# Patient Record
Sex: FEMALE | Race: WHITE | Employment: OTHER | URBAN - METROPOLITAN AREA
[De-identification: names, ages, dates, MRNs, and addresses within clinical notes are randomized per-mention and may not be internally consistent; named-entity substitution may affect disease eponyms.]

---

## 2017-01-03 DIAGNOSIS — I10 ESSENTIAL HYPERTENSION WITH GOAL BLOOD PRESSURE LESS THAN 140/90: Primary | ICD-10-CM

## 2017-01-03 DIAGNOSIS — J44.9 CHRONIC OBSTRUCTIVE PULMONARY DISEASE, UNSPECIFIED COPD TYPE (H): ICD-10-CM

## 2017-01-03 DIAGNOSIS — R91.8 GROUND GLASS OPACITY PRESENT ON IMAGING OF LUNG: ICD-10-CM

## 2017-01-03 RX ORDER — HYDROCHLOROTHIAZIDE 25 MG/1
25 TABLET ORAL DAILY
Qty: 30 TABLET | Refills: 0 | Status: SHIPPED | OUTPATIENT
Start: 2017-01-03 | End: 2017-02-26

## 2017-01-03 NOTE — TELEPHONE ENCOUNTER
HCTZ 25 mg tab      Last Written Prescription Date: 8/29/16  Last Fill Quantity: 30, # refills: 3  Last Office Visit with [facility] or [facility] prescribing provider: 12/29/16       POTASSIUM   Date Value Ref Range Status   02/17/2016 3.6 3.4 - 5.3 mmol/L Final     CREATININE   Date Value Ref Range Status   02/17/2016 0.85 0.52 - 1.04 mg/dL Final     BP Readings from Last 3 Encounters:   12/29/16 96/62   11/16/16 128/80   10/26/16 116/73     Dulera 100-5 mcg oral inhaler        Last Written Prescription Date: 12/2/16  Last Fill Quantity: 1, # refills: 0    Last Office Visit with [facility] or [facility] prescribing provider:  12/29/16   Future Office Visit:       Date of Last Asthma Action Plan Letter:   There are no preventive care reminders to display for this patient.   Asthma Control Test: No flowsheet data found.    Date of Last Spirometry Test:   No results found for this or any previous visit.

## 2017-01-04 ENCOUNTER — TELEPHONE (OUTPATIENT)
Dept: FAMILY MEDICINE | Facility: CLINIC | Age: 72
End: 2017-01-04

## 2017-01-04 NOTE — TELEPHONE ENCOUNTER
Reason for Call: Request for an order or referral:    Order or referral being requested: Theresa is asking if Shae can refer her to . She says she saw Dr Persaud in [place] on 12/29 and he referred her. She called a [facility] clinic in [place] and they can't get her in until May and then they referred her to try to get apt at the [facility] but they can't get her in until April. She wants to know if Shae can refer her someplace where she can be seen sooner. Theresa says she was referred for on ongoing cough she has had for 4 months    Date needed: as soon as possible    Has the patient been seen by the PCP for this problem? YES    Additional comments: none    Phone number Patient can be reached at:  Home number on file [phone] (home)    Best Time:  anytime        Call taken on 1/4/2017 at 9:57 AM by Aliza Jolley

## 2017-01-24 ENCOUNTER — TELEPHONE (OUTPATIENT)
Dept: FAMILY MEDICINE | Facility: CLINIC | Age: 72
End: 2017-01-24

## 2017-01-25 ENCOUNTER — MEDICAL CORRESPONDENCE (OUTPATIENT)
Dept: HEALTH INFORMATION MANAGEMENT | Facility: CLINIC | Age: 72
End: 2017-01-25

## 2017-01-25 DIAGNOSIS — Z79.4 TYPE 2 DIABETES MELLITUS WITHOUT COMPLICATION, WITH LONG-TERM CURRENT USE OF INSULIN (H): Primary | Chronic | ICD-10-CM

## 2017-01-25 DIAGNOSIS — E11.9 TYPE 2 DIABETES MELLITUS WITHOUT COMPLICATION, WITH LONG-TERM CURRENT USE OF INSULIN (H): Primary | Chronic | ICD-10-CM

## 2017-01-25 RX ORDER — METFORMIN HCL 500 MG
500 TABLET, EXTENDED RELEASE 24 HR ORAL 2 TIMES DAILY WITH MEALS
Qty: 180 TABLET | Refills: 0 | Status: SHIPPED | OUTPATIENT
Start: 2017-01-25 | End: 2017-04-21

## 2017-01-25 NOTE — TELEPHONE ENCOUNTER
Metformin  mg tab         Last Written Prescription Date: 6/29/16  Last Fill Quantity: 180, # refills: 1  Last Office Visit with [facility], [facility] or [facility] prescribing provider:  12/29/16        BP Readings from Last 3 Encounters:   12/29/16 96/62   11/16/16 128/80   10/26/16 116/73     MICROL        8   6/29/2016  No results found for this basename: microalbumin  CREATININE   Date Value Ref Range Status   02/17/2016 0.85 0.52 - 1.04 mg/dL Final   ]  GFR ESTIMATE   Date Value Ref Range Status   02/17/2016 66 >60 mL/min/1.7m2 Final     Comment:     Non  GFR Calc   04/17/2015 76 >60 mL/min/1.7m2 Final     Comment:     Non  GFR Calc   02/04/2015 76 >60 mL/min/1.7m2 Final     Comment:     Non  GFR Calc     GFR ESTIMATE IF BLACK   Date Value Ref Range Status   02/17/2016 80 >60 mL/min/1.7m2 Final     Comment:      GFR Calc   04/17/2015 >90   GFR Calc   >60 mL/min/1.7m2 Final   02/04/2015 >90   GFR Calc   >60 mL/min/1.7m2 Final     CHOL      185   6/29/2016  HDL       47   6/29/2016  LDL      103   6/29/2016  TRIG      174   6/29/2016  CHOLHDLRATIO      3.2   4/30/2015  AST       27   2/17/2016  ALT       31   2/17/2016  A1C      6.5   6/29/2016  A1C      6.5   2/24/2016  A1C      7.1   9/11/2015  A1C      7.7   4/17/2015  A1C      6.9   1/5/2015  POTASSIUM   Date Value Ref Range Status   02/17/2016 3.6 3.4 - 5.3 mmol/L Final

## 2017-02-15 ENCOUNTER — PRE VISIT (OUTPATIENT)
Dept: GASTROENTEROLOGY | Facility: CLINIC | Age: 72
End: 2017-02-15

## 2017-02-15 NOTE — TELEPHONE ENCOUNTER
1.  Date/reason for appt: 4/11/17 -- LPRD  2.  Referring provider: Rony Persaud  3.  Call to patient (Yes / No - short description): no, referred  4.  Previous care at / records requested from: [facility] -- referral and records in Epic. Per appt note, no outside records.

## 2017-02-26 DIAGNOSIS — I10 ESSENTIAL HYPERTENSION WITH GOAL BLOOD PRESSURE LESS THAN 140/90: ICD-10-CM

## 2017-02-26 NOTE — TELEPHONE ENCOUNTER
HCTZ      Last Written Prescription Date: 02/26/17  Last Fill Quantity: 30, # refills: 0  Last Office Visit with [facility], [facility] or [facility] prescribing provider: 11/16/16       Potassium   Date Value Ref Range Status   02/17/2016 3.6 3.4 - 5.3 mmol/L Final     Creatinine   Date Value Ref Range Status   02/17/2016 0.85 0.52 - 1.04 mg/dL Final     BP Readings from Last 3 Encounters:   12/29/16 96/62   11/16/16 128/80   10/26/16 116/73

## 2017-02-27 RX ORDER — HYDROCHLOROTHIAZIDE 25 MG/1
25 TABLET ORAL DAILY
Qty: 30 TABLET | Refills: 0 | Status: SHIPPED | OUTPATIENT
Start: 2017-02-27 | End: 2017-04-21

## 2017-03-06 ENCOUNTER — HOSPITAL ENCOUNTER (OUTPATIENT)
Dept: CT IMAGING | Facility: CLINIC | Age: 72
Discharge: HOME | End: 2017-03-06
Attending: PHYSICIAN ASSISTANT | Admitting: PHYSICIAN ASSISTANT
Payer: MEDICARE

## 2017-03-06 DIAGNOSIS — M54.10 RADICULAR LEG PAIN: ICD-10-CM

## 2017-03-06 DIAGNOSIS — R91.8 GROUND GLASS OPACITY PRESENT ON IMAGING OF LUNG: ICD-10-CM

## 2017-03-06 DIAGNOSIS — J44.9 CHRONIC OBSTRUCTIVE PULMONARY DISEASE, UNSPECIFIED COPD TYPE (H): ICD-10-CM

## 2017-03-06 PROCEDURE — 71250 CT THORAX DX C-: CPT

## 2017-03-06 RX ORDER — TRAMADOL HYDROCHLORIDE 50 MG/1
50 TABLET ORAL EVERY 6 HOURS PRN
Qty: 30 TABLET | Refills: 3 | Status: SHIPPED | OUTPATIENT
Start: 2017-03-06 | End: 2017-04-21

## 2017-03-06 NOTE — PROGRESS NOTES
Amber Cardenas,    Your chest CT looks ok.  The previous concern resolved.  They saw a bit of inflammation which can suggest current or recent infection.  Therefore, let me know if you're having more cough.  Otherwise you do not need to repeat this CT unless new concerns come up.    Please call clinic at [phone] if you have questions.    Shae Pierre PA-C

## 2017-03-06 NOTE — TELEPHONE ENCOUNTER
Tramadol      Last Written Prescription Date:  08/29/16  Last Fill Quantity: 30,   # refills: 3  Last Office Visit with [facility] or [facility] prescribing provider: 12/29/16  Future Office visit:       Routing refill request to provider for review/approval because:  Drug not on the [facility] or [facility] refill protocol or controlled substance

## 2017-03-15 ENCOUNTER — MEDICAL CORRESPONDENCE (OUTPATIENT)
Dept: HEALTH INFORMATION MANAGEMENT | Facility: CLINIC | Age: 72
End: 2017-03-15

## 2017-03-15 ENCOUNTER — TRANSFERRED RECORDS (OUTPATIENT)
Dept: HEALTH INFORMATION MANAGEMENT | Facility: CLINIC | Age: 72
End: 2017-03-15

## 2017-03-15 DIAGNOSIS — E11.40 DIABETIC NEUROPATHY (H): ICD-10-CM

## 2017-03-15 DIAGNOSIS — R26.89 BALANCE PROBLEM: ICD-10-CM

## 2017-03-15 DIAGNOSIS — R26.89 SCISSOR GAIT: ICD-10-CM

## 2017-03-15 DIAGNOSIS — G25.81 RESTLESS LEGS SYNDROME (RLS): Primary | ICD-10-CM

## 2017-03-17 ENCOUNTER — TRANSFERRED RECORDS (OUTPATIENT)
Dept: HEALTH INFORMATION MANAGEMENT | Facility: CLINIC | Age: 72
End: 2017-03-17

## 2017-04-08 DIAGNOSIS — I10 ESSENTIAL HYPERTENSION: ICD-10-CM

## 2017-04-10 RX ORDER — METOPROLOL SUCCINATE 50 MG/1
TABLET, EXTENDED RELEASE ORAL
Qty: 90 TABLET | Refills: 0 | Status: SHIPPED | OUTPATIENT
Start: 2017-04-10 | End: 2017-04-21

## 2017-04-10 NOTE — TELEPHONE ENCOUNTER
metoprolol (TOPROL-XL) 50 MG 24 hr tablet      Last Written Prescription Date: 9/16/16  Last Fill Quantity: 90, # refills: 1  Last Office Visit with [facility], [facility] or [facility] prescribing provider: 12/29/16       Potassium   Date Value Ref Range Status   02/17/2016 3.6 3.4 - 5.3 mmol/L Final     Creatinine   Date Value Ref Range Status   02/17/2016 0.85 0.52 - 1.04 mg/dL Final     BP Readings from Last 3 Encounters:   12/29/16 96/62   11/16/16 128/80   10/26/16 116/73

## 2017-04-21 ENCOUNTER — OFFICE VISIT (OUTPATIENT)
Dept: FAMILY MEDICINE | Facility: CLINIC | Age: 72
End: 2017-04-21
Payer: COMMERCIAL

## 2017-04-21 VITALS
HEART RATE: 72 BPM | DIASTOLIC BLOOD PRESSURE: 76 MMHG | WEIGHT: 175.2 LBS | BODY MASS INDEX: 33.08 KG/M2 | SYSTOLIC BLOOD PRESSURE: 126 MMHG | HEIGHT: 61 IN | OXYGEN SATURATION: 91 %

## 2017-04-21 DIAGNOSIS — E11.42 DIABETIC POLYNEUROPATHY ASSOCIATED WITH TYPE 2 DIABETES MELLITUS (H): ICD-10-CM

## 2017-04-21 DIAGNOSIS — Z79.4 TYPE 2 DIABETES MELLITUS WITHOUT COMPLICATION, WITH LONG-TERM CURRENT USE OF INSULIN (H): Chronic | ICD-10-CM

## 2017-04-21 DIAGNOSIS — R05.3 CHRONIC COUGH: ICD-10-CM

## 2017-04-21 DIAGNOSIS — E11.9 TYPE 2 DIABETES MELLITUS WITHOUT COMPLICATION, WITH LONG-TERM CURRENT USE OF INSULIN (H): Chronic | ICD-10-CM

## 2017-04-21 DIAGNOSIS — E78.5 DYSLIPIDEMIA: ICD-10-CM

## 2017-04-21 DIAGNOSIS — G25.81 RESTLESS LEGS SYNDROME (RLS): ICD-10-CM

## 2017-04-21 DIAGNOSIS — I10 ESSENTIAL HYPERTENSION WITH GOAL BLOOD PRESSURE LESS THAN 140/90: ICD-10-CM

## 2017-04-21 DIAGNOSIS — Z00.00 WELLNESS EXAMINATION: Primary | ICD-10-CM

## 2017-04-21 DIAGNOSIS — J44.9 CHRONIC OBSTRUCTIVE PULMONARY DISEASE, UNSPECIFIED COPD TYPE (H): Chronic | ICD-10-CM

## 2017-04-21 DIAGNOSIS — E53.8 B12 DEFICIENCY: ICD-10-CM

## 2017-04-21 DIAGNOSIS — K21.9 LPRD (LARYNGOPHARYNGEAL REFLUX DISEASE): ICD-10-CM

## 2017-04-21 DIAGNOSIS — M54.10 RADICULAR LEG PAIN: ICD-10-CM

## 2017-04-21 DIAGNOSIS — F33.0 MAJOR DEPRESSIVE DISORDER, RECURRENT EPISODE, MILD (H): Chronic | ICD-10-CM

## 2017-04-21 DIAGNOSIS — R26.89 SCISSOR GAIT: ICD-10-CM

## 2017-04-21 DIAGNOSIS — R63.4 WEIGHT LOSS: ICD-10-CM

## 2017-04-21 DIAGNOSIS — R26.89 BALANCE PROBLEM: ICD-10-CM

## 2017-04-21 LAB
ALBUMIN SERPL-MCNC: 3.4 G/DL (ref 3.4–5)
ALP SERPL-CCNC: 129 U/L (ref 40–150)
ALT SERPL W P-5'-P-CCNC: 21 U/L (ref 0–50)
ANION GAP SERPL CALCULATED.3IONS-SCNC: 6 MMOL/L (ref 3–14)
AST SERPL W P-5'-P-CCNC: 20 U/L (ref 0–45)
BASOPHILS # BLD AUTO: 0.1 10E9/L (ref 0–0.2)
BASOPHILS NFR BLD AUTO: 0.8 %
BILIRUB SERPL-MCNC: 0.4 MG/DL (ref 0.2–1.3)
BUN SERPL-MCNC: 13 MG/DL (ref 7–30)
CALCIUM SERPL-MCNC: 8.9 MG/DL (ref 8.5–10.1)
CHLORIDE SERPL-SCNC: 102 MMOL/L (ref 94–109)
CHOLEST SERPL-MCNC: 151 MG/DL
CO2 SERPL-SCNC: 31 MMOL/L (ref 20–32)
CREAT SERPL-MCNC: 0.94 MG/DL (ref 0.52–1.04)
DIFFERENTIAL METHOD BLD: NORMAL
EOSINOPHIL # BLD AUTO: 0.4 10E9/L (ref 0–0.7)
EOSINOPHIL NFR BLD AUTO: 5.5 %
ERYTHROCYTE [DISTWIDTH] IN BLOOD BY AUTOMATED COUNT: 14.4 % (ref 10–15)
ERYTHROCYTE [SEDIMENTATION RATE] IN BLOOD BY WESTERGREN METHOD: 8 MM/H (ref 0–30)
FERRITIN SERPL-MCNC: 71 NG/ML (ref 8–252)
GFR SERPL CREATININE-BSD FRML MDRD: 58 ML/MIN/1.7M2
GLUCOSE SERPL-MCNC: 139 MG/DL (ref 70–99)
HBA1C MFR BLD: 6.3 % (ref 4.3–6)
HCT VFR BLD AUTO: 40.9 % (ref 35–47)
HDLC SERPL-MCNC: 59 MG/DL
HGB BLD-MCNC: 13.1 G/DL (ref 11.7–15.7)
LDLC SERPL CALC-MCNC: 69 MG/DL
LYMPHOCYTES # BLD AUTO: 1.7 10E9/L (ref 0.8–5.3)
LYMPHOCYTES NFR BLD AUTO: 25.5 %
MCH RBC QN AUTO: 29.2 PG (ref 26.5–33)
MCHC RBC AUTO-ENTMCNC: 32 G/DL (ref 31.5–36.5)
MCV RBC AUTO: 91 FL (ref 78–100)
MONOCYTES # BLD AUTO: 0.6 10E9/L (ref 0–1.3)
MONOCYTES NFR BLD AUTO: 9.7 %
NEUTROPHILS # BLD AUTO: 3.8 10E9/L (ref 1.6–8.3)
NEUTROPHILS NFR BLD AUTO: 58.5 %
NONHDLC SERPL-MCNC: 92 MG/DL
PLATELET # BLD AUTO: 234 10E9/L (ref 150–450)
POTASSIUM SERPL-SCNC: 4.2 MMOL/L (ref 3.4–5.3)
PROT SERPL-MCNC: 6.7 G/DL (ref 6.8–8.8)
RBC # BLD AUTO: 4.49 10E12/L (ref 3.8–5.2)
SODIUM SERPL-SCNC: 139 MMOL/L (ref 133–144)
TRIGL SERPL-MCNC: 113 MG/DL
TSH SERPL DL<=0.005 MIU/L-ACNC: 3.65 MU/L (ref 0.4–4)
VIT B12 SERPL-MCNC: 281 PG/ML (ref 193–986)
WBC # BLD AUTO: 6.5 10E9/L (ref 4–11)

## 2017-04-21 PROCEDURE — 82607 VITAMIN B-12: CPT | Performed by: PHYSICIAN ASSISTANT

## 2017-04-21 PROCEDURE — 82728 ASSAY OF FERRITIN: CPT | Performed by: PHYSICIAN ASSISTANT

## 2017-04-21 PROCEDURE — 36415 COLL VENOUS BLD VENIPUNCTURE: CPT | Performed by: PHYSICIAN ASSISTANT

## 2017-04-21 PROCEDURE — 99397 PER PM REEVAL EST PAT 65+ YR: CPT | Performed by: PHYSICIAN ASSISTANT

## 2017-04-21 PROCEDURE — 83036 HEMOGLOBIN GLYCOSYLATED A1C: CPT | Performed by: PHYSICIAN ASSISTANT

## 2017-04-21 PROCEDURE — 80061 LIPID PANEL: CPT | Performed by: PHYSICIAN ASSISTANT

## 2017-04-21 PROCEDURE — 85652 RBC SED RATE AUTOMATED: CPT | Performed by: PHYSICIAN ASSISTANT

## 2017-04-21 PROCEDURE — 85025 COMPLETE CBC W/AUTO DIFF WBC: CPT | Performed by: PHYSICIAN ASSISTANT

## 2017-04-21 PROCEDURE — 99213 OFFICE O/P EST LOW 20 MIN: CPT | Mod: 25 | Performed by: PHYSICIAN ASSISTANT

## 2017-04-21 PROCEDURE — 84443 ASSAY THYROID STIM HORMONE: CPT | Performed by: PHYSICIAN ASSISTANT

## 2017-04-21 PROCEDURE — 80053 COMPREHEN METABOLIC PANEL: CPT | Performed by: PHYSICIAN ASSISTANT

## 2017-04-21 RX ORDER — METOPROLOL SUCCINATE 50 MG/1
50 TABLET, EXTENDED RELEASE ORAL DAILY
Qty: 90 TABLET | Refills: 3 | Status: SHIPPED | OUTPATIENT
Start: 2017-04-21 | End: 2017-11-27

## 2017-04-21 RX ORDER — TRAMADOL HYDROCHLORIDE 50 MG/1
50 TABLET ORAL EVERY 6 HOURS PRN
Qty: 30 TABLET | Refills: 3 | Status: SHIPPED | OUTPATIENT
Start: 2017-04-21 | End: 2017-10-03

## 2017-04-21 RX ORDER — METFORMIN HCL 500 MG
500 TABLET, EXTENDED RELEASE 24 HR ORAL 2 TIMES DAILY WITH MEALS
Qty: 180 TABLET | Refills: 3 | Status: SHIPPED | OUTPATIENT
Start: 2017-04-21 | End: 2018-04-24

## 2017-04-21 RX ORDER — ATORVASTATIN CALCIUM 40 MG/1
40 TABLET, FILM COATED ORAL AT BEDTIME
Qty: 90 TABLET | Refills: 3 | Status: SHIPPED | OUTPATIENT
Start: 2017-04-21 | End: 2018-03-14

## 2017-04-21 RX ORDER — DULOXETIN HYDROCHLORIDE 60 MG/1
60 CAPSULE, DELAYED RELEASE ORAL DAILY
Qty: 30 CAPSULE | Refills: 3 | Status: SHIPPED | OUTPATIENT
Start: 2017-04-21 | End: 2017-05-12

## 2017-04-21 RX ORDER — HYDROCHLOROTHIAZIDE 25 MG/1
25 TABLET ORAL DAILY
Qty: 90 TABLET | Refills: 3 | Status: SHIPPED | OUTPATIENT
Start: 2017-04-21 | End: 2017-11-27

## 2017-04-21 RX ORDER — MECOBALAMIN 5000 MCG
15 TABLET,DISINTEGRATING ORAL 2 TIMES DAILY
Qty: 60 CAPSULE | Refills: 3 | Status: SHIPPED | OUTPATIENT
Start: 2017-04-21 | End: 2017-10-04

## 2017-04-21 ASSESSMENT — ANXIETY QUESTIONNAIRES
6. BECOMING EASILY ANNOYED OR IRRITABLE: NOT AT ALL
5. BEING SO RESTLESS THAT IT IS HARD TO SIT STILL: NOT AT ALL
7. FEELING AFRAID AS IF SOMETHING AWFUL MIGHT HAPPEN: NOT AT ALL
3. WORRYING TOO MUCH ABOUT DIFFERENT THINGS: MORE THAN HALF THE DAYS
1. FEELING NERVOUS, ANXIOUS, OR ON EDGE: MORE THAN HALF THE DAYS
IF YOU CHECKED OFF ANY PROBLEMS ON THIS QUESTIONNAIRE, HOW DIFFICULT HAVE THESE PROBLEMS MADE IT FOR YOU TO DO YOUR WORK, TAKE CARE OF THINGS AT HOME, OR GET ALONG WITH OTHER PEOPLE: SOMEWHAT DIFFICULT
GAD7 TOTAL SCORE: 4
2. NOT BEING ABLE TO STOP OR CONTROL WORRYING: NOT AT ALL

## 2017-04-21 ASSESSMENT — PATIENT HEALTH QUESTIONNAIRE - PHQ9: 5. POOR APPETITE OR OVEREATING: NOT AT ALL

## 2017-04-21 NOTE — PATIENT INSTRUCTIONS
Weight loss concerning  Labs for this  Have had CT of lungs, scope of stomach.  Do mammogram. Colonoscopy is up to date.    Cough -  Stop dulera since not helping  Try spiriva  Set up appt with lung doctor in [place]    Mood -   Worse with bad things going on in family  Increase cymbalta  Recheck with me in 3 weeks for breathing and mood    Other meds unchanged until I see labs      [place] Mammo Schedule      [facility] Mammo Schedule  [facility] ~ [phone]  One Day Weekly- Alternating Days    [place] ~ [phone]  Every other Monday or Wednesday   & one Saturday morning a month    [place] ~ [phone]  Every Other Monday Morning    [place] ~ [phone]  Every Other Monday Afternoon    [place] ~ [phone]  Every Monday morning  Every Tuesday afternoon     Wed, Thurs, Friday morning & afternoon    Mammogram walk-in hours in [place]: Monday Friday, 8 a.m. - 4 p.m.  Questions? Call [phone].      Preventive Health Recommendations    Female Ages 65 +    Yearly exam:     See your health care provider every year in order to  o Review health changes.   o Discuss preventive care.    o Review your medicines if your doctor has prescribed any.      You no longer need a yearly Pap test unless you've had an abnormal Pap test in the past 10 years. If you have vaginal symptoms, such as bleeding or discharge, be sure to talk with your provider about a Pap test.      Every 1 to 2 years, have a mammogram.  If you are over 69, talk with your health care provider about whether or not you want to continue having screening mammograms.      Every 10 years, have a colonoscopy. Or, have a yearly FIT test (stool test). These exams will check for colon cancer.       Have a cholesterol test every 5 years, or more often if your doctor advises it.       Have a diabetes test (fasting glucose) every three years. If you are at risk for diabetes, you should have this test more often.       At age 65, have a bone  density scan (DEXA) to check for osteoporosis (brittle bone disease).    Shots:    Get a flu shot each year.    Get a tetanus shot every 10 years.    Talk to your doctor about your pneumonia vaccines. There are now two you should receive - Pneumovax (PPSV 23) and Prevnar (PCV 13).    Talk to your doctor about the shingles vaccine.    Talk to your doctor about the hepatitis B vaccine.    Nutrition:     Eat at least 5 servings of fruits and vegetables each day.      Eat whole-grain bread, whole-wheat pasta and brown rice instead of white grains and rice.      Talk to your provider about Calcium and Vitamin D.     Lifestyle    Exercise at least 150 minutes a week (30 minutes a day, 5 days a week). This will help you control your weight and prevent disease.      Limit alcohol to one drink per day.      No smoking.       Wear sunscreen to prevent skin cancer.       See your dentist twice a year for an exam and cleaning.      See your eye doctor every 1 to 2 years to screen for conditions such as glaucoma, macular degeneration and cataracts.

## 2017-04-21 NOTE — MR AVS SNAPSHOT
After Visit Summary   4/21/2017    Theresa Bill    MRN: [id number]           Patient Information     Date Of Birth          1945        Visit Information        Provider Department      4/21/2017 9:00 AM Shae Pierre PA-C [facility]        Today's Diagnoses     Chronic cough    -  1    Essential hypertension with goal blood pressure less than 140/90        Type 2 diabetes mellitus without complication, with long-term current use of insulin (H)        Essential hypertension        Radicular leg pain        LPRD (laryngopharyngeal reflux disease)        Dyslipidemia        Major depressive disorder, recurrent episode, mild (H)        Chronic obstructive pulmonary disease, unspecified COPD type (H)        Weight loss        Restless legs syndrome (RLS)        Diabetic neuropathy (H)        Scissor gait        Balance problem        B12 deficiency          Care Instructions    Weight loss concerning  Labs for this  Have had CT of lungs, scope of stomach.  Do mammogram. Colonoscopy is up to date.    Cough -  Stop dulera since not helping  Try spiriva  Set up appt with lung doctor in [place]    Mood -   Worse with bad things going on in family  Increase cymbalta  Recheck with me in 3 weeks for breathing and mood    Other meds unchanged until I see labs      [place] Mammo Schedule      [facility] Mammo Schedule  [facility] ~ [phone]  One Day Weekly- Alternating Days    [place] ~ [phone]  Every other Monday or Wednesday   & one Saturday morning a month    [place] ~ [phone]  Every Other Monday Morning    [place] ~ [phone]  Every Other Monday Afternoon    [place] ~ [phone]  Every Monday morning  Every Tuesday afternoon     Wed, Thurs, Friday morning & afternoon    Mammogram walk-in hours in [place]: Monday-Friday, 8 a.m. - 4 p.m.  Questions? Call [phone].      Preventive Health Recommendations    Female Ages 65 +    Yearly exam:     See  your health care provider every year in order to  o Review health changes.   o Discuss preventive care.    o Review your medicines if your doctor has prescribed any.      You no longer need a yearly Pap test unless you've had an abnormal Pap test in the past 10 years. If you have vaginal symptoms, such as bleeding or discharge, be sure to talk with your provider about a Pap test.      Every 1 to 2 years, have a mammogram.  If you are over 69, talk with your health care provider about whether or not you want to continue having screening mammograms.      Every 10 years, have a colonoscopy. Or, have a yearly FIT test (stool test). These exams will check for colon cancer.       Have a cholesterol test every 5 years, or more often if your doctor advises it.       Have a diabetes test (fasting glucose) every three years. If you are at risk for diabetes, you should have this test more often.       At age 65, have a bone density scan (DEXA) to check for osteoporosis (brittle bone disease).    Shots:    Get a flu shot each year.    Get a tetanus shot every 10 years.    Talk to your doctor about your pneumonia vaccines. There are now two you should receive - Pneumovax (PPSV 23) and Prevnar (PCV 13).    Talk to your doctor about the shingles vaccine.    Talk to your doctor about the hepatitis B vaccine.    Nutrition:     Eat at least 5 servings of fruits and vegetables each day.      Eat whole-grain bread, whole-wheat pasta and brown rice instead of white grains and rice.      Talk to your provider about Calcium and Vitamin D.     Lifestyle    Exercise at least 150 minutes a week (30 minutes a day, 5 days a week). This will help you control your weight and prevent disease.      Limit alcohol to one drink per day.      No smoking.       Wear sunscreen to prevent skin cancer.       See your dentist twice a year for an exam and cleaning.      See your eye doctor every 1 to 2 years to screen for conditions such as glaucoma,  macular degeneration and cataracts.        Follow-ups after your visit        Additional Services     PULMONARY MEDICINE REFERRAL       Your provider has referred you to: [facility]: [facility] [phone]   [email or web address]    Please be aware that coverage of these services is subject to the terms and limitations of your health insurance plan.  Call member services at your health plan with any benefit or coverage questions.      Please bring the following with you to your appointment:    (1) Any X-Rays, CTs or MRIs which have been performed.  Contact the facility where they were done to arrange for  prior to your scheduled appointment.    (2) List of current medications   (3) This referral request   (4) Any documents/labs given to you for this referral                  Who to contact     If you have questions or need follow up information about today's clinic visit or your schedule please contact [facility] directly at [phone].  Normal or non-critical lab and imaging results will be communicated to you by MyChart, letter or phone within 4 business days after the clinic has received the results. If you do not hear from us within 7 days, please contact the clinic through [employer] or phone. If you have a critical or abnormal lab result, we will notify you by phone as soon as possible.  Submit refill requests through [employer] or call your pharmacy and they will forward the refill request to us. Please allow 3 business days for your refill to be completed.          Additional Information About Your Visit        [employer] Information     [employer] gives you secure access to your electronic health record. If you see a primary care provider, you can also send messages to your care team and make appointments. If you have questions, please call your primary care clinic.  If you do not have a primary care provider, please call [phone] and  "they will assist you.        Care EveryWhere ID     This is your Care EveryWhere ID. This could be used by other organizations to access your [place] medical records  [id number]        Your Vitals Were     Pulse Height Pulse Oximetry BMI (Body Mass Index)          72 5' 1\" (1.549 m) 91% 33.1 kg/m2         Blood Pressure from Last 3 Encounters:   04/21/17 126/76   12/29/16 96/62   11/16/16 128/80    Weight from Last 3 Encounters:   04/21/17 175 lb 3.2 oz (79.5 kg)   12/29/16 179 lb (81.2 kg)   11/16/16 181 lb 6.4 oz (82.3 kg)              We Performed the Following     CBC with platelets and differential     Comprehensive metabolic panel (BMP + Alb, Alk Phos, ALT, AST, Total. Bili, TP)     Erythrocyte sedimentation rate auto     Ferritin     Lipid Profile with reflex to direct LDL     PULMONARY MEDICINE REFERRAL     TSH with free T4 reflex     Vitamin B12          Today's Medication Changes          These changes are accurate as of: 4/21/17 10:08 AM.  If you have any questions, ask your nurse or doctor.               Start taking these medicines.        Dose/Directions    tiotropium 2.5 MCG/ACT inhalation aerosol   Commonly known as:  SPIRIVA RESPIMAT   Used for:  Chronic cough, Chronic obstructive pulmonary disease, unspecified COPD type (H)   Started by:  Shae Pierre PA-C        Dose:  2 puff   Inhale 2 puffs into the lungs daily   Quantity:  4 g   Refills:  1         These medicines have changed or have updated prescriptions.        Dose/Directions    DULoxetine 60 MG EC capsule   Commonly known as:  CYMBALTA   This may have changed:    - medication strength  - how much to take  - additional instructions   Used for:  Major depressive disorder, recurrent episode, mild (H)   Changed by:  Shae Pierre PA-C        Dose:  60 mg   Take 1 capsule (60 mg) by mouth daily   Quantity:  30 capsule   Refills:  3       metoprolol 50 MG 24 hr tablet   Commonly known as:  TOPROL-XL   This may have changed: "  See the new instructions.   Used for:  Essential hypertension   Changed by:  Shae Pierre PA-C        Dose:  50 mg   Take 1 tablet (50 mg) by mouth daily   Quantity:  90 tablet   Refills:  3            Where to get your medicines      These medications were sent to [facility] [address] [place] [address] - [address] [facility]  [address] [facility] [address]     Phone:  [phone]     atorvastatin 40 MG tablet    DULoxetine 60 MG EC capsule    hydrochlorothiazide 25 MG tablet    LANsoprazole 15 MG CR capsule    metFORMIN 500 MG 24 hr tablet    metoprolol 50 MG 24 hr tablet    tiotropium 2.5 MCG/ACT inhalation aerosol         Some of these will need a paper prescription and others can be bought over the counter.  Ask your nurse if you have questions.     Bring a paper prescription for each of these medications     traMADol 50 MG tablet                Primary Care Provider Office Phone # Fax #    Shae Pierre PA-C [phone]       [address] [address]        Thank you!     Thank you for choosing [facility]  for your care. Our goal is always to provide you with excellent care. Hearing back from our patients is one way we can continue to improve our services. Please take a few minutes to complete the written survey that you may receive in the mail after your visit with us. Thank you!             Your Updated Medication List - Protect others around you: Learn how to safely use, store and throw away your medicines at www.disposemymeds.org.          This list is accurate as of: 4/21/17 10:08 AM.  Always use your most recent med list.                   Brand Name Dispense Instructions for use    ACE/ARB NOT PRESCRIBED (INTENTIONAL)      ACE & ARB not prescribed due to Intolerance-cough       albuterol 108 (90 BASE) MCG/ACT Inhaler    PROAIR HFA/PROVENTIL HFA/VENTOLIN HFA    1 Inhaler    Inhale 2 puffs into the lungs  every 4 hours as needed for shortness of breath / dyspnea       aspirin 81 MG tablet     100    ONE DAILY       atorvastatin 40 MG tablet    LIPITOR    90 tablet    Take 1 tablet (40 mg) by mouth At Bedtime       blood glucose monitoring test strip    no brand specified    2 Box    1 strip by In Vitro route 3 times daily. Has a Freestyle       clonazePAM 0.5 MG tablet    klonoPIN    180 tablet    Take 0.5 tablets (0.25 mg) by mouth At Bedtime       DAILY MULTIVITAMIN PO      One tablet daily       DITROPAN XL 15 MG Tb24   Generic drug:  oxybutynin chloride     90 tablet    Take 1 tablet (15 mg) by mouth daily       DULoxetine 60 MG EC capsule    CYMBALTA    30 capsule    Take 1 capsule (60 mg) by mouth daily       hydrochlorothiazide 25 MG tablet    HYDRODIURIL    90 tablet    Take 1 tablet (25 mg) by mouth daily       hydrocortisone 1 % cream    CORTAID    30 g    Apply sparingly to affected area of lip corners two times daily for 1 week.       insulin detemir 100 UNIT/ML injection    LEVEMIR FLEXPEN/FLEXTOUCH    3 Month    9 units at bedtime  Or as directed by diabetic educator.       insulin pen needle 32G X 4 MM    BD MRAIIA U/F    90 each    Use one pen daily       ipratropium - albuterol 0.5 mg/2.5 mg/3 mL 0.5-2.5 (3) MG/3ML neb solution    DUONEB    1080 mL    Take 1 vial (3 mLs) by nebulization every 6 hours as needed for shortness of breath / dyspnea or wheezing       ketoconazole 2 % cream    NIZORAL    30 g    Apply topically 2 times daily To corners of lips for 2 weeks.       LANsoprazole 15 MG CR capsule    PREVACID    60 capsule    Take 1 capsule (15 mg) by mouth 2 times daily       metFORMIN 500 MG 24 hr tablet    GLUCOPHAGE-XR    180 tablet    Take 1 tablet (500 mg) by mouth 2 times daily (with meals)       metoprolol 50 MG 24 hr tablet    TOPROL-XL    90 tablet    Take 1 tablet (50 mg) by mouth daily       * nystatin cream    MYCOSTATIN    60 g    Apply twice daily for yeast rash for 3 weeks.       *  nystatin [id number] UNIT/GM Powd    MYCOSTATIN    60 g    Apply 1 g topically 3 times daily as needed       olopatadine 0.1 % ophthalmic solution    PATANOL    5 mL    Place 1 drop into both eyes 2 times daily       order for DME      Equipment being ordered: CPAP Patient Theresa Bill received a Resmed Airsense 10  Auto. Pressures were set at Auto 10 - 15 cm H2O.       order for DME     1 Device    Equipment being ordered: Knee compression sleeve       tiotropium 2.5 MCG/ACT inhalation aerosol    SPIRIVA RESPIMAT    4 g    Inhale 2 puffs into the lungs daily       traMADol 50 MG tablet    ULTRAM    30 tablet    Take 1 tablet (50 mg) by mouth every 6 hours as needed for moderate pain       * Notice:  This list has 2 medication(s) that are the same as other medications prescribed for you. Read the directions carefully, and ask your doctor or other care provider to review them with you.

## 2017-04-21 NOTE — PROGRESS NOTES
CMA - future Cr please, diagnosis dec gfr.    Amber Cardenas,    Your labs look fine overall.  I found them reassuring regarding your weight loss.  My only concern on these labs was that kidney function dipped a bit.  This could be from just a bit of dehydration.  Drink plenty of water.  We'll recheck kidney function when we do your next A1c.    A1c result still to come.    Please call clinic at [phone] if you have questions.    Shae Pierre PA-C

## 2017-04-21 NOTE — NURSING NOTE
"Chief Complaint   Patient presents with     Wellness Visit     pt. is fasting today for lab work        Initial /76 (BP Location: Right arm, Patient Position: Chair, Cuff Size: Adult Regular)  Pulse 72  Ht 5' 1\" (1.549 m)  Wt 175 lb 3.2 oz (79.5 kg)  SpO2 91%  BMI 33.1 kg/m2 Estimated body mass index is 33.1 kg/(m^2) as calculated from the following:    Height as of this encounter: 5' 1\" (1.549 m).    Weight as of this encounter: 175 lb 3.2 oz (79.5 kg).  Medication Reconciliation: complete     Theresa Canchola, CMA      "

## 2017-04-22 ASSESSMENT — PATIENT HEALTH QUESTIONNAIRE - PHQ9: SUM OF ALL RESPONSES TO PHQ QUESTIONS 1-9: 13

## 2017-04-22 ASSESSMENT — ANXIETY QUESTIONNAIRES: GAD7 TOTAL SCORE: 4

## 2017-04-24 NOTE — PROGRESS NOTES
Amber Cardenas,    Your A1c continues to look great.  I think we can decrease your levemir.  I have that you're currently taking 9 units per day.  If this is correct, then please decrease to 7 units.  If not correct, please let me know.  Monitor your sugars a bit more for next couple weeks after this dose change, and let me know if getting numbers above 180.    Please call clinic at [phone] if you have questions.    Shae Pierre PA-C

## 2017-04-26 ENCOUNTER — TELEPHONE (OUTPATIENT)
Dept: FAMILY MEDICINE | Facility: CLINIC | Age: 72
End: 2017-04-26

## 2017-04-26 NOTE — TELEPHONE ENCOUNTER
Attempted to contact pt. She is looking for the number to pulmonology [place] the cities. She can try the [facility] at [phone] or [place] at [phone]. Also inquire that her Endoscopy papers are at the  for her to . I made copies and put in her chart. Thanks.     Ana Beard MA  12:46 PM 4/26/2017

## 2017-04-28 DIAGNOSIS — N30.20 OTHER CHRONIC CYSTITIS WITHOUT HEMATURIA: Primary | ICD-10-CM

## 2017-04-29 DIAGNOSIS — N30.20 OTHER CHRONIC CYSTITIS WITHOUT HEMATURIA: ICD-10-CM

## 2017-04-29 LAB
ALBUMIN UR-MCNC: NEGATIVE MG/DL
APPEARANCE UR: CLEAR
BILIRUB UR QL STRIP: NEGATIVE
COLOR UR AUTO: YELLOW
GLUCOSE UR STRIP-MCNC: NEGATIVE MG/DL
HGB UR QL STRIP: ABNORMAL
KETONES UR STRIP-MCNC: NEGATIVE MG/DL
LEUKOCYTE ESTERASE UR QL STRIP: ABNORMAL
NITRATE UR QL: NEGATIVE
PH UR STRIP: 7.5 PH (ref 5–7)
SP GR UR STRIP: 1.01 (ref 1–1.03)
URN SPEC COLLECT METH UR: ABNORMAL
UROBILINOGEN UR STRIP-ACNC: 0.2 EU/DL (ref 0.2–1)

## 2017-04-29 PROCEDURE — 87086 URINE CULTURE/COLONY COUNT: CPT | Performed by: FAMILY MEDICINE

## 2017-04-29 PROCEDURE — 81003 URINALYSIS AUTO W/O SCOPE: CPT | Performed by: FAMILY MEDICINE

## 2017-05-02 LAB
BACTERIA SPEC CULT: ABNORMAL
MICRO REPORT STATUS: ABNORMAL
SPECIMEN SOURCE: ABNORMAL

## 2017-05-12 ENCOUNTER — OFFICE VISIT (OUTPATIENT)
Dept: FAMILY MEDICINE | Facility: CLINIC | Age: 72
End: 2017-05-12
Payer: COMMERCIAL

## 2017-05-12 VITALS
BODY MASS INDEX: 32.47 KG/M2 | RESPIRATION RATE: 16 BRPM | HEIGHT: 61 IN | DIASTOLIC BLOOD PRESSURE: 82 MMHG | HEART RATE: 59 BPM | WEIGHT: 172 LBS | OXYGEN SATURATION: 96 % | SYSTOLIC BLOOD PRESSURE: 128 MMHG

## 2017-05-12 DIAGNOSIS — K12.0 APHTHAE, ORAL: ICD-10-CM

## 2017-05-12 DIAGNOSIS — D50.9 IRON DEFICIENCY ANEMIA, UNSPECIFIED IRON DEFICIENCY ANEMIA TYPE: ICD-10-CM

## 2017-05-12 DIAGNOSIS — Z79.4 TYPE 2 DIABETES MELLITUS WITHOUT COMPLICATION, WITH LONG-TERM CURRENT USE OF INSULIN (H): Chronic | ICD-10-CM

## 2017-05-12 DIAGNOSIS — R05.3 CHRONIC COUGH: ICD-10-CM

## 2017-05-12 DIAGNOSIS — R13.10 DYSPHAGIA, UNSPECIFIED TYPE: ICD-10-CM

## 2017-05-12 DIAGNOSIS — E11.9 TYPE 2 DIABETES MELLITUS WITHOUT COMPLICATION, WITH LONG-TERM CURRENT USE OF INSULIN (H): Chronic | ICD-10-CM

## 2017-05-12 DIAGNOSIS — G25.81 RESTLESS LEGS SYNDROME (RLS): ICD-10-CM

## 2017-05-12 DIAGNOSIS — J44.9 CHRONIC OBSTRUCTIVE PULMONARY DISEASE, UNSPECIFIED COPD TYPE (H): Chronic | ICD-10-CM

## 2017-05-12 DIAGNOSIS — F33.1 MODERATE RECURRENT MAJOR DEPRESSION (H): Primary | ICD-10-CM

## 2017-05-12 DIAGNOSIS — K14.6 TONGUE BURNING SENSATION: ICD-10-CM

## 2017-05-12 LAB
FERRITIN SERPL-MCNC: 67 NG/ML (ref 8–252)
FOLATE SERPL-MCNC: 10.4 NG/ML
IRON SATN MFR SERPL: 20 % (ref 15–46)
IRON SERPL-MCNC: 70 UG/DL (ref 35–180)
KOH PREP SPEC: NORMAL
MICRO REPORT STATUS: NORMAL
SPECIMEN SOURCE: NORMAL
TIBC SERPL-MCNC: 357 UG/DL (ref 240–430)
VIT B12 SERPL-MCNC: 254 PG/ML (ref 193–986)

## 2017-05-12 PROCEDURE — 82728 ASSAY OF FERRITIN: CPT | Performed by: PHYSICIAN ASSISTANT

## 2017-05-12 PROCEDURE — 83550 IRON BINDING TEST: CPT | Performed by: PHYSICIAN ASSISTANT

## 2017-05-12 PROCEDURE — 87210 SMEAR WET MOUNT SALINE/INK: CPT | Performed by: PHYSICIAN ASSISTANT

## 2017-05-12 PROCEDURE — 83516 IMMUNOASSAY NONANTIBODY: CPT | Performed by: PHYSICIAN ASSISTANT

## 2017-05-12 PROCEDURE — 99214 OFFICE O/P EST MOD 30 MIN: CPT | Performed by: PHYSICIAN ASSISTANT

## 2017-05-12 PROCEDURE — 82746 ASSAY OF FOLIC ACID SERUM: CPT | Performed by: PHYSICIAN ASSISTANT

## 2017-05-12 PROCEDURE — 82607 VITAMIN B-12: CPT | Performed by: PHYSICIAN ASSISTANT

## 2017-05-12 PROCEDURE — 36415 COLL VENOUS BLD VENIPUNCTURE: CPT | Performed by: PHYSICIAN ASSISTANT

## 2017-05-12 PROCEDURE — 83540 ASSAY OF IRON: CPT | Performed by: PHYSICIAN ASSISTANT

## 2017-05-12 RX ORDER — FLUOCINONIDE 0.5 MG/G
OINTMENT TOPICAL
Qty: 15 G | Refills: 0 | Status: SHIPPED | OUTPATIENT
Start: 2017-05-12 | End: 2019-03-11

## 2017-05-12 RX ORDER — DULOXETIN HYDROCHLORIDE 60 MG/1
60 CAPSULE, DELAYED RELEASE ORAL DAILY
Qty: 90 CAPSULE | Refills: 3 | Status: SHIPPED | OUTPATIENT
Start: 2017-05-12 | End: 2017-10-04

## 2017-05-12 ASSESSMENT — ANXIETY QUESTIONNAIRES
3. WORRYING TOO MUCH ABOUT DIFFERENT THINGS: NOT AT ALL
1. FEELING NERVOUS, ANXIOUS, OR ON EDGE: NOT AT ALL
IF YOU CHECKED OFF ANY PROBLEMS ON THIS QUESTIONNAIRE, HOW DIFFICULT HAVE THESE PROBLEMS MADE IT FOR YOU TO DO YOUR WORK, TAKE CARE OF THINGS AT HOME, OR GET ALONG WITH OTHER PEOPLE: SOMEWHAT DIFFICULT
5. BEING SO RESTLESS THAT IT IS HARD TO SIT STILL: NOT AT ALL
7. FEELING AFRAID AS IF SOMETHING AWFUL MIGHT HAPPEN: NOT AT ALL
GAD7 TOTAL SCORE: 3
6. BECOMING EASILY ANNOYED OR IRRITABLE: NOT AT ALL
2. NOT BEING ABLE TO STOP OR CONTROL WORRYING: NEARLY EVERY DAY

## 2017-05-12 ASSESSMENT — PATIENT HEALTH QUESTIONNAIRE - PHQ9: 5. POOR APPETITE OR OVEREATING: NOT AT ALL

## 2017-05-12 NOTE — MR AVS SNAPSHOT
After Visit Summary   5/12/2017    Theresa Bill    MRN: [id number]           Patient Information     Date Of Birth          1945        Visit Information        Provider Department      5/12/2017 9:00 AM Shae Pierre PA-C [facility]        Today's Diagnoses     Moderate recurrent major depression (H)    -  1    Chronic cough        Chronic obstructive pulmonary disease, unspecified COPD type (H)        Dysphagia, unspecified type        Tongue burning sensation        Aphthae, oral        Type 2 diabetes mellitus without complication, with long-term current use of insulin (H)        Restless legs syndrome (RLS)        Iron deficiency anemia, unspecified iron deficiency anemia type          Care Instructions    Cough -  Continue spiriva  Set up swallow study  Consider 24 hr pH monitor if swallowing study normal or if continue to get burning    Mood and stress -  Decided to continue cymbalta but hold off on re-adding wellbutrin  Starting counseling    Burning tongue -  Labs today    Lip ulcers -  Swish and spit salt water  Steroid gel can be tried - prescribed today     Diabetes -  No changes        Follow-ups after your visit        Additional Services     Speech Therapy Referral       *This order will print in the [facility] Central Scheduling Office*    [facility] provides Speech Therapy evaluation and treatment and many specialty services across the [place] system.  If requesting a specialty program, please choose from the list below.    Call [phone] to schedule [facility] at all locations, with the exception of [facility], please call [phone].     Treatment: Evaluation & Treatment  Speech Treatment Diagnosis: dysphagia, chronic cough now seeming after eating, history nissen  Special Instructions: eval and treat  Special Programs: none    Please be aware that coverage of these  services is subject to the terms and limitations of your health insurance plan.  Call member services at your health plan with any benefit or coverage questions.      **Note to Provider** To refer patients to therapy outside of the location list, change the order class to External Referral in the order composer.                  Your next 10 appointments already scheduled     Jul 05, 2017  9:00 AM CDT   [facility] Visit with PFT LAB   [facility]    [address] [place] [id number] [phone]           Bring a current list of meds and any records pertaining to this visit.  For Physicals, please bring immunization records and any forms needing to be filled out.  Please arrive 10 minutes early to complete paperwork.            Jul 06, 2017  2:30 PM CDT   New Visit with Dana Curiel MD   [facility]    [address] [place] [id number] [phone]              Who to contact     If you have questions or need follow up information about today's clinic visit or your schedule please contact [facility] directly at [phone].  Normal or non-critical lab and imaging results will be communicated to you by MyChart, letter or phone within 4 business days after the clinic has received the results. If you do not hear from us within 7 days, please contact the clinic through XDxhart or phone. If you have a critical or abnormal lab result, we will notify you by phone as soon as possible.  Submit refill requests through tinyclues or call your pharmacy and they will forward the refill request to us. Please allow 3 business days for your refill to be completed.          Additional Information About Your Visit        tinyclues Information     tinyclues gives you secure access to your electronic health record. If you see a primary care provider, you can also send messages to your  "care team and make appointments. If you have questions, please call your primary care clinic.  If you do not have a primary care provider, please call [phone] and they will assist you.        Care EveryWhere ID     This is your Care EveryWhere ID. This could be used by other organizations to access your [facility] medical records  [id number]        Your Vitals Were     Pulse Respirations Height Pulse Oximetry BMI (Body Mass Index)       59 16 5' 1\" (1.549 m) 96% 32.5 kg/m2        Blood Pressure from Last 3 Encounters:   05/12/17 147/78   04/21/17 126/76   12/29/16 96/62    Weight from Last 3 Encounters:   05/12/17 172 lb (78 kg)   04/21/17 175 lb 3.2 oz (79.5 kg)   12/29/16 179 lb (81.2 kg)              We Performed the Following     Ferritin     Iron and iron binding capacity     KOH prep (Other than skin, nails, hair)     Speech Therapy Referral     Vitamin B12          Today's Medication Changes          These changes are accurate as of: 5/12/17  9:59 AM.  If you have any questions, ask your nurse or doctor.               Start taking these medicines.        Dose/Directions    fluocinonide 0.05 % ointment   Commonly known as:  LIDEX   Used for:  Aphthae, oral   Started by:  Shae Pierre PA-C        Apply sparingly to affected area twice daily as needed for mouth ulcer.   Quantity:  15 g   Refills:  0            Where to get your medicines      [facility] [address] [place] [address]  [address] [place] [id number]     Phone:  [phone]     DULoxetine 60 MG EC capsule    fluocinonide 0.05 % ointment    insulin pen needle 32G X 4 MM                Primary Care Provider Office Phone # Fax #    Shae Pierre PA-C [phone]       [facility] [address] [place] [address]        Thank you!     Thank you for choosing [facility]  for your care. Our goal is always to provide you " with excellent care. Hearing back from our patients is one way we can continue to improve our services. Please take a few minutes to complete the written survey that you may receive in the mail after your visit with us. Thank you!             Your Updated Medication List - Protect others around you: Learn how to safely use, store and throw away your medicines at www.disposemymeds.org.          This list is accurate as of: 5/12/17  9:59 AM.  Always use your most recent med list.                   Brand Name Dispense Instructions for use    ACE/ARB NOT PRESCRIBED (INTENTIONAL)      ACE & ARB not prescribed due to Intolerance-cough       albuterol 108 (90 BASE) MCG/ACT Inhaler    PROAIR HFA/PROVENTIL HFA/VENTOLIN HFA    1 Inhaler    Inhale 2 puffs into the lungs every 4 hours as needed for shortness of breath / dyspnea       aspirin 81 MG tablet     100    ONE DAILY       atorvastatin 40 MG tablet    LIPITOR    90 tablet    Take 1 tablet (40 mg) by mouth At Bedtime       blood glucose monitoring test strip    no brand specified    2 Box    1 strip by In Vitro route 3 times daily. Has a Freestyle       clonazePAM 0.5 MG tablet    klonoPIN    180 tablet    Take 0.5 tablets (0.25 mg) by mouth At Bedtime       DAILY MULTIVITAMIN PO      One tablet daily       DITROPAN XL 15 MG Tb24   Generic drug:  oxybutynin chloride     90 tablet    Take 1 tablet (15 mg) by mouth daily       DULoxetine 60 MG EC capsule    CYMBALTA    90 capsule    Take 1 capsule (60 mg) by mouth daily       fluocinonide 0.05 % ointment    LIDEX    15 g    Apply sparingly to affected area twice daily as needed for mouth ulcer.       hydrochlorothiazide 25 MG tablet    HYDRODIURIL    90 tablet    Take 1 tablet (25 mg) by mouth daily       hydrocortisone 1 % cream    CORTAID    30 g    Apply sparingly to affected area of lip corners two times daily for 1 week.       insulin detemir 100 UNIT/ML injection    LEVEMIR FLEXPEN/FLEXTOUCH    15 mL    7 units at  bedtime       insulin pen needle 32G X 4 MM    BD MARIIA U/F    90 each    Use one pen daily       ipratropium - albuterol 0.5 mg/2.5 mg/3 mL 0.5-2.5 (3) MG/3ML neb solution    DUONEB    1080 mL    Take 1 vial (3 mLs) by nebulization every 6 hours as needed for shortness of breath / dyspnea or wheezing       ketoconazole 2 % cream    NIZORAL    30 g    Apply topically 2 times daily To corners of lips for 2 weeks.       LANsoprazole 15 MG CR capsule    PREVACID    60 capsule    Take 1 capsule (15 mg) by mouth 2 times daily       metFORMIN 500 MG 24 hr tablet    GLUCOPHAGE-XR    180 tablet    Take 1 tablet (500 mg) by mouth 2 times daily (with meals)       metoprolol 50 MG 24 hr tablet    TOPROL-XL    90 tablet    Take 1 tablet (50 mg) by mouth daily       * nystatin cream    MYCOSTATIN    60 g    Apply twice daily for yeast rash for 3 weeks.       * nystatin 566230 UNIT/GM Powd    MYCOSTATIN    60 g    Apply 1 g topically 3 times daily as needed       olopatadine 0.1 % ophthalmic solution    PATANOL    5 mL    Place 1 drop into both eyes 2 times daily       order for DME      Equipment being ordered: CPAP Patient Theresa Bill received a Resmed Airsense 10  Auto. Pressures were set at Auto 10 - 15 cm H2O.       order for DME     1 Device    Equipment being ordered: Knee compression sleeve       tiotropium 2.5 MCG/ACT inhalation aerosol    SPIRIVA RESPIMAT    4 g    Inhale 2 puffs into the lungs daily       traMADol 50 MG tablet    ULTRAM    30 tablet    Take 1 tablet (50 mg) by mouth every 6 hours as needed for moderate pain       * Notice:  This list has 2 medication(s) that are the same as other medications prescribed for you. Read the directions carefully, and ask your doctor or other care provider to review them with you.

## 2017-05-12 NOTE — NURSING NOTE
"Chief Complaint   Patient presents with     Depression     Patient has been dealing with  having stage 4 cancer     Breathing Problem     Patient states breathing is better with starting spiriva- still coughing some     Mouth Lesions     Has sore on her lip and tounge hurts has been ongoing for about a week.       Initial /78  Pulse 59  Resp 16  Ht 5' 1\" (1.549 m)  Wt 172 lb (78 kg)  SpO2 96%  BMI 32.5 kg/m2 Estimated body mass index is 32.5 kg/(m^2) as calculated from the following:    Height as of this encounter: 5' 1\" (1.549 m).    Weight as of this encounter: 172 lb (78 kg).  Medication Reconciliation: complete    "

## 2017-05-12 NOTE — PROGRESS NOTES
SUBJECTIVE:                                                    Theresa Bill is a 71 year old female who presents to clinic today for the following health issues:  Chief Complaint   Patient presents with     Depression     Patient has been dealing with  having stage 4 cancer     Breathing Problem     Patient states breathing is better with starting spiriva- still coughing some     Mouth Lesions     Has sore on her lip and tounge hurts has been ongoing for about a week.         COPD Follow-Up    Symptoms are currently: improved    Current fatigue or dyspnea with ambulation: stable     Shortness of breath: stable    Increased or change in Cough/Sputum: Has been ongoing with coughing    Fever(s): No    Baseline ambulation without stopping to rest 20 feet. Able to walk up 2 flights of stairs without stopping to rest.    Any ER/UC or hospital admissions since your last visit? No     History   Smoking Status     Never Smoker   Smokeless Tobacco     Never Used     No results found for: FEV1, REV8HLV       Amount of exercise or physical activity: 6-7 days/week for an average of 15-30 minutes    Problems taking medications regularly: No    Medication side effects: none    Diet: regular (no restrictions)    Chronic cough, etiology still somewhat unclear.  Long history COPD that had done well.  Prednisone and antibiotics have not helped, CT has been neg.  Pulm appt in July.  History nissen, has seen ENT, on PPI, has had endoscopy.  Still getting heartburn.      Since adding spirvia, cough better but still there - notes is generally good until she eats, then feels tickle and starts coughing.  Also feels pasta or bread gets stuck in throat, has to drink water to help these foods go down.  No other foods are a struggle.      Depression Followup    Status since last visit: Stable worried about her     See PHQ-9 for current symptoms.  Other associated symptoms: None    Complicating factors:   Significant life event:   "Yes-   has stage 4 cancer   Current substance abuse:  None  Anxiety or Panic symptoms:  No    PHQ-9  English PHQ-9   Any Language        Increase cymbalta - does think has helped, doesn't worry as much about things except her 's cancer.  Phq from 13 to 9, and GINNY from 4 to 3.  Doesn't want to resume wellbutrin.  Has considered counseling. Admits trouble coping with 's illness.    Mouth sores    Duration: has been ongoing for about a week    Description (location/character/radiation): sore on lip and tounge is sore    Intensity:  moderate    Accompanying signs and symptoms: Painful    History (similar episodes/previous evaluation): None    Precipitating or alleviating factors: None    Therapies tried and outcome: None   Lip ulcers of late.  Burning distal 1/3 tongue x 2 wks.  Long history dry fissured flat tongue.  History b12 def, iron def.  Long history dry mouth.    DM2 - dec levemir to 7 units - 159-169 mornings, then 120s-130s in day.  Levemir using qhs.    Problem list and histories reviewed & adjusted, as indicated.  Additional history: as documented    Labs reviewed in EPIC    Reviewed and updated as needed this visit by clinical staff  Tobacco  Allergies  Meds  Med Hx  Surg Hx  Fam Hx  Soc Hx      Reviewed and updated as needed this visit by Provider         ROS:  Constitutional, HEENT, cardiovascular, pulmonary, GI, neuro, endocrine and psych systems are negative, except as otherwise noted.    OBJECTIVE:                                                    /82  Pulse 59  Resp 16  Ht 5' 1\" (1.549 m)  Wt 172 lb (78 kg)  SpO2 96%  BMI 32.5 kg/m2  Body mass index is 32.5 kg/(m^2).  GENERAL: healthy, alert and no distress  HENT: lower lip with 3 small aphthae, tongue dry flat with deep fissures, no visible thrush  PSYCH: mentation appears normal, affect normal/bright    PHQ-9 SCORE 6/29/2016 4/21/2017 5/12/2017   Total Score - - -   Total Score 7 13 9     GINNY-7 SCORE " 6/29/2016 4/21/2017 5/12/2017   Total Score - - -   Total Score 3 4 3        ASSESSMENT/PLAN:                                                        ICD-10-CM    1. Moderate recurrent major depression (H) F33.1 DULoxetine (CYMBALTA) 60 MG EC capsule   2. Chronic cough R05 Speech Therapy Referral   3. Chronic obstructive pulmonary disease, unspecified COPD type (H) J44.9    4. Dysphagia, unspecified type R13.10 Speech Therapy Referral   5. Tongue burning sensation K14.6 Vitamin B12     Ferritin     Iron and iron binding capacity     KOH prep (Other than skin, nails, hair)     Tissue transglutaminase chayo IgA and IgG     Folate   6. Aphthae, oral K12.0 fluocinonide (LIDEX) 0.05 % ointment   7. Type 2 diabetes mellitus without complication, with long-term current use of insulin (H) E11.9 insulin pen needle (BD MARIIA U/F) 32G X 4 MM    Z79.4    8. Restless legs syndrome (RLS) G25.81 Ferritin     Iron and iron binding capacity   9. Iron deficiency anemia, unspecified iron deficiency anemia type D50.9 Ferritin     Iron and iron binding capacity       Patient Instructions   Cough -  Continue spiriva  Set up swallow study  Consider 24 hr pH monitor if swallowing study normal or if continue to get burning    Mood and stress -  Decided to continue cymbalta but hold off on re-adding wellbutrin  Starting counseling    Burning tongue -  Labs today    Lip ulcers -  Swish and spit salt water  Steroid gel can be tried - prescribed today     Diabetes -  No changes      Shae Pierre PA-C  [facility]

## 2017-05-12 NOTE — PATIENT INSTRUCTIONS
Cough -  Continue spiriva  Set up swallow study  Consider 24 hr pH monitor if swallowing study normal or if continue to get burning    Mood and stress -  Decided to continue cymbalta but hold off on re-adding wellbutrin  Starting counseling    Burning tongue -  Labs today    Lip ulcers -  Swish and spit salt water  Steroid gel can be tried - prescribed today     Diabetes -  No changes

## 2017-05-13 ASSESSMENT — PATIENT HEALTH QUESTIONNAIRE - PHQ9: SUM OF ALL RESPONSES TO PHQ QUESTIONS 1-9: 9

## 2017-05-13 ASSESSMENT — ANXIETY QUESTIONNAIRES: GAD7 TOTAL SCORE: 3

## 2017-05-15 LAB
TTG IGA SER-ACNC: NORMAL U/ML
TTG IGG SER-ACNC: NORMAL U/ML

## 2017-05-15 NOTE — PROGRESS NOTES
Amber Cardenas,    The labs for causes of tongue issues were all normal.    Please call clinic at [phone] if you have questions.    Shae Pierre PA-C

## 2017-05-23 ENCOUNTER — HOSPITAL ENCOUNTER (OUTPATIENT)
Dept: SPEECH THERAPY | Facility: CLINIC | Age: 72
Setting detail: THERAPIES SERIES
End: 2017-05-23
Attending: PHYSICIAN ASSISTANT
Payer: MEDICARE

## 2017-05-23 PROCEDURE — G8998 SWALLOW D/C STATUS: HCPCS | Mod: GN,CH | Performed by: SPEECH-LANGUAGE PATHOLOGIST

## 2017-05-23 PROCEDURE — G8996 SWALLOW CURRENT STATUS: HCPCS | Mod: GN,CH | Performed by: SPEECH-LANGUAGE PATHOLOGIST

## 2017-05-23 PROCEDURE — 40000211 ZZHC STATISTIC SLP  DEPARTMENT VISIT: Performed by: SPEECH-LANGUAGE PATHOLOGIST

## 2017-05-23 PROCEDURE — G8997 SWALLOW GOAL STATUS: HCPCS | Mod: GN,CH | Performed by: SPEECH-LANGUAGE PATHOLOGIST

## 2017-05-23 PROCEDURE — 92610 EVALUATE SWALLOWING FUNCTION: CPT | Mod: GN | Performed by: SPEECH-LANGUAGE PATHOLOGIST

## 2017-05-23 NOTE — PROGRESS NOTES
Clinical Swallow Evaluation  05/23/17   General Information   Type Of Visit Initial   Start Of Care Date 05/23/17   Referring Physician GERARDO Lyles   Orders Evaluate And Treat   Medical Diagnosis Chronic cough; dysphagia   Onset Of Illness/injury Or Date Of Surgery 05/12/17  (order date)   Precautions/limitations No Known Precautions/limitations   Hearing WFL for exam   Pertinent History of Current Problem/OT: Additional Occupational Profile Info Pt reports cough that often occurs with eating.  It does not happen at ever meal but is difficult to get out of the coughing episode.  She states in can be when eating or drinking.  It has caused her to stop eating at meals and some weight loss.  Pt describes it as a dry cough and does not produce anything during cough.  PMH includes GERD, stress.  Onset of cough is December 2017.  Primary symptom is feeling of food sticking in pharynx and esophagus.   Respiratory Status Room air   Prior Level Of Function Comment Currently eats a regular diet consistency.   Patient Role/employment History Retired   Living Environment [place]   Patient/family Goals to stop coughing   General Information Comments Pt has dry mouth as side effect of medication.   Pain Assessment   Pain Reported No   FALL RISK SCREEN   Have you fallen 2 or more times in the last year? No   Have you fallen and had an injury in the past year? No   Is the patient a fall risk? No   Comments has precautions set in place.  Had PT due to balance   Clinical Swallow Evaluation   Oral Musculature generally intact   Structural Abnormalities none present   Dentition upper dentures;present and adequate   Mandibular Strength and Mobility intact   Lingual Strength and Mobility WFL   Buccal Strength and Mobility intact   Laryngeal Function Cough;Throat clear;Swallow;Voicing initiated   Additional Documentation Yes   Clinical Swallow Eval: Thin Liquid Texture Trial   Mode of Presentation, Thin Liquids  cup;self-fed   Volume of Liquid or Food Presented 8 oz in total   Oral Phase of Swallow WFL   Pharyngeal Phase of Swallow intact   Diagnostic Statement WNL for single and consecutive sips.   Clinical Swallow Eval: Pudding Thick Liquid Texture Trial   Mode of Presentation, Pudding spoon;self-fed   Oral Phase, Pudding WFL   Pharyngeal Phase, Pudding intact;feeling of something stuck in throat   Diagnostic Statement WFL.  Needed to alternate liquids to clear residue.   Clinical Swallow Eval: Solid Food Texture Trial   Mode of Presentation, Solid self-fed   Volume of Solid Food Presented 1/5 shalini cracker   Oral Phase, Solid WFL  (feeling dry/sticky)   Pharyngeal Phase, Solid intact   Diagnostic Statement WFL.  Needed to alternate liquids to clear residue.   Swallow Compensations   Swallow Compensations Alternate viscosity of consistencies   Educational Assessment   Barriers to Learning No barriers   Esophageal Phase of Swallow   Patient reports or presents with symptoms of esophageal dysphagia Yes   Swallow Eval: Clinical Impressions   Skilled Criteria for Therapy Intervention No problems identified which require skilled intervention   Functional Assessment Scale (FAS) 7   Treatment Diagnosis mild pharyngeal phase dysphagia, possible esophageal stage dysphagia   Diet texture recommendations Regular diet   Clinical Impression Comments No overt dysphagia symptoms during visit today.  Oral and pharyngeal phase appear WFL.  Pt did report post swallow residue in pharyngeal area able to be cleared when alternating with thin liquid.  Due to soft pharyngeal dysphagia signs, pt's decreased nutrition and weight loss due to swallowing difficulty, and reports of feeling of food sticking lower down, would like to refer her for a video swallow study with an esophagram for further diagnostic information.  Need for treatment and goals to be based of video swallow study.   Total Session Time   Total Session Time 45   Total Evaluation  Time 45   SLP Medicare Only G-code   G-code Swallowing   Swallowing   Swallowing:  Current Status , Goal , Discharge -Hflp Only-Modifier the same for all G-codes CH: 0% impairment   Swallowing: Current  & Discharge Modifier Rationale-Eval Only outcome measure tool level 7

## 2017-05-24 DIAGNOSIS — R13.10 DYSPHAGIA, UNSPECIFIED TYPE: Primary | ICD-10-CM

## 2017-05-31 ENCOUNTER — RADIANT APPOINTMENT (OUTPATIENT)
Dept: MAMMOGRAPHY | Facility: CLINIC | Age: 72
End: 2017-05-31
Attending: PHYSICIAN ASSISTANT
Payer: COMMERCIAL

## 2017-05-31 DIAGNOSIS — Z12.31 VISIT FOR SCREENING MAMMOGRAM: ICD-10-CM

## 2017-05-31 PROCEDURE — G0202 SCR MAMMO BI INCL CAD: HCPCS | Mod: TC

## 2017-06-15 ENCOUNTER — HOSPITAL ENCOUNTER (OUTPATIENT)
Dept: GENERAL RADIOLOGY | Facility: CLINIC | Age: 72
Discharge: HOME | End: 2017-06-15
Attending: PHYSICIAN ASSISTANT | Admitting: PHYSICIAN ASSISTANT
Payer: MEDICARE

## 2017-06-15 ENCOUNTER — PRE VISIT (OUTPATIENT)
Dept: PULMONOLOGY | Facility: CLINIC | Age: 72
End: 2017-06-15

## 2017-06-15 ENCOUNTER — HOSPITAL ENCOUNTER (OUTPATIENT)
Dept: SPEECH THERAPY | Facility: CLINIC | Age: 72
Setting detail: THERAPIES SERIES
End: 2017-06-15
Attending: PHYSICIAN ASSISTANT
Payer: MEDICARE

## 2017-06-15 DIAGNOSIS — R13.10 DYSPHAGIA: ICD-10-CM

## 2017-06-15 PROCEDURE — G8996 SWALLOW CURRENT STATUS: HCPCS | Mod: GN,CH | Performed by: SPEECH-LANGUAGE PATHOLOGIST

## 2017-06-15 PROCEDURE — 40000211 ZZHC STATISTIC SLP  DEPARTMENT VISIT: Performed by: SPEECH-LANGUAGE PATHOLOGIST

## 2017-06-15 PROCEDURE — G8998 SWALLOW D/C STATUS: HCPCS | Mod: GN,CH | Performed by: SPEECH-LANGUAGE PATHOLOGIST

## 2017-06-15 PROCEDURE — 92611 MOTION FLUOROSCOPY/SWALLOW: CPT | Mod: GN | Performed by: SPEECH-LANGUAGE PATHOLOGIST

## 2017-06-15 PROCEDURE — G8997 SWALLOW GOAL STATUS: HCPCS | Mod: GN,CH | Performed by: SPEECH-LANGUAGE PATHOLOGIST

## 2017-06-15 RX ADMIN — ANTACID/ANTIFLATULENT 4 G: 380; 550; 10; 10 GRANULE, EFFERVESCENT ORAL at 10:36

## 2017-06-15 NOTE — LETTER
[facility]  [address]  [facility] [address]  Phone: [phone]  Fax: [phone]    June 19, 2017    Theresa LUDY Bill  [address] [place] [address]          Dear Ms. Bill,    The results of your recent lab tests: Your video swallow study shows an area of mucosal (tissue) abnormality.  This wasn't present when you'd done your scope a few months ago, so I'm not sure what to think.  There is some esophagus movement abnormality, which could be causing your sensation of food getting stuck, and may or may not be causing cough.  There is not a lot of acid reflux.  I would suggest that you return to your GI specialist. Enclosed is a copy of these results.  If you have any further questions or problems, please contact our office.  Results for orders placed or performed during the hospital encounter of 06/15/17   XR Video Swallow w Esophagram    Narrative    XR VIDEO SWALLOW W ESOPHAGRAM 6/15/2017 10:39 AM    HISTORY: Dysphagia.    FLUORO TIME:  1.8 minutes    PROCEDURE: The patient's swallowing mechanism is assessed under  fluoroscopy in conjunction with a speech pathologist. Multiple  consistencies are utilized.  The cervical portion of the esophagus is  also visualized. Then, the thoracic portion of the esophagus and  gastroesophageal junction are assessed.  Thirteen images are obtained.    FINDINGS: Assessment of the swallowing mechanism shows one episode of  transient penetration during the swallow with the thin barium type  consistency. There also is a mild delay in the triggering of the  swallowing mechanism on one occasion with the pudding type  consistency. When the swallow is triggered pharyngeal contents clear  well. No pharyngeal mass, web, or diverticulum are seen.    A hiatal hernia is present that measures 8.9 cm in diameter. Within  the distal esophagus there is a focal area of mucosal irregularity  that measures 1.4 cm in length. A mild decrease in primary  peristaltic  waves is seen and there are mild-moderate tertiary contractions  present within the esophagus. Some mild intermittent gastroesophageal  reflux from the herniated portion of the stomach into the distal  esophagus is also seen.      Impression    IMPRESSION:   1. There is a focal area of mucosal irregularity/ulceration in the  distal esophagus. Correlation with endoscopy may be helpful.  2. Esophageal dysmotility is present.  3. A hiatal hernia is present with an associated small amount of  gastroesophageal reflux.    ANTONELLA GRADY MD       Sincerely,      Shae Pierre PA-C/ gui

## 2017-06-15 NOTE — PROGRESS NOTES
Amber Cardenas,    Your video swallow study shows an area of mucosal (tissue) abnormality.  This wasn't present when you'd done your scope a few months ago, so I'm not sure what to think.  There is some esophagus movement abnormality, which could be causing your sensation of food getting stuck, and may or may not be causing cough.  There is not a lot of acid reflux.  I would suggest that you return to your GI specialist, or I can refer you to one if needed.  Do you need referral?    Let me know.    Shae Pierre PA-C

## 2017-06-16 DIAGNOSIS — R13.10 DYSPHAGIA, UNSPECIFIED TYPE: Primary | ICD-10-CM

## 2017-06-19 NOTE — TELEPHONE ENCOUNTER
PULMONARY NEW PATIENT PRE-VISIT ASSESSMENT    Date Patient Contacted: 6/21/2017left message to call back     Confirmed appointment times and location OR  Message left confirming appointment times and location. Requested patient return call to review medical history and outside records.        1. Patient is scheduled to see Dr Curiel on 7/6/2017  2. Reason for visit: Chronic Cough   3. Referring provider Shae Pierre PA-C     4. Has patient seen previous specialist? ???-Patient may have seen a Pulmonologist a long time ago but can't remember      5. Previous Imaging: In Psychiatric: Last CXR done: 12/19/2016, Last Chest CT done: 3/6/2017      Outside Imaging: Location/Date/Type/Status (Requested, Received, Patient will hand carry disc, Pushed to iSite, Disc will be mailed) ???- No outside imaging        6.  Pulmonary Function Tests: Scheduled at  7/5/2017       Outside PFT Lab:  Location/Date/Status (Requested, Received, Patient will hand carry) ??? no       Oxygen? ???- no    Will route to care team for follow-up.    Patient Reminders Given:  Please, make sure you bring an updated list of your medications.   If you need to cancel or reschedule, please call [phone].

## 2017-06-20 ENCOUNTER — TRANSFERRED RECORDS (OUTPATIENT)
Dept: HEALTH INFORMATION MANAGEMENT | Facility: CLINIC | Age: 72
End: 2017-06-20

## 2017-07-05 ENCOUNTER — OFFICE VISIT (OUTPATIENT)
Dept: NURSING | Facility: CLINIC | Age: 72
End: 2017-07-05
Payer: COMMERCIAL

## 2017-07-05 DIAGNOSIS — R05.9 COUGH: Primary | ICD-10-CM

## 2017-07-05 PROCEDURE — 94729 DIFFUSING CAPACITY: CPT | Performed by: INTERNAL MEDICINE

## 2017-07-05 PROCEDURE — 94375 RESPIRATORY FLOW VOLUME LOOP: CPT | Performed by: INTERNAL MEDICINE

## 2017-07-05 PROCEDURE — 94726 PLETHYSMOGRAPHY LUNG VOLUMES: CPT | Performed by: INTERNAL MEDICINE

## 2017-07-05 NOTE — MR AVS SNAPSHOT
After Visit Summary   7/5/2017    Theresa Bill    MRN: [id number]           Patient Information     Date Of Birth          1945        Visit Information        Provider Department      7/5/2017 9:00 AM PFT LAB [facility]        Today's Diagnoses     Cough    -  1       Follow-ups after your visit        Your next 10 appointments already scheduled     Jul 06, 2017  2:30 PM CDT   New Visit with Dana Curiel MD   [facility] ([facility]    [id number] [place] [id number] [phone]              Who to contact     If you have questions or need follow up information about today's clinic visit or your schedule please contact [facility] directly at [phone].  Normal or non-critical lab and imaging results will be communicated to you by LivePersonhart, letter or phone within 4 business days after the clinic has received the results. If you do not hear from us within 7 days, please contact the clinic through LivePersonhart or phone. If you have a critical or abnormal lab result, we will notify you by phone as soon as possible.  Submit refill requests through [employer] or call your pharmacy and they will forward the refill request to us. Please allow 3 business days for your refill to be completed.          Additional Information About Your Visit        LivePersonhart Information     [employer] gives you secure access to your electronic health record. If you see a primary care provider, you can also send messages to your care team and make appointments. If you have questions, please call your primary care clinic.  If you do not have a primary care provider, please call [phone] and they will assist you.      [employer] Space is an electronic gateway that provides easy, online access to your medical records. With [employer], you can request a clinic appointment, read your test results, renew a prescription or communicate with your  care team.     To access your existing account, please contact your [facility] or call [phone] for assistance.        Care EveryWhere ID     This is your Care EveryWhere ID. This could be used by other organizations to access your [facility] medical records  [id number]         Blood Pressure from Last 3 Encounters:   05/12/17 128/82   04/21/17 126/76   12/29/16 96/62    Weight from Last 3 Encounters:   05/12/17 78 kg (172 lb)   04/21/17 79.5 kg (175 lb 3.2 oz)   12/29/16 81.2 kg (179 lb)              We Performed the Following     General PFT Lab (Please always keep checked)     HC DIFFUSING CAPACITY     HC PLETHYSMOGRAPHY LUNG VOLUMES W/WO AIRWAY RESIST     RESPIRATORY FLOW VOLUME LOOP        Primary Care Provider Office Phone # Fax #    Shae Pierre PA-C [phone]       [facility] [address] [address]        Equal Access to Services     SABAS VUONG : Hadii aad ku hadasho Soomaali, waaxda luqadaha, qaybta kaalmada adeegyada, waxay idiin hayaan nandinieg kharamary oconnor . So [facility] [phone].    ATENCIÓN: Si habla español, tiene a urban disposición servicios gratuitos de asistencia lingüística. Llame al [phone].    We comply with applicable federal civil rights laws and [place] laws. We do not discriminate on the basis of race, color, national origin, age, disability sex, sexual orientation or gender identity.            Thank you!     Thank you for choosing [facility]  for your care. Our goal is always to provide you with excellent care. Hearing back from our patients is one way we can continue to improve our services. Please take a few minutes to complete the written survey that you may receive in the mail after your visit with us. Thank you!             Your Updated Medication List - Protect others around you: Learn how to safely use, store and throw away your medicines at www.disposemymeds.org.           This list is accurate as of: 7/5/17  9:20 AM.  Always use your most recent med list.                   Brand Name Dispense Instructions for use Diagnosis    ACE/ARB NOT PRESCRIBED (INTENTIONAL)      ACE & ARB not prescribed due to Intolerance-cough    Type 2 diabetes, HbA1c goal < 7% (H)       albuterol 108 (90 BASE) MCG/ACT Inhaler    PROAIR HFA/PROVENTIL HFA/VENTOLIN HFA    1 Inhaler    Inhale 2 puffs into the lungs every 4 hours as needed for shortness of breath / dyspnea    Cough, Chronic obstructive pulmonary disease, unspecified COPD type (H)       aspirin 81 MG tablet     100    ONE DAILY    Type II or unspecified type diabetes mellitus without mention of complication, not stated as uncontrolled       atorvastatin 40 MG tablet    LIPITOR    90 tablet    Take 1 tablet (40 mg) by mouth At Bedtime    Dyslipidemia       blood glucose monitoring test strip    no brand specified    2 Box    1 strip by In Vitro route 3 times daily. Has a Freestyle    Type 2 diabetes, HbA1c goal < 7% (H)       clonazePAM 0.5 MG tablet    klonoPIN    180 tablet    Take 0.5 tablets (0.25 mg) by mouth At Bedtime        DAILY MULTIVITAMIN PO      One tablet daily        DITROPAN XL 15 MG Tb24   Generic drug:  oxybutynin chloride     90 tablet    Take 1 tablet (15 mg) by mouth daily    Overactive bladder       DULoxetine 60 MG EC capsule    CYMBALTA    90 capsule    Take 1 capsule (60 mg) by mouth daily    Moderate recurrent major depression (H)       fluocinonide 0.05 % ointment    LIDEX    15 g    Apply sparingly to affected area twice daily as needed for mouth ulcer.    Aphthae, oral       hydrochlorothiazide 25 MG tablet    HYDRODIURIL    90 tablet    Take 1 tablet (25 mg) by mouth daily    Essential hypertension with goal blood pressure less than 140/90       hydrocortisone 1 % cream    CORTAID    30 g    Apply sparingly to affected area of lip corners two times daily for 1 week.    Angular cheilitis       insulin detemir 100  UNIT/ML injection    LEVEMIR FLEXPEN/FLEXTOUCH    15 mL    7 units at bedtime    Type 2 diabetes mellitus without complication, with long-term current use of insulin (H)       insulin pen needle 32G X 4 MM    BD MARIIA U/F    90 each    Use one pen daily    Type 2 diabetes mellitus without complication, with long-term current use of insulin (H)       ipratropium - albuterol 0.5 mg/2.5 mg/3 mL 0.5-2.5 (3) MG/3ML neb solution    DUONEB    1080 mL    Take 1 vial (3 mLs) by nebulization every 6 hours as needed for shortness of breath / dyspnea or wheezing    COPD exacerbation (H)       ketoconazole 2 % cream    NIZORAL    30 g    Apply topically 2 times daily To corners of lips for 2 weeks.    Angular cheilitis       LANsoprazole 15 MG CR capsule    PREVACID    60 capsule    Take 1 capsule (15 mg) by mouth 2 times daily    LPRD (laryngopharyngeal reflux disease)       metFORMIN 500 MG 24 hr tablet    GLUCOPHAGE-XR    180 tablet    Take 1 tablet (500 mg) by mouth 2 times daily (with meals)    Type 2 diabetes mellitus without complication, with long-term current use of insulin (H)       metoprolol 50 MG 24 hr tablet    TOPROL-XL    90 tablet    Take 1 tablet (50 mg) by mouth daily        * nystatin cream    MYCOSTATIN    60 g    Apply twice daily for yeast rash for 3 weeks.    Intertrigo       * nystatin [id number] UNIT/GM Powd    MYCOSTATIN    60 g    Apply 1 g topically 3 times daily as needed    Erythematous cond NEC       olopatadine 0.1 % ophthalmic solution    PATANOL    5 mL    Place 1 drop into both eyes 2 times daily    Allergic conjunctivitis, bilateral       order for DME      Equipment being ordered: CPAP Patient Theresa Bill received a Resmed Airsense 10  Auto. Pressures were set at Auto 10 - 15 cm H2O.        order for DME     1 Device    Equipment being ordered: Knee compression sleeve    Fall at home, subsequent encounter, Pain in joint, lower leg, unspecified laterality, Effusion of knee joint right, Arthritis  of knee, degenerative       tiotropium 2.5 MCG/ACT inhalation aerosol    SPIRIVA RESPIMAT    4 g    Inhale 2 puffs into the lungs daily    Chronic cough, Chronic obstructive pulmonary disease, unspecified COPD type (H)       traMADol 50 MG tablet    ULTRAM    30 tablet    Take 1 tablet (50 mg) by mouth every 6 hours as needed for moderate pain    Radicular leg pain       * Notice:  This list has 2 medication(s) that are the same as other medications prescribed for you. Read the directions carefully, and ask your doctor or other care provider to review them with you.

## 2017-07-06 ENCOUNTER — OFFICE VISIT (OUTPATIENT)
Dept: PULMONOLOGY | Facility: CLINIC | Age: 72
End: 2017-07-06
Attending: PHYSICIAN ASSISTANT
Payer: COMMERCIAL

## 2017-07-06 VITALS
RESPIRATION RATE: 20 BRPM | OXYGEN SATURATION: 93 % | HEART RATE: 80 BPM | DIASTOLIC BLOOD PRESSURE: 68 MMHG | BODY MASS INDEX: 31.51 KG/M2 | WEIGHT: 166.9 LBS | SYSTOLIC BLOOD PRESSURE: 113 MMHG | HEIGHT: 61 IN

## 2017-07-06 DIAGNOSIS — J41.0 SIMPLE CHRONIC BRONCHITIS (H): Chronic | ICD-10-CM

## 2017-07-06 DIAGNOSIS — R05.3 CHRONIC COUGH: Primary | ICD-10-CM

## 2017-07-06 DIAGNOSIS — K21.9 LPRD (LARYNGOPHARYNGEAL REFLUX DISEASE): ICD-10-CM

## 2017-07-06 PROCEDURE — 99214 OFFICE O/P EST MOD 30 MIN: CPT | Performed by: INTERNAL MEDICINE

## 2017-07-06 ASSESSMENT — PAIN SCALES - GENERAL: PAINLEVEL: NO PAIN (0)

## 2017-07-06 NOTE — PATIENT INSTRUCTIONS
It was nice to meet you today.    You don't have COPD by pulmonary function testing. You can stop the spiriva, and I don't think the dulera is needed, either.    You may continue the nebulizer if you feel like it is helpful, but I do not think it is going to help your cough.    Your cough is probably from reflux disease. I agree that a pH probe and manometry is a good idea, and I would have you see GI again.    Dana Curiel

## 2017-07-06 NOTE — NURSING NOTE
"Theresa Bill's goals for this visit include: .  She requests these members of her care team be copied on today's visit information: RP    PCP: Shae Pierre    Referring Provider:  Shae Pierre PA-C  [place] [id number] [place] [id number]    Chief Complaint   Patient presents with     Consult     chronic cough  RP: Peter       Initial /68 (BP Location: Left arm, Patient Position: Chair, Cuff Size: Adult Regular)  Pulse 80  Resp 20  Ht 1.549 m (5' 1\")  Wt 75.7 kg (166 lb 14.4 oz)  SpO2 93%  BMI 31.54 kg/m2 Estimated body mass index is 31.54 kg/(m^2) as calculated from the following:    Height as of this encounter: 1.549 m (5' 1\").    Weight as of this encounter: 75.7 kg (166 lb 14.4 oz).  Medication Reconciliation: complete    Do you need any medication refills at today's visit? Karlene Chávez LPN    "

## 2017-07-06 NOTE — LETTER
7/6/2017        RE: Theresa Bill  [address] [place] [address]        Pulmonary Progress Note:    Chief Complaint: Cough  Referring Provider: Shae Pierre    HPI:  Patient is a 71-year-old woman here for evaluation of chronic cough. She says that she's had cough since last December. She says that she's had intermittent dry cough lasting for approximately 4 weeks at a time throughout the last several years. This time however it started in December and has continued. She says that it is dry and is not productive of sputum. It occurs with eating.. She says that she has been having increasing issues with GERD. She says that when she lies in bed at night she feels arising heartburn in her chest. She has a history of chronic heartburn and hiatal hernia with a previous Nissen fundoplication initially with good improvement in symptoms. She says that approximately 2 months ago her symptoms returned and she's been taking lansoprazole ever since. Despite taking a PPI she continues to have significant heartburn symptoms. When her heartburn occurs at night she gets out of bed and takes lemon lime soda and dilia seltzer. She reports approximately 30 pound weight loss in the last 2 months. She's had symptoms of dry mouth which is made it hard to swallow. She says she feels a phlegm sensation in the back of her esophagus. She says that she's had evaluations including CT scans, video swallow studies, and a previous evaluation by ENT. At that time and direct laryngoscopy revealed changes consistent with laryngeal pharyngeal reflux disease and she was recommended to start a PPI. She reports that she has no problems with allergies. She does not have postnasal drip. She has no history of asthma. She reports episodes of bronchitis as a child but didn't get sick more than other children. she reports 5-10 minutes of morning stiffness every morning. She has been taking Tylenol arthritis for joint issues. She has no  "problems with photosensitivity. No rashes. She has right greater than left lower extremity edema. She's been taking teacher [place] but doesn't think it changed her breathing. She has nebs (albuterol ipratropium) which she uses intermittently. They help her bring up sputum but don't make the cough stop. She does not have issues of shortness of breath with exertion. She is a lifetime nonsmoker.      Past Medical History:  GERD history of Nissen fundoplication   Diabetes   Dysphasia   Depression   Hypertension     Family History:  Mother had brain and ovarian cancer. Father had lung cancer and colitis. She has 6 sisters to inform  of MIs. She has 2 brothers 1 with cirrhosis from alcohol the other she hasn't seen since her mother  20 years ago.     Social History:  She is a lifetime nonsmoker. She has 2 dogs that are lalo tzu/poodle mixes. Her  has lung liver bone cancer. He was diagnosed in 2016. They live outside of [facility] on [place] acres. 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    ROS: a full 14 point Review of Systems was done and is negative except as noted above and in the HPI.    Exam:  /68 (BP Location: Left arm, Patient Position: Chair, Cuff Size: Adult Regular)  Pulse 80  Resp 20  Ht 1.549 m (5' 1\")  Wt 75.7 kg (166 lb 14.4 oz)  SpO2 93%  BMI 31.54 kg/m2  Alert, in no acute distress  PERRLA  nose,mouth without ulcers or lesions  No adenopathy,masses or thyromegaly  lungs clear to auscultation  Regular rate and rhythm  Spine ROM normal. Muscular strength intact.  no suspicious lesions or rashes  mentation appears normal., affect and mood normal  without deficit    Results:  PFTs reviewed. Normal spirometry, lung volumes and DLCO.    Chest Imaging reviewed. Chest CT from 2017 shows resolution of prior ground glass, but new poorly defined nodular opacities in the left base and tree in bud nodularity in the right " middle and lower lobes    Labs Reviewed. Bicarb mildly elevated at 31, renal function normal.    Assessment and Plan:  Patient is a 71-year-old woman here for evaluation of chronic cough.    #1 chronic cough most likely related to laryngeal pharyngeal reflux disease based on history. We spent quite a bit of time talking about the most common causes of chronic cough. She does not have COPD by pulmonary function testing and is unlikely to received benefit from inhaler therapy. Furthermore tiotropium is likely worsening her dry mouth. She does not have postnasal drip and I would not start therapy for that at this time. Based on her history she does have symptomatic heartburn and abnormal swallowing studies. I think 24-hour pH probe with manometry would be a very reasonable next step she should reestablish with GI. Given that she's had previous evaluation by ENT who agreed with the diagnosis of laryngeal pharyngeal reflux disease do not think that she needs reevaluation by this time. We did talk about in the future if she continues to have symptoms despite adequate treatment for reflux it might be reasonable to evaluate for speech therapy in the future.    #2 abnormal CT scan with scattered areas of tree and bud nodularity then present on most recent CT scan. Suspect that these are most likely related to aspiration or bronchitis. Unlikely to represent a cause for her current cough.    Patient may return for evaluation in pulmonary clinic on an as-needed basis in the future      Sincerely,        Dana Curiel MD

## 2017-07-06 NOTE — MR AVS SNAPSHOT
After Visit Summary   7/6/2017    Theresa Bill    MRN: [id number]           Patient Information     Date Of Birth          1945        Visit Information        Provider Department      7/6/2017 2:30 PM Dana Curiel MD [facility]        Today's Diagnoses     Chronic cough    -  1    LPRD (laryngopharyngeal reflux disease)        Simple chronic bronchitis (H)          Care Instructions    It was nice to meet you today.    You don't have COPD by pulmonary function testing. You can stop the spiriva, and I don't think the dulera is needed, either.    You may continue the nebulizer if you feel like it is helpful, but I do not think it is going to help your cough.    Your cough is probably from reflux disease. I agree that a pH probe and manometry is a good idea, and I would have you see GI again.    Dana Curiel           Follow-ups after your visit        Follow-up notes from your care team     Return if symptoms worsen or fail to improve.      Who to contact     If you have questions or need follow up information about today's clinic visit or your schedule please contact [facility] directly at [phone].  Normal or non-critical lab and imaging results will be communicated to you by MyChart, letter or phone within 4 business days after the clinic has received the results. If you do not hear from us within 7 days, please contact the clinic through [employer] or phone. If you have a critical or abnormal lab result, we will notify you by phone as soon as possible.  Submit refill requests through [employer] or call your pharmacy and they will forward the refill request to us. Please allow 3 business days for your refill to be completed.          Additional Information About Your Visit        MyChart Information     [employer] gives you secure access to your electronic health record. If you see a primary care provider, you can also send messages  "to your care team and make appointments. If you have questions, please call your primary care clinic.  If you do not have a primary care provider, please call [phone] and they will assist you.      IM5 is an electronic gateway that provides easy, online access to your medical records. With IM5, you can request a clinic appointment, read your test results, renew a prescription or communicate with your care team.     To access your existing account, please contact your [facility] or call [phone] for assistance.        Care EveryWhere ID     This is your Care EveryWhere ID. This could be used by other organizations to access your [facility] medical records  [id number]        Your Vitals Were     Pulse Respirations Height Pulse Oximetry BMI (Body Mass Index)       80 20 1.549 m (5' 1\") 93% 31.54 kg/m2        Blood Pressure from Last 3 Encounters:   07/06/17 113/68   05/12/17 128/82   04/21/17 126/76    Weight from Last 3 Encounters:   07/06/17 75.7 kg (166 lb 14.4 oz)   05/12/17 78 kg (172 lb)   04/21/17 79.5 kg (175 lb 3.2 oz)              Today, you had the following     No orders found for display         Today's Medication Changes          These changes are accurate as of: 7/6/17  3:11 PM.  If you have any questions, ask your nurse or doctor.               Stop taking these medicines if you haven't already. Please contact your care team if you have questions.     tiotropium 2.5 MCG/ACT inhalation aerosol   Commonly known as:  SPIRIVA RESPIMAT   Stopped by:  Dana Curiel MD                    Primary Care Provider Office Phone # Fax #    Shae Pierre PA-C [phone]       [facility] [address] [place] [id number]        Equal Access to Services     SABAS VUONG : Annalisa Agrawla, hiral zambrano, qaángel perry. So wa " [phone].    ATENCIÓN: Si romi rivera, tiene a urban disposición servicios gratuitos de asistencia lingüística. Elaina jain [phone].    We comply with applicable federal civil rights laws and [place] laws. We do not discriminate on the basis of race, color, national origin, age, disability sex, sexual orientation or gender identity.            Thank you!     Thank you for choosing [facility]  for your care. Our goal is always to provide you with excellent care. Hearing back from our patients is one way we can continue to improve our services. Please take a few minutes to complete the written survey that you may receive in the mail after your visit with us. Thank you!             Your Updated Medication List - Protect others around you: Learn how to safely use, store and throw away your medicines at www.disposemymeds.org.          This list is accurate as of: 7/6/17  3:11 PM.  Always use your most recent med list.                   Brand Name Dispense Instructions for use Diagnosis    ACE/ARB NOT PRESCRIBED (INTENTIONAL)      ACE & ARB not prescribed due to Intolerance-cough    Type 2 diabetes, HbA1c goal < 7% (H)       albuterol 108 (90 BASE) MCG/ACT Inhaler    PROAIR HFA/PROVENTIL HFA/VENTOLIN HFA    1 Inhaler    Inhale 2 puffs into the lungs every 4 hours as needed for shortness of breath / dyspnea    Cough, Chronic obstructive pulmonary disease, unspecified COPD type (H)       aspirin 81 MG tablet     100    ONE DAILY    Type II or unspecified type diabetes mellitus without mention of complication, not stated as uncontrolled       atorvastatin 40 MG tablet    LIPITOR    90 tablet    Take 1 tablet (40 mg) by mouth At Bedtime    Dyslipidemia       blood glucose monitoring test strip    no brand specified    2 Box    1 strip by In Vitro route 3 times daily. Has a Freestyle    Type 2 diabetes, HbA1c goal < 7% (H)       clonazePAM 0.5 MG tablet    klonoPIN    180 tablet    Take 0.5 tablets  (0.25 mg) by mouth At Bedtime        DAILY MULTIVITAMIN PO      One tablet daily        DITROPAN XL 15 MG Tb24   Generic drug:  oxybutynin chloride     90 tablet    Take 1 tablet (15 mg) by mouth daily    Overactive bladder       DULoxetine 60 MG EC capsule    CYMBALTA    90 capsule    Take 1 capsule (60 mg) by mouth daily    Moderate recurrent major depression (H)       fluocinonide 0.05 % ointment    LIDEX    15 g    Apply sparingly to affected area twice daily as needed for mouth ulcer.    Aphthae, oral       hydrochlorothiazide 25 MG tablet    HYDRODIURIL    90 tablet    Take 1 tablet (25 mg) by mouth daily    Essential hypertension with goal blood pressure less than 140/90       hydrocortisone 1 % cream    CORTAID    30 g    Apply sparingly to affected area of lip corners two times daily for 1 week.    Angular cheilitis       insulin detemir 100 UNIT/ML injection    LEVEMIR FLEXPEN/FLEXTOUCH    15 mL    7 units at bedtime    Type 2 diabetes mellitus without complication, with long-term current use of insulin (H)       insulin pen needle 32G X 4 MM    BD MARIIA U/F    90 each    Use one pen daily    Type 2 diabetes mellitus without complication, with long-term current use of insulin (H)       ipratropium - albuterol 0.5 mg/2.5 mg/3 mL 0.5-2.5 (3) MG/3ML neb solution    DUONEB    1080 mL    Take 1 vial (3 mLs) by nebulization every 6 hours as needed for shortness of breath / dyspnea or wheezing    COPD exacerbation (H)       ketoconazole 2 % cream    NIZORAL    30 g    Apply topically 2 times daily To corners of lips for 2 weeks.    Angular cheilitis       LANsoprazole 15 MG CR capsule    PREVACID    60 capsule    Take 1 capsule (15 mg) by mouth 2 times daily    LPRD (laryngopharyngeal reflux disease)       metFORMIN 500 MG 24 hr tablet    GLUCOPHAGE-XR    180 tablet    Take 1 tablet (500 mg) by mouth 2 times daily (with meals)    Type 2 diabetes mellitus without complication, with long-term current use of insulin  (H)       metoprolol 50 MG 24 hr tablet    TOPROL-XL    90 tablet    Take 1 tablet (50 mg) by mouth daily        * nystatin cream    MYCOSTATIN    60 g    Apply twice daily for yeast rash for 3 weeks.    Intertrigo       * nystatin [id number] UNIT/GM Powd    MYCOSTATIN    60 g    Apply 1 g topically 3 times daily as needed    Erythematous cond NEC       olopatadine 0.1 % ophthalmic solution    PATANOL    5 mL    Place 1 drop into both eyes 2 times daily    Allergic conjunctivitis, bilateral       order for DME      Equipment being ordered: CPAP Patient Theresa Bill received a Bio[employer]one Therapeutic Airsense 10  Auto. Pressures were set at Auto 10 - 15 cm H2O.        order for DME     1 Device    Equipment being ordered: Knee compression sleeve    Fall at home, subsequent encounter, Pain in joint, lower leg, unspecified laterality, Effusion of knee joint right, Arthritis of knee, degenerative       traMADol 50 MG tablet    ULTRAM    30 tablet    Take 1 tablet (50 mg) by mouth every 6 hours as needed for moderate pain    Radicular leg pain       * Notice:  This list has 2 medication(s) that are the same as other medications prescribed for you. Read the directions carefully, and ask your doctor or other care provider to review them with you.

## 2017-07-06 NOTE — PROGRESS NOTES
Pulmonary Progress Note:    Chief Complaint: Cough  Referring Provider: Shae Pierre    HPI:  Patient is a 71-year-old woman here for evaluation of chronic cough. She says that she's had cough since last December. She says that she's had intermittent dry cough lasting for approximately 4 weeks at a time throughout the last several years. This time however it started in December and has continued. She says that it is dry and is not productive of sputum. It occurs with eating.. She says that she has been having increasing issues with GERD. She says that when she lies in bed at night she feels arising heartburn in her chest. She has a history of chronic heartburn and hiatal hernia with a previous Nissen fundoplication initially with good improvement in symptoms. She says that approximately 2 months ago her symptoms returned and she's been taking lansoprazole ever since. Despite taking a PPI she continues to have significant heartburn symptoms. When her heartburn occurs at night she gets out of bed and takes lemon lime soda and dilia seltzer. She reports approximately 30 pound weight loss in the last 2 months. She's had symptoms of dry mouth which is made it hard to swallow. She says she feels a phlegm sensation in the back of her esophagus. She says that she's had evaluations including CT scans, video swallow studies, and a previous evaluation by ENT. At that time and direct laryngoscopy revealed changes consistent with laryngeal pharyngeal reflux disease and she was recommended to start a PPI. She reports that she has no problems with allergies. She does not have postnasal drip. She has no history of asthma. She reports episodes of bronchitis as a child but didn't get sick more than other children. she reports 5-10 minutes of morning stiffness every morning. She has been taking Tylenol arthritis for joint issues. She has no problems with photosensitivity. No rashes. She has right greater than left lower extremity  "edema. She's been taking teacher [place] but doesn't think it changed her breathing. She has nebs (albuterol ipratropium) which she uses intermittently. They help her bring up sputum but don't make the cough stop. She does not have issues of shortness of breath with exertion. She is a lifetime nonsmoker.      Past Medical History:  GERD history of Nissen fundoplication   Diabetes   Dysphasia   Depression   Hypertension     Family History:  Mother had brain and ovarian cancer. Father had lung cancer and colitis. She has 6 sisters to inform  of MIs. She has 2 brothers 1 with cirrhosis from alcohol the other she hasn't seen since her mother  20 years ago.     Social History:  She is a lifetime nonsmoker. She has 2 dogs that are lalo tzu/poodle mixes. Her  has lung liver bone cancer. He was diagnosed in 2016. They live outside of [facility] on [place] acres. 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    ROS: a full 14 point Review of Systems was done and is negative except as noted above and in the HPI.    Exam:  /68 (BP Location: Left arm, Patient Position: Chair, Cuff Size: Adult Regular)  Pulse 80  Resp 20  Ht 1.549 m (5' 1\")  Wt 75.7 kg (166 lb 14.4 oz)  SpO2 93%  BMI 31.54 kg/m2  Alert, in no acute distress  PERRLA  nose,mouth without ulcers or lesions  No adenopathy,masses or thyromegaly  lungs clear to auscultation  Regular rate and rhythm  Spine ROM normal. Muscular strength intact.  no suspicious lesions or rashes  mentation appears normal., affect and mood normal  without deficit    Results:  PFTs reviewed. Normal spirometry, lung volumes and DLCO.    Chest Imaging reviewed. Chest CT from 2017 shows resolution of prior ground glass, but new poorly defined nodular opacities in the left base and tree in bud nodularity in the right middle and lower lobes    Labs Reviewed. Bicarb mildly elevated at 31, renal function " normal.    Assessment and Plan:  Patient is a 71-year-old woman here for evaluation of chronic cough.    #1 chronic cough most likely related to laryngeal pharyngeal reflux disease based on history. We spent quite a bit of time talking about the most common causes of chronic cough. She does not have COPD by pulmonary function testing and is unlikely to received benefit from inhaler therapy. Furthermore tiotropium is likely worsening her dry mouth. She does not have postnasal drip and I would not start therapy for that at this time. Based on her history she does have symptomatic heartburn and abnormal swallowing studies. I think 24-hour pH probe with manometry would be a very reasonable next step she should reestablish with GI. Given that she's had previous evaluation by ENT who agreed with the diagnosis of laryngeal pharyngeal reflux disease do not think that she needs reevaluation by this time. We did talk about in the future if she continues to have symptoms despite adequate treatment for reflux it might be reasonable to evaluate for speech therapy in the future.    #2 abnormal CT scan with scattered areas of tree and bud nodularity then present on most recent CT scan. Suspect that these are most likely related to aspiration or bronchitis. Unlikely to represent a cause for her current cough.    Patient may return for evaluation in pulmonary clinic on an as-needed basis in the future

## 2017-07-08 LAB
DLCOUNC-%PRED-PRE: 95 %
DLCOUNC-PRE: 18.43 ML/MIN/MMHG
DLCOUNC-PRED: 19.21 ML/MIN/MMHG
ERV-%PRED-PRE: 28 %
ERV-PRE: 0.1 L
ERV-PRED: 0.37 L
EXPTIME-PRE: 6.26 SEC
FEF2575-%PRED-PRE: 118 %
FEF2575-PRE: 1.99 L/SEC
FEF2575-PRED: 1.68 L/SEC
FEFMAX-%PRED-PRE: 102 %
FEFMAX-PRE: 5.19 L/SEC
FEFMAX-PRED: 5.07 L/SEC
FEV1-%PRED-PRE: 104 %
FEV1-PRE: 2.06 L
FEV1FEV6-PRE: 79 %
FEV1FEV6-PRED: 79 %
FEV1FVC-PRE: 79 %
FEV1FVC-PRED: 78 %
FEV1SVC-PRE: 81 %
FEV1SVC-PRED: 73 %
FIFMAX-PRE: 4.72 L/SEC
FRCPLETH-%PRED-PRE: 82 %
FRCPLETH-PRE: 2.11 L
FRCPLETH-PRED: 2.57 L
FVC-%PRED-PRE: 102 %
FVC-PRE: 2.59 L
FVC-PRED: 2.53 L
IC-%PRED-PRE: 104 %
IC-PRE: 2.43 L
IC-PRED: 2.32 L
RVPLETH-%PRED-PRE: 100 %
RVPLETH-PRE: 2.01 L
RVPLETH-PRED: 1.99 L
TLCPLETH-%PRED-PRE: 100 %
TLCPLETH-PRE: 4.54 L
TLCPLETH-PRED: 4.52 L
VA-%PRED-PRE: 88 %
VA-PRE: 4.13 L
VC-%PRED-PRE: 94 %
VC-PRE: 2.53 L
VC-PRED: 2.69 L

## 2017-07-13 ENCOUNTER — TRANSFERRED RECORDS (OUTPATIENT)
Dept: HEALTH INFORMATION MANAGEMENT | Facility: CLINIC | Age: 72
End: 2017-07-13

## 2017-07-14 ENCOUNTER — OFFICE VISIT (OUTPATIENT)
Dept: FAMILY MEDICINE | Facility: CLINIC | Age: 72
End: 2017-07-14
Payer: COMMERCIAL

## 2017-07-14 VITALS
HEIGHT: 61 IN | SYSTOLIC BLOOD PRESSURE: 142 MMHG | HEART RATE: 54 BPM | DIASTOLIC BLOOD PRESSURE: 74 MMHG | TEMPERATURE: 98.1 F | OXYGEN SATURATION: 95 % | BODY MASS INDEX: 32.32 KG/M2 | WEIGHT: 171.2 LBS

## 2017-07-14 DIAGNOSIS — I10 ESSENTIAL HYPERTENSION: Chronic | ICD-10-CM

## 2017-07-14 DIAGNOSIS — Z79.4 TYPE 2 DIABETES MELLITUS WITHOUT COMPLICATION, WITH LONG-TERM CURRENT USE OF INSULIN (H): Chronic | ICD-10-CM

## 2017-07-14 DIAGNOSIS — R63.4 WEIGHT LOSS: ICD-10-CM

## 2017-07-14 DIAGNOSIS — K22.4 ESOPHAGEAL DYSMOTILITY: ICD-10-CM

## 2017-07-14 DIAGNOSIS — E11.9 TYPE 2 DIABETES MELLITUS WITHOUT COMPLICATION, WITH LONG-TERM CURRENT USE OF INSULIN (H): Chronic | ICD-10-CM

## 2017-07-14 DIAGNOSIS — K21.00 GASTROESOPHAGEAL REFLUX DISEASE WITH ESOPHAGITIS: Primary | ICD-10-CM

## 2017-07-14 DIAGNOSIS — F43.9 STRESS: ICD-10-CM

## 2017-07-14 DIAGNOSIS — R05.3 CHRONIC COUGH: ICD-10-CM

## 2017-07-14 PROCEDURE — 99214 OFFICE O/P EST MOD 30 MIN: CPT | Performed by: PHYSICIAN ASSISTANT

## 2017-07-14 RX ORDER — TRIMETHOPRIM 100 MG/1
100 TABLET ORAL DAILY
COMMUNITY
Start: 2017-04-28 | End: 2017-10-04

## 2017-07-14 NOTE — NURSING NOTE
"Chief Complaint   Patient presents with     Chronic Cough     Pulmonologist seen on 7/6, still has chronic cough, would like to know what else she can do over 1 year      Gastrophageal Reflux     follow up        Initial LMP 02/24/2017 (Exact Date) Estimated body mass index is 23.49 kg/(m^2) as calculated from the following:    Height as of 4/4/17: 5' 7\" (1.702 m).    Weight as of 4/4/17: 150 lb (68 kg).  Medication Reconciliation: complete     Rochelle Ojeda, PETE   "

## 2017-07-14 NOTE — PROGRESS NOTES
SUBJECTIVE:                                                    Theresa Bill is a 71 year old female who presents to clinic today for the following health issues:    Chief Complaint   Patient presents with     Chronic Cough     Pulmonologist seen on 7/6, still has chronic cough, would like to know what else she can do over 1 year      Gastrophageal Reflux     follow up      GERD/Heartburn      Duration: over 1 year     Description (location/character/radiation): chest and throat     Intensity:  moderate    Accompanying signs and symptoms:  food getting stuck: YES- feels like it   nausea/vomiting/blood: YES- only when she coughs a lot and the she vomits   abdominal pain: no   black/tarry or bloody stools: no :    History (similar episodes/previous evaluation): Yes    Precipitating or alleviating factors:  worse with no particular food or drink.  current NSAID/Aspirin use: YES- baby aspirin    Therapies tried and outcome: Prevacid, does not seem to be helping     Also notes heartburn is really bad lately.  On prevacid 15 bid.  See recent endoscopy and motility studies.  See recent pulmonology appt.    A lot of stress with 's metastatic cancer.  Trying to figure out how to get him to eat.  Feels cymbalta is still helping.  Hasn't started with counselor yet but friends have encouraged her to follow through on my past recommendation of starting with counselor.    Feels she is recently losing weight with stress.  Recent clinic readings do not reflect this, but as addressed at other appt, is down wt from Nov.  Not eating as much.  Blood sugars have been stable.    Problem list and histories reviewed & adjusted, as indicated.  Additional history: as documented    BP Readings from Last 3 Encounters:   07/14/17 142/74   07/06/17 113/68   05/12/17 128/82    Wt Readings from Last 3 Encounters:   07/14/17 171 lb 3.2 oz (77.7 kg)   07/06/17 166 lb 14.4 oz (75.7 kg)   05/12/17 172 lb (78 kg)        Reviewed and updated as  "needed this visit by clinical staff       Reviewed and updated as needed this visit by Provider         ROS:  Constitutional, HEENT, cardiovascular, pulmonary, GI, endocrine and psych systems are negative, except as otherwise noted.    OBJECTIVE:     /74  Pulse 54  Temp 98.1  F (36.7  C) (Tympanic)  Ht 5' 1\" (1.549 m)  Wt 171 lb 3.2 oz (77.7 kg)  SpO2 95%  BMI 32.35 kg/m2  Body mass index is 32.35 kg/(m^2).  GENERAL: healthy, alert and no distress  PSYCH: mentation appears normal, affect normal    ASSESSMENT/PLAN:       ICD-10-CM    1. Gastroesophageal reflux disease with esophagitis K21.0 ranitidine (ZANTAC) 150 MG tablet   2. Esophageal dysmotility K22.4 ranitidine (ZANTAC) 150 MG tablet   3. Chronic cough R05 ranitidine (ZANTAC) 150 MG tablet   4. Stress F43.9 MENTAL HEALTH REFERRAL   5. Type 2 diabetes mellitus without complication, with long-term current use of insulin (H) E11.9     Z79.4    6. Weight loss R63.4    7. Essential hypertension I10    BP did not improve on recheck    Patient Instructions   Cough and heartburn -  Start zantac (ranitidine) in addition to your lansoprazole   [place] [facility] [phone]  We can do 24 hour pH (acid) monitor in [place] if needed    Stress -  Start with counselor.  Someone will call you to schedule.    Can ask 's oncologist about appetite stimulant such as remeron (mirtazepine).  Or have him see family practice if needed.    BP - recheck within a month    Diabetes - keep an eye on sugars.  Stress can cause sugars to go up.  Weight loss can cause sugars to go down.  May need insulin adjustment if sugars change.          Shae Pierre PA-C  [facility]  "

## 2017-07-14 NOTE — MR AVS SNAPSHOT
After Visit Summary   7/14/2017    Theresa Bill    MRN: [id number]           Patient Information     Date Of Birth          1945        Visit Information        Provider Department      7/14/2017 10:00 AM Shae Pierre PA-C [facility]        Today's Diagnoses     Gastroesophageal reflux disease with esophagitis    -  1    Esophageal dysmotility        Chronic cough        Stress          Care Instructions    Cough and heartburn -  Start zantac (ranitidine) in addition to your lansoprazole   [place] [facility]  [phone]  We can do 24 hour pH (acid) monitor in [place] if needed    Stress -  Start with counselor.  Someone will call you to schedule.    Can ask 's oncologist about appetite stimulant such as remeron (mirtazepine).  Or have him see family practice if needed.              Follow-ups after your visit        Additional Services     MENTAL HEALTH REFERRAL       Your provider has referred you to: [facility] Providers Intake Scheduling [phone]  [email or web address]  NEEDS COUNSELOR, IDEALLY CLOSE TO HOME.    All scheduling is subject to the client's specific insurance plan & benefits, provider/location availability, and provider clinical specialities.  Please arrive 15 minutes early for your first appointment and bring your completed paperwork.    Please be aware that coverage of these services is subject to the terms and limitations of your health insurance plan.  Call member services at your health plan with any benefit or coverage questions.                  Who to contact     If you have questions or need follow up information about today's clinic visit or your schedule please contact [facility] directly at [phone].  Normal or non-critical lab and imaging results will be communicated to you by MyChart, letter or phone within 4 business days after the clinic has received the results.  "If you do not hear from us within 7 days, please contact the clinic through [employer] or phone. If you have a critical or abnormal lab result, we will notify you by phone as soon as possible.  Submit refill requests through [employer] or call your pharmacy and they will forward the refill request to us. Please allow 3 business days for your refill to be completed.          Additional Information About Your Visit        [employer] Information     [employer] gives you secure access to your electronic health record. If you see a primary care provider, you can also send messages to your care team and make appointments. If you have questions, please call your primary care clinic.  If you do not have a primary care provider, please call [phone] and they will assist you.        Care EveryWhere ID     This is your Care EveryWhere ID. This could be used by other organizations to access your [place] medical records  [id number]        Your Vitals Were     Pulse Temperature Height Pulse Oximetry BMI (Body Mass Index)       54 98.1  F (36.7  C) (Tympanic) 5' 1\" (1.549 m) 95% 32.35 kg/m2        Blood Pressure from Last 3 Encounters:   07/14/17 140/69   07/06/17 113/68   05/12/17 128/82    Weight from Last 3 Encounters:   07/14/17 171 lb 3.2 oz (77.7 kg)   07/06/17 166 lb 14.4 oz (75.7 kg)   05/12/17 172 lb (78 kg)              We Performed the Following     MENTAL HEALTH REFERRAL          Today's Medication Changes          These changes are accurate as of: 7/14/17 10:25 AM.  If you have any questions, ask your nurse or doctor.               Start taking these medicines.        Dose/Directions    ranitidine 150 MG tablet   Commonly known as:  ZANTAC   Used for:  Gastroesophageal reflux disease with esophagitis, Esophageal dysmotility, Chronic cough   Started by:  Shae Pierre PA-C        Dose:  150 mg   Take 1 tablet (150 mg) by mouth 2 times daily   Quantity:  60 tablet   Refills:  1            Where to get your medicines "      These medications were sent to [facility] [address] [place], [place] - [address]  [address], [facility] [address]     Phone:  [phone]     ranitidine 150 MG tablet                Primary Care Provider Office Phone # Fax #    Shae Pierre PA-C [phone]       [facility] [address] [facility] [address]        Equal Access to Services     SABAS VUONG : Hadii aad ku hadasho Soomaali, waaxda luqadaha, qaybta kaalmada adeegyada, waxay idiin hayaan adeeg khtawannash latresa jauregui. So [facility] [phone].    ATENCIÓN: Si habla español, tiene a urban disposición servicios gratuitos de asistencia lingüística. Elaina al [phone].    We comply with applicable federal civil rights laws and [place] laws. We do not discriminate on the basis of race, color, national origin, age, disability sex, sexual orientation or gender identity.            Thank you!     Thank you for choosing [facility]  for your care. Our goal is always to provide you with excellent care. Hearing back from our patients is one way we can continue to improve our services. Please take a few minutes to complete the written survey that you may receive in the mail after your visit with us. Thank you!             Your Updated Medication List - Protect others around you: Learn how to safely use, store and throw away your medicines at www.disposemymeds.org.          This list is accurate as of: 7/14/17 10:25 AM.  Always use your most recent med list.                   Brand Name Dispense Instructions for use Diagnosis    ACE/ARB NOT PRESCRIBED (INTENTIONAL)      ACE & ARB not prescribed due to Intolerance-cough    Type 2 diabetes, HbA1c goal < 7% (H)       aspirin 81 MG tablet     100    ONE DAILY    Type II or unspecified type diabetes mellitus without mention of complication, not stated as uncontrolled       atorvastatin 40 MG tablet    LIPITOR    90 tablet    Take 1 tablet (40 mg)  by mouth At Bedtime    Dyslipidemia       blood glucose monitoring test strip    no brand specified    2 Box    1 strip by In Vitro route 3 times daily. Has a Freestyle    Type 2 diabetes, HbA1c goal < 7% (H)       clonazePAM 0.5 MG tablet    klonoPIN    180 tablet    Take 0.5 tablets (0.25 mg) by mouth At Bedtime        DAILY MULTIVITAMIN PO      One tablet daily        DULoxetine 60 MG EC capsule    CYMBALTA    90 capsule    Take 1 capsule (60 mg) by mouth daily    Moderate recurrent major depression (H)       fluocinonide 0.05 % ointment    LIDEX    15 g    Apply sparingly to affected area twice daily as needed for mouth ulcer.    Aphthae, oral       hydrochlorothiazide 25 MG tablet    HYDRODIURIL    90 tablet    Take 1 tablet (25 mg) by mouth daily    Essential hypertension with goal blood pressure less than 140/90       hydrocortisone 1 % cream    CORTAID    30 g    Apply sparingly to affected area of lip corners two times daily for 1 week.    Angular cheilitis       insulin detemir 100 UNIT/ML injection    LEVEMIR FLEXPEN/FLEXTOUCH    15 mL    7 units at bedtime    Type 2 diabetes mellitus without complication, with long-term current use of insulin (H)       insulin pen needle 32G X 4 MM    BD MARIIA U/F    90 each    Use one pen daily    Type 2 diabetes mellitus without complication, with long-term current use of insulin (H)       ipratropium - albuterol 0.5 mg/2.5 mg/3 mL 0.5-2.5 (3) MG/3ML neb solution    DUONEB    1080 mL    Take 1 vial (3 mLs) by nebulization every 6 hours as needed for shortness of breath / dyspnea or wheezing    COPD exacerbation (H)       ketoconazole 2 % cream    NIZORAL    30 g    Apply topically 2 times daily To corners of lips for 2 weeks.    Angular cheilitis       LANsoprazole 15 MG CR capsule    PREVACID    60 capsule    Take 1 capsule (15 mg) by mouth 2 times daily    LPRD (laryngopharyngeal reflux disease)       metFORMIN 500 MG 24 hr tablet    GLUCOPHAGE-XR    180 tablet     Take 1 tablet (500 mg) by mouth 2 times daily (with meals)    Type 2 diabetes mellitus without complication, with long-term current use of insulin (H)       metoprolol 50 MG 24 hr tablet    TOPROL-XL    90 tablet    Take 1 tablet (50 mg) by mouth daily        * nystatin cream    MYCOSTATIN    60 g    Apply twice daily for yeast rash for 3 weeks.    Intertrigo       * nystatin 509643 UNIT/GM Powd    MYCOSTATIN    60 g    Apply 1 g topically 3 times daily as needed    Erythematous cond NEC       olopatadine 0.1 % ophthalmic solution    PATANOL    5 mL    Place 1 drop into both eyes 2 times daily    Allergic conjunctivitis, bilateral       order for DME      Equipment being ordered: CPAP Patient Theresa Bill received a [employer] Airsense 10  Auto. Pressures were set at Auto 10 - 15 cm H2O.        order for DME     1 Device    Equipment being ordered: Knee compression sleeve    Fall at home, subsequent encounter, Pain in joint, lower leg, unspecified laterality, Effusion of knee joint right, Arthritis of knee, degenerative       ranitidine 150 MG tablet    ZANTAC    60 tablet    Take 1 tablet (150 mg) by mouth 2 times daily    Gastroesophageal reflux disease with esophagitis, Esophageal dysmotility, Chronic cough       traMADol 50 MG tablet    ULTRAM    30 tablet    Take 1 tablet (50 mg) by mouth every 6 hours as needed for moderate pain    Radicular leg pain       trimethoprim 100 MG tablet    TRIMPEX     Take 100 mg by mouth daily        * Notice:  This list has 2 medication(s) that are the same as other medications prescribed for you. Read the directions carefully, and ask your doctor or other care provider to review them with you.

## 2017-07-14 NOTE — PATIENT INSTRUCTIONS
Cough and heartburn -  Start zantac (ranitidine) in addition to your lansoprazole   Go back to GI specialist Dr Hubbard at [facility]  [phone]  We can do 24 hour pH (acid) monitor in [place] if needed    Stress -  Start with counselor.  Someone will call you to schedule.    Can ask 's oncologist about appetite stimulant such as remeron (mirtazepine).  Or have him see family practice if needed.    BP - recheck within a month    Diabetes - keep an eye on sugars.  Stress can cause sugars to go up.  Weight loss can cause sugars to go down.  May need insulin adjustment if sugars change.

## 2017-08-23 ENCOUNTER — HOSPITAL ENCOUNTER (OUTPATIENT)
Dept: PHYSICAL THERAPY | Facility: CLINIC | Age: 72
Setting detail: THERAPIES SERIES
End: 2017-08-23
Attending: ORTHOPAEDIC SURGERY
Payer: MEDICARE

## 2017-08-23 PROCEDURE — 97535 SELF CARE MNGMENT TRAINING: CPT | Mod: GP | Performed by: PHYSICAL THERAPIST

## 2017-08-23 PROCEDURE — 97110 THERAPEUTIC EXERCISES: CPT | Mod: GP | Performed by: PHYSICAL THERAPIST

## 2017-08-23 PROCEDURE — G8978 MOBILITY CURRENT STATUS: HCPCS | Mod: GP,CJ | Performed by: PHYSICAL THERAPIST

## 2017-08-23 PROCEDURE — 40000718 ZZHC STATISTIC PT DEPARTMENT ORTHO VISIT: Performed by: PHYSICAL THERAPIST

## 2017-08-23 PROCEDURE — G8979 MOBILITY GOAL STATUS: HCPCS | Mod: GP,CI | Performed by: PHYSICAL THERAPIST

## 2017-08-23 NOTE — PROGRESS NOTES
[facility]          OUTPATIENT PHYSICAL THERAPY ORTHOPEDIC EVALUATION  PLAN OF TREATMENT FOR OUTPATIENT REHABILITATION  (COMPLETE FOR INITIAL CLAIMS ONLY)  Patient's Last Name, First Name, M.I.  YOB: 1945  FlyTheresa  LUDY    Provider s Name:  [facility]   Medical Record No.  [id number]   Start of Care Date:  08/23/17   Onset Date:  02/23/17   Type:     _X__PT   ___OT   ___SLP Medical Diagnosis:  Primary OA of both knees     PT Diagnosis:  decreased ROM and weakness in bi knees associated with OA   Visits from SOC:  1      _________________________________________________________________________________  Plan of Treatment/Functional Goals:  balance training, gait training, joint mobilization, manual therapy, neuromuscular re-education, ROM, strengthening, stretching     Cryotherapy     Goals  Goal Identifier: knee ext  Goal Description: Pt will demonstrate 0 deg of knee extension in order to be able to walk with normal gait   Target Date: 09/13/17    Goal Identifier: squat to get object off floor.   Goal Description: Pt will be able to demonstrate full squat with less than 1/10 pain in order to be able to  object off of the ground   Target Date: 09/13/17    Goal Identifier: pain after standing   Goal Description: Pt will relate being able to sit for 45 min and stand with less than 1/10 pain in knees to help imprve mobility  Target Date: 10/04/17    Goal Identifier: stairs  Goal Description: Pt will be able to ascend/descend full flight of stairs with less than 1/10 pain and reciprocal gait in order to demonstrate improve strength and LE function and reduce risk of falls   Target Date: 10/04/17         Therapy Frequency:  1 time/week  Predicted Duration of Therapy Intervention:  6 weeks    Sena Batista, PT                 I CERTIFY THE NEED FOR THESE SERVICES FURNISHED UNDER        THIS PLAN OF TREATMENT AND WHILE UNDER MY CARE .             Physician  Signature               Date    X_____________________________________________________                            Certification Date From:  08/23/17   Certification Date To:  10/04/17    Referring Provider:  DO Denise Roberts    Initial Assessment        See Epic Evaluation Start of Care Date: 08/23/17

## 2017-08-23 NOTE — PROGRESS NOTES
08/23/17 0700   General Information   Type of Visit Initial OP Ortho PT Evaluation   Start of Care Date 08/23/17   Referring Physician DO Denise Roberts   Patient/Family Goals Statement reduce knee pain, avoid surgery    Orders Evaluate and Treat   Date of Order 08/15/17   Insurance Type Medicare   Medical Diagnosis Primary OA of both knees   Surgical/Medical history reviewed Yes   Precautions/Limitations no known precautions/limitations   Body Part(s)   Body Part(s) Knee   Presentation and Etiology   Pertinent history of current problem (include personal factors and/or comorbidities that impact the POC) Pt relates knees have been bothering her for 6 months. Pt relates depends on the day which is worse. Pt relates she had an injection on L knee but it only lasted for 3 days. Sitting for more than an hour makes her realyl stiff. Pt is unsure when she will go back to the MD. Pt relates she may get more injections but does not want surgery. Pt relates L is bone on bone, and R is almost there PMH: diabetes, bladder control, hysterecomomy, bi foot surgery, L knee surgery - mensicues 20+ years , back surgery    Impairments A. Pain   Functional Limitations perform activities of daily living   Symptom Location bi knee pain   How/Where did it occur From insidious onset;From Degenerative Joint Disease   Onset date of current episode/exacerbation 02/23/17   Chronicity New   Pain rating (0-10 point scale) Best (/10);Worst (/10)   Best (/10) 4   Worst (/10) 10   Pain quality C. Aching   Frequency of pain/symptoms A. Constant   Pain/symptoms exacerbated by A. Sitting;F. Nothing   Pain/symptoms eased by E. Changing positions   Progression of symptoms since onset: Unchanged   Current Level of Function   Current Community Support Family/friend caregiver   Patient role/employment history F. Retired   [facility]   Fall Risk Screen   Fall screen completed by PT   Per patient -  Fall 2 or more times in past year? Yes   Per patient - Fall with injury in past year? No   Timed Up and Go score (seconds) 12.7 secs   Is patient a fall risk? No   Fall screen comments fell on stairs - has rails but does not use them   Functional Scales   Functional Scales Other   Other Scales  LEFS: 66/80   Knee Objective Findings   Side (if bilateral, select both right and left) Right;Left   Integumentary  min swelling noted on R medial side    Gait/Locomotion walking: increased lateral lean trendlendurg, min knee movement, stairs: encoruaged to use rail, can use reciprocal pattern however prefers to lead with L, signifincat drop noted    Balance/Proprioception (Single Leg Stance) R: 3 secs w UE support  L: 7 secs w UE suppor     Anterior Drawer Test neg   Posterior Drawer Test neg   Varus Stress Test neg    Valgus Stress Test pos on R, neg on L    Palpation TTP medial meniscus    Right Knee Extension AROM lacking 11 deg w pain   Right Knee Flexion AROM 130   Right Knee Flexion Strength 4/5   Right Knee Extension Strength 4+/5   Left Knee Extension AROM 0   Left Knee Flexion AROM 135   Left Knee Flexion Strength 4-/5   Left Knee Extension Strength 4/5    Planned Therapy Interventions   Planned Therapy Interventions balance training;gait training;joint mobilization;manual therapy;neuromuscular re-education;ROM;strengthening;stretching   Planned Modality Interventions   Planned Modality Interventions Cryotherapy   Clinical Impression   Criteria for Skilled Therapeutic Interventions Met yes, treatment indicated   PT Diagnosis decreased ROM and weakness in bi knees associated with OA   Influenced by the following impairments decreased ROM, weakness and pain   Functional limitations due to impairments walking, balance, standing, gardening   Clinical Presentation Stable/Uncomplicated   Clinical Presentation Rationale Pt is pleasant 71 yr old woman w c/o of bi knee pain. Pt is very active and motivated to get better. Pt  does have a number of other co-morbidities   Clinical Decision Making (Complexity) Low complexity   Therapy Frequency 1 time/week   Predicted Duration of Therapy Intervention (days/wks) 6 weeks   Risk & Benefits of therapy have been explained Yes   Patient, Family & other staff in agreement with plan of care Yes   Education Assessment   Preferred Learning Style Listening;Reading;Demonstration;Pictures/video   Barriers to Learning No barriers   ORTHO GOALS   PT Ortho Eval Goals 1;2;3;4   Ortho Goal 1   Goal Identifier knee ext   Goal Description Pt will demonstrate 0 deg of knee extension in order to be able to walk with normal gait    Target Date 09/13/17   Ortho Goal 2   Goal Identifier squat to get object off floor.    Goal Description Pt will be able to demonstrate full squat with less than 1/10 pain in order to be able to  object off of the ground    Target Date 09/13/17   Ortho Goal 3   Goal Identifier pain after standing    Goal Description Pt will relate being able to sit for 45 min and stand with less than 1/10 pain in knees to help imprve mobility   Target Date 10/04/17   Ortho Goal 4   Goal Identifier stairs   Goal Description Pt will be able to ascend/descend full flight of stairs with less than 1/10 pain and reciprocal gait in order to demonstrate improve strength and LE function and reduce risk of falls    Target Date 10/04/17   Total Evaluation Time   Total Evaluation Time 45 (20 eval, 1 TE, 1 ADL)    Therapy Certification   Certification date from 08/23/17   Certification date to 10/04/17   Medical Diagnosis Primary OA of both knees     Sena Batista  Physical Therapist  [address] [address]  [email or web address]   [email or web address]   Office: [phone] Fax: [phone]

## 2017-08-30 ENCOUNTER — HOSPITAL ENCOUNTER (OUTPATIENT)
Dept: PHYSICAL THERAPY | Facility: CLINIC | Age: 72
Setting detail: THERAPIES SERIES
End: 2017-08-30
Attending: ORTHOPAEDIC SURGERY
Payer: MEDICARE

## 2017-08-30 PROCEDURE — 97140 MANUAL THERAPY 1/> REGIONS: CPT | Mod: GP | Performed by: PHYSICAL THERAPIST

## 2017-08-30 PROCEDURE — 40000718 ZZHC STATISTIC PT DEPARTMENT ORTHO VISIT: Performed by: PHYSICAL THERAPIST

## 2017-08-30 PROCEDURE — 97110 THERAPEUTIC EXERCISES: CPT | Mod: GP | Performed by: PHYSICAL THERAPIST

## 2017-09-06 ENCOUNTER — HOSPITAL ENCOUNTER (OUTPATIENT)
Dept: PHYSICAL THERAPY | Facility: CLINIC | Age: 72
Setting detail: THERAPIES SERIES
End: 2017-09-06
Attending: ORTHOPAEDIC SURGERY
Payer: MEDICARE

## 2017-09-06 PROCEDURE — G8979 MOBILITY GOAL STATUS: HCPCS | Mod: GP,CI | Performed by: PHYSICAL THERAPIST

## 2017-09-06 PROCEDURE — 40000718 ZZHC STATISTIC PT DEPARTMENT ORTHO VISIT: Performed by: PHYSICAL THERAPIST

## 2017-09-06 PROCEDURE — G8980 MOBILITY D/C STATUS: HCPCS | Mod: GP,CJ | Performed by: PHYSICAL THERAPIST

## 2017-09-06 PROCEDURE — 97110 THERAPEUTIC EXERCISES: CPT | Mod: GP | Performed by: PHYSICAL THERAPIST

## 2017-09-08 ENCOUNTER — OFFICE VISIT (OUTPATIENT)
Dept: PSYCHOLOGY | Facility: CLINIC | Age: 72
End: 2017-09-08
Attending: PHYSICIAN ASSISTANT
Payer: COMMERCIAL

## 2017-09-08 DIAGNOSIS — F32.0 MILD MAJOR DEPRESSION (H): Primary | ICD-10-CM

## 2017-09-08 PROCEDURE — 90834 PSYTX W PT 45 MINUTES: CPT | Performed by: PSYCHOLOGIST

## 2017-09-08 NOTE — PROGRESS NOTES
Progress Note - Initial Session  Disclaimer: This note consists of symbols derived from keyboarding, dictation and/or voice recognition software. As a result, there may be errors in the script that have gone undetected. Please consider this when interpreting information found in this chart.    Client Name:  Theresa Bill Date: 9/8/2017         Service Type: Individual      Session Start Time: 10:30 AM  Session End Time: 11:15 AM      Session Length: 38 - 52      Session #: 1     Attendees: Client attended alone         Diagnostic Assessment in progress.  Unable to complete documentation at the conclusion of the first session due to needing more information. Client presents with concerns of caregiver stress. Her  was diagnosed last fall with stage IV lung and bone cancer. He has undergone radiation and is currently undergoing chemotherapy. Client states she does not know how best to care for him, he is not eating due to side effects from chemotherapy, expressing a lot of pain. They have been  for 53 years. She describes keeping the marriage happy by doing what he tells her. She notes he can be extremely controlling. She does get to go out to bingo on Tuesdays and have contact with friends. 2 adult children in the neighborhood are available to help.      Mental Status Assessment:  Appearance:   Appropriate   Eye Contact:   Good   Psychomotor Behavior: Normal   Attitude:   Cooperative   Orientation:   All  Speech   Rate / Production: Normal    Volume:  Normal   Mood:    Anxious  Sad   Affect:    Appropriate   Thought Content:  Clear   Thought Form:  Coherent  Logical   Insight:    Good       Safety Issues and Plan for Safety and Risk Management:  Client denies current fears or concerns for personal safety.  Client denies current or recent suicidal ideation or behaviors.  Client denies current or recent homicidal ideation or behaviors.  Client denies current or recent self injurious  behavior or ideation.  Client denies other safety concerns.  A safety and risk management plan has not been developed at this time, however client was given the after-hours number / 911 should there be a change in any of these risk factors.  Client reports there are firearms in the house. The firearms are secured in a locked space.      Diagnostic Criteria:  A) Single episode - symptoms have been present during the same 2-week period and represent a change from previous functioning 5 or more symptoms (required for diagnosis)   - Depressed mood. Note: In children and adolescents, can be irritable mood.     - Diminished interest or pleasure in all, or almost all, activities.    - Psychomotor activity retardation.    - Fatigue or loss of energy.    - Diminished ability to think or concentrate, or indecisiveness.   B) The symptoms cause clinically significant distress or impairment in social, occupational, or other important areas of functioning  C) The episode is not attributable to the physiological effects of a substance or to another medical condition  D) The occurence of major depressive episode is not better explained by other thought / psychotic disorders  E) There has never been a manic episode or hypomanic episode        DSM5 Diagnoses: (Sustained by DSM5 Criteria Listed Above)  Diagnoses: 296.21 (F32.0) Major Depressive Disorder, Single Episode, Mild _  Psychosocial & Contextual Factors: Caregiver stress  WHODAS 2.0 (12 item)            This questionnaire asks about difficulties due to health conditions. Health conditions  include  disease or illnesses, other health problems that may be short or long lasting,  injuries, mental health or emotional problems, and problems with alcohol or drugs.                     Think back over the past 30 days and answer these questions, thinking about how much  difficulty you had doing the following activities. For each question, please [place] only  one response.    S1  Standing for long periods such as 30 minutes? Moderate =   3   S2 Taking care of household responsibilities? None =         1   S3 Learning a new task, for example, learning how to get to a new place? Mild =           2   S4 How much of a problem do you have joining community activities (for example, festivals, [employer] or other activities) in the same way as anyone else can? None =         1   S5 How much have you been emotionally affected by your health problems? None =         1     In the past 30 days, how much difficulty did you have in:   S6 Concentrating on doing something for ten minutes? None =         1   S7 Walking a long distance such as a kilometer (or equivalent)? Moderate =   3   S8 Washing your whole body? None =         1   S9 Getting dressed? None =         1   S10 Dealing with people you do not know? None =         1   S11 Maintaining a friendship? None =         1   S12 Your day to day work? None =         1     H1 Overall, in the past 30 days, how many days were these difficulties present? Record number of days 1    H2 In the past 30 days, for how many days were you totally unable to carry out your usual activities or work because of any health condition? Record number of days  0    H3 In the past 30 days, not counting the days that you were totally unable, for how many days did you cut back or reduce your usual activities or work because of any health condition? Record number of days 0        Collateral Reports Completed:  Not Applicable      PLAN: (Homework, other):  Client stated that she may follow up for ongoing services with [facility].  Appointment set for next week. Client given prescription for an evening out a week as well as frequent phone contact with friends.      Sam Jaime

## 2017-09-08 NOTE — MR AVS SNAPSHOT
MRN:[id number]                      After Visit Summary   9/8/2017    Theresa Bill    MRN: [id number]           Visit Information        Provider Department      9/8/2017 10:30 AM Sam Jaime [facility] Generic      Your next 10 appointments already scheduled     Sep 13, 2017 11:00 AM CDT   Ortho Treatment with Sena Batista, PT   [facility] ([facility])    [address] [place] [address]   [phone]            Sep 19, 2017  3:00 PM CDT   Return Visit with Sam Garciaon   [facility] ([facility])    [address] [place] [address]   [phone]            Sep 20, 2017 11:00 AM CDT   Ortho Treatment with Sena Batista, PT   [facility] ([facility])    [address] [place] [address]   [phone]            Oct 02, 2017  3:00 PM CDT   Return Visit with Sam Jaime   [facility] ([facility])    [address] [facility] [address]   [phone]              MyChart Information     [facility] gives you secure access to your electronic health record. If you see a primary care provider, you can also send messages to your care team and make appointments. If you have questions, please call your primary care clinic.  If you do not have a primary care provider, please call [phone] and they will assist you.        Care EveryWhere ID     This is your Care EveryWhere ID. This could be used by other organizations to access your [place] medical records  [id number]        Equal Access to Services     SABAS VUONG : Hadii lamonte hall Sodanielle, waaxda luqadaha, qaybta kaalmada ángel ingram. So [facility] [phone].    ATENCIÓN: Si habla español, tiene a urban disposición servicios gratuitos de asistencia lingüística. Llame al [phone].    We  comply with applicable federal civil rights laws and [place] laws. We do not discriminate on the basis of race, color, national origin, age, disability sex, sexual orientation or gender identity.

## 2017-09-23 ENCOUNTER — HOSPITAL ENCOUNTER (EMERGENCY)
Facility: CLINIC | Age: 72
Discharge: HOME | End: 2017-09-23
Attending: FAMILY MEDICINE | Admitting: FAMILY MEDICINE
Payer: MEDICARE

## 2017-09-23 ENCOUNTER — APPOINTMENT (OUTPATIENT)
Dept: GENERAL RADIOLOGY | Facility: CLINIC | Age: 72
End: 2017-09-23
Attending: FAMILY MEDICINE
Payer: MEDICARE

## 2017-09-23 VITALS
RESPIRATION RATE: 15 BRPM | SYSTOLIC BLOOD PRESSURE: 129 MMHG | OXYGEN SATURATION: 97 % | DIASTOLIC BLOOD PRESSURE: 82 MMHG | TEMPERATURE: 98.3 F

## 2017-09-23 DIAGNOSIS — F43.9 STRESS: ICD-10-CM

## 2017-09-23 DIAGNOSIS — K21.9 GASTROESOPHAGEAL REFLUX DISEASE, ESOPHAGITIS PRESENCE NOT SPECIFIED: ICD-10-CM

## 2017-09-23 LAB
ANION GAP SERPL CALCULATED.3IONS-SCNC: 11 MMOL/L (ref 3–14)
BASOPHILS # BLD AUTO: 0.1 10E9/L (ref 0–0.2)
BASOPHILS NFR BLD AUTO: 0.6 %
BUN SERPL-MCNC: 12 MG/DL (ref 7–30)
CALCIUM SERPL-MCNC: 9.5 MG/DL (ref 8.5–10.1)
CHLORIDE SERPL-SCNC: 104 MMOL/L (ref 94–109)
CO2 SERPL-SCNC: 25 MMOL/L (ref 20–32)
CREAT SERPL-MCNC: 0.8 MG/DL (ref 0.52–1.04)
DIFFERENTIAL METHOD BLD: NORMAL
EOSINOPHIL # BLD AUTO: 0.2 10E9/L (ref 0–0.7)
EOSINOPHIL NFR BLD AUTO: 1.8 %
ERYTHROCYTE [DISTWIDTH] IN BLOOD BY AUTOMATED COUNT: 13.9 % (ref 10–15)
GFR SERPL CREATININE-BSD FRML MDRD: 70 ML/MIN/1.7M2
GLUCOSE SERPL-MCNC: 201 MG/DL (ref 70–99)
HCT VFR BLD AUTO: 42 % (ref 35–47)
HGB BLD-MCNC: 13.9 G/DL (ref 11.7–15.7)
IMM GRANULOCYTES # BLD: 0 10E9/L (ref 0–0.4)
IMM GRANULOCYTES NFR BLD: 0.2 %
LYMPHOCYTES # BLD AUTO: 2 10E9/L (ref 0.8–5.3)
LYMPHOCYTES NFR BLD AUTO: 22.9 %
MCH RBC QN AUTO: 30 PG (ref 26.5–33)
MCHC RBC AUTO-ENTMCNC: 33.1 G/DL (ref 31.5–36.5)
MCV RBC AUTO: 91 FL (ref 78–100)
MONOCYTES # BLD AUTO: 0.6 10E9/L (ref 0–1.3)
MONOCYTES NFR BLD AUTO: 7.2 %
NEUTROPHILS # BLD AUTO: 5.9 10E9/L (ref 1.6–8.3)
NEUTROPHILS NFR BLD AUTO: 67.3 %
PLATELET # BLD AUTO: 307 10E9/L (ref 150–450)
POTASSIUM SERPL-SCNC: 3.7 MMOL/L (ref 3.4–5.3)
RBC # BLD AUTO: 4.63 10E12/L (ref 3.8–5.2)
SODIUM SERPL-SCNC: 140 MMOL/L (ref 133–144)
TROPONIN I SERPL-MCNC: <0.015 UG/L (ref 0–0.04)
WBC # BLD AUTO: 8.8 10E9/L (ref 4–11)

## 2017-09-23 PROCEDURE — 85025 COMPLETE CBC W/AUTO DIFF WBC: CPT | Performed by: FAMILY MEDICINE

## 2017-09-23 PROCEDURE — 93010 ELECTROCARDIOGRAM REPORT: CPT | Performed by: FAMILY MEDICINE

## 2017-09-23 PROCEDURE — 84484 ASSAY OF TROPONIN QUANT: CPT | Performed by: FAMILY MEDICINE

## 2017-09-23 PROCEDURE — 25000125 ZZHC RX 250: Performed by: FAMILY MEDICINE

## 2017-09-23 PROCEDURE — 93005 ELECTROCARDIOGRAM TRACING: CPT

## 2017-09-23 PROCEDURE — 71020 XR CHEST 2 VW: CPT

## 2017-09-23 PROCEDURE — 99285 EMERGENCY DEPT VISIT HI MDM: CPT | Mod: 25

## 2017-09-23 PROCEDURE — A9270 NON-COVERED ITEM OR SERVICE: HCPCS | Mod: GY | Performed by: FAMILY MEDICINE

## 2017-09-23 PROCEDURE — 99284 EMERGENCY DEPT VISIT MOD MDM: CPT | Mod: 25 | Performed by: FAMILY MEDICINE

## 2017-09-23 PROCEDURE — 80048 BASIC METABOLIC PNL TOTAL CA: CPT | Performed by: FAMILY MEDICINE

## 2017-09-23 PROCEDURE — 25000132 ZZH RX MED GY IP 250 OP 250 PS 637: Mod: GY | Performed by: FAMILY MEDICINE

## 2017-09-23 PROCEDURE — 96374 THER/PROPH/DIAG INJ IV PUSH: CPT

## 2017-09-23 RX ORDER — PANTOPRAZOLE SODIUM 40 MG/1
40 TABLET, DELAYED RELEASE ORAL DAILY
Qty: 30 TABLET | Refills: 0 | Status: SHIPPED | OUTPATIENT
Start: 2017-09-23 | End: 2017-10-26

## 2017-09-23 RX ADMIN — PANTOPRAZOLE SODIUM 40 MG: 40 INJECTION, POWDER, FOR SOLUTION INTRAVENOUS at 12:29

## 2017-09-23 RX ADMIN — LIDOCAINE HYDROCHLORIDE 30 ML: 20 SOLUTION ORAL; TOPICAL at 12:27

## 2017-09-23 NOTE — ED PROVIDER NOTES
History     Chief Complaint   Patient presents with     Chest Pain   chest pain  HPI  Theresa Bill is a 71 year old female, past medical history is significant for low back pain, depression, hypertension, type II diabetes, osteoarthritis, next urinary incontinence, restless legs, obesity, GERD, history of GI bleed, dyslipidemia, iron deficiency anemia, frequent falls, chronic pain, COPD, presents to the emergency department with concerns of epigastric/chest pain.  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  The patient states that she was doing nothing stressful or strenuous and developed central chest pressure or tightness, burning sensation that radiated through to the back and also into her right neck and then subsequently settled in her low back.  She has had symptoms like this before with heartburn/reflux.  When I asked her about what might because he at this time she identified bringing her  home from hospital yesterday on oxygen with metastatic lung cancer.  She was extremely emotional and tearful describing the course of events over the last several days.  She is under considerably more stress than usual.  She also noted some mild shortness of breath, lightheadedness.  Thinks that she has not had enough to eat or drink yet this morning.  She received aspirin and nitroglycerin in the ambulance.  The discomfort in her epigastric area seems to have improved and as she puts it settled in her low back however the nitroglycerin made her nauseated and she threw up once.  She has no further nausea.    Active Ambulatory Problems     Diagnosis Date Noted     Lumbago 04/21/2005     Pain in limb 04/21/2005     Mild major depression (H) 04/21/2005     Essential hypertension 04/21/2005     Type 2 diabetes mellitus without complication (H) 04/21/2005     Osteoarthritis 04/21/2005     Mixed incontinence urge and stress  (male)(female) 04/21/2005     Restless legs 03/15/2006     Microalbuminuria 05/01/2006     Obesity, BMI > 35 in diabetes 07/27/2006     Esophageal reflux 10/20/2006     GI bleed 03/17/2010     Dyslipidemia 10/31/2010     Iron deficiency anemia 09/20/2013     Falls frequently 05/21/2015     ACP (advance care planning) 06/16/2015     SHANTI (obstructive sleep apnea) 06/24/2015     Other chronic pain 11/10/2015     Reactive airway disease, mild intermittent, uncomplicated 08/11/2016     Resolved Ambulatory Problems     Diagnosis Date Noted     COPD (chronic obstructive pulmonary disease) (H) 09/12/2012     Chronic pain 10/14/2015     Ground glass opacity present on imaging of lung 12/02/2016     Past Medical History:   Diagnosis Date     Depression      Diabetes (H)      Diabetes mellitus (H)      Dysphagia      GERD (gastroesophageal reflux disease)      Hypertension      Past Surgical History:   Procedure Laterality Date     BUNIONECTOMY CRISELDA  7/1/2011    Procedure:BUNIONECTOMY CRISELDA; weil osteotomy & Lapidtus Bunionectomy; Surgeon:HYACINTH SANTO; Location:[facility]     BUNIONECTOMY CHEVRON  4/6/2012    Procedure:BUNIONECTOMY CHEVRON; Lapidus bunionectomy, plantar plate repair second and third metatarsophalangeal joints,left foot-popliteal block left lower extremity; Surgeon:HYACINTH SANTO; Location:[facility] OR     C TOTAL ABDOM HYSTERECTOMY  1992    ovaries removed     COLONOSCOPY  9/11/2012    Procedure: COLONOSCOPY;  Colonoscopy;  Surgeon: Alyssa Foster MD;  Location: [facility]     NISSEN FUNDOPLICATION      2003     REPAIR HAMMER TOE  7/1/2011    Procedure:REPAIR HAMMER TOE; hammertoe correction right 2nd digit; Surgeon:HYACINTH SANTO; Location:[facility]     SURGICAL HISTORY OF -   1979    Tubal     SURGICAL HISTORY OF -   1993    Stamey bladder surgery     SURGICAL HISTORY OF -       Lipoma removed f/back     SURGICAL HISTORY OF -   1995    (L) Foot surgery     SURGICAL HISTORY OF -   05/09/2002     Colonoscopy     SURGICAL HISTORY OF -       knee arthroscopy left     SURGICAL HISTORY OF -   2007-two phases    interstim bladder implant     SURGICAL HISTORY OF -   Sept. 13, 2007    laparoscopic Nissen fundoplication     Social History     Social History     Marital status:      Spouse name: N/A     Number of children: N/A     Years of education: N/A     Occupational History     Not on file.     Social History Main Topics     Smoking status: Never Smoker     Smokeless tobacco: Never Used     Alcohol use No     Drug use: No     Sexual activity: Yes     Partners: Male     Other Topics Concern     Parent/Sibling W/ Cabg, Mi Or Angioplasty Before 65f 55m? Yes     Social History Narrative     Family History   Problem Relation Age of Onset     CANCER Mother      brain cancer, ovarian cancer     CANCER Father      lung cancer     GASTROINTESTINAL DISEASE Father      colitis     Alcohol/Drug Brother      Alcohol/Drug Son      Alcohol/Drug Brother      GASTROINTESTINAL DISEASE Sister      DIABETES Sister      CANCER Sister      3 sisters .w ovarian cancer and one also with uterine cancer     Gynecology Son      kidney stones     HEART DISEASE Sister      MI age 64     GASTROINTESTINAL DISEASE Other      colitis/colitis/colitis/colitis     No current facility-administered medications for this encounter.      Current Outpatient Prescriptions   Medication     pantoprazole (PROTONIX) 40 MG EC tablet     trimethoprim (TRIMPEX) 100 MG tablet     ranitidine (ZANTAC) 150 MG tablet     insulin pen needle (BD MARIIA U/F) 32G X 4 MM     DULoxetine (CYMBALTA) 60 MG EC capsule     fluocinonide (LIDEX) 0.05 % ointment     insulin detemir (LEVEMIR FLEXPEN/FLEXTOUCH) 100 UNIT/ML injection     hydrochlorothiazide (HYDRODIURIL) 25 MG tablet     metFORMIN (GLUCOPHAGE-XR) 500 MG 24 hr tablet     metoprolol (TOPROL-XL) 50 MG 24 hr tablet     traMADol (ULTRAM) 50 MG tablet     LANsoprazole (PREVACID) 15 MG CR capsule     atorvastatin  (LIPITOR) 40 MG tablet     ipratropium - albuterol 0.5 mg/2.5 mg/3 mL (DUONEB) 0.5-2.5 (3) MG/3ML neb solution     nystatin (MYCOSTATIN) 228572 UNIT/GM POWD     ketoconazole (NIZORAL) 2 % cream     clonazePAM (KLONOPIN) 0.5 MG tablet     order for DME     ORDER FOR DME     olopatadine (PATANOL) 0.1 % ophthalmic solution     hydrocortisone 1 % cream     nystatin (MYCOSTATIN) cream     ACE/ARB NOT PRESCRIBED, INTENTIONAL,     glucose blood VI test strips strip     Multiple Vitamin (DAILY MULTIVITAMIN PO)     ASPIRIN 81 MG OR TABS     Facility-Administered Medications Ordered in Other Encounters   Medication     lidocaine 2%-EPINEPHrine 1:200,000 injection     ropivacaine (NAROPIN) injection     midazolam (VERSED) injection     fentaNYL (SUBLIMAZE) injection        Allergies   Allergen Reactions     Nkda [No Known Drug Allergies]        I have reviewed the Medications, Allergies, Past Medical and Surgical History, and Social History in the Epic system.           Review of Systems   All other systems reviewed and are negative.      Physical Exam      Physical Exam   Constitutional: She is oriented to person, place, and time. She appears well-developed and well-nourished.   HENT:   Head: Normocephalic and atraumatic.   Right Ear: External ear normal.   Left Ear: External ear normal.   Nose: Nose normal.   Mouth/Throat: Oropharynx is clear and moist.   Eyes: Conjunctivae and EOM are normal. Pupils are equal, round, and reactive to light.   Neck: Normal range of motion. Neck supple.   Cardiovascular: Normal rate, regular rhythm, normal heart sounds and intact distal pulses.    Pulmonary/Chest: Effort normal and breath sounds normal.   Abdominal: Soft. Bowel sounds are normal. There is tenderness.       Musculoskeletal: Normal range of motion.   Neurological: She is alert and oriented to person, place, and time.   Skin: Skin is warm and dry.   Psychiatric: She has a normal mood and affect. Her behavior is normal.   Nursing  note and vitals reviewed.      ED Course     ED Course     Procedures             EKG Interpretation:      Interpreted by Domenico Youssef  Time reviewed: 11:22  Symptoms at time of EKG: Chest tightness   Rhythm: normal sinus   Rate: 74  Axis: Normal  Ectopy: none  Conduction: normal  ST Segments/ T Waves: No ST-T wave changes and No acute ischemic changes  Q Waves: none  Comparison to prior: No old EKG available    Clinical Impression: no acute changes      Results for orders placed or performed during the hospital encounter of 09/23/17   XR Chest 2 Views    Narrative    XR CHEST 2 VW 9/23/2017 1:02 PM    COMPARISON: 12/29/2016    HISTORY: Chest pain      Impression    IMPRESSION: Cardiac silhouette and pulmonary vasculature are within  normal limits. No focal airspace disease, pleural effusion or  pneumothorax.    DERIC TORRES MD     2:08 PM  Complete resolution of symptoms with GI cocktail.            Critical Care time:  none             Labs Ordered and Resulted from Time of ED Arrival Up to the Time of Departure from the ED   BASIC METABOLIC PANEL - Abnormal; Notable for the following:        Result Value    Glucose 201 (*)     All other components within normal limits   CBC WITH PLATELETS DIFFERENTIAL   TROPONIN I       Assessments & Plan (with Medical Decision Making)   71-year-old female past medical history reviewed above, presentation the emergency department with concerns of chest discomfort as described in the HPI.  As noted the patient is under considerably more emotional stress than usual.  Physical exam finds her mildly anxious but otherwise comfortable with stable at the range normal vital signs, epigastric tenderness.  EKG revealed no acute changes, left diagnostics were notable only for a glucose of 201 in this type II diabetic.  Negative cardiac troponin.  Chest x-ray is acutely negative.  The patient's symptoms resolved completely with administration of IV Protonix and a GI cocktail.   Differential diagnosis of her entrance complaint was reviewed with her, I suspect that this relates to a combination of stress and GERD and my suspicion for more concerning underlying pathology such as ACS is quite low.  Plan for disposition to home and I will place the patient on Protonix and recommended that she discontinue Zantac.  Recheck in clinic in 10-14 days.      Disclaimer: This note consists of symbols derived from keyboarding, dictation and/or voice recognition software. As a result, there may be errors in the script that have gone undetected. Please consider this when interpreting information found in this chart.      I have reviewed the nursing notes.    I have reviewed the findings, diagnosis, plan and need for follow up with the patient.       Discharge Medication List as of 9/23/2017  2:13 PM      START taking these medications    Details   pantoprazole (PROTONIX) 40 MG EC tablet Take 1 tablet (40 mg) by mouth daily for 30 doses, Disp-30 tablet, R-0, Local Print             Final diagnoses:   Gastroesophageal reflux disease, esophagitis presence not specified   Stress       9/23/2017   [facility] EMERGENCY DEPARTMENT     Domenico Youssef MD  09/24/17 [id number]

## 2017-09-23 NOTE — ED AVS SNAPSHOT
[facility]    [address] [facility] [address]    Phone:  [phone]    Fax:  [phone]                                       Theresa Bill   MRN: [id number]    Department:  [facility]   Date of Visit:  9/23/2017           Patient Information     Date Of Birth          1945        Your diagnoses for this visit were:     Gastroesophageal reflux disease, esophagitis presence not specified     Stress        You were seen by Domenico Youssef MD.      Follow-up Information     Schedule an appointment as soon as possible for a visit with Shae Pierre PA-C.    Specialty:  Physician Assistant    Why:  As needed, If symptoms worsen    Contact information:    [address] [place] [address]  [phone]          Discharge Instructions       Discontinue Zantac.  Pantoprazole as directed.  Please follow up in clinic with the primary care provider in one to 2 weeks.  Return to emergency department if worse or changes    Future Appointments        Provider Department Dept Phone Center    10/2/2017 3:00 PM Sam Jaime [facility] [phone] [facility]      24 Hour Appointment Hotline       To make an appointment at any [facility], call [phone] ([phone]). If you don't have a family doctor or clinic, we will help you find one. [place] clinics are conveniently located to serve the needs of you and your family.             Review of your medicines      START taking        Dose / Directions Last dose taken    pantoprazole 40 MG EC tablet   Commonly known as:  PROTONIX   Dose:  40 mg   Quantity:  30 tablet        Take 1 tablet (40 mg) by mouth daily for 30 doses   Refills:  0          Our records show that you are taking the medicines listed below. If these are incorrect, please call your family doctor or clinic.        Dose / Directions Last dose taken    ACE/ARB NOT PRESCRIBED (INTENTIONAL)         ACE & ARB not prescribed due to Intolerance-cough   Refills:  0        aspirin 81 MG tablet   Quantity:  100        ONE DAILY   Refills:  3        atorvastatin 40 MG tablet   Commonly known as:  LIPITOR   Dose:  40 mg   Quantity:  90 tablet        Take 1 tablet (40 mg) by mouth At Bedtime   Refills:  3        blood glucose monitoring test strip   Commonly known as:  no brand specified   Dose:  1 strip   Quantity:  2 Box        1 strip by In Vitro route 3 times daily. Has a Freestyle   Refills:  12        clonazePAM 0.5 MG tablet   Commonly known as:  klonoPIN   Dose:  0.25 mg   Quantity:  180 tablet        Take 0.5 tablets (0.25 mg) by mouth At Bedtime   Refills:  2        DAILY MULTIVITAMIN PO        One tablet daily   Refills:  0        DULoxetine 60 MG EC capsule   Commonly known as:  CYMBALTA   Dose:  60 mg   Quantity:  90 capsule        Take 1 capsule (60 mg) by mouth daily   Refills:  3        fluocinonide 0.05 % ointment   Commonly known as:  LIDEX   Quantity:  15 g        Apply sparingly to affected area twice daily as needed for mouth ulcer.   Refills:  0        hydrochlorothiazide 25 MG tablet   Commonly known as:  HYDRODIURIL   Dose:  25 mg   Quantity:  90 tablet        Take 1 tablet (25 mg) by mouth daily   Refills:  3        hydrocortisone 1 % cream   Commonly known as:  CORTAID   Quantity:  30 g        Apply sparingly to affected area of lip corners two times daily for 1 week.   Refills:  0        insulin detemir 100 UNIT/ML injection   Commonly known as:  LEVEMIR FLEXPEN/FLEXTOUCH   Quantity:  15 mL        7 units at bedtime   Refills:  3        insulin pen needle 32G X 4 MM   Commonly known as:  BD MARIIA U/F   Quantity:  90 each        Use one pen daily   Refills:  0        ipratropium - albuterol 0.5 mg/2.5 mg/3 mL 0.5-2.5 (3) MG/3ML neb solution   Commonly known as:  DUONEB   Dose:  1 vial   Quantity:  1080 mL        Take 1 vial (3 mLs) by nebulization every 6 hours as needed for shortness of  breath / dyspnea or wheezing   Refills:  0        ketoconazole 2 % cream   Commonly known as:  NIZORAL   Quantity:  30 g        Apply topically 2 times daily To corners of lips for 2 weeks.   Refills:  1        LANsoprazole 15 MG CR capsule   Commonly known as:  PREVACID   Dose:  15 mg   Quantity:  60 capsule        Take 1 capsule (15 mg) by mouth 2 times daily   Refills:  3        metFORMIN 500 MG 24 hr tablet   Commonly known as:  GLUCOPHAGE-XR   Dose:  500 mg   Quantity:  180 tablet        Take 1 tablet (500 mg) by mouth 2 times daily (with meals)   Refills:  3        metoprolol 50 MG 24 hr tablet   Commonly known as:  TOPROL-XL   Dose:  50 mg   Quantity:  90 tablet        Take 1 tablet (50 mg) by mouth daily   Refills:  3        * nystatin cream   Commonly known as:  MYCOSTATIN   Quantity:  60 g        Apply twice daily for yeast rash for 3 weeks.   Refills:  3        * nystatin 654820 UNIT/GM Powd   Commonly known as:  MYCOSTATIN   Dose:  1 g   Quantity:  60 g        Apply 1 g topically 3 times daily as needed   Refills:  1        olopatadine 0.1 % ophthalmic solution   Commonly known as:  PATANOL   Dose:  1 drop   Quantity:  5 mL        Place 1 drop into both eyes 2 times daily   Refills:  3        order for DME        Equipment being ordered: CPAP Patient Theresa Bill received a Resmed Airsense 10  Auto. Pressures were set at Auto 10 - 15 cm H2O.   Refills:  0        order for DME   Quantity:  1 Device        Equipment being ordered: Knee compression sleeve   Refills:  0        ranitidine 150 MG tablet   Commonly known as:  ZANTAC   Dose:  150 mg   Quantity:  60 tablet        Take 1 tablet (150 mg) by mouth 2 times daily   Refills:  1        traMADol 50 MG tablet   Commonly known as:  ULTRAM   Dose:  50 mg   Quantity:  30 tablet        Take 1 tablet (50 mg) by mouth every 6 hours as needed for moderate pain   Refills:  3        trimethoprim 100 MG tablet   Commonly known as:  TRIMPEX   Dose:  100 mg         Take 100 mg by mouth daily   Refills:  0        * Notice:  This list has 2 medication(s) that are the same as other medications prescribed for you. Read the directions carefully, and ask your doctor or other care provider to review them with you.            Prescriptions were sent or printed at these locations (1 Prescription)                   Other Prescriptions                Printed at Department/Unit printer (1 of 1)         pantoprazole (PROTONIX) 40 MG EC tablet                Procedures and tests performed during your visit     Basic metabolic panel    CBC with platelets differential    EKG 12-lead, tracing only    Troponin I    XR Chest 2 Views      Orders Needing Specimen Collection     None      Pending Results     No orders found from 9/21/2017 to 9/24/2017.            Pending Culture Results     No orders found from 9/21/2017 to 9/24/2017.            Pending Results Instructions     If you had any lab results that were not finalized at the time of your Discharge, you can call the ED Lab Result RN at [phone]. You will be contacted by this team for any positive Lab results or changes in treatment. The nurses are available 7 days a week from 10A to 6:30P.  You can leave a message 24 hours per day and they will return your call.        Test Results From Your Hospital Stay        9/23/2017 11:12 AM      Component Results     Component Value Ref Range & Units Status    WBC 8.8 4.0 - 11.0 10e9/L Final    RBC Count 4.63 3.8 - 5.2 10e12/L Final    Hemoglobin 13.9 11.7 - 15.7 g/dL Final    Hematocrit 42.0 35.0 - 47.0 % Final    MCV 91 78 - 100 fl Final    MCH 30.0 26.5 - 33.0 pg Final    MCHC 33.1 31.5 - 36.5 g/dL Final    RDW 13.9 10.0 - 15.0 % Final    Platelet Count 307 150 - 450 10e9/L Final    Diff Method Automated Method  Final    % Neutrophils 67.3 % Final    % Lymphocytes 22.9 % Final    % Monocytes 7.2 % Final    % Eosinophils 1.8 % Final    % Basophils 0.6 % Final    % Immature Granulocytes 0.2 %  Final    Absolute Neutrophil 5.9 1.6 - 8.3 10e9/L Final    Absolute Lymphocytes 2.0 0.8 - 5.3 10e9/L Final    Absolute Monocytes 0.6 0.0 - 1.3 10e9/L Final    Absolute Eosinophils 0.2 0.0 - 0.7 10e9/L Final    Absolute Basophils 0.1 0.0 - 0.2 10e9/L Final    Abs Immature Granulocytes 0.0 0 - 0.4 10e9/L Final         9/23/2017 11:34 AM      Component Results     Component Value Ref Range & Units Status    Sodium 140 133 - 144 mmol/L Final    Potassium 3.7 3.4 - 5.3 mmol/L Final    Chloride 104 94 - 109 mmol/L Final    Carbon Dioxide 25 20 - 32 mmol/L Final    Anion Gap 11 3 - 14 mmol/L Final    Glucose 201 (H) 70 - 99 mg/dL Final    Urea Nitrogen 12 7 - 30 mg/dL Final    Creatinine 0.80 0.52 - 1.04 mg/dL Final    GFR Estimate 70 >60 mL/min/1.7m2 Final    Non  GFR Calc    GFR Estimate If Black 85 >60 mL/min/1.7m2 Final    African American GFR Calc    Calcium 9.5 8.5 - 10.1 mg/dL Final         9/23/2017 11:34 AM      Component Results     Component Value Ref Range & Units Status    Troponin I ES <0.015 0.000 - 0.045 ug/L Final    The 99th percentile for upper reference range is 0.045 ug/L.  Troponin values   in the range of 0.045 - 0.120 ug/L may be associated with risks of adverse   clinical events.           9/23/2017  1:06 PM      Narrative     XR CHEST 2 VW 9/23/2017 1:02 PM    COMPARISON: 12/29/2016    HISTORY: Chest pain        Impression     IMPRESSION: Cardiac silhouette and pulmonary vasculature are within  normal limits. No focal airspace disease, pleural effusion or  pneumothorax.    DERIC TORRES MD                Thank you for choosing [facility]       Thank you for choosing [facility] for your care. Our goal is always to provide you with excellent care. Hearing back from our patients is one way we can continue to improve our services. Please take a few minutes to complete the written survey that you may receive in the mail after you visit with us. Thank you!        MyChart Information      [employer] gives you secure access to your electronic health record. If you see a primary care provider, you can also send messages to your care team and make appointments. If you have questions, please call your primary care clinic.  If you do not have a primary care provider, please call [phone] and they will assist you.        Care EveryWhere ID     This is your Care EveryWhere ID. This could be used by other organizations to access your [place] medical records  [id number]        Equal Access to Services     SABAS VUONG : Hadii aad ku hadasho Soelielali, waaxda luqadaha, qaybta kaalmada adeegyaestrellita, ángel oconnor . So [facility] [phone].    ATENCIÓN: Si habla español, tiene a urban disposición servicios gratuitos de asistencia lingüística. Blairame al [phone].    We comply with applicable federal civil rights laws and [place] laws. We do not discriminate on the basis of race, color, national origin, age, disability sex, sexual orientation or gender identity.            After Visit Summary       This is your record. Keep this with you and show to your community pharmacist(s) and doctor(s) at your next visit.

## 2017-09-23 NOTE — DISCHARGE INSTRUCTIONS
Discontinue Zantac.  Pantoprazole as directed.  Please follow up in clinic with the primary care provider in one to 2 weeks.  Return to emergency department if worse or changes

## 2017-09-23 NOTE — ED AVS SNAPSHOT
[facility] Department    [address] [facility] [address]    Phone:  [phone]    Fax:  [phone]                                       Theresa Bill   MRN: [id number]    Department:  [facility]   Date of Visit:  9/23/2017           After Visit Summary Signature Page     I have received my discharge instructions, and my questions have been answered. I have discussed any challenges I see with this plan with the nurse or doctor.    ..........................................................................................................................................  Patient/Patient Representative Signature      ..........................................................................................................................................  Patient Representative Print Name and Relationship to Patient    ..................................................               ................................................  Date                                            Time    ..........................................................................................................................................  Reviewed by Signature/Title    ...................................................              ..............................................  Date                                                            Time

## 2017-09-24 DIAGNOSIS — K21.00 GASTROESOPHAGEAL REFLUX DISEASE WITH ESOPHAGITIS: ICD-10-CM

## 2017-09-24 DIAGNOSIS — F33.0 MAJOR DEPRESSIVE DISORDER, RECURRENT EPISODE, MILD (H): Chronic | ICD-10-CM

## 2017-09-24 DIAGNOSIS — K22.4 ESOPHAGEAL DYSMOTILITY: ICD-10-CM

## 2017-09-24 DIAGNOSIS — R05.3 CHRONIC COUGH: ICD-10-CM

## 2017-09-25 RX ORDER — DULOXETIN HYDROCHLORIDE 60 MG/1
CAPSULE, DELAYED RELEASE ORAL
Qty: 30 CAPSULE | Refills: 0 | Status: SHIPPED | OUTPATIENT
Start: 2017-09-25 | End: 2017-11-10

## 2017-09-25 NOTE — TELEPHONE ENCOUNTER
Ranitidine      Last Written Prescription Date: 07/14/17  Last Fill Quantity: 60,  # refills: 1   Last Office Visit with [facility] or  Health prescribing provider: 07/14/17                                         Next 5 appointments (look out 90 days)     Oct 02, 2017  3:00 PM CDT   Return Visit with [facility] ([facility])    [address] [facility] [address]   [phone]                  Duloxetine      Last Written Prescription Date: 05/12/17  Last Quantity: 90, # refills: 3  Last Office Visit with [facility], [facility] or [facility] prescribing provider: 07/14/17  Next 5 appointments (look out 90 days)     Oct 02, 2017  3:00 PM CDT   Return Visit with [facility] ([facility])    [address] [facility] [address]   [phone]                   Creatinine   Date Value Ref Range Status   09/23/2017 0.80 0.52 - 1.04 mg/dL Final     Lab Results   Component Value Date    AST 20 04/21/2017     Lab Results   Component Value Date    ALT 21 04/21/2017     BP Readings from Last 3 Encounters:   09/23/17 129/82   07/14/17 142/74   07/06/17 113/68

## 2017-09-28 ENCOUNTER — ALLIED HEALTH/NURSE VISIT (OUTPATIENT)
Dept: FAMILY MEDICINE | Facility: CLINIC | Age: 72
End: 2017-09-28
Payer: COMMERCIAL

## 2017-09-28 VITALS — SYSTOLIC BLOOD PRESSURE: 129 MMHG | DIASTOLIC BLOOD PRESSURE: 82 MMHG

## 2017-09-28 DIAGNOSIS — I10 ESSENTIAL HYPERTENSION: Primary | Chronic | ICD-10-CM

## 2017-09-28 PROCEDURE — 99207 ZZC NO BILLABLE SERVICE THIS VISIT: CPT | Performed by: PHYSICIAN ASSISTANT

## 2017-09-29 ENCOUNTER — TELEPHONE (OUTPATIENT)
Dept: FAMILY MEDICINE | Facility: CLINIC | Age: 72
End: 2017-09-29

## 2017-10-02 ENCOUNTER — OFFICE VISIT (OUTPATIENT)
Dept: PSYCHOLOGY | Facility: CLINIC | Age: 72
End: 2017-10-02
Payer: COMMERCIAL

## 2017-10-02 DIAGNOSIS — F32.0 MILD MAJOR DEPRESSION (H): ICD-10-CM

## 2017-10-02 DIAGNOSIS — F43.21 GRIEF: Primary | ICD-10-CM

## 2017-10-02 PROCEDURE — 90791 PSYCH DIAGNOSTIC EVALUATION: CPT | Performed by: PSYCHOLOGIST

## 2017-10-02 NOTE — MR AVS SNAPSHOT
MRN:[id number]                      After Visit Summary   10/2/2017    Theresa Bill    MRN: [id number]           Visit Information        Provider Department      10/2/2017 3:00 PM Sam Jaime [facility] Generic      Your next 10 appointments already scheduled     Oct 06, 2017  8:45 AM CDT   Ech Stress Test with LAUREN JORDAN ECHO STRESS 2   [facility] ([facility])    [address] [facility] [address]   [phone]           1. Please bring or wear a comfortable two-piece outfit and walking shoes. 2. Stop eating 3 hours before the test. You may drink water or juice. 3. Stop all caffeine 12 hours before the test. This includes coffee, tea, soda pop, chocolate and certain medicines (such as Anacin and Excederin). Also avoid decaf coffee and tea, as these contain small amounts of caffeine. 4. No alcohol, smoking or use of other tobacco products for 12 hours before the test. 5. Refer to your provider instructions to see if you need to stop any medications (such as beta-blockers or nitrates) for this test. 6. For patients with diabetes: - If you take insulin, call your diabetes care team. Ask if you should take a   dose the morning of your test. - If you take diabetes medicine by mouth, dont take it on the morning of your test. Bring it with you to take after the test. (If you have questions, call your diabetes care team) 7. When you arrive, please tell us if: - You have diabetes. - You have taken Viagra, Cialis or Levitra in the past 48 hours. 8. For any questions that cannot be answered, please contact the ordering physician            Oct 23, 2017  6:00 PM CDT   Return Visit with Sam Jaime   [facility] ([facility])    [address] [facility] [address]   [phone]            Nov 06, 2017  3:00 PM CST   Return Visit with Sam Jaime   [facility]  ([facility])    [address]  [facility] [address]   [phone]              MyChart Information     [employer] gives you secure access to your electronic health record. If you see a primary care provider, you can also send messages to your care team and make appointments. If you have questions, please call your primary care clinic.  If you do not have a primary care provider, please call [phone] and they will assist you.        Care EveryWhere ID     This is your Care EveryWhere ID. This could be used by other organizations to access your [place] medical records  [id number]        Equal Access to Services     SABAS VUONG : Annalisa Agrawal, hiral zambrano, krystian ingram, ángel jauregui. So [facility] [phone].    ATENCIÓN: Si habla español, tiene a urban disposición servicios gratuitos de asistencia lingüística. Llame al [phone].    We comply with applicable federal civil rights laws and [place] laws. We do not discriminate on the basis of race, color, national origin, age, disability, sex, sexual orientation, or gender identity.

## 2017-10-02 NOTE — Clinical Note
I was sorry to hear that her   last week. We will work together on processing all of the mixed thoughts that come at a time like this.

## 2017-10-03 DIAGNOSIS — M54.10 RADICULAR LEG PAIN: ICD-10-CM

## 2017-10-03 NOTE — TELEPHONE ENCOUNTER
traMADol (ULTRAM) 50 MG tablet      Last Written Prescription Date:  4/21/17  Last Fill Quantity: 30,   # refills: 0  Last Office Visit with [facility] provider: 7/14/17  Future Office visit:    Next 5 appointments (look out 90 days)     Oct 04, 2017  9:40 AM CDT   SHORT with Shae Pierre PA-C   [facility] ([facility])    [address] [place] [address]   [phone]            Oct 23, 2017  6:00 PM CDT   Return Visit with [facility] ([facility])    [address] [facility] [address]   [phone]            Nov 06, 2017  3:00 PM CST   Return Visit with [facility] ([facility])    [address] [facility] [address]   [phone]                   Routing refill request to provider for review/approval because:  Drug not on the [facility] refill protocol or controlled substance

## 2017-10-04 ENCOUNTER — OFFICE VISIT (OUTPATIENT)
Dept: FAMILY MEDICINE | Facility: CLINIC | Age: 72
End: 2017-10-04
Payer: COMMERCIAL

## 2017-10-04 VITALS
TEMPERATURE: 97.5 F | DIASTOLIC BLOOD PRESSURE: 78 MMHG | WEIGHT: 157 LBS | SYSTOLIC BLOOD PRESSURE: 120 MMHG | BODY MASS INDEX: 29.66 KG/M2 | HEART RATE: 76 BPM | RESPIRATION RATE: 16 BRPM

## 2017-10-04 DIAGNOSIS — Z79.4 TYPE 2 DIABETES MELLITUS WITHOUT COMPLICATION, WITH LONG-TERM CURRENT USE OF INSULIN (H): Chronic | ICD-10-CM

## 2017-10-04 DIAGNOSIS — E11.9 TYPE 2 DIABETES MELLITUS WITHOUT COMPLICATION, WITH LONG-TERM CURRENT USE OF INSULIN (H): Chronic | ICD-10-CM

## 2017-10-04 DIAGNOSIS — F43.21 GRIEF: ICD-10-CM

## 2017-10-04 DIAGNOSIS — R07.89 CHEST PRESSURE: Primary | ICD-10-CM

## 2017-10-04 DIAGNOSIS — I10 ESSENTIAL HYPERTENSION: Chronic | ICD-10-CM

## 2017-10-04 DIAGNOSIS — E66.811 CLASS 1 OBESITY WITH SERIOUS COMORBIDITY IN ADULT, UNSPECIFIED BMI, UNSPECIFIED OBESITY TYPE: ICD-10-CM

## 2017-10-04 DIAGNOSIS — I20.89 ANGINAL EQUIVALENT (H): ICD-10-CM

## 2017-10-04 LAB — HBA1C MFR BLD: 6.7 % (ref 4.3–6)

## 2017-10-04 PROCEDURE — 36415 COLL VENOUS BLD VENIPUNCTURE: CPT | Performed by: PHYSICIAN ASSISTANT

## 2017-10-04 PROCEDURE — 83036 HEMOGLOBIN GLYCOSYLATED A1C: CPT | Performed by: PHYSICIAN ASSISTANT

## 2017-10-04 PROCEDURE — 99207 C FOOT EXAM  NO CHARGE: CPT | Performed by: PHYSICIAN ASSISTANT

## 2017-10-04 PROCEDURE — 99214 OFFICE O/P EST MOD 30 MIN: CPT | Performed by: PHYSICIAN ASSISTANT

## 2017-10-04 RX ORDER — NITROGLYCERIN 0.4 MG/1
TABLET SUBLINGUAL
Qty: 25 TABLET | Refills: 0 | Status: SHIPPED | OUTPATIENT
Start: 2017-10-04 | End: 2019-03-11

## 2017-10-04 RX ORDER — TRAMADOL HYDROCHLORIDE 50 MG/1
TABLET ORAL
Qty: 30 TABLET | Refills: 5 | Status: SHIPPED | OUTPATIENT
Start: 2017-10-04 | End: 2018-04-24

## 2017-10-04 ASSESSMENT — PATIENT HEALTH QUESTIONNAIRE - PHQ9
SUM OF ALL RESPONSES TO PHQ QUESTIONS 1-9: 6
5. POOR APPETITE OR OVEREATING: MORE THAN HALF THE DAYS

## 2017-10-04 ASSESSMENT — ANXIETY QUESTIONNAIRES
IF YOU CHECKED OFF ANY PROBLEMS ON THIS QUESTIONNAIRE, HOW DIFFICULT HAVE THESE PROBLEMS MADE IT FOR YOU TO DO YOUR WORK, TAKE CARE OF THINGS AT HOME, OR GET ALONG WITH OTHER PEOPLE: VERY DIFFICULT
1. FEELING NERVOUS, ANXIOUS, OR ON EDGE: NEARLY EVERY DAY
5. BEING SO RESTLESS THAT IT IS HARD TO SIT STILL: MORE THAN HALF THE DAYS
3. WORRYING TOO MUCH ABOUT DIFFERENT THINGS: SEVERAL DAYS
2. NOT BEING ABLE TO STOP OR CONTROL WORRYING: NEARLY EVERY DAY
6. BECOMING EASILY ANNOYED OR IRRITABLE: NOT AT ALL
7. FEELING AFRAID AS IF SOMETHING AWFUL MIGHT HAPPEN: NEARLY EVERY DAY
GAD7 TOTAL SCORE: 14

## 2017-10-04 NOTE — NURSING NOTE
"Chief Complaint   Patient presents with     ER F/U     stress related       Initial /78 (BP Location: Right arm)  Pulse 76  Temp 97.5  F (36.4  C) (Tympanic)  Resp 16  Wt 157 lb (71.2 kg)  BMI 29.66 kg/m2 Estimated body mass index is 29.66 kg/(m^2) as calculated from the following:    Height as of 7/14/17: 5' 1\" (1.549 m).    Weight as of this encounter: 157 lb (71.2 kg).  Medication Reconciliation: complete    Health Maintenance that is potentially due pending provider review:  NONE    n/a    Is there anyone who you would like to be able to receive your results? No  If yes have patient fill out RICH    "

## 2017-10-04 NOTE — PATIENT INSTRUCTIONS
[phone] to set up stress test  Nitroglycerin for use if symptoms happen, and be seen in emergency room if symptoms do not promptly go away    Continue with counseling, but see me if depression seems to worsen    Call if questions

## 2017-10-04 NOTE — PROGRESS NOTES
Amber Cardenas,    Your A1c continues to look great.  A1c is 6.7.      Please call clinic at [phone] if you have questions.    Shae Pierre PA-C

## 2017-10-04 NOTE — PROGRESS NOTES
"  SUBJECTIVE:   Theresa Bill is a 71 year old female who presents to clinic today for the following health issues:      ED/UC Followup:    Facility:  [facility]  Date of visit: 2017  Reason for visit: stress  Current Status:  passed last Thursday      From ED note: \"The patient states that she was doing nothing stressful or strenuous and developed central chest pressure or tightness, burning sensation that radiated through to the back and also into her right neck and then subsequently settled in her low back.  She has had symptoms like this before with heartburn/reflux.  When I asked her about what might because he at this time she identified bringing her  home from hospital yesterday on oxygen with metastatic lung cancer.  She was extremely emotional and tearful describing the course of events over the last several days.  She is under considerably more stress than usual.  She also noted some mild shortness of breath, lightheadedness.  Thinks that she has not had enough to eat or drink yet this morning.\"      TODAY --  Had complete resolution of symptoms with GI cocktail.  Was switched from lansoprazole to pantoprazole.  Did have another episode in [facility] on way home, \"I almost passed out\", also had another episode in middle of night this week, woke her up, felt like couldn't breathe, and that someone sitting on chest.  Couple days ago felt heart beating harder, and stomach felt \"jittery\".  Fam history - no heart troubles in parents but 2 sisters  of massive heart attacks - ages 62 and 70.    DM2, insulin treated.  A1c 6.3 in April.  Sugars 123-149 in mornings.  217 at ED.  She notes some wt loss in past month again.  Flowsheet indicates 14 pounds since July.  She indicates not being hungry when stressed or when  would not eat.    Feels her stress has improved some.    GERD.  Sees her GI specialist later this.      Problem list and histories reviewed & adjusted, as indicated.  Additional " history: as documented    BP Readings from Last 3 Encounters:   10/04/17 120/78   09/28/17 129/82   09/23/17 129/82    Wt Readings from Last 3 Encounters:   10/04/17 157 lb (71.2 kg)   07/14/17 171 lb 3.2 oz (77.7 kg)   07/06/17 166 lb 14.4 oz (75.7 kg)         Labs reviewed in EPIC      Reviewed and updated as needed this visit by clinical staffTobacco  Allergies  Meds  Problems  Med Hx  Surg Hx  Fam Hx  Soc Hx        Reviewed and updated as needed this visit by Provider  Tobacco  Allergies  Meds  Problems  Med Hx  Surg Hx  Fam Hx  Soc Hx          ROS:  Constitutional, cardiovascular, pulmonary, GI, endocrine and psych systems are negative, except as otherwise noted.      OBJECTIVE:   /78 (BP Location: Right arm)  Pulse 76  Temp 97.5  F (36.4  C) (Tympanic)  Resp 16  Wt 157 lb (71.2 kg)  BMI 29.66 kg/m2  Body mass index is 29.66 kg/(m^2).  GENERAL: healthy, alert and no distress  RESP: lungs clear to auscultation - no rales, rhonchi or wheezes  CV: regular rate and rhythm, normal S1 S2, no S3 or S4, no murmur, click or rub, no peripheral edema   PSYCH: mentation appears normal, affect normal at times mild distress  Diabetic foot exam: normal DP and PT pulses, no trophic changes or ulcerative lesions and normal monofilament exam    Recent EKG at ED - short [place].  ASSESSMENT/PLAN:       ICD-10-CM    1. Chest pressure R07.89 Exercise Stress Echocardiogram     nitroGLYcerin (NITROSTAT) 0.4 MG sublingual tablet   2. Type 2 diabetes mellitus without complication, with long-term current use of insulin (H) E11.9 Exercise Stress Echocardiogram    Z79.4 Hemoglobin A1c     C FOOT EXAM  NO CHARGE   3. Essential hypertension I10 Exercise Stress Echocardiogram   4. Class 1 obesity with serious comorbidity in adult, unspecified BMI, unspecified obesity type E66.9 Exercise Stress Echocardiogram   5. Grief F43.20        Patient Instructions   [phone] to set up stress test  Nitroglycerin for use if  symptoms happen, and be seen in emergency room if symptoms do not promptly go away    Continue with counseling, but see me if depression seems to worsen    Call if questions          Shae Pierre PA-C  [facility]

## 2017-10-04 NOTE — MR AVS SNAPSHOT
After Visit Summary   10/4/2017    Theresa Bill    MRN: [id number]           Patient Information     Date Of Birth          1945        Visit Information        Provider Department      10/4/2017 9:40 AM Shae Pierre PA-C [facility]        Today's Diagnoses     Chest pressure    -  1    Type 2 diabetes mellitus without complication, with long-term current use of insulin (H)        Essential hypertension        Class 1 obesity with serious comorbidity in adult, unspecified BMI, unspecified obesity type        Grief          Care Instructions    [phone] to set up stress test  Nitroglycerin for use if symptoms happen, and be seen in emergency room if symptoms do not promptly go away    Continue with counseling, but see me if depression seems to worsen    Call if questions              Follow-ups after your visit        Your next 10 appointments already scheduled     Oct 23, 2017  6:00 PM CDT   Return Visit with [facility])    [address] [address]   [phone]            Nov 06, 2017  3:00 PM CST   Return Visit with Sam SHIRLEY [facility])    [address] [address]   [phone]              Future tests that were ordered for you today     Open Future Orders        Priority Expected Expires Ordered    Exercise Stress Echocardiogram ASA  10/4/2018 10/4/2017            Who to contact     If you have questions or need follow up information about today's clinic visit or your schedule please contact [facility] directly at [phone].  Normal or non-critical lab and imaging results will be communicated to you by MyChart, letter or phone within 4 business days after the clinic has received the results. If you do not hear from us within 7 days, please contact the clinic through MyChart or phone. If you  have a critical or abnormal lab result, we will notify you by phone as soon as possible.  Submit refill requests through [employer] or call your pharmacy and they will forward the refill request to us. Please allow 3 business days for your refill to be completed.          Additional Information About Your Visit        [employer] Information     [employer] gives you secure access to your electronic health record. If you see a primary care provider, you can also send messages to your care team and make appointments. If you have questions, please call your primary care clinic.  If you do not have a primary care provider, please call [phone] and they will assist you.        Care EveryWhere ID     This is your Care EveryWhere ID. This could be used by other organizations to access your [place] medical records  [id number]        Your Vitals Were     Pulse Temperature Respirations BMI (Body Mass Index)          76 97.5  F (36.4  C) (Tympanic) 16 29.66 kg/m2         Blood Pressure from Last 3 Encounters:   10/04/17 120/78   09/28/17 129/82   09/23/17 129/82    Weight from Last 3 Encounters:   10/04/17 157 lb (71.2 kg)   07/14/17 171 lb 3.2 oz (77.7 kg)   07/06/17 166 lb 14.4 oz (75.7 kg)              We Performed the Following     C FOOT EXAM  NO CHARGE     Hemoglobin A1c          Today's Medication Changes          These changes are accurate as of: 10/4/17 10:22 AM.  If you have any questions, ask your nurse or doctor.               Start taking these medicines.        Dose/Directions    nitroGLYcerin 0.4 MG sublingual tablet   Commonly known as:  NITROSTAT   Used for:  Chest pressure   Started by:  Shae Pierre PA-C        For chest pain place 1 tablet under the tongue every 5 minutes for 3 doses. If symptoms persist 5 minutes after 1st dose call 911.   Quantity:  25 tablet   Refills:  0         These medicines have changed or have updated prescriptions.        Dose/Directions    DULoxetine 60 MG EC capsule    Commonly known as:  CYMBALTA   This may have changed:  Another medication with the same name was removed. Continue taking this medication, and follow the directions you see here.   Used for:  Major depressive disorder, recurrent episode, mild (H)   Changed by:  Shae Pierre PA-C        TAKE ONE CAPSULE BY MOUTH ONCE DAILY   Quantity:  30 capsule   Refills:  0         Stop taking these medicines if you haven't already. Please contact your care team if you have questions.     ipratropium - albuterol 0.5 mg/2.5 mg/3 mL 0.5-2.5 (3) MG/3ML neb solution   Commonly known as:  DUONEB   Stopped by:  Shae Pierre PA-C           trimethoprim 100 MG tablet   Commonly known as:  TRIMPEX   Stopped by:  Shae Pierre PA-C                Where to get your medicines      These medications were sent to [facility] [address] [place] [address] [address] [facility]  [address] [facility] [address]     Phone:  [phone]     nitroGLYcerin 0.4 MG sublingual tablet                Primary Care Provider Office Phone # Fax #    Shae Pierre PA-C [phone] [address] [place] [id number]        Equal Access to Services     SABAS VUONG : Hadii lamonte starr hadasho Soomaali, waaxda luqadaha, qaybta kaalmada adeegyada, ángel jauregui. So [facility] [phone].    ATENCIÓN: Si habla español, tiene a urban disposición servicios gratuitos de asistencia lingüística. ame al [phone].    We comply with applicable federal civil rights laws and [place] laws. We do not discriminate on the basis of race, color, national origin, age, disability, sex, sexual orientation, or gender identity.            Thank you!     Thank you for choosing [facility]  for your care. Our goal is always to provide you with excellent care. Hearing back from our patients is one way we can continue to improve our services. Please take a few minutes to complete  the written survey that you may receive in the mail after your visit with us. Thank you!             Your Updated Medication List - Protect others around you: Learn how to safely use, store and throw away your medicines at [email or web address].          This list is accurate as of: 10/4/17 10:22 AM.  Always use your most recent med list.                   Brand Name Dispense Instructions for use Diagnosis    ACE/ARB NOT PRESCRIBED (INTENTIONAL)      ACE & ARB not prescribed due to Intolerance-cough    Type 2 diabetes, HbA1c goal < 7% (H)       aspirin 81 MG tablet     100    ONE DAILY    Type II or unspecified type diabetes mellitus without mention of complication, not stated as uncontrolled       atorvastatin 40 MG tablet    LIPITOR    90 tablet    Take 1 tablet (40 mg) by mouth At Bedtime    Dyslipidemia       blood glucose monitoring test strip    no brand specified    2 Box    1 strip by In Vitro route 3 times daily. Has a Freestyle    Type 2 diabetes, HbA1c goal < 7% (H)       clonazePAM 0.5 MG tablet    klonoPIN    180 tablet    Take 0.5 tablets (0.25 mg) by mouth At Bedtime        DAILY MULTIVITAMIN PO      One tablet daily        DULoxetine 60 MG EC capsule    CYMBALTA    30 capsule    TAKE ONE CAPSULE BY MOUTH ONCE DAILY    Major depressive disorder, recurrent episode, mild (H)       fluocinonide 0.05 % ointment    LIDEX    15 g    Apply sparingly to affected area twice daily as needed for mouth ulcer.    Aphthae, oral       hydrochlorothiazide 25 MG tablet    HYDRODIURIL    90 tablet    Take 1 tablet (25 mg) by mouth daily    Essential hypertension with goal blood pressure less than 140/90       hydrocortisone 1 % cream    CORTAID    30 g    Apply sparingly to affected area of lip corners two times daily for 1 week.    Angular cheilitis       insulin detemir 100 UNIT/ML injection    LEVEMIR FLEXPEN/FLEXTOUCH    15 mL    7 units at bedtime    Type 2 diabetes mellitus without complication, with long-term  current use of insulin (H)       insulin pen needle 32G X 4 MM    BD MARIIA U/F    90 each    Use one pen daily    Type 2 diabetes mellitus without complication, with long-term current use of insulin (H)       ketoconazole 2 % cream    NIZORAL    30 g    Apply topically 2 times daily To corners of lips for 2 weeks.    Angular cheilitis       metFORMIN 500 MG 24 hr tablet    GLUCOPHAGE-XR    180 tablet    Take 1 tablet (500 mg) by mouth 2 times daily (with meals)    Type 2 diabetes mellitus without complication, with long-term current use of insulin (H)       metoprolol 50 MG 24 hr tablet    TOPROL-XL    90 tablet    Take 1 tablet (50 mg) by mouth daily        nitroGLYcerin 0.4 MG sublingual tablet    NITROSTAT    25 tablet    For chest pain place 1 tablet under the tongue every 5 minutes for 3 doses. If symptoms persist 5 minutes after 1st dose call 911.    Chest pressure       * nystatin cream    MYCOSTATIN    60 g    Apply twice daily for yeast rash for 3 weeks.    Intertrigo       * nystatin [id number] UNIT/GM Powd    MYCOSTATIN    60 g    Apply 1 g topically 3 times daily as needed    Other specified erythematous condition(514.22)       olopatadine 0.1 % ophthalmic solution    PATANOL    5 mL    Place 1 drop into both eyes 2 times daily    Allergic conjunctivitis, bilateral       order for DME      Equipment being ordered: CPAP Patient Theresa Bill received a Resmed Airsense 10  Auto. Pressures were set at Auto 10 - 15 cm H2O.        order for DME     1 Device    Equipment being ordered: Knee compression sleeve    Fall at home, subsequent encounter, Pain in joint, lower leg, unspecified laterality, Effusion of knee joint right, Arthritis of knee, degenerative       pantoprazole 40 MG EC tablet    PROTONIX    30 tablet    Take 1 tablet (40 mg) by mouth daily for 30 doses        ranitidine 150 MG tablet    ZANTAC    60 tablet    TAKE ONE TABLET BY MOUTH TWICE DAILY    Gastroesophageal reflux disease with esophagitis,  Esophageal dysmotility, Chronic cough       traMADol 50 MG tablet    ULTRAM    30 tablet    Take 1 tablet (50 mg) by mouth every 6 hours as needed for moderate pain    Radicular leg pain       * Notice:  This list has 2 medication(s) that are the same as other medications prescribed for you. Read the directions carefully, and ask your doctor or other care provider to review them with you.

## 2017-10-05 ASSESSMENT — ANXIETY QUESTIONNAIRES: GAD7 TOTAL SCORE: 14
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
## 2017-10-06 ENCOUNTER — HOSPITAL ENCOUNTER (OUTPATIENT)
Dept: CARDIOLOGY | Facility: CLINIC | Age: 72
Discharge: HOME | End: 2017-10-06
Attending: PHYSICIAN ASSISTANT | Admitting: PHYSICIAN ASSISTANT
Payer: MEDICARE

## 2017-10-06 DIAGNOSIS — Z79.4 TYPE 2 DIABETES MELLITUS WITHOUT COMPLICATION, WITH LONG-TERM CURRENT USE OF INSULIN (H): Chronic | ICD-10-CM

## 2017-10-06 DIAGNOSIS — E66.811 CLASS 1 OBESITY WITH SERIOUS COMORBIDITY IN ADULT, UNSPECIFIED BMI, UNSPECIFIED OBESITY TYPE: ICD-10-CM

## 2017-10-06 DIAGNOSIS — I10 ESSENTIAL HYPERTENSION: Chronic | ICD-10-CM

## 2017-10-06 DIAGNOSIS — E11.9 TYPE 2 DIABETES MELLITUS WITHOUT COMPLICATION, WITH LONG-TERM CURRENT USE OF INSULIN (H): Chronic | ICD-10-CM

## 2017-10-06 DIAGNOSIS — R07.89 CHEST PRESSURE: ICD-10-CM

## 2017-10-06 PROCEDURE — 93325 DOPPLER ECHO COLOR FLOW MAPG: CPT | Mod: 26 | Performed by: INTERNAL MEDICINE

## 2017-10-06 PROCEDURE — 93018 CV STRESS TEST I&R ONLY: CPT | Performed by: INTERNAL MEDICINE

## 2017-10-06 PROCEDURE — 93016 CV STRESS TEST SUPVJ ONLY: CPT | Performed by: INTERNAL MEDICINE

## 2017-10-06 PROCEDURE — 93350 STRESS TTE ONLY: CPT | Mod: 26 | Performed by: INTERNAL MEDICINE

## 2017-10-06 PROCEDURE — 93350 STRESS TTE ONLY: CPT | Mod: TC

## 2017-10-06 PROCEDURE — 93321 DOPPLER ECHO F-UP/LMTD STD: CPT | Mod: 26 | Performed by: INTERNAL MEDICINE

## 2017-10-06 NOTE — PROGRESS NOTES
Amber Cardenas,    Your stress test actually came back as normal, but I still strongly think that this may be your heart.  Given everything that you're currently going through, I also checked with Dr Valenzuela -- just in case she would disagree with me.  Unfortunately we both feel that you need to see cardiology as soon as possible, to be sure that your heart is not cause of symptoms.  We have set up an appt for you next Friday 10/13 at 1 pm in [place].    Please call clinic at [phone] if you have questions.    Shae Pierre PA-C

## 2017-10-09 PROBLEM — I20.89 ANGINAL EQUIVALENT (H): Status: ACTIVE | Noted: 2017-10-09

## 2017-10-11 ENCOUNTER — TELEPHONE (OUTPATIENT)
Dept: FAMILY MEDICINE | Facility: CLINIC | Age: 72
End: 2017-10-11

## 2017-10-11 ENCOUNTER — MEDICAL CORRESPONDENCE (OUTPATIENT)
Dept: HEALTH INFORMATION MANAGEMENT | Facility: CLINIC | Age: 72
End: 2017-10-11

## 2017-10-17 ENCOUNTER — OFFICE VISIT (OUTPATIENT)
Dept: CARDIOLOGY | Facility: CLINIC | Age: 72
End: 2017-10-17
Attending: PHYSICIAN ASSISTANT
Payer: COMMERCIAL

## 2017-10-17 VITALS
WEIGHT: 173.4 LBS | BODY MASS INDEX: 32.76 KG/M2 | HEART RATE: 60 BPM | OXYGEN SATURATION: 98 % | SYSTOLIC BLOOD PRESSURE: 131 MMHG | DIASTOLIC BLOOD PRESSURE: 73 MMHG

## 2017-10-17 DIAGNOSIS — R07.9 ACUTE CHEST PAIN: Primary | ICD-10-CM

## 2017-10-17 PROCEDURE — 99204 OFFICE O/P NEW MOD 45 MIN: CPT | Performed by: INTERNAL MEDICINE

## 2017-10-17 NOTE — PATIENT INSTRUCTIONS
Thank you for your [facility] visit today. Your provider has recommended the following:  Medication Changes:  None today. Continue taking your medications as you have been.  Recommendations:  1. CT angiogram to be done at [facility]. Please call [phone] to schedule this at your convenience within the next 1-2 weeks. Follow the instructions below.  Follow-up:  Will be based on the results of this test.  We kindly ask that you call cardiology scheduling at [phone] three months prior to requested revisit date to schedule future cardiology appointments.  Reminder:  1. Please bring in your current medication list or your medication, over the counter supplements and vitamin bottles as we will review these at each office visit.               [facility]~[address] Floor~[place], [place]~[address]  Questions about your visit today?  Call your Cardiology Clinic RN's-Eun Crespo and/or Hilary Tracy at [phone].    CT Angiogram  Your procedure will be done at [facility] Imaging Department [address].   3rd floor of [facility]~[address]~[place], [place]. Please park in the  [address]  ramp on the west side of [address] on [address]. Take the skyway over [facility] to the hospital. Go up one floor to the 3rd floor, where your procedure will be done.  What is it?  This test looks at your heart and heart arteries. A CT (computed tomography) scan is a series of pictures that allow us to look inside your body. Our scanner creates images of the body in cross sections, much like slices of bread. This helps us see any problems more clearly. Before the scan, we will give you contrast fluid (X-ray dye) through an IV (small needle in your arm) The contrast helps your blood vessels show up more clearly on the pictures.  How do I get ready for this exam?  Please allow two hours for this test. Bring any scans or x-rays taken at other  "hospitals if similar tests were done. Also bring a current list of your medications including vitamins, minerals and over-the-counter medications. It is safest to leave personal items at home.     The day before your exam, drink extra fluids, water is best. Drink at least six 8 oz glasses unless your doctor has asked you to restrict your fluid intake.    No caffeine AND No smoking the day of your test.    Do not eat or drink for 3 hours before your exam.    If you are not on a beta blocker medication you will be taking 2 doses of a medication called, \"Metoprolol\". Take 50 mg the evening before your procedure and 50 mg the morning of your procedure. This medication is used to slow your heart rate so we can get clear pictures.    You may have or need a blood test (creatinine test) within 30 days of your exam. Staff will let you know when you are there.     You may take your morning medications with a small sip of water unless below.    Do not take diuretics (furosemide; HCTZ; torsemide; bumex) the morning of your test.    Do not take NSAIDS (ibuprofen; naproxen; indomethacin) the morning of your test.    Do not take your oral diabetic medication(s) the day of your test. Restart these medication(s) 48 hours after testing.    Do not take Viagra, Levitra or Cialis for 48 hours prior to your test.  Be sure to tell your doctor:    If you have any allergies, including any reaction to contrast.     If there is a chance you may be pregnant.    If you are breastfeeding.    If you have any special needs.  What happens during the exam?  Please wear loose clothing such as a sweat suit or jogging clothes. Avoid snaps, zippers and other metal. We may have you undress and put on a hospital gown.    We will place pads (EKG leads) on your chest.    You will lie on a table with your arms extended above your head. The table will move through the scanner. The scanner is a short tube. You will not be enclosed.    We will place a small " needle (IV) into a vein in your arm. The contrast fluid flows through this needle. You may received medicine to slow your heart rate as well.    The scanner will take a series of pictures.You must lie very still during this time but you can talk to staff via a 2-way speaker system.  Is it safe?  As with any scan that uses radiation there is a very small increased risk of cancer. Your doctor orders a scan when he or she feels the potential benefits outweigh the risks of the scan. Please talk with your doctor if you have any concerns before your exam.  When will I know the result?  The doctor who did your exam and the radiologist (x-ray doctor) will prepare a report of your results. The report will be sent to your doctor who will discuss the result with you.  Who should I call with questions?  Please call the [facility] diagnostic imaging scheduling department at [phone]  with any questions. You may also contact your cardiology nurse at [phone].

## 2017-10-17 NOTE — PROGRESS NOTES
REASON FOR CONSULTATION:  Chest discomfort, palpitations, dizziness.      HISTORY OF PRESENT ILLNESS:  I had the pleasure of seeing Ms. Bill in consultation at [facility] today.  She is a very pleasant 71-year-old female with a past medical history significant for hypertension, dyslipidemia and diabetes.  She has recently been under a great deal of stress due to the illness and ultimately passing away of her .  He actually passed away in late September.      On 09/23/2017, she presented to the Emergency Department at [facility] with epigastric/chest discomfort and diaphoresis as well as palpitations.  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  She ultimately presented to the Emergency Department where her evaluation was essentially unremarkable.  She did receive nitroglycerin which actually led to nausea.      To work up these complaints further, she underwent an exercise stress echocardiogram on 10/06/2017.  This demonstrated reduced functional capacity, but no evidence of ischemia.  Of note, the sensitivity of the test was reduced since the patient was imaged at less than 85% of her maximum predicted value.      She states that she had 2 further episodes of diaphoresis and shortness of breath after the incident that prompted her Emergency Department evaluation.  In the last 2 weeks or so, she has felt slightly better.  She feels that her overall stress level is also improving.      Past medical history, family history, social history, allergies and medications are reviewed in my Epic note.  Physical exam is dictated below.      Her EKG on 09/23/2017 demonstrated probable normal sinus rhythm with no acute ST-T wave abnormalities.      LABORATORY DATA:  Lipids on 04/21/2017 demonstrated total cholesterol 151, HDL 59 and triglycerides of 103.      IMPRESSION:   1.  Chest discomfort, diaphoresis and palpitations in  the context of significant stress.   2.  Hypertension.   3.  Dyslipidemia.   4.  Diabetes.      Ms. Crow presents for evaluation regarding episodic diaphoresis, chest discomfort and dyspnea which prompted an Emergency Department visit in late 2017.  Although her exercise stress echocardiogram was reported as negative for ischemia, it was of limited sensitivity due to the fact that she was imaged at below 85% of maximum age-predicted heart rate.      At this point in time, I think it would be reasonable to evaluate her further with a CT coronary angiogram.  Assuming the CT coronary angiogram demonstrates no significant obstructive disease, then I think that continued management of risk factors would be appropriate.  I will also order a Holter monitor to rule out any significant tachycardia or bradyarrhythmias that could contribute to her symptoms.      It was a pleasure seeing her in consultation today.  Further evaluation will depend on the results of the above-mentioned investigations.         CAITLYN STARR MD             D: 10/17/2017 14:58   T: 10/17/2017 16:26   MT:       Name:     TERRY CROW   MRN:      [id number]        Account:      [id number]   :      1945           Service Date: 10/17/2017      Document: [id number]

## 2017-10-17 NOTE — MR AVS SNAPSHOT
After Visit Summary   10/17/2017    Theresa Blil    MRN: [id number]           Patient Information     Date Of Birth          1945        Visit Information        Provider Department      10/17/2017 1:00 PM Jose Guzman MD [facility]YS[facility] AT [facility]        Today's Diagnoses     Acute chest pain    -  1      Care Instructions    Thank you for your  Heart Care visit today. Your provider has recommended the following:  Medication Changes:  None today. Continue taking your medications as you have been.  Recommendations:  1. CT angiogram to be done at [facility]. Please call [phone] to schedule this at your convenience within the next 1-2 weeks. Follow the instructions below.  Follow-up:  Will be based on the results of this test.  We kindly ask that you call cardiology scheduling at [phone] three months prior to requested revisit date to schedule future cardiology appointments.  Reminder:  1. Please bring in your current medication list or your medication, over the counter supplements and vitamin bottles as we will review these at each office visit.               [facility]~[address]nd Floor~[place], [place]~[address]  Questions about your visit today?  Call your Cardiology Clinic RN's-Eun Crespo and/or Hilary Tracy at [phone].    CT Angiogram  Your procedure will be done at [facility] Imaging Department [address].   3rd floor of [facility]~[address]~[place], [place]. Please park in the  Skyway  ramp on the west side of [facility] on [address]. Take the skyway over [facility] to the hospital. Go up one floor to the 3rd floor, where your procedure will be done.  What is it?  This test looks at your heart and heart arteries. A CT (computed tomography) scan is a series of pictures that allow us to look inside your body. Our scanner creates images of the body in  "cross sections, much like slices of bread. This helps us see any problems more clearly. Before the scan, we will give you contrast fluid (X-ray dye) through an IV (small needle in your arm) The contrast helps your blood vessels show up more clearly on the pictures.  How do I get ready for this exam?  Please allow two hours for this test. Bring any scans or x-rays taken at other hospitals if similar tests were done. Also bring a current list of your medications including vitamins, minerals and over-the-counter medications. It is safest to leave personal items at home.     The day before your exam, drink extra fluids, water is best. Drink at least six 8 oz glasses unless your doctor has asked you to restrict your fluid intake.    No caffeine AND No smoking the day of your test.    Do not eat or drink for 3 hours before your exam.    If you are not on a beta blocker medication you will be taking 2 doses of a medication called, \"Metoprolol\". Take 50 mg the evening before your procedure and 50 mg the morning of your procedure. This medication is used to slow your heart rate so we can get clear pictures.    You may have or need a blood test (creatinine test) within 30 days of your exam. Staff will let you know when you are there.     You may take your morning medications with a small sip of water unless below.    Do not take diuretics (furosemide; HCTZ; torsemide; bumex) the morning of your test.    Do not take NSAIDS (ibuprofen; naproxen; indomethacin) the morning of your test.    Do not take your oral diabetic medication(s) the day of your test. Restart these medication(s) 48 hours after testing.    Do not take Viagra, Levitra or Cialis for 48 hours prior to your test.  Be sure to tell your doctor:    If you have any allergies, including any reaction to contrast.     If there is a chance you may be pregnant.    If you are breastfeeding.    If you have any special needs.  What happens during the exam?  Please wear loose " clothing such as a sweat suit or jogging clothes. Avoid snaps, zippers and other metal. We may have you undress and put on a hospital gown.    We will place pads (EKG leads) on your chest.    You will lie on a table with your arms extended above your head. The table will move through the scanner. The scanner is a short tube. You will not be enclosed.    We will place a small needle (IV) into a vein in your arm. The contrast fluid flows through this needle. You may received medicine to slow your heart rate as well.    The scanner will take a series of pictures.You must lie very still during this time but you can talk to staff via a 2-way speaker system.  Is it safe?  As with any scan that uses radiation there is a very small increased risk of cancer. Your doctor orders a scan when he or she feels the potential benefits outweigh the risks of the scan. Please talk with your doctor if you have any concerns before your exam.  When will I know the result?  The doctor who did your exam and the radiologist (x-ray doctor) will prepare a report of your results. The report will be sent to your doctor who will discuss the result with you.  Who should I call with questions?  Please call the [facility] diagnostic imaging scheduling department at [phone]  with any questions. You may also contact your cardiology nurse at [phone].              Follow-ups after your visit        Your next 10 appointments already scheduled     Oct 23, 2017  6:00 PM CDT   Return Visit with [facility] ([facility])    [address] [address]   [phone]            Nov 06, 2017  3:00 PM CST   Return Visit with [facility] ([facility])    [address] [address]   [phone]              Future tests that were ordered for you today     Open Future Orders        Priority Expected Expires Ordered    Holter Monitor  24 hour - Adult Routine 10/24/2017 10/17/2018 10/17/2017    CT Angiogram coronary artery Routine 10/18/2017 10/17/2018 10/17/2017            Who to contact     If you have questions or need follow up information about today's clinic visit or your schedule please contact [facility] PHYSIC[facility] AT [facility] directly at [phone].  Normal or non-critical lab and imaging results will be communicated to you by [employer], letter or phone within 4 business days after the clinic has received the results. If you do not hear from us within 7 days, please contact the clinic through [employer] or phone. If you have a critical or abnormal lab result, we will notify you by phone as soon as possible.  Submit refill requests through [employer] or call your pharmacy and they will forward the refill request to us. Please allow 3 business days for your refill to be completed.          Additional Information About Your Visit        [employer] Information     [employer] gives you secure access to your electronic health record. If you see a primary care provider, you can also send messages to your care team and make appointments. If you have questions, please call your primary care clinic.  If you do not have a primary care provider, please call [phone] and they will assist you.        Care EveryWhere ID     This is your Care EveryWhere ID. This could be used by other organizations to access your [facility] [place] medical records  [id number]        Your Vitals Were     Pulse Pulse Oximetry BMI (Body Mass Index)             60 98% 32.76 kg/m2          Blood Pressure from Last 3 Encounters:   10/17/17 131/73   10/04/17 120/78   09/28/17 129/82    Weight from Last 3 Encounters:   10/17/17 78.7 kg (173 lb 6.4 oz)   10/04/17 71.2 kg (157 lb)   07/14/17 77.7 kg (171 lb 3.2 oz)               Primary Care Provider Office Phone # Fax #    Shae Pierre PA-C [phone] [address] [place] [address]         Equal Access to Services     [facility] : Hadii aad ku hadjaydontirso Soelielali, waaxda luqadaha, qaybta kaalmaestrellita ingram, ángel jauregui. So [facility] [phone].    ATENCIÓN: Si habla español, tiene a urban disposición servicios gratuitos de asistencia lingüística. Llame al [phone].    We comply with applicable federal civil rights laws and [place] laws. We do not discriminate on the basis of race, color, national origin, age, disability, sex, sexual orientation, or gender identity.            Thank you!     Thank you for choosing [facility] PHYSICIAN [facility]ART AT [facility]  for your care. Our goal is always to provide you with excellent care. Hearing back from our patients is one way we can continue to improve our services. Please take a few minutes to complete the written survey that you may receive in the mail after your visit with us. Thank you!             Your Updated Medication List - Protect others around you: Learn how to safely use, store and throw away your medicines at www.disposemymeds.org.          This list is accurate as of: 10/17/17  1:37 PM.  Always use your most recent med list.                   Brand Name Dispense Instructions for use Diagnosis    ACE/ARB NOT PRESCRIBED (INTENTIONAL)      ACE & ARB not prescribed due to Intolerance-cough    Type 2 diabetes, HbA1c goal < 7% (H)       aspirin 81 MG tablet     100    ONE DAILY    Type II or unspecified type diabetes mellitus without mention of complication, not stated as uncontrolled       atorvastatin 40 MG tablet    LIPITOR    90 tablet    Take 1 tablet (40 mg) by mouth At Bedtime    Dyslipidemia       blood glucose monitoring test strip    no brand specified    2 Box    1 strip by In Vitro route 3 times daily. Has a Freestyle    Type 2 diabetes, HbA1c goal < 7% (H)       clonazePAM 0.5 MG tablet    klonoPIN    180 tablet    Take 0.5 tablets (0.25 mg) by mouth At Bedtime        DAILY MULTIVITAMIN PO       One tablet daily        DULoxetine 60 MG EC capsule    CYMBALTA    30 capsule    TAKE ONE CAPSULE BY MOUTH ONCE DAILY    Major depressive disorder, recurrent episode, mild (H)       fluocinonide 0.05 % ointment    LIDEX    15 g    Apply sparingly to affected area twice daily as needed for mouth ulcer.    Aphthae, oral       hydrochlorothiazide 25 MG tablet    HYDRODIURIL    90 tablet    Take 1 tablet (25 mg) by mouth daily    Essential hypertension with goal blood pressure less than 140/90       hydrocortisone 1 % cream    CORTAID    30 g    Apply sparingly to affected area of lip corners two times daily for 1 week.    Angular cheilitis       insulin detemir 100 UNIT/ML injection    LEVEMIR FLEXPEN/FLEXTOUCH    15 mL    7 units at bedtime    Type 2 diabetes mellitus without complication, with long-term current use of insulin (H)       insulin pen needle 32G X 4 MM    BD MARIIA U/F    90 each    Use one pen daily    Type 2 diabetes mellitus without complication, with long-term current use of insulin (H)       ketoconazole 2 % cream    NIZORAL    30 g    Apply topically 2 times daily To corners of lips for 2 weeks.    Angular cheilitis       metFORMIN 500 MG 24 hr tablet    GLUCOPHAGE-XR    180 tablet    Take 1 tablet (500 mg) by mouth 2 times daily (with meals)    Type 2 diabetes mellitus without complication, with long-term current use of insulin (H)       metoprolol 50 MG 24 hr tablet    TOPROL-XL    90 tablet    Take 1 tablet (50 mg) by mouth daily        nitroGLYcerin 0.4 MG sublingual tablet    NITROSTAT    25 tablet    For chest pain place 1 tablet under the tongue every 5 minutes for 3 doses. If symptoms persist 5 minutes after 1st dose call 911.    Chest pressure       * nystatin cream    MYCOSTATIN    60 g    Apply twice daily for yeast rash for 3 weeks.    Intertrigo       * nystatin 358806 UNIT/GM Powd    MYCOSTATIN    60 g    Apply 1 g topically 3 times daily as needed    Other specified erythematous  condition(695.89)       olopatadine 0.1 % ophthalmic solution    PATANOL    5 mL    Place 1 drop into both eyes 2 times daily    Allergic conjunctivitis, bilateral       order for DME      Equipment being ordered: CPAP Patient Theresa Bill received a Resmed Airsense 10  Auto. Pressures were set at Auto 10 - 15 cm H2O.        order for DME     1 Device    Equipment being ordered: Knee compression sleeve    Fall at home, subsequent encounter, Pain in joint, lower leg, unspecified laterality, Effusion of knee joint right, Arthritis of knee, degenerative       pantoprazole 40 MG EC tablet    PROTONIX    30 tablet    Take 1 tablet (40 mg) by mouth daily for 30 doses        ranitidine 150 MG tablet    ZANTAC    60 tablet    TAKE ONE TABLET BY MOUTH TWICE DAILY    Gastroesophageal reflux disease with esophagitis, Esophageal dysmotility, Chronic cough       traMADol 50 MG tablet    ULTRAM    30 tablet    TAKE ONE TABLET BY MOUTH EVERY 6 HOURS AS NEEDED FOR  MODERATE  PAIN    Radicular leg pain       * Notice:  This list has 2 medication(s) that are the same as other medications prescribed for you. Read the directions carefully, and ask your doctor or other care provider to review them with you.

## 2017-10-17 NOTE — LETTER
10/17/2017    Shae Pierre PA-C  [address] [place] [id number]    RE: Theresa BOSTON Fly       Dear Colleague,    I had the pleasure of seeing Theresa Bill in the [facility].    REASON FOR CONSULTATION:  Chest discomfort, palpitations, dizziness.      HISTORY OF PRESENT ILLNESS:  I had the pleasure of seeing Ms. Bill in consultation at [facility] today.  She is a very pleasant 71-year-old female with a past medical history significant for hypertension, dyslipidemia and diabetes.  She has recently been under a great deal of stress due to the illness and ultimately passing away of her .  He actually passed away in late September.      On 09/23/2017, she presented to the Emergency Department at [facility] with epigastric/chest discomfort and diaphoresis as well as palpitations.  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  She ultimately presented to the Emergency Department where her evaluation was essentially unremarkable.  She did receive nitroglycerin which actually led to nausea.      To work up these complaints further, she underwent an exercise stress echocardiogram on 10/06/2017.  This demonstrated reduced functional capacity, but no evidence of ischemia.  Of note, the sensitivity of the test was reduced since the patient was imaged at less than 85% of her maximum predicted value.      She states that she had 2 further episodes of diaphoresis and shortness of breath after the incident that prompted her Emergency Department evaluation.  In the last 2 weeks or so, she has felt slightly better.  She feels that her overall stress level is also improving.      Past medical history, family history, social history, allergies and medications are reviewed in my Epic note.  Physical exam is dictated below.      Her EKG on 09/23/2017 demonstrated probable normal sinus rhythm with  no acute ST-T wave abnormalities.      LABORATORY DATA:  Lipids on 04/21/2017 demonstrated total cholesterol 151, HDL 59 and triglycerides of 103.     Outpatient Encounter Prescriptions as of 10/17/2017   Medication Sig Dispense Refill     traMADol (ULTRAM) 50 MG tablet TAKE ONE TABLET BY MOUTH EVERY 6 HOURS AS NEEDED FOR  MODERATE  PAIN 30 tablet 5     nitroGLYcerin (NITROSTAT) 0.4 MG sublingual tablet For chest pain place 1 tablet under the tongue every 5 minutes for 3 doses. If symptoms persist 5 minutes after 1st dose call 911. 25 tablet 0     [DISCONTINUED] ranitidine (ZANTAC) 150 MG tablet TAKE ONE TABLET BY MOUTH TWICE DAILY 60 tablet 0     [DISCONTINUED] DULoxetine (CYMBALTA) 60 MG EC capsule TAKE ONE CAPSULE BY MOUTH ONCE DAILY 30 capsule 0     [DISCONTINUED] pantoprazole (PROTONIX) 40 MG EC tablet Take 1 tablet (40 mg) by mouth daily for 30 doses 30 tablet 0     fluocinonide (LIDEX) 0.05 % ointment Apply sparingly to affected area twice daily as needed for mouth ulcer. 15 g 0     [DISCONTINUED] insulin detemir (LEVEMIR FLEXPEN/FLEXTOUCH) 100 UNIT/ML injection 7 units at bedtime 15 mL 3     metFORMIN (GLUCOPHAGE-XR) 500 MG 24 hr tablet Take 1 tablet (500 mg) by mouth 2 times daily (with meals) 180 tablet 3     atorvastatin (LIPITOR) 40 MG tablet Take 1 tablet (40 mg) by mouth At Bedtime 90 tablet 3     [DISCONTINUED] hydrochlorothiazide (HYDRODIURIL) 25 MG tablet Take 1 tablet (25 mg) by mouth daily 90 tablet 3     [DISCONTINUED] metoprolol (TOPROL-XL) 50 MG 24 hr tablet Take 1 tablet (50 mg) by mouth daily 90 tablet 3     nystatin (MYCOSTATIN) 408971 UNIT/GM POWD Apply 1 g topically 3 times daily as needed 60 g 1     ketoconazole (NIZORAL) 2 % cream Apply topically 2 times daily To corners of lips for 2 weeks. 30 g 1     clonazePAM (KLONOPIN) 0.5 MG tablet Take 1 mg by mouth At Bedtime  180 tablet 2     olopatadine (PATANOL) 0.1 % ophthalmic solution Place 1 drop into both eyes 2 times daily 5 mL 3      hydrocortisone 1 % cream Apply sparingly to affected area of lip corners two times daily for 1 week. 30 g 0     nystatin (MYCOSTATIN) cream Apply twice daily for yeast rash for 3 weeks. 60 g 3     Multiple Vitamin (DAILY MULTIVITAMIN PO) One tablet daily       ASPIRIN 81 MG OR TABS ONE DAILY 100 3     insulin pen needle (BD MARIIA U/F) 32G X 4 MM Use one pen daily (Patient not taking: Reported on 12/1/2017) 90 each 0     order for DME Equipment being ordered: CPAP Patient Theresa Bill received a Resmed Airsense 10  Auto. Pressures were set at Auto 10 - 15 cm H2O.       ORDER FOR DME Equipment being ordered: Knee compression sleeve (Patient not taking: Reported on 12/1/2017) 1 Device 0     ACE/ARB NOT PRESCRIBED, INTENTIONAL, ACE & ARB not prescribed due to Intolerance-cough       (Patient not taking: Reported on 12/1/2017)       glucose blood VI test strips strip 1 strip by In Vitro route 3 times daily. Has a Freestyle (Patient not taking: Reported on 12/1/2017) 2 Box 12     Facility-Administered Encounter Medications as of 10/17/2017   Medication Dose Route Frequency Provider Last Rate Last Dose     lidocaine 2%-EPINEPHrine 1:200,000 injection    PRN Gagan Baez, APRN CRNA   5 mL at 04/03/12 1245     ropivacaine (NAROPIN) injection    PRN Gagan Baez, APRN CRNA   20 mL at 04/03/12 1252     midazolam (VERSED) injection    PRN Gagan Baez, APRN CRNA   2 mg at 04/03/12 1225     fentaNYL (SUBLIMAZE) injection    PRN Gagan Baez, APRN CRNA   50 mcg at 04/03/12 1225      IMPRESSION:   1.  Chest discomfort, diaphoresis and palpitations in the context of significant stress.   2.  Hypertension.   3.  Dyslipidemia.   4.  Diabetes.      Ms. Bill presents for evaluation regarding episodic diaphoresis, chest discomfort and dyspnea which prompted an Emergency Department visit in late 09/2017.  Although her exercise stress echocardiogram was reported as negative for ischemia, it was of limited sensitivity due to the  fact that she was imaged at below 85% of maximum age-predicted heart rate.      At this point in time, I think it would be reasonable to evaluate her further with a CT coronary angiogram.  Assuming the CT coronary angiogram demonstrates no significant obstructive disease, then I think that continued management of risk factors would be appropriate.  I will also order a Holter monitor to rule out any significant tachycardia or bradyarrhythmias that could contribute to her symptoms.      It was a pleasure seeing her in consultation today.  Further evaluation will depend on the results of the above-mentioned investigations.     Sincerely,    Jose Guzman MD     [facility]

## 2017-10-24 ENCOUNTER — HOSPITAL ENCOUNTER (OUTPATIENT)
Dept: CARDIOLOGY | Facility: CLINIC | Age: 72
Discharge: HOME | End: 2017-10-24
Attending: INTERNAL MEDICINE | Admitting: INTERNAL MEDICINE
Payer: MEDICARE

## 2017-10-24 VITALS — DIASTOLIC BLOOD PRESSURE: 61 MMHG | HEART RATE: 56 BPM | SYSTOLIC BLOOD PRESSURE: 98 MMHG

## 2017-10-24 DIAGNOSIS — R07.9 ACUTE CHEST PAIN: ICD-10-CM

## 2017-10-24 LAB
CREAT BLD-MCNC: 0.8 MG/DL (ref 0.52–1.04)
GFR SERPL CREATININE-BSD FRML MDRD: 71 ML/MIN/1.7M2

## 2017-10-24 PROCEDURE — A9270 NON-COVERED ITEM OR SERVICE: HCPCS | Mod: GY | Performed by: INTERNAL MEDICINE

## 2017-10-24 PROCEDURE — 25000128 H RX IP 250 OP 636: Performed by: INTERNAL MEDICINE

## 2017-10-24 PROCEDURE — 75574 CT ANGIO HRT W/3D IMAGE: CPT

## 2017-10-24 PROCEDURE — 82565 ASSAY OF CREATININE: CPT

## 2017-10-24 PROCEDURE — 75574 CT ANGIO HRT W/3D IMAGE: CPT | Mod: 26 | Performed by: INTERNAL MEDICINE

## 2017-10-24 PROCEDURE — 25000132 ZZH RX MED GY IP 250 OP 250 PS 637: Mod: GY | Performed by: INTERNAL MEDICINE

## 2017-10-24 RX ORDER — ONDANSETRON 2 MG/ML
4 INJECTION INTRAMUSCULAR; INTRAVENOUS
Status: DISCONTINUED | OUTPATIENT
Start: 2017-10-24 | End: 2017-10-25 | Stop reason: HOSPADM

## 2017-10-24 RX ORDER — NITROGLYCERIN 0.4 MG/1
0.4 TABLET SUBLINGUAL
Status: DISCONTINUED | OUTPATIENT
Start: 2017-10-24 | End: 2017-10-25 | Stop reason: HOSPADM

## 2017-10-24 RX ORDER — METHYLPREDNISOLONE SODIUM SUCCINATE 125 MG/2ML
125 INJECTION, POWDER, LYOPHILIZED, FOR SOLUTION INTRAMUSCULAR; INTRAVENOUS
Status: DISCONTINUED | OUTPATIENT
Start: 2017-10-24 | End: 2017-10-25 | Stop reason: HOSPADM

## 2017-10-24 RX ORDER — ACYCLOVIR 200 MG/1
0-1 CAPSULE ORAL
Status: DISCONTINUED | OUTPATIENT
Start: 2017-10-24 | End: 2017-10-25 | Stop reason: HOSPADM

## 2017-10-24 RX ORDER — IOPAMIDOL 755 MG/ML
500 INJECTION, SOLUTION INTRAVASCULAR ONCE
Status: COMPLETED | OUTPATIENT
Start: 2017-10-24 | End: 2017-10-24

## 2017-10-24 RX ORDER — DIPHENHYDRAMINE HCL 25 MG
25 CAPSULE ORAL
Status: DISCONTINUED | OUTPATIENT
Start: 2017-10-24 | End: 2017-10-25 | Stop reason: HOSPADM

## 2017-10-24 RX ORDER — METOPROLOL TARTRATE 50 MG
50-100 TABLET ORAL
Status: COMPLETED | OUTPATIENT
Start: 2017-10-24 | End: 2017-10-24

## 2017-10-24 RX ORDER — METOPROLOL TARTRATE 1 MG/ML
5-15 INJECTION, SOLUTION INTRAVENOUS
Status: DISCONTINUED | OUTPATIENT
Start: 2017-10-24 | End: 2017-10-25 | Stop reason: HOSPADM

## 2017-10-24 RX ORDER — DIPHENHYDRAMINE HYDROCHLORIDE 50 MG/ML
25-50 INJECTION INTRAMUSCULAR; INTRAVENOUS
Status: DISCONTINUED | OUTPATIENT
Start: 2017-10-24 | End: 2017-10-25 | Stop reason: HOSPADM

## 2017-10-24 RX ADMIN — SODIUM CHLORIDE 100 ML: 9 INJECTION, SOLUTION INTRAVENOUS at 11:58

## 2017-10-24 RX ADMIN — IOPAMIDOL 120 ML: 755 INJECTION, SOLUTION INTRAVENOUS at 11:58

## 2017-10-24 RX ADMIN — METOPROLOL TARTRATE 50 MG: 50 TABLET ORAL at 10:20

## 2017-10-24 RX ADMIN — NITROGLYCERIN 0.4 MG: 0.4 TABLET SUBLINGUAL at 11:51

## 2017-10-24 NOTE — PROGRESS NOTES
HPI and Plan:   See dictation    Orders Placed This Encounter   Procedures     CT Angiogram coronary artery     Holter Monitor 24 hour - Adult       No orders of the defined types were placed in this encounter.      There are no discontinued medications.      Encounter Diagnosis   Name Primary?     Acute chest pain Yes       CURRENT MEDICATIONS:  Current Outpatient Prescriptions   Medication Sig Dispense Refill     traMADol (ULTRAM) 50 MG tablet TAKE ONE TABLET BY MOUTH EVERY 6 HOURS AS NEEDED FOR  MODERATE  PAIN 30 tablet 5     nitroGLYcerin (NITROSTAT) 0.4 MG sublingual tablet For chest pain place 1 tablet under the tongue every 5 minutes for 3 doses. If symptoms persist 5 minutes after 1st dose call 911. 25 tablet 0     ranitidine (ZANTAC) 150 MG tablet TAKE ONE TABLET BY MOUTH TWICE DAILY 60 tablet 0     DULoxetine (CYMBALTA) 60 MG EC capsule TAKE ONE CAPSULE BY MOUTH ONCE DAILY 30 capsule 0     fluocinonide (LIDEX) 0.05 % ointment Apply sparingly to affected area twice daily as needed for mouth ulcer. 15 g 0     insulin detemir (LEVEMIR FLEXPEN/FLEXTOUCH) 100 UNIT/ML injection 7 units at bedtime 15 mL 3     hydrochlorothiazide (HYDRODIURIL) 25 MG tablet Take 1 tablet (25 mg) by mouth daily 90 tablet 3     metFORMIN (GLUCOPHAGE-XR) 500 MG 24 hr tablet Take 1 tablet (500 mg) by mouth 2 times daily (with meals) 180 tablet 3     metoprolol (TOPROL-XL) 50 MG 24 hr tablet Take 1 tablet (50 mg) by mouth daily 90 tablet 3     atorvastatin (LIPITOR) 40 MG tablet Take 1 tablet (40 mg) by mouth At Bedtime 90 tablet 3     nystatin (MYCOSTATIN) 734733 UNIT/GM POWD Apply 1 g topically 3 times daily as needed 60 g 1     ketoconazole (NIZORAL) 2 % cream Apply topically 2 times daily To corners of lips for 2 weeks. 30 g 1     clonazePAM (KLONOPIN) 0.5 MG tablet Take 0.5 tablets (0.25 mg) by mouth At Bedtime 180 tablet 2     olopatadine (PATANOL) 0.1 % ophthalmic solution Place 1 drop into both eyes 2 times daily 5 mL 3      hydrocortisone 1 % cream Apply sparingly to affected area of lip corners two times daily for 1 week. 30 g 0     nystatin (MYCOSTATIN) cream Apply twice daily for yeast rash for 3 weeks. 60 g 3     Multiple Vitamin (DAILY MULTIVITAMIN PO) One tablet daily       ASPIRIN 81 MG OR TABS ONE DAILY 100 3     insulin pen needle (BD MARIIA U/F) 32G X 4 MM Use one pen daily (Patient not taking: Reported on 10/17/2017) 90 each 0     order for DME Equipment being ordered: CPAP Patient Theresa Bill received a [employer] Airsense 10  Auto. Pressures were set at Auto 10 - 15 cm H2O.       ORDER FOR DME Equipment being ordered: Knee compression sleeve (Patient not taking: Reported on 10/17/2017) 1 Device 0     ACE/ARB NOT PRESCRIBED, INTENTIONAL, ACE & ARB not prescribed due to Intolerance-cough       (Patient not taking: Reported on 10/17/2017)       glucose blood VI test strips strip 1 strip by In Vitro route 3 times daily. Has a Freestyle (Patient not taking: Reported on 10/17/2017) 2 Box 12       ALLERGIES     Allergies   Allergen Reactions     Nkda [No Known Drug Allergies]        PAST MEDICAL HISTORY:  Past Medical History:   Diagnosis Date     Depression      Diabetes (H)      Diabetes mellitus (H)     NIDDM     Dysphagia      GERD (gastroesophageal reflux disease)      Hypertension        PAST SURGICAL HISTORY:  Past Surgical History:   Procedure Laterality Date     BUNIONECTOMY CRISELDA  7/1/2011    Procedure:BUNIONECTOMY CRISELDA; weil osteotomy & Lapidtus Bunionectomy; Surgeon:HYACINTH SANTO; Location:[facility]     BUNIONECTOMY CHEVRON  4/6/2012    Procedure:BUNIONECTOMY CHEVRON; Lapidus bunionectomy, plantar plate repair second and third metatarsophalangeal joints,left foot-popliteal block left lower extremity; Surgeon:HYACINTH SANTO; Location:[facility]      TOTAL ABDOM HYSTERECTOMY  1992    ovaries removed     COLONOSCOPY  9/11/2012    Procedure: COLONOSCOPY;  Colonoscopy;  Surgeon: Alyssa Foster MD;  Location:  [facility]     NISSEN FUNDOPLICATION      2003     REPAIR HAMMER TOE  7/1/2011    Procedure:REPAIR HAMMER TOE; hammertoe correction right 2nd digit; Surgeon:HYACINTH SANTO; Location:[facility]     SURGICAL HISTORY OF -   1979    Tubal     SURGICAL HISTORY OF -   1993    Stamey bladder surgery     SURGICAL HISTORY OF -       Lipoma removed f/back     SURGICAL HISTORY OF -   1995    (L) Foot surgery     SURGICAL HISTORY OF -   05/09/2002    Colonoscopy     SURGICAL HISTORY OF -       knee arthroscopy left     SURGICAL HISTORY OF -   2007-two phases    interstim bladder implant     SURGICAL HISTORY OF -   Sept. 13, 2007    laparoscopic Nissen fundoplication       FAMILY HISTORY:  Family History   Problem Relation Age of Onset     CANCER Mother      brain cancer, ovarian cancer     CANCER Father      lung cancer     GASTROINTESTINAL DISEASE Father      colitis     Alcohol/Drug Brother      Alcohol/Drug Son      Alcohol/Drug Brother      GASTROINTESTINAL DISEASE Sister      DIABETES Sister      CANCER Sister      3 sisters .w ovarian cancer and one also with uterine cancer     Gynecology Son      kidney stones     HEART DISEASE Sister      MI age 64     GASTROINTESTINAL DISEASE Other      colitis/colitis/colitis/colitis       SOCIAL HISTORY:  Social History     Social History     Marital status:      Spouse name: N/A     Number of children: N/A     Years of education: N/A     Social History Main Topics     Smoking status: Never Smoker     Smokeless tobacco: Never Used     Alcohol use No     Drug use: No     Sexual activity: Yes     Partners: Male     Other Topics Concern     Parent/Sibling W/ Cabg, Mi Or Angioplasty Before 65f 55m? Yes     Social History Narrative       Review of Systems:  Skin:  Positive for itching     Eyes:  Negative      ENT:  Negative      Respiratory:  Negative       Cardiovascular:    Positive for;lower extremity symptoms;edema    Gastroenterology: Negative      Genitourinary:  Positive for  urinary frequency;urgency    Musculoskeletal:  Positive for arthritis;joint pain;joint swelling;joint stiffness;nocturnal cramping    Neurologic:  Negative      Psychiatric:  Positive for depression    Heme/Lymph/Imm:  Negative      Endocrine:  Positive for diabetes      Physical Exam:  Vitals: /73 (BP Location: Right arm, Patient Position: Sitting, Cuff Size: Adult Regular)  Pulse 60  Wt 78.7 kg (173 lb 6.4 oz)  SpO2 98%  BMI 32.76 kg/m2    Constitutional:  cooperative        Skin:  warm and dry to the touch        Head:  normocephalic        Eyes:  pupils equal and round        ENT:  no pallor or cyanosis        Neck:  carotid pulses are full and equal bilaterally        Chest:  clear to auscultation          Cardiac: regular rhythm                  Abdomen:  abdomen soft        Vascular: pulses full and equal                                        Extremities and Back:  no edema              Neurological:  affect appropriate, oriented to time, person and place              CC  Shae Pierre PA-C  [address] [place] [address]

## 2017-10-26 DIAGNOSIS — K21.9 GASTROESOPHAGEAL REFLUX DISEASE, ESOPHAGITIS PRESENCE NOT SPECIFIED: Primary | ICD-10-CM

## 2017-10-26 NOTE — TELEPHONE ENCOUNTER
protonix      Last Written Prescription Date:  7/23/17  Last Fill Quantity: 30,   # refills: 0  Future Office visit:  10/4/17  Next 5 appointments (look out 90 days)     Nov 01, 2017 11:20 AM CDT   SHORT with Shae Pierre PA-C   [facility] ([facility])    [address] [place] [address]   [phone]            Nov 06, 2017  3:00 PM CST   Return Visit with Sam Jaime   [facility] ([facility])    [address] [facility] [address]   [phone]                   Routing refill request to provider for review/approval because:  Drug not on the [facility], [facility] or [facility] refill protocol or controlled substance

## 2017-10-27 RX ORDER — PANTOPRAZOLE SODIUM 40 MG/1
40 TABLET, DELAYED RELEASE ORAL DAILY
Qty: 30 TABLET | Refills: 0 | Status: SHIPPED | OUTPATIENT
Start: 2017-10-27 | End: 2017-11-02

## 2017-11-02 ENCOUNTER — OFFICE VISIT (OUTPATIENT)
Dept: FAMILY MEDICINE | Facility: CLINIC | Age: 72
End: 2017-11-02
Payer: COMMERCIAL

## 2017-11-02 VITALS
HEART RATE: 64 BPM | DIASTOLIC BLOOD PRESSURE: 64 MMHG | WEIGHT: 169 LBS | BODY MASS INDEX: 31.91 KG/M2 | TEMPERATURE: 98 F | HEIGHT: 61 IN | SYSTOLIC BLOOD PRESSURE: 116 MMHG

## 2017-11-02 DIAGNOSIS — K21.9 GASTROESOPHAGEAL REFLUX DISEASE, ESOPHAGITIS PRESENCE NOT SPECIFIED: ICD-10-CM

## 2017-11-02 DIAGNOSIS — G25.81 RESTLESS LEGS SYNDROME (RLS): ICD-10-CM

## 2017-11-02 DIAGNOSIS — Z23 NEED FOR PROPHYLACTIC VACCINATION AND INOCULATION AGAINST INFLUENZA: ICD-10-CM

## 2017-11-02 DIAGNOSIS — F43.21 GRIEF: ICD-10-CM

## 2017-11-02 DIAGNOSIS — F32.0 MILD MAJOR DEPRESSION (H): Primary | Chronic | ICD-10-CM

## 2017-11-02 PROCEDURE — G0008 ADMIN INFLUENZA VIRUS VAC: HCPCS | Performed by: PHYSICIAN ASSISTANT

## 2017-11-02 PROCEDURE — 90662 IIV NO PRSV INCREASED AG IM: CPT | Performed by: PHYSICIAN ASSISTANT

## 2017-11-02 PROCEDURE — 99214 OFFICE O/P EST MOD 30 MIN: CPT | Mod: 25 | Performed by: PHYSICIAN ASSISTANT

## 2017-11-02 RX ORDER — BUPROPION HYDROCHLORIDE 150 MG/1
150 TABLET ORAL EVERY MORNING
Qty: 30 TABLET | Refills: 0 | Status: SHIPPED | OUTPATIENT
Start: 2017-11-02 | End: 2017-11-27

## 2017-11-02 RX ORDER — PANTOPRAZOLE SODIUM 40 MG/1
40 TABLET, DELAYED RELEASE ORAL DAILY
Qty: 30 TABLET | Refills: 5 | Status: SHIPPED | OUTPATIENT
Start: 2017-11-02 | End: 2018-03-14

## 2017-11-02 ASSESSMENT — PATIENT HEALTH QUESTIONNAIRE - PHQ9
5. POOR APPETITE OR OVEREATING: NEARLY EVERY DAY
SUM OF ALL RESPONSES TO PHQ QUESTIONS 1-9: 16

## 2017-11-02 ASSESSMENT — ANXIETY QUESTIONNAIRES
5. BEING SO RESTLESS THAT IT IS HARD TO SIT STILL: MORE THAN HALF THE DAYS
3. WORRYING TOO MUCH ABOUT DIFFERENT THINGS: MORE THAN HALF THE DAYS
7. FEELING AFRAID AS IF SOMETHING AWFUL MIGHT HAPPEN: NOT AT ALL
2. NOT BEING ABLE TO STOP OR CONTROL WORRYING: MORE THAN HALF THE DAYS
1. FEELING NERVOUS, ANXIOUS, OR ON EDGE: NEARLY EVERY DAY
6. BECOMING EASILY ANNOYED OR IRRITABLE: SEVERAL DAYS
GAD7 TOTAL SCORE: 13

## 2017-11-02 NOTE — MR AVS SNAPSHOT
After Visit Summary   11/2/2017    Theresa Bill    MRN: [id number]           Patient Information     Date Of Birth          1945        Visit Information        Provider Department      11/2/2017 8:40 AM Shae Pierre PA-C [facility]        Today's Diagnoses     Mild major depression (H)    -  1    Grief        Gastroesophageal reflux disease, esophagitis presence not specified          Care Instructions      Mood, grief, and poor sleep -  Message sent to counselor to see if there are any sooner appts  Consider seeing counselor weekly for awhile  Add wellbutrin again to help you through this tough time.  If sleep worsens on this medication, let me know.  I will be in touch with neurology to see their thoughts of increasing clonazepam at night.  I had to add more medications if we can   Recheck with me in 3 weeks.    Pain in chest -  Heart vessels look good  Still set up holter monitor to make sure not funny beats of heart causing any symptoms.  Call cardiology at [phone] to set this up.    Hiatal hernia -  Continue protonix and ranitidine.  Nothing more to be done at this time.  I do not think GI would opt to fix at this time.    Call if wanting to try carafate as needed.    Recheck in 3 weeks.            Follow-ups after your visit        Your next 10 appointments already scheduled     Nov 06, 2017  3:00 PM CST   Return Visit with Sam Jaime   [facility] ([facility])    [address] [facility] [address] [phone]              Who to contact     If you have questions or need follow up information about today's clinic visit or your schedule please contact [facility] directly at [phone].  Normal or non-critical lab and imaging results will be communicated to you by MyChart, letter or phone within 4 business days after the clinic has received the results. If you do not hear from us within 7  "days, please contact the clinic through [employer] or phone. If you have a critical or abnormal lab result, we will notify you by phone as soon as possible.  Submit refill requests through [employer] or call your pharmacy and they will forward the refill request to us. Please allow 3 business days for your refill to be completed.          Additional Information About Your Visit        ShootHomeharStayTuned Information     [employer] Plus gives you secure access to your electronic health record. If you see a primary care provider, you can also send messages to your care team and make appointments. If you have questions, please call your primary care clinic.  If you do not have a primary care provider, please call [phone] and they will assist you.        Care EveryWhere ID     This is your Care EveryWhere ID. This could be used by other organizations to access your [place] medical records  [id number]        Your Vitals Were     Pulse Temperature Height BMI (Body Mass Index)          64 98  F (36.7  C) (Tympanic) 5' 1\" (1.549 m) 31.93 kg/m2         Blood Pressure from Last 3 Encounters:   11/02/17 116/64   10/24/17 98/61   10/17/17 131/73    Weight from Last 3 Encounters:   11/02/17 169 lb (76.7 kg)   10/17/17 173 lb 6.4 oz (78.7 kg)   10/04/17 157 lb (71.2 kg)              Today, you had the following     No orders found for display         Today's Medication Changes          These changes are accurate as of: 11/2/17  9:30 AM.  If you have any questions, ask your nurse or doctor.               Start taking these medicines.        Dose/Directions    buPROPion 150 MG 24 hr tablet   Commonly known as:  WELLBUTRIN XL   Used for:  Mild major depression (H), Grief   Started by:  Shae Pierre PA-C        Dose:  150 mg   Take 1 tablet (150 mg) by mouth every morning Notify me if sleep is worsening with starting this medication.   Quantity:  30 tablet   Refills:  0            Where to get your medicines      These medications were sent " to [facility] [address] [place], [place] - [address]  [address], [facility] [address]     Phone:  [phone]     buPROPion 150 MG 24 hr tablet    pantoprazole 40 MG EC tablet                Primary Care Provider Office Phone # Fax #    Shae Pierre PA-C [phone] [address] [facility] [address]        Equal Access to Services     SABAS VUONG : Hadii aad ku hadasho Soomaali, waaxda luqadaha, qaybta kaalmada adeegyada, waxay idiin hayaan adeeg khtawannash latresa . So [facility] [phone].    ATENCIÓN: Si habla esplatha, tiene a urban disposición servicios gratuitos de asistencia lingüística. [facility] [phone].    We comply with applicable federal civil rights laws and [place] laws. We do not discriminate on the basis of race, color, national origin, age, disability, sex, sexual orientation, or gender identity.            Thank you!     Thank you for choosing [facility]  for your care. Our goal is always to provide you with excellent care. Hearing back from our patients is one way we can continue to improve our services. Please take a few minutes to complete the written survey that you may receive in the mail after your visit with us. Thank you!             Your Updated Medication List - Protect others around you: Learn how to safely use, store and throw away your medicines at www.disposemymeds.org.          This list is accurate as of: 11/2/17  9:30 AM.  Always use your most recent med list.                   Brand Name Dispense Instructions for use Diagnosis    ACE/ARB/ARNI NOT PRESCRIBED (INTENTIONAL)      ACE & ARB not prescribed due to Intolerance-cough    Type 2 diabetes, HbA1c goal < 7% (H)       aspirin 81 MG tablet     100    ONE DAILY    Type II or unspecified type diabetes mellitus without mention of complication, not stated as uncontrolled       atorvastatin 40 MG tablet    LIPITOR    90 tablet    Take 1 tablet (40 mg) by mouth At  Bedtime    Dyslipidemia       blood glucose monitoring test strip    no brand specified    2 Box    1 strip by In Vitro route 3 times daily. Has a Freestyle    Type 2 diabetes, HbA1c goal < 7% (H)       buPROPion 150 MG 24 hr tablet    WELLBUTRIN XL    30 tablet    Take 1 tablet (150 mg) by mouth every morning Notify me if sleep is worsening with starting this medication.    Mild major depression (H), Grief       clonazePAM 0.5 MG tablet    klonoPIN    180 tablet    Take 0.5 tablets (0.25 mg) by mouth At Bedtime        DAILY MULTIVITAMIN PO      One tablet daily        DULoxetine 60 MG EC capsule    CYMBALTA    30 capsule    TAKE ONE CAPSULE BY MOUTH ONCE DAILY    Major depressive disorder, recurrent episode, mild (H)       fluocinonide 0.05 % ointment    LIDEX    15 g    Apply sparingly to affected area twice daily as needed for mouth ulcer.    Aphthae, oral       hydrochlorothiazide 25 MG tablet    HYDRODIURIL    90 tablet    Take 1 tablet (25 mg) by mouth daily    Essential hypertension with goal blood pressure less than 140/90       hydrocortisone 1 % cream    CORTAID    30 g    Apply sparingly to affected area of lip corners two times daily for 1 week.    Angular cheilitis       insulin detemir 100 UNIT/ML injection    LEVEMIR FLEXPEN/FLEXTOUCH    15 mL    7 units at bedtime    Type 2 diabetes mellitus without complication, with long-term current use of insulin (H)       insulin pen needle 32G X 4 MM    BD MARIIA U/F    90 each    Use one pen daily    Type 2 diabetes mellitus without complication, with long-term current use of insulin (H)       ketoconazole 2 % cream    NIZORAL    30 g    Apply topically 2 times daily To corners of lips for 2 weeks.    Angular cheilitis       metFORMIN 500 MG 24 hr tablet    GLUCOPHAGE-XR    180 tablet    Take 1 tablet (500 mg) by mouth 2 times daily (with meals)    Type 2 diabetes mellitus without complication, with long-term current use of insulin (H)       metoprolol 50 MG 24  hr tablet    TOPROL-XL    90 tablet    Take 1 tablet (50 mg) by mouth daily        nitroGLYcerin 0.4 MG sublingual tablet    NITROSTAT    25 tablet    For chest pain place 1 tablet under the tongue every 5 minutes for 3 doses. If symptoms persist 5 minutes after 1st dose call 911.    Chest pressure       * nystatin cream    MYCOSTATIN    60 g    Apply twice daily for yeast rash for 3 weeks.    Intertrigo       * nystatin [id number] UNIT/GM Powd    MYCOSTATIN    60 g    Apply 1 g topically 3 times daily as needed    Other specified erythematous condition(547.78)       olopatadine 0.1 % ophthalmic solution    PATANOL    5 mL    Place 1 drop into both eyes 2 times daily    Allergic conjunctivitis, bilateral       order for DME      Equipment being ordered: CPAP Patient Theresa Bill received a [employer] Airsense 10  Auto. Pressures were set at Auto 10 - 15 cm H2O.        order for DME     1 Device    Equipment being ordered: Knee compression sleeve    Fall at home, subsequent encounter, Pain in joint, lower leg, unspecified laterality, Effusion of knee joint right, Arthritis of knee, degenerative       pantoprazole 40 MG EC tablet    PROTONIX    30 tablet    Take 1 tablet (40 mg) by mouth daily    Gastroesophageal reflux disease, esophagitis presence not specified       ranitidine 150 MG tablet    ZANTAC    60 tablet    TAKE ONE TABLET BY MOUTH TWICE DAILY    Gastroesophageal reflux disease with esophagitis, Esophageal dysmotility, Chronic cough       traMADol 50 MG tablet    ULTRAM    30 tablet    TAKE ONE TABLET BY MOUTH EVERY 6 HOURS AS NEEDED FOR  MODERATE  PAIN    Radicular leg pain       * Notice:  This list has 2 medication(s) that are the same as other medications prescribed for you. Read the directions carefully, and ask your doctor or other care provider to review them with you.

## 2017-11-02 NOTE — NURSING NOTE
"Chief Complaint   Patient presents with     Hernia     Flu Shot       Initial /64 (BP Location: Right arm, Patient Position: Chair, Cuff Size: Adult Large)  Pulse 64  Temp 98  F (36.7  C) (Tympanic)  Ht 5' 1\" (1.549 m)  Wt 169 lb (76.7 kg)  BMI 31.93 kg/m2 Estimated body mass index is 31.93 kg/(m^2) as calculated from the following:    Height as of this encounter: 5' 1\" (1.549 m).    Weight as of this encounter: 169 lb (76.7 kg).  Medication Reconciliation: complete    Health Maintenance that is potentially due pending provider review:  NONE        Is there anyone who you would like to be able to receive your results? No  If yes have patient fill out RICH      "

## 2017-11-02 NOTE — PATIENT INSTRUCTIONS
Mood, grief, and poor sleep -  Message sent to counselor to see if there are any sooner appts  Consider seeing counselor weekly for awhile  Add wellbutrin again to help you through this tough time.  If sleep worsens on this medication, let me know.  I will be in touch with neurology to see their thoughts of increasing clonazepam at night.  I had to add more medications if we can   Recheck with me in 3 weeks.    Pain in chest -  Heart vessels look good  Still set up holter monitor to make sure not funny beats of heart causing any symptoms.  Call cardiology at [phone] to set this up.    Hiatal hernia -  Continue protonix and ranitidine.  Nothing more to be done at this time.  I do not think GI would opt to fix at this time.    Call if wanting to try carafate as needed.    Recheck in 3 weeks.

## 2017-11-03 ENCOUNTER — OFFICE VISIT (OUTPATIENT)
Dept: PSYCHOLOGY | Facility: CLINIC | Age: 72
End: 2017-11-03
Payer: COMMERCIAL

## 2017-11-03 DIAGNOSIS — F43.21 GRIEF: Primary | ICD-10-CM

## 2017-11-03 DIAGNOSIS — F32.0 MILD MAJOR DEPRESSION (H): ICD-10-CM

## 2017-11-03 PROCEDURE — 90832 PSYTX W PT 30 MINUTES: CPT | Performed by: PSYCHOLOGIST

## 2017-11-03 ASSESSMENT — ANXIETY QUESTIONNAIRES: GAD7 TOTAL SCORE: 13

## 2017-11-03 NOTE — MR AVS SNAPSHOT
MRN:[id number]                      After Visit Summary   11/3/2017    Theresa Bill    MRN: [id number]           Visit Information        Provider Department      11/3/2017 8:30 AM Sam Jaime [facility]      Your next 10 appointments already scheduled     Nov 08, 2017  8:00 AM CST   Holter Monitor with [facility]   [facility] ([facility])    [address]  [facility] [address]   [phone]            Nov 17, 2017 12:00 PM CST   Return Visit with Sam Jaime   [facility] ([facility])    [address]  [facility] [address]   [phone]            Nov 27, 2017  9:00 AM CST   SHORT with Shae Pierre PA-C   [facility] ([facility])    [address] [place] [address]   [phone]              MyChart Information     [employer] gives you secure access to your electronic health record. If you see a primary care provider, you can also send messages to your care team and make appointments. If you have questions, please call your primary care clinic.  If you do not have a primary care provider, please call [phone] and they will assist you.        Care EveryWhere ID     This is your Care EveryWhere ID. This could be used by other organizations to access your [place] medical records  [id number]        Equal Access to Services     SABAS VUONG : Hadii lamonte ku hadasho Soelielali, waaxda luqadaha, qaybta kaalmada adeegyada, ángel jauregui. So [facility] [phone].    ATENCIÓN: Si habla español, tiene a urban disposición servicios gratuitos de asistencia lingüística. Llame al [phone].    We comply with applicable federal civil rights laws and [place] laws. We do not discriminate on the basis of race, color, national origin, age, disability, sex, sexual orientation, or gender  identity.

## 2017-11-07 NOTE — PROGRESS NOTES
Progress Note  Disclaimer: This note consists of symbols derived from keyboarding, dictation and/or voice recognition software. As a result, there may be errors in the script that have gone undetected. Please consider this when interpreting information found in this chart.    Client Name: Theresa Bill  Date: November 3, 2017         Service Type: Individual      Session Start Time: 8:45 AM  Session End Time: 9:15 AM      Session Length: 30     Session #: 3     Attendees: Client attended alone    Treatment Plan Last Reviewed: November 3, 2017       DATA   Client seen on a space available basis at request of PCP. Since her 's death a couple of weeks ago, Client reports she is uncomfortable being in her home despite her sister currently living with her. She's having difficulty sleeping. She sometimes find yourself sleeping in his chair. She has a photo of him by her bed and gives it a kiss good night every night. She is receiving excellent support from family.   Progress Since Last Session (Related to Symptoms / Goals / Homework):   Symptoms: Stable    Homework: not applicable      Episode of Care Goals: Satisfactory progress - CONTEMPLATION (Considering change and yet undecided); Intervened by assessing the negative and positive thinking (ambivalence) about behavior change     Current / Ongoing Stressors and Concerns:   Recent bereavement, prior caregiver stress     Treatment Objective(s) Addressed in This Session:   increase understanding of steps in the grief process  Utilize family support     Intervention:   Interpersonal Therapy: facilitating appropriate expressions of emotion and grief.        ASSESSMENT: Current Emotional / Mental Status (status of significant symptoms):   Risk status (Self / Other harm or suicidal ideation)   Client denies current fears or concerns for personal safety.   Client denies current or recent suicidal ideation or behaviors.   Client  denies current or recent homicidal ideation or behaviors.   Client denies current or recent self injurious behavior or ideation.   Client denies other safety concerns.   A safety and risk management plan has not been developed at this time, however client was given the after-hours number / 911 should there be a change in any of these risk factors.     Appearance:   Appropriate    Eye Contact:   Good    Psychomotor Behavior: Retarded (Slowed)    Attitude:   Cooperative    Orientation:   All   Speech    Rate / Production: Slow     Volume:  Soft    Mood:    Depressed    Affect:    Appropriate    Thought Content:  Clear    Thought Form:  Coherent  Logical  difficult to think of anything but the loss of her    Insight:    Fair      Medication Review:   No changes to current psychiatric medication(s)     Medication Compliance:   Yes     Changes in Health Issues:   None reported     Chemical Use Review:   Substance Use: Chemical use reviewed, no active concerns identified      Tobacco Use: No current tobacco use.       Collateral Reports Completed:   Not Applicable    PLAN: (Client Tasks / Therapist Tasks / Other)  Client is doing exactly what she needs to be doing. Therapist encouraged client in the appropriate expressions of grief as well as of her love for her late . We will meet as often as the client would like for the next few weeks and taper as appropriate.        Sam Jaime                                                         ________________________________________________________________________    Treatment Plan    Client's Name: Theresa Bill  YOB: 1945    Date: November 6, 2017    Referral / Collaboration:  Referral to another professional/service is not indicated at this time..    Anticipated number of session or this episode of care: 25       DSM5 Diagnoses: (Sustained by DSM5 Criteria Listed Above)  Diagnoses:            296.21 (F32.0) Major Depressive Disorder, Single  Episode, Mild _ grief  Psychosocial & Contextual Factors: Caregiver stress, recent death of her   WHODAS 2.0 (12 item)                          This questionnaire asks about difficulties due to health conditions. Health conditions                   include                        disease or illnesses, other health problems that may be short or long lasting,                    injuries, mental health or emotional problems, and problems with alcohol or drugs.                              Think back over the past 30 days and answer these questions, thinking about how much              difficulty you had doing the following activities. For each question, please Native only                   one response.      S1 Standing for long periods such as 30 minutes? Moderate =   3   S2 Taking care of household responsibilities? None =         1   S3 Learning a new task, for example, learning how to get to a new place? Mild =           2   S4 How much of a problem do you have joining community activities (for example, festivals, Temple or other activities) in the same way as anyone else can? None =         1   S5 How much have you been emotionally affected by your health problems? None =         1               In the past 30 days, how much difficulty did you have in:   S6 Concentrating on doing something for ten minutes? None =         1   S7 Walking a long distance such as a kilometer (or equivalent)? Moderate =   3   S8 Washing your whole body? None =         1   S9 Getting dressed? None =         1   S10 Dealing with people you do not know? None =         1   S11 Maintaining a friendship? None =         1   S12 Your day to day work? None =         1       H1 Overall, in the past 30 days, how many days were these difficulties present? Record number of days 1    H2 In the past 30 days, for how many days were you totally unable to carry out your usual activities or work because of any health condition? Record number of  days  0    H3 In the past 30 days, not counting the days that you were totally unable, for how many days did you cut back or reduce your usual activities or work because of any health condition? Record number of days 0      Goals  1. Education- the stages of grief  a. Client will be able to describe the stages of grief; denial, bargaining, anger, depression, and acceptance and give examples of how each have felt for her.  2. Behavioral Activation  a. Client will learn to assess their emotional stateon a day to day basis  b. Client will Identify two forms of exercise/activity and engage in them 3 times per week  c. Client will Identify 3 things that make them laugh, and engage in these 5 times per week.  d. Client will Identify 1-2Creative activities or hobbies  and engage in them 2 times per week  e. Client will identify music, movies, books that make them feel good and use them 3-4 times per week  3. Self-care  a. Client will identify 5 things they can do just for themselves  b. Client will take time for quiet, reflection, meditation 5 times per week  c. Client will Learn to set boundaries when appropriate  d. Client will Identify 2 individuals they can call on for support, distraction  4. Assessment of progress  a. Client will engage in assessment of their progress on a regular basis      Client has reviewed and agreed to the above plan.      Sam Jaime  November 6, 2017

## 2017-11-08 ENCOUNTER — HOSPITAL ENCOUNTER (OUTPATIENT)
Dept: CARDIOLOGY | Facility: CLINIC | Age: 72
End: 2017-11-08
Attending: INTERNAL MEDICINE
Payer: MEDICARE

## 2017-11-08 DIAGNOSIS — R07.9 ACUTE CHEST PAIN: ICD-10-CM

## 2017-11-08 PROCEDURE — 93225 XTRNL ECG REC<48 HRS REC: CPT

## 2017-11-08 PROCEDURE — 93227 XTRNL ECG REC<48 HR R&I: CPT | Performed by: INTERNAL MEDICINE

## 2017-11-09 ENCOUNTER — TELEPHONE (OUTPATIENT)
Dept: FAMILY MEDICINE | Facility: CLINIC | Age: 72
End: 2017-11-09

## 2017-11-09 NOTE — TELEPHONE ENCOUNTER
Shae has advised taking Clonazepam  2 mg at bedtime. Her Neurologist should be contacting her also. I spoke with Theresa and no questions at this time. She will call if any questions or problems.    Renetta Britton CMA

## 2017-11-09 NOTE — TELEPHONE ENCOUNTER
Shae would like to know if the increase in the clonazepam has helped her sleep. I spoke with Theresa and states not helpful. She is still awake until 2 or 3 am. She recently developed cold like symptoms and has taken OTC cough and cold medications which seem to be more affective for sleep than the clonazepam. She has taken Mucinex and Mila seltzer day and night cold cough. Information will be given to Shae.    Renetta Britton,CMA

## 2017-11-10 ENCOUNTER — OFFICE VISIT (OUTPATIENT)
Dept: FAMILY MEDICINE | Facility: CLINIC | Age: 72
End: 2017-11-10
Payer: COMMERCIAL

## 2017-11-10 VITALS
HEIGHT: 61 IN | OXYGEN SATURATION: 96 % | TEMPERATURE: 99.3 F | WEIGHT: 171 LBS | DIASTOLIC BLOOD PRESSURE: 72 MMHG | SYSTOLIC BLOOD PRESSURE: 110 MMHG | BODY MASS INDEX: 32.28 KG/M2 | HEART RATE: 74 BPM

## 2017-11-10 DIAGNOSIS — F33.0 MAJOR DEPRESSIVE DISORDER, RECURRENT EPISODE, MILD (H): Chronic | ICD-10-CM

## 2017-11-10 DIAGNOSIS — J20.9 ACUTE BRONCHITIS, UNSPECIFIED ORGANISM: Primary | ICD-10-CM

## 2017-11-10 PROCEDURE — 94640 AIRWAY INHALATION TREATMENT: CPT | Performed by: NURSE PRACTITIONER

## 2017-11-10 PROCEDURE — 99214 OFFICE O/P EST MOD 30 MIN: CPT | Mod: 25 | Performed by: NURSE PRACTITIONER

## 2017-11-10 RX ORDER — DULOXETIN HYDROCHLORIDE 60 MG/1
CAPSULE, DELAYED RELEASE ORAL
Qty: 30 CAPSULE | Refills: 5 | Status: SHIPPED | OUTPATIENT
Start: 2017-11-10 | End: 2018-03-14

## 2017-11-10 RX ORDER — PREDNISONE 20 MG/1
20 TABLET ORAL 2 TIMES DAILY
Qty: 10 TABLET | Refills: 0 | Status: SHIPPED | OUTPATIENT
Start: 2017-11-10 | End: 2017-11-27

## 2017-11-10 RX ORDER — DULOXETIN HYDROCHLORIDE 60 MG/1
CAPSULE, DELAYED RELEASE ORAL
Qty: 30 CAPSULE | Refills: 0 | Status: CANCELLED | OUTPATIENT
Start: 2017-11-10

## 2017-11-10 ASSESSMENT — ANXIETY QUESTIONNAIRES
IF YOU CHECKED OFF ANY PROBLEMS ON THIS QUESTIONNAIRE, HOW DIFFICULT HAVE THESE PROBLEMS MADE IT FOR YOU TO DO YOUR WORK, TAKE CARE OF THINGS AT HOME, OR GET ALONG WITH OTHER PEOPLE: SOMEWHAT DIFFICULT
5. BEING SO RESTLESS THAT IT IS HARD TO SIT STILL: NOT AT ALL
6. BECOMING EASILY ANNOYED OR IRRITABLE: NOT AT ALL
7. FEELING AFRAID AS IF SOMETHING AWFUL MIGHT HAPPEN: NOT AT ALL
1. FEELING NERVOUS, ANXIOUS, OR ON EDGE: NOT AT ALL
3. WORRYING TOO MUCH ABOUT DIFFERENT THINGS: MORE THAN HALF THE DAYS
2. NOT BEING ABLE TO STOP OR CONTROL WORRYING: MORE THAN HALF THE DAYS
GAD7 TOTAL SCORE: 4

## 2017-11-10 ASSESSMENT — PATIENT HEALTH QUESTIONNAIRE - PHQ9
SUM OF ALL RESPONSES TO PHQ QUESTIONS 1-9: 7
5. POOR APPETITE OR OVEREATING: NOT AT ALL

## 2017-11-10 NOTE — NURSING NOTE
The following nebulizer treatment was given:     MEDICATION: Albuterol Sulfate 2.5 mg  : Bocada  LOT #: [id number]  EXPIRATION DATE: 02/2019  NDC # 1852-6486-82   Verbal order per Jane Traylor NP, verbal order read back.   Neb was given once in clinic at 11:40am.   Benjamín Monahan CMA

## 2017-11-10 NOTE — NURSING NOTE
"Chief Complaint   Patient presents with     Cough       Initial /72  Pulse 74  Temp 99.3  F (37.4  C) (Tympanic)  Ht 5' 1\" (1.549 m)  Wt 171 lb (77.6 kg)  SpO2 96%  BMI 32.31 kg/m2 Estimated body mass index is 32.31 kg/(m^2) as calculated from the following:    Height as of this encounter: 5' 1\" (1.549 m).    Weight as of this encounter: 171 lb (77.6 kg).  Medication Reconciliation: complete     Benjamín Monahan CMA    "

## 2017-11-10 NOTE — PATIENT INSTRUCTIONS
Prednisone sent to the pharmacy for symptoms    Cymbalta refilled    Follow up if symptoms do not improve or worsen.

## 2017-11-10 NOTE — MR AVS SNAPSHOT
After Visit Summary   11/10/2017    Theresa Bill    MRN: [id number]           Patient Information     Date Of Birth          1945        Visit Information        Provider Department      11/10/2017 11:20 AM Marley Traylor APRN CNP [facility]        Today's Diagnoses     Acute bronchitis, unspecified organism    -  1    Major depressive disorder, recurrent episode, mild (H)          Care Instructions    Prednisone sent to the pharmacy for symptoms    Cymbalta refilled    Follow up if symptoms do not improve or worsen.            Follow-ups after your visit        Your next 10 appointments already scheduled     Nov 17, 2017 12:00 PM CST   Return Visit with Sam Jaime   [facility]    [address] [facility] [place] [address] [phone]            Nov 27, 2017  9:00 AM CST   SHORT with Shae Pierre PA-C   [facility]    [address] [place] [address] [phone]              Who to contact     If you have questions or need follow up information about today's clinic visit or your schedule please contact [facility] directly at [phone].  Normal or non-critical lab and imaging results will be communicated to you by MyChart, letter or phone within 4 business days after the clinic has received the results. If you do not hear from us within 7 days, please contact the clinic through MyChart or phone. If you have a critical or abnormal lab result, we will notify you by phone as soon as possible.  Submit refill requests through [facility] or call your pharmacy and they will forward the refill request to us. Please allow 3 business days for your refill to be completed.          Additional Information About Your Visit        MyChart Information     [facility] gives you secure access to your electronic health record. If you see a primary care  "provider, you can also send messages to your care team and make appointments. If you have questions, please call your primary care clinic.  If you do not have a primary care provider, please call [phone] and they will assist you.        Care EveryWhere ID     This is your Care EveryWhere ID. This could be used by other organizations to access your [place] medical records  [id number]        Your Vitals Were     Pulse Temperature Height Pulse Oximetry BMI (Body Mass Index)       74 99.3  F (37.4  C) (Tympanic) 5' 1\" (1.549 m) 96% 32.31 kg/m2        Blood Pressure from Last 3 Encounters:   11/10/17 110/72   11/02/17 116/64   10/24/17 98/61    Weight from Last 3 Encounters:   11/10/17 171 lb (77.6 kg)   11/02/17 169 lb (76.7 kg)   10/17/17 173 lb 6.4 oz (78.7 kg)              Today, you had the following     No orders found for display         Today's Medication Changes          These changes are accurate as of: 11/10/17 11:56 AM.  If you have any questions, ask your nurse or doctor.               Start taking these medicines.        Dose/Directions    predniSONE 20 MG tablet   Commonly known as:  DELTASONE   Used for:  Acute bronchitis, unspecified organism   Started by:  Marley Traylor APRN CNP        Dose:  20 mg   Take 1 tablet (20 mg) by mouth 2 times daily   Quantity:  10 tablet   Refills:  0         These medicines have changed or have updated prescriptions.        Dose/Directions    DULoxetine 60 MG EC capsule   Commonly known as:  CYMBALTA   This may have changed:  See the new instructions.   Used for:  Major depressive disorder, recurrent episode, mild (H)   Changed by:  Marley Traylor APRN CNP        TAKE ONE CAPSULE BY MOUTH ONCE DAILY   Quantity:  30 capsule   Refills:  5            Where to get your medicines      These medications were sent to [facility] [address] [place] [address] [address] [facility]  [address] [facility] [address]     Phone:  [phone]     " DULoxetine 60 MG EC capsule    predniSONE 20 MG tablet                Primary Care Provider Office Phone # Fax #    Shae Pierre PA-C [phone] [address] [place] [address]        Equal Access to Services     SABAS VUONG : Hadii lamonte ku hadjaydono Soomaali, waaxda luqadaha, qaybta kaalmada adeegyada, ángel leen chandni gonzales laZoemontse jauregui. So [facility] [phone].    ATENCIÓN: Si habla español, tiene a urban disposición servicios gratuitos de asistencia lingüística. Llame al [phone].    We comply with applicable federal civil rights laws and [place] laws. We do not discriminate on the basis of race, color, national origin, age, disability, sex, sexual orientation, or gender identity.            Thank you!     Thank you for choosing [facility]  for your care. Our goal is always to provide you with excellent care. Hearing back from our patients is one way we can continue to improve our services. Please take a few minutes to complete the written survey that you may receive in the mail after your visit with us. Thank you!             Your Updated Medication List - Protect others around you: Learn how to safely use, store and throw away your medicines at www.disposemymeds.org.          This list is accurate as of: 11/10/17 11:56 AM.  Always use your most recent med list.                   Brand Name Dispense Instructions for use Diagnosis    ACE/ARB/ARNI NOT PRESCRIBED (INTENTIONAL)      ACE & ARB not prescribed due to Intolerance-cough    Type 2 diabetes, HbA1c goal < 7% (H)       aspirin 81 MG tablet     100    ONE DAILY    Type II or unspecified type diabetes mellitus without mention of complication, not stated as uncontrolled       atorvastatin 40 MG tablet    LIPITOR    90 tablet    Take 1 tablet (40 mg) by mouth At Bedtime    Dyslipidemia       blood glucose monitoring test strip    no brand specified    2 Box    1 strip by In Vitro route 3 times daily. Has  a Freestyle    Type 2 diabetes, HbA1c goal < 7% (H)       buPROPion 150 MG 24 hr tablet    WELLBUTRIN XL    30 tablet    Take 1 tablet (150 mg) by mouth every morning Notify me if sleep is worsening with starting this medication.    Mild major depression (H), Grief       clonazePAM 0.5 MG tablet    klonoPIN    180 tablet    Take 1 mg by mouth At Bedtime        DAILY MULTIVITAMIN PO      One tablet daily        DULoxetine 60 MG EC capsule    CYMBALTA    30 capsule    TAKE ONE CAPSULE BY MOUTH ONCE DAILY    Major depressive disorder, recurrent episode, mild (H)       fluocinonide 0.05 % ointment    LIDEX    15 g    Apply sparingly to affected area twice daily as needed for mouth ulcer.    Aphthae, oral       hydrochlorothiazide 25 MG tablet    HYDRODIURIL    90 tablet    Take 1 tablet (25 mg) by mouth daily    Essential hypertension with goal blood pressure less than 140/90       hydrocortisone 1 % cream    CORTAID    30 g    Apply sparingly to affected area of lip corners two times daily for 1 week.    Angular cheilitis       insulin detemir 100 UNIT/ML injection    LEVEMIR FLEXPEN/FLEXTOUCH    15 mL    7 units at bedtime    Type 2 diabetes mellitus without complication, with long-term current use of insulin (H)       insulin pen needle 32G X 4 MM    BD MARIIA U/F    90 each    Use one pen daily    Type 2 diabetes mellitus without complication, with long-term current use of insulin (H)       ketoconazole 2 % cream    NIZORAL    30 g    Apply topically 2 times daily To corners of lips for 2 weeks.    Angular cheilitis       metFORMIN 500 MG 24 hr tablet    GLUCOPHAGE-XR    180 tablet    Take 1 tablet (500 mg) by mouth 2 times daily (with meals)    Type 2 diabetes mellitus without complication, with long-term current use of insulin (H)       metoprolol 50 MG 24 hr tablet    TOPROL-XL    90 tablet    Take 1 tablet (50 mg) by mouth daily        nitroGLYcerin 0.4 MG sublingual tablet    NITROSTAT    25 tablet    For chest  pain place 1 tablet under the tongue every 5 minutes for 3 doses. If symptoms persist 5 minutes after 1st dose call 911.    Chest pressure       * nystatin cream    MYCOSTATIN    60 g    Apply twice daily for yeast rash for 3 weeks.    Intertrigo       * nystatin [id number] UNIT/GM Powd    MYCOSTATIN    60 g    Apply 1 g topically 3 times daily as needed    Other specified erythematous condition(335.89)       olopatadine 0.1 % ophthalmic solution    PATANOL    5 mL    Place 1 drop into both eyes 2 times daily    Allergic conjunctivitis, bilateral       order for DME      Equipment being ordered: CPAP Patient Theresa Bill received a "[employer] Airsense 10  Auto. Pressures were set at Auto 10 - 15 cm H2O.        order for DME     1 Device    Equipment being ordered: Knee compression sleeve    Fall at home, subsequent encounter, Pain in joint, lower leg, unspecified laterality, Effusion of knee joint right, Arthritis of knee, degenerative       pantoprazole 40 MG EC tablet    PROTONIX    30 tablet    Take 1 tablet (40 mg) by mouth daily    Gastroesophageal reflux disease, esophagitis presence not specified       predniSONE 20 MG tablet    DELTASONE    10 tablet    Take 1 tablet (20 mg) by mouth 2 times daily    Acute bronchitis, unspecified organism       ranitidine 150 MG tablet    ZANTAC    60 tablet    TAKE ONE TABLET BY MOUTH TWICE DAILY    Gastroesophageal reflux disease with esophagitis, Esophageal dysmotility, Chronic cough       traMADol 50 MG tablet    ULTRAM    30 tablet    TAKE ONE TABLET BY MOUTH EVERY 6 HOURS AS NEEDED FOR  MODERATE  PAIN    Radicular leg pain       * Notice:  This list has 2 medication(s) that are the same as other medications prescribed for you. Read the directions carefully, and ask your doctor or other care provider to review them with you.

## 2017-11-10 NOTE — PROGRESS NOTES
SUBJECTIVE:   Theresa Bill is a 71 year old female who presents to clinic today for the following health issues:      ENT Symptoms             Symptoms: cc Present Absent Comment   Fever/Chills  x  Chills    Fatigue  x  Not sleeping from the cough    Muscle Aches  x     Eye Irritation   x    Sneezing   x    Nasal Nathan/Drg  x  Nasal Congestion    Sinus Pressure/Pain   x    Loss of smell   x    Dental pain   x    Sore Throat  x  Waking up with a sore throat in the morning, better throughout the day    Swollen Glands   x    Ear Pain/Fullness   x    Cough x      Wheeze  x     Chest Pain   x Tightness    Shortness of breath   x    Rash   x    Other  x  Headache      Symptom duration:  3 days    Symptom severity:  moderate    Treatments tried:  Mucinex    Contacts:  Her sister has a cough as well        Depression Followup    Status since last visit: Improved     See PHQ-9 for current symptoms.  Other associated symptoms: None    Complicating factors:   Significant life event:  Yes-   passed away in September   Current substance abuse:  None  Anxiety or Panic symptoms:  No    PHQ-9 Score and MyChart F/U Questions 5/12/2017 10/4/2017 11/2/2017   Total Score 9 6 16   Q9: Suicide Ideation Not at all Not at all Not at all     PHQ-9  English  PHQ-9   Any Language  Suicide Assessment Five-step Evaluation and Treatment (SAFE-T)    Improving over the past couple of weeks.  No thoughts of suicide or self harm    Problem list and histories reviewed & adjusted, as indicated.  Additional history: as documented    Patient Active Problem List   Diagnosis     Lumbago     Pain in limb     Mild major depression (H)     Essential hypertension     Type 2 diabetes mellitus without complication (H)     Osteoarthritis     Mixed incontinence urge and stress (male)(female)     Restless legs     Microalbuminuria     Obesity     Esophageal reflux     GI bleed     Dyslipidemia     Iron deficiency anemia     Falls frequently     ACP  (advance care planning)     SHANTI (obstructive sleep apnea)     Other chronic pain     Reactive airway disease, mild intermittent, uncomplicated     Anginal equivalent (H)     Past Surgical History:   Procedure Laterality Date     BUNIONECTOMY CRISELDA  7/1/2011    Procedure:BUNIONECTOMY CRISELDA; weil osteotomy & Lapidtus Bunionectomy; Surgeon:HYACINTH SANTO; Location:[facility]     BUNIONECTOMY CHEVRON  4/6/2012    Procedure:BUNIONECTOMY CHEVRON; Lapidus bunionectomy, plantar plate repair second and third metatarsophalangeal joints,left foot-popliteal block left lower extremity; Surgeon:HYACINTH SANTO; Location:[facility]     C TOTAL ABDOM HYSTERECTOMY  1992    ovaries removed     COLONOSCOPY  9/11/2012    Procedure: COLONOSCOPY;  Colonoscopy;  Surgeon: Alyssa Foster MD;  Location: [facility]     NISSEN FUNDOPLICATION      2003     REPAIR HAMMER TOE  7/1/2011    Procedure:REPAIR HAMMER TOE; hammertoe correction right 2nd digit; Surgeon:HYACINTH SANTO; Location:[facility]     SURGICAL HISTORY OF -   1979    Tubal     SURGICAL HISTORY OF -   1993    Stamey bladder surgery     SURGICAL HISTORY OF -       Lipoma removed f/back     SURGICAL HISTORY OF -   1995    (L) Foot surgery     SURGICAL HISTORY OF -   05/09/2002    Colonoscopy     SURGICAL HISTORY OF -       knee arthroscopy left     SURGICAL HISTORY OF -   2007-two phases    interstim bladder implant     SURGICAL HISTORY OF -   Sept. 13, 2007    laparoscopic Nissen fundoplication       Social History   Substance Use Topics     Smoking status: Never Smoker     Smokeless tobacco: Never Used     Alcohol use No     Family History   Problem Relation Age of Onset     CANCER Mother      brain cancer, ovarian cancer     CANCER Father      lung cancer     GASTROINTESTINAL DISEASE Father      colitis     Alcohol/Drug Brother      Alcohol/Drug Son      Alcohol/Drug Brother      GASTROINTESTINAL DISEASE Sister      DIABETES Sister      CANCER Sister      3 sisters .w  ovarian cancer and one also with uterine cancer     Gynecology Son      kidney stones     HEART DISEASE Sister      MI age 64     GASTROINTESTINAL DISEASE Other      colitis/colitis/colitis/colitis         Current Outpatient Prescriptions   Medication Sig Dispense Refill     pantoprazole (PROTONIX) 40 MG EC tablet Take 1 tablet (40 mg) by mouth daily 30 tablet 5     buPROPion (WELLBUTRIN XL) 150 MG 24 hr tablet Take 1 tablet (150 mg) by mouth every morning Notify me if sleep is worsening with starting this medication. 30 tablet 0     traMADol (ULTRAM) 50 MG tablet TAKE ONE TABLET BY MOUTH EVERY 6 HOURS AS NEEDED FOR  MODERATE  PAIN 30 tablet 5     ranitidine (ZANTAC) 150 MG tablet TAKE ONE TABLET BY MOUTH TWICE DAILY 60 tablet 0     DULoxetine (CYMBALTA) 60 MG EC capsule TAKE ONE CAPSULE BY MOUTH ONCE DAILY 30 capsule 0     fluocinonide (LIDEX) 0.05 % ointment Apply sparingly to affected area twice daily as needed for mouth ulcer. 15 g 0     insulin detemir (LEVEMIR FLEXPEN/FLEXTOUCH) 100 UNIT/ML injection 7 units at bedtime 15 mL 3     hydrochlorothiazide (HYDRODIURIL) 25 MG tablet Take 1 tablet (25 mg) by mouth daily 90 tablet 3     metFORMIN (GLUCOPHAGE-XR) 500 MG 24 hr tablet Take 1 tablet (500 mg) by mouth 2 times daily (with meals) 180 tablet 3     metoprolol (TOPROL-XL) 50 MG 24 hr tablet Take 1 tablet (50 mg) by mouth daily 90 tablet 3     atorvastatin (LIPITOR) 40 MG tablet Take 1 tablet (40 mg) by mouth At Bedtime 90 tablet 3     ketoconazole (NIZORAL) 2 % cream Apply topically 2 times daily To corners of lips for 2 weeks. 30 g 1     clonazePAM (KLONOPIN) 0.5 MG tablet Take 1 mg by mouth At Bedtime  180 tablet 2     olopatadine (PATANOL) 0.1 % ophthalmic solution Place 1 drop into both eyes 2 times daily 5 mL 3     hydrocortisone 1 % cream Apply sparingly to affected area of lip corners two times daily for 1 week. 30 g 0     Multiple Vitamin (DAILY MULTIVITAMIN PO) One tablet daily       ASPIRIN 81 MG OR  "TABS ONE DAILY 100 3     nitroGLYcerin (NITROSTAT) 0.4 MG sublingual tablet For chest pain place 1 tablet under the tongue every 5 minutes for 3 doses. If symptoms persist 5 minutes after 1st dose call 911. 25 tablet 0     insulin pen needle (BD MARIIA U/F) 32G X 4 MM Use one pen daily (Patient not taking: Reported on 10/17/2017) 90 each 0     nystatin (MYCOSTATIN) 971177 UNIT/GM POWD Apply 1 g topically 3 times daily as needed 60 g 1     order for DME Equipment being ordered: CPAP Patient Theresa Bill received a Resmed Airsense 10  Auto. Pressures were set at Auto 10 - 15 cm H2O.       ORDER FOR DME Equipment being ordered: Knee compression sleeve (Patient not taking: Reported on 10/17/2017) 1 Device 0     nystatin (MYCOSTATIN) cream Apply twice daily for yeast rash for 3 weeks. 60 g 3     ACE/ARB NOT PRESCRIBED, INTENTIONAL, ACE & ARB not prescribed due to Intolerance-cough       (Patient not taking: Reported on 10/17/2017)       glucose blood VI test strips strip 1 strip by In Vitro route 3 times daily. Has a Freestyle (Patient not taking: Reported on 10/17/2017) 2 Box 12     Allergies   Allergen Reactions     Nkda [No Known Drug Allergies]      Labs reviewed in EPIC      Reviewed and updated as needed this visit by clinical staff     Reviewed and updated as needed this visit by Provider       ROS:  Constitutional, HEENT, cardiovascular, pulmonary, gi and gu systems are negative, except as otherwise noted.      OBJECTIVE:   /72  Pulse 74  Temp 99.3  F (37.4  C) (Tympanic)  Ht 5' 1\" (1.549 m)  Wt 171 lb (77.6 kg)  SpO2 96%  BMI 32.31 kg/m2  Body mass index is 32.31 kg/(m^2).  GENERAL: healthy, alert and no distress  HENT: normal cephalic/atraumatic, both ears: occluded with wax, nose and mouth without ulcers or lesions, oropharynx clear and oral mucous membranes moist  RESP: expiratory wheezes throughout  CV: regular rate and rhythm, normal S1 S2, no S3 or S4, no murmur, click or rub  MS: no gross " musculoskeletal defects noted, no edema  PSYCH: mentation appears normal, affect normal/bright    Diagnostic Test Results:  none     ASSESSMENT/PLAN:     1. Major depressive disorder, recurrent episode, mild (H)  Improved.  Cymbalta refilled.  Has follow up with PCP planned.  - DULoxetine (CYMBALTA) 60 MG EC capsule; TAKE ONE CAPSULE BY MOUTH ONCE DAILY  Dispense: 30 capsule; Refill: 5    2. Acute bronchitis, unspecified organism  Improved airflow following neb administration.  Prednisone sent to the pharmacy for symptoms.  Has albuterol and nebs at home.  Follow up if symptoms do not improve or worsen.  - predniSONE (DELTASONE) 20 MG tablet; Take 1 tablet (20 mg) by mouth 2 times daily  Dispense: 10 tablet; Refill: 0  - ALBUTEROL UNIT DOSE, 1 MG  - INHALATION/NEBULIZER TREATMENT, INITIAL    Home care instructions were reviewed with the patient. The risks, benefits and treatment options of prescribed medications or other treatments have been discussed with the patient. The patient verbalized their understanding and should call or follow up if no improvement or if they develop further problems.    Patient Instructions   Prednisone sent to the pharmacy for symptoms    Cymbalta refilled    Follow up if symptoms do not improve or worsen.        NICHELLE Donahue [facility]

## 2017-11-11 ASSESSMENT — ANXIETY QUESTIONNAIRES: GAD7 TOTAL SCORE: 4

## 2017-11-17 DIAGNOSIS — R05.3 CHRONIC COUGH: ICD-10-CM

## 2017-11-17 DIAGNOSIS — K22.4 ESOPHAGEAL DYSMOTILITY: ICD-10-CM

## 2017-11-17 DIAGNOSIS — K21.00 GASTROESOPHAGEAL REFLUX DISEASE WITH ESOPHAGITIS: ICD-10-CM

## 2017-11-27 ENCOUNTER — OFFICE VISIT (OUTPATIENT)
Dept: FAMILY MEDICINE | Facility: CLINIC | Age: 72
End: 2017-11-27
Payer: COMMERCIAL

## 2017-11-27 VITALS
BODY MASS INDEX: 31.72 KG/M2 | HEART RATE: 68 BPM | SYSTOLIC BLOOD PRESSURE: 92 MMHG | HEIGHT: 61 IN | WEIGHT: 168 LBS | DIASTOLIC BLOOD PRESSURE: 57 MMHG | RESPIRATION RATE: 14 BRPM | TEMPERATURE: 97 F

## 2017-11-27 DIAGNOSIS — N39.0 FREQUENT UTI: ICD-10-CM

## 2017-11-27 DIAGNOSIS — I10 ESSENTIAL HYPERTENSION WITH GOAL BLOOD PRESSURE LESS THAN 140/90: ICD-10-CM

## 2017-11-27 DIAGNOSIS — F43.21 GRIEF: ICD-10-CM

## 2017-11-27 DIAGNOSIS — R82.90 NONSPECIFIC FINDING ON EXAMINATION OF URINE: ICD-10-CM

## 2017-11-27 DIAGNOSIS — J41.0 SIMPLE CHRONIC BRONCHITIS (H): ICD-10-CM

## 2017-11-27 DIAGNOSIS — I10 BENIGN ESSENTIAL HYPERTENSION: Primary | ICD-10-CM

## 2017-11-27 DIAGNOSIS — E11.9 TYPE 2 DIABETES MELLITUS WITHOUT COMPLICATION, WITH LONG-TERM CURRENT USE OF INSULIN (H): Chronic | ICD-10-CM

## 2017-11-27 DIAGNOSIS — K21.9 LPRD (LARYNGOPHARYNGEAL REFLUX DISEASE): ICD-10-CM

## 2017-11-27 DIAGNOSIS — H61.23 BILATERAL IMPACTED CERUMEN: ICD-10-CM

## 2017-11-27 DIAGNOSIS — F33.0 MILD RECURRENT MAJOR DEPRESSION (H): ICD-10-CM

## 2017-11-27 DIAGNOSIS — K14.6 PAINFUL TONGUE: ICD-10-CM

## 2017-11-27 DIAGNOSIS — Z79.4 TYPE 2 DIABETES MELLITUS WITHOUT COMPLICATION, WITH LONG-TERM CURRENT USE OF INSULIN (H): Chronic | ICD-10-CM

## 2017-11-27 PROBLEM — I20.89 ANGINAL EQUIVALENT (H): Status: RESOLVED | Noted: 2017-10-09 | Resolved: 2017-11-27

## 2017-11-27 LAB
ALBUMIN UR-MCNC: NEGATIVE MG/DL
ANION GAP SERPL CALCULATED.3IONS-SCNC: 6 MMOL/L (ref 3–14)
APPEARANCE UR: ABNORMAL
BACTERIA #/AREA URNS HPF: ABNORMAL /HPF
BILIRUB UR QL STRIP: NEGATIVE
BUN SERPL-MCNC: 14 MG/DL (ref 7–30)
CALCIUM SERPL-MCNC: 8.8 MG/DL (ref 8.5–10.1)
CHLORIDE SERPL-SCNC: 105 MMOL/L (ref 94–109)
CO2 SERPL-SCNC: 32 MMOL/L (ref 20–32)
COLOR UR AUTO: YELLOW
CREAT SERPL-MCNC: 0.8 MG/DL (ref 0.52–1.04)
CREAT UR-MCNC: 92 MG/DL
GFR SERPL CREATININE-BSD FRML MDRD: 70 ML/MIN/1.7M2
GLUCOSE SERPL-MCNC: 131 MG/DL (ref 70–99)
GLUCOSE UR STRIP-MCNC: NEGATIVE MG/DL
HBA1C MFR BLD: 7.5 % (ref 4.3–6)
HGB UR QL STRIP: ABNORMAL
KETONES UR STRIP-MCNC: NEGATIVE MG/DL
KOH PREP SPEC: NORMAL
LEUKOCYTE ESTERASE UR QL STRIP: ABNORMAL
MICROALBUMIN UR-MCNC: 14 MG/L
MICROALBUMIN/CREAT UR: 14.78 MG/G CR (ref 0–25)
NITRATE UR QL: POSITIVE
PH UR STRIP: 7 PH (ref 5–7)
POTASSIUM SERPL-SCNC: 3.6 MMOL/L (ref 3.4–5.3)
RBC #/AREA URNS AUTO: ABNORMAL /HPF
SODIUM SERPL-SCNC: 143 MMOL/L (ref 133–144)
SOURCE: ABNORMAL
SP GR UR STRIP: 1.02 (ref 1–1.03)
SPECIMEN SOURCE: NORMAL
UROBILINOGEN UR STRIP-ACNC: 1 EU/DL (ref 0.2–1)
WBC #/AREA URNS AUTO: ABNORMAL /HPF

## 2017-11-27 PROCEDURE — 87086 URINE CULTURE/COLONY COUNT: CPT | Performed by: PHYSICIAN ASSISTANT

## 2017-11-27 PROCEDURE — 87186 SC STD MICRODIL/AGAR DIL: CPT | Performed by: PHYSICIAN ASSISTANT

## 2017-11-27 PROCEDURE — 83036 HEMOGLOBIN GLYCOSYLATED A1C: CPT | Performed by: PHYSICIAN ASSISTANT

## 2017-11-27 PROCEDURE — 82043 UR ALBUMIN QUANTITATIVE: CPT | Performed by: PHYSICIAN ASSISTANT

## 2017-11-27 PROCEDURE — 69210 REMOVE IMPACTED EAR WAX UNI: CPT | Performed by: PHYSICIAN ASSISTANT

## 2017-11-27 PROCEDURE — 99215 OFFICE O/P EST HI 40 MIN: CPT | Mod: 25 | Performed by: PHYSICIAN ASSISTANT

## 2017-11-27 PROCEDURE — 36415 COLL VENOUS BLD VENIPUNCTURE: CPT | Performed by: PHYSICIAN ASSISTANT

## 2017-11-27 PROCEDURE — 80048 BASIC METABOLIC PNL TOTAL CA: CPT | Performed by: PHYSICIAN ASSISTANT

## 2017-11-27 PROCEDURE — 87210 SMEAR WET MOUNT SALINE/INK: CPT | Performed by: PHYSICIAN ASSISTANT

## 2017-11-27 PROCEDURE — 69209 REMOVE IMPACTED EAR WAX UNI: CPT | Performed by: PHYSICIAN ASSISTANT

## 2017-11-27 PROCEDURE — 81001 URINALYSIS AUTO W/SCOPE: CPT | Performed by: PHYSICIAN ASSISTANT

## 2017-11-27 PROCEDURE — 87088 URINE BACTERIA CULTURE: CPT | Performed by: PHYSICIAN ASSISTANT

## 2017-11-27 RX ORDER — HYDROCHLOROTHIAZIDE 12.5 MG/1
12.5 TABLET ORAL DAILY
Qty: 30 TABLET | Refills: 0 | Status: SHIPPED | OUTPATIENT
Start: 2017-11-27 | End: 2018-02-15

## 2017-11-27 RX ORDER — BUPROPION HYDROCHLORIDE 150 MG/1
150 TABLET ORAL EVERY MORNING
Qty: 30 TABLET | Refills: 3 | Status: SHIPPED | OUTPATIENT
Start: 2017-11-27 | End: 2018-05-21

## 2017-11-27 RX ORDER — METOPROLOL SUCCINATE 25 MG/1
25 TABLET, EXTENDED RELEASE ORAL DAILY
Qty: 30 TABLET | Refills: 0 | Status: SHIPPED | OUTPATIENT
Start: 2017-11-27 | End: 2018-02-15

## 2017-11-27 ASSESSMENT — ANXIETY QUESTIONNAIRES
5. BEING SO RESTLESS THAT IT IS HARD TO SIT STILL: NOT AT ALL
1. FEELING NERVOUS, ANXIOUS, OR ON EDGE: NOT AT ALL
6. BECOMING EASILY ANNOYED OR IRRITABLE: NOT AT ALL
3. WORRYING TOO MUCH ABOUT DIFFERENT THINGS: NOT AT ALL
GAD7 TOTAL SCORE: 3
2. NOT BEING ABLE TO STOP OR CONTROL WORRYING: NOT AT ALL
7. FEELING AFRAID AS IF SOMETHING AWFUL MIGHT HAPPEN: NOT AT ALL

## 2017-11-27 ASSESSMENT — PATIENT HEALTH QUESTIONNAIRE - PHQ9
SUM OF ALL RESPONSES TO PHQ QUESTIONS 1-9: 9
5. POOR APPETITE OR OVEREATING: NEARLY EVERY DAY

## 2017-11-27 NOTE — PROGRESS NOTES
"    SUBJECTIVE:                                                    Theresa Bill is a 71 year old female who presents to clinic today for the following health issues:  {Provider please address medication reconciliation discrepancies--rooming staff please delete if no med/rec issues}    {Superlists:424818}    {additional problems for provider to add:440375}    Problem list and histories reviewed & adjusted, as indicated.  Additional history: {NONE - AS DOCUMENTED:719676::\"as documented\"}    {HIST REVIEW/ LINKS 2:213614}    {PROVIDER CHARTING PREFERENCE:828198}      "

## 2017-11-27 NOTE — LETTER
My Depression Action Plan  Name: Theresa Bill   Date of Birth 1945  Date: 11/27/2017    My doctor: Shae Pierre   My clinic: [address] [id number] [phone]          GREEN    ZONE   Good Control    What it looks like:     Things are going generally well. You have normal up s and down s. You may even feel depressed from time to time, but bad moods usually last less than a day.   What you need to do:  1. Continue to care for yourself (see self care plan)  2. Check your depression survival kit and update it as needed  3. Follow your physician s recommendations including any medication.  4. Do not stop taking medication unless you consult with your physician first.           YELLOW         ZONE Getting Worse    What it looks like:     Depression is starting to interfere with your life.     It may be hard to get out of bed; you may be starting to isolate yourself from others.    Symptoms of depression are starting to last most all day and this has happened for several days.     You may have suicidal thoughts but they are not constant.   What you need to do:     1. Call your care team, your response to treatment will improve if you keep your care team informed of your progress. Yellow periods are signs an adjustment may need to be made.     2. Continue your self-care, even if you have to fake it!    3. Talk to someone in your support network    4. Open up your depression survival kit           RED    ZONE Medical Alert - Get Help    What it looks like:     Depression is seriously interfering with your life.     You may experience these or other symptoms: You can t get out of bed most days, can t work or engage in other necessary activities, you have trouble taking care of basic hygiene, or basic responsibilities, thoughts of suicide or death that will not go away, self-injurious behavior.     What you need to do:  1. Call your care team  and request a same-day appointment. If they are not available (weekends or after hours) call your local crisis line, emergency room or 911.      Electronically signed by: Renetta Britton, November 27, 2017    Depression Self Care Plan / Survival Kit    Self-Care for Depression  Here s the deal. Your body and mind are really not as separate as most people think.  What you do and think affects how you feel and how you feel influences what you do and think. This means if you do things that people who feel good do, it will help you feel better.  Sometimes this is all it takes.  There is also a place for medication and therapy depending on how severe your depression is, so be sure to consult with your medical provider and/ or Behavioral Health Consultant if your symptoms are worsening or not improving.     In order to better manage my stress, I will:    Exercise  Get some form of exercise, every day. This will help reduce pain and release endorphins, the  feel good  chemicals in your brain. This is almost as good as taking antidepressants!  This is not the same as joining a gym and then never going! (they count on that by the way ) It can be as simple as just going for a walk or doing some gardening, anything that will get you moving.      Hygiene   Maintain good hygiene (Get out of bed in the morning, Make your bed, Brush your teeth, Take a shower, and Get dressed like you were going to work, even if you are unemployed).  If your clothes don't fit try to get ones that do.    Diet  I will strive to eat foods that are good for me, drink plenty of water, and avoid excessive sugar, caffeine, alcohol, and other mood-altering substances.  Some foods that are helpful in depression are: complex carbohydrates, B vitamins, flaxseed, fish or fish oil, fresh fruits and vegetables.    Psychotherapy  I agree to participate in Individual Therapy (if recommended).    Medication  If prescribed medications, I agree to take them.  Missing  doses can result in serious side effects.  I understand that drinking alcohol, or other illicit drug use, may cause potential side effects.  I will not stop my medication abruptly without first discussing it with my provider.    Staying Connected With Others  I will stay in touch with my friends, family members, and my primary care provider/team.    Use your imagination  Be creative.  We all have a creative side; it doesn t matter if it s oil painting, sand castles, or mud pies! This will also kick up the endorphins.    Witness Beauty  (AKA stop and smell the roses) Take a look outside, even in mid-winter. Notice colors, textures. Watch the squirrels and birds.     Service to others  Be of service to others.  There is always someone else in need.  By helping others we can  get out of ourselves  and remember the really important things.  This also provides opportunities for practicing all the other parts of the program.    Humor  Laugh and be silly!  Adjust your TV habits for less news and crime-drama and more comedy.    Control your stress  Try breathing deep, massage therapy, biofeedback, and meditation. Find time to relax each day.     My support system    Clinic Contact:  Phone number:    Contact 1:  Phone number:    Contact 2:  Phone number:    Faith/:  Phone number:    Therapist:  Phone number:    Local crisis center:    Phone number:    Other community support:  Phone number:

## 2017-11-27 NOTE — PATIENT INSTRUCTIONS
Depression -  Continue seeing Sam Jaime, consider if want to see more often  Keep trying to keep mind quiet at night - re-direct thinking if it is wandering to   Can consider wellbutrin increase or adding trazodone for mood and sleep    Falling -  Wax removal from ears today so that I can check for ear infection  Nurse will do BP and pulse laying, sitting, standing  I suspect we need to decrease BP meds    - change Hydrochlorothiazide from 25 mg to 12.5 mg - can take half tab or just  new prescription   -change metoprolol from 50 mg to 25 mg - can take half tab or just  new prescription.  Break tab along line if using up current med  Checking labs today  Decided to hold off on EKG for now, until later this week to see if med changes helped.  Talk to cardiology on Fri if needed, or call me.    Also cut back on clonazepam to 1 tab at a time - old dose - since newer dose didn't help anyway    Restless legs -  Clonazepam back to old lower dose  Hold off on new med for now.  Discuss with neurology next week.      Sleep -  No new med for now, until you can tell me that you're not falling  Can contact me for new med when falling is better    Cough -  Chronic bronchitis v acid reflux   Can see stomach doctor if bothering a lot    Recheck in 3 weeks, sooner if needed

## 2017-11-27 NOTE — PROGRESS NOTES
"  SUBJECTIVE:   Theresa Bill is a 71 year old female who presents to clinic today for the following health issues:      Depression Followup    Status since last visit: worse    See PHQ-9 for current symptoms.  Other associated symptoms: none    Complicating factors:   Significant life event:  September death of    Current substance abuse:  None  Anxiety or Panic symptoms:      PHQ-9 Score and MyChart F/U Questions 11/2/2017 11/10/2017 11/27/2017   Total Score 16 7 9   Q9: Suicide Ideation Not at all Not at all Not at all     PHQ-9  English  PHQ-9   Any Language  Suicide Assessment Five-step Evaluation and Treatment (SAFE-T)      Amount of exercise or physical activity: walk    Problems taking medications regularly: sometimes forgets medication    Medication side effects:     Mood and grief - not much better.  Seeing counselor q 2 wks but counselor had to recently cancel and extend this, feels is adequate.  Sister now living with her.  Seeing a lot of family.  Has started volunteering at [employer].  Unsure if wellbutrin helped.  Notes most of symptoms are at night.  Cannot sleep, and RLS acting up.  RLS not seeming to be what keeps her awake.  Increase of clonazepam to 1 mg did not help, and forgot to try increase to 1.5 mg.  Sees neurology late next wk.  History refractory RLS.    Coughing multiple times per day, sometimes hard enoughing to throw up.  Generally dry cough.  Pulmonary specialist this summer clarified diagnosis as chronic bronchitis, not COPD.    Balance worse this week.  \"All I'll do is turn around and fall.\"  Not dizzy or feeling like world is spinning.  Sometimes feels like stood too quickly, but this isn't the current situation.      DM2 - not checking.    Feels like mouth is coated.  When spits, thick white.  Tongue does hurt.    Problem list and histories reviewed & adjusted, as indicated.  Additional history: as documented    BP Readings from Last 3 Encounters:   11/27/17 92/57 " "  11/10/17 110/72   11/02/17 116/64    Wt Readings from Last 3 Encounters:   11/27/17 168 lb (76.2 kg)   11/10/17 171 lb (77.6 kg)   11/02/17 169 lb (76.7 kg)                  Labs reviewed in EPIC      Reviewed and updated as needed this visit by clinical staffTobacco  Allergies  Meds  Problems  Med Hx  Surg Hx       Reviewed and updated as needed this visit by Provider  Allergies  Meds  Problems  Med Hx  Surg Hx         ROS:  Constitutional, HEENT, cardiovascular, pulmonary, musculoskeletal, neuro, and psych systems are negative, except as otherwise noted.    OBJECTIVE:   BP 92/57 (BP Location: Right arm, Patient Position: Standing, Cuff Size: Adult Large)  Pulse 68  Temp 97  F (36.1  C) (Tympanic)  Resp 14  Ht 5' 1\" (1.549 m)  Wt 168 lb (76.2 kg)  BMI 31.74 kg/m2  Body mass index is 31.74 kg/(m^2).  GENERAL: healthy, alert and no distress  HENT: ear canals and TM's normal after cerumen impaction removed via lavage R by CMA, and manually by me on L  RESP: lungs clear to auscultation - no rales, rhonchi or wheezes  CV: regular rate and rhythm, normal S1 S2, no S3 or S4, no murmur, click or rub  PSYCH: mentation appears normal, affect normal/bright    PHQ-9 SCORE 11/2/2017 11/10/2017 11/27/2017   Total Score - - -   Total Score 16 7 9     GINNY-7 SCORE 11/2/2017 11/10/2017 11/27/2017   Total Score - - -   Total Score 13 4 3     Recent flowsheet indicates BPs running 90s to 110s systolic since Oct.  See orthostatic vitals today    ASSESSMENT/PLAN:       ICD-10-CM    1. Benign essential hypertension I10 Urine Microscopic   2. Mild recurrent major depression (H) F33.0 buPROPion (WELLBUTRIN XL) 150 MG 24 hr tablet   3. Grief F43.20 buPROPion (WELLBUTRIN XL) 150 MG 24 hr tablet   4. Type 2 diabetes mellitus without complication, with long-term current use of insulin (H) E11.9 Albumin Random Urine Quantitative with Creat Ratio    Z79.4 insulin detemir (LEVEMIR FLEXPEN/FLEXTOUCH) 100 UNIT/ML injection     " Hemoglobin A1c     Basic metabolic panel   5. Essential hypertension with goal blood pressure less than 140/90 I10 metoprolol (TOPROL-XL) 25 MG 24 hr tablet     hydrochlorothiazide 12.5 MG TABS tablet   6. Simple chronic bronchitis (H) J41.0    7. LPRD (laryngopharyngeal reflux disease) K21.9    8. Frequent UTI N39.0 *UA reflex to Microscopic   9. Nonspecific finding on examination of urine R82.90 Urine Culture Aerobic Bacterial   10. Bilateral impacted cerumen H61.23 REMOVE IMPACTED CERUMEN     HC REMOVAL IMPACTED CERUMEN IRRIGATION/LVG UNILAT     Forgot to check tongue - patient can return today if desired for this    40 min spent with patient, over half in discussion for options for depression, falling, RLS.    Patient Instructions   Depression -  Continue seeing Sam Jaime, consider if want to see more often  Keep trying to keep mind quiet at night - re-direct thinking if it is wandering to   Can consider wellbutrin increase or adding trazodone for mood and sleep    Falling -  Wax removal from ears today so that I can check for ear infection  Nurse will do BP and pulse laying, sitting, standing  I suspect we need to decrease BP meds    - change Hydrochlorothiazide from 25 mg to 12.5 mg - can take half tab or just  new prescription   -change metoprolol from 50 mg to 25 mg - can take half tab or just  new prescription.  Break tab along line if using up current med  Checking labs today  Decided to hold off on EKG for now, until later this week to see if med changes helped.  Talk to cardiology on Fri if needed, or call me.    Also cut back on clonazepam to 1 tab at a time - old dose - since newer dose didn't help anyway    Restless legs -  Clonazepam back to old lower dose  Hold off on new med for now.  Discuss with neurology next week.      Sleep -  No new med for now, until you can tell me that you're not falling  Can contact me for new med when falling is better    Cough -  Chronic  bronchitis v acid reflux   Can see stomach doctor if bothering a lot    Recheck in 3 weeks, sooner if needed      Shae Pierre PA-C  [facility]

## 2017-11-27 NOTE — MR AVS SNAPSHOT
After Visit Summary   11/27/2017    Theresa Bill    MRN: [id number]           Patient Information     Date Of Birth          1945        Visit Information        Provider Department      11/27/2017 9:00 AM Shae Pierre PA-C [facility]        Today's Diagnoses     Benign essential hypertension    -  1    Mild recurrent major depression (H)        Grief        Type 2 diabetes mellitus without complication, with long-term current use of insulin (H)        Essential hypertension with goal blood pressure less than 140/90        Simple chronic bronchitis (H)        LPRD (laryngopharyngeal reflux disease)          Care Instructions    Depression -  Continue seeing Sam Jaime, consider if want to see more often  Keep trying to keep mind quiet at night - re-direct thinking if it is wandering to   Can consider wellbutrin increase or adding trazodone for mood and sleep    Falling -  Wax removal from ears today so that I can check for ear infection  Nurse will do BP and pulse laying, sitting, standing  I suspect we need to decrease BP meds    - change Hydrochlorothiazide from 25 mg to 12.5 mg - can take half tab or just  new prescription   -change metoprolol from 50 mg to 25 mg - can take half tab or just  new prescription.  Break tab along line if using up current med  Checking labs today  Decided to hold off on EKG for now, until later this week to see if med changes helped.  Talk to cardiology on Fri if needed, or call me.    Also cut back on clonazepam to 1 tab at a time - old dose - since newer dose didn't help anyway    Restless legs -  Clonazepam back to old lower dose  Hold off on new med for now.  Discuss with neurology next week.      Sleep -  No new med for now, until you can tell me that you're not falling  Can contact me for new med when falling is better    Cough -  Chronic bronchitis v acid reflux   Can see stomach doctor if bothering a  lot    Recheck in 3 weeks, sooner if needed          Follow-ups after your visit        Your next 10 appointments already scheduled     Dec 01, 2017 11:00 AM CST   Return Visit with NICHELLE Nelson [facility] ([facility])    [address] [address]   [phone]            Dec 14, 2017 10:30 AM CST   Return Visit with Sam SHIRLEY Addison   [facility] ([facility])    [address] [address]   [phone]            Dec 28, 2017 10:30 AM CST   Return Visit with Sam Sebastianifton   [facility] ([facility]    [address] [address]   [phone]            Jan 11, 2018 10:30 AM CST   Return Visit with Sam Jaime   [facility] ([facility])    [address] [address]   [phone]              Who to contact     If you have questions or need follow up information about today's clinic visit or your schedule please contact [facility] directly at [phone].  Normal or non-critical lab and imaging results will be communicated to you by MyChart, letter or phone within 4 business days after the clinic has received the results. If you do not hear from us within 7 days, please contact the clinic through [employer] or phone. If you have a critical or abnormal lab result, we will notify you by phone as soon as possible.  Submit refill requests through [employer] or call your pharmacy and they will forward the refill request to us. Please allow 3 business days for your refill to be completed.          Additional Information About Your Visit        [employer] Information     [employer] gives you secure access to your electronic health record. If you see a primary care provider, you can also send messages to your care team and make appointments. If you have questions, please call your primary care  "clinic.  If you do not have a primary care provider, please call [phone] and they will assist you.        Care EveryWhere ID     This is your Care EveryWhere ID. This could be used by other organizations to access your [facility] medical records  [id number]        Your Vitals Were     Pulse Temperature Respirations Height BMI (Body Mass Index)       70 97  F (36.1  C) (Tympanic) 14 5' 1\" (1.549 m) 31.74 kg/m2        Blood Pressure from Last 3 Encounters:   11/27/17 110/70   11/10/17 110/72   11/02/17 116/64    Weight from Last 3 Encounters:   11/27/17 168 lb (76.2 kg)   11/10/17 171 lb (77.6 kg)   11/02/17 169 lb (76.7 kg)              We Performed the Following     Albumin Random Urine Quantitative with Creat Ratio     Basic metabolic panel     DEPRESSION ACTION PLAN (DAP)     Hemoglobin A1c          Today's Medication Changes          These changes are accurate as of: 11/27/17 10:06 AM.  If you have any questions, ask your nurse or doctor.               These medicines have changed or have updated prescriptions.        Dose/Directions    buPROPion 150 MG 24 hr tablet   Commonly known as:  WELLBUTRIN XL   This may have changed:  additional instructions   Used for:  Mild recurrent major depression (H), Grief   Changed by:  Shae Pierre PA-C        Dose:  150 mg   Take 1 tablet (150 mg) by mouth every morning   Quantity:  30 tablet   Refills:  3       hydrochlorothiazide 12.5 MG Tabs tablet   This may have changed:    - medication strength  - how much to take   Used for:  Essential hypertension with goal blood pressure less than 140/90   Changed by:  Shae Pierre PA-C        Dose:  12.5 mg   Take 1 tablet (12.5 mg) by mouth daily   Quantity:  30 tablet   Refills:  0       metoprolol 25 MG 24 hr tablet   Commonly known as:  TOPROL-XL   This may have changed:    - medication strength  - how much to take   Used for:  Essential hypertension with goal blood pressure less than 140/90   Changed by:  " Shae Pierre PA-C        Dose:  25 mg   Take 1 tablet (25 mg) by mouth daily   Quantity:  30 tablet   Refills:  0            Where to get your medicines      These medications were sent to [facility] [address] [place] [address] [address] [facility]  [address] [facility] [address]     Phone:  [phone]     buPROPion 150 MG 24 hr tablet    hydrochlorothiazide 12.5 MG Tabs tablet    insulin detemir 100 UNIT/ML injection    metoprolol 25 MG 24 hr tablet                Primary Care Provider Office Phone # Fax #    Shae Pierre PA-C [phone] [address] [address]        Equal Access to Services     SABAS VUONG : Annalisa Agrawal, waoyselynda kenya, qaybta kaalmada adecyrusyaestrellita, ángel jauregui. [facility] [phone].    ATENCIÓN: Si habla español, tiene a urban disposición servicios gratuitos de asistencia lingüística. [facility] [phone].    We comply with applicable federal civil rights laws and [place] laws. We do not discriminate on the basis of race, color, national origin, age, disability, sex, sexual orientation, or gender identity.            Thank you!     Thank you for choosing [facility]  for your care. Our goal is always to provide you with excellent care. Hearing back from our patients is one way we can continue to improve our services. Please take a few minutes to complete the written survey that you may receive in the mail after your visit with us. Thank you!             Your Updated Medication List - Protect others around you: Learn how to safely use, store and throw away your medicines at www.disposemymeds.org.          This list is accurate as of: 11/27/17 10:06 AM.  Always use your most recent med list.                   Brand Name Dispense Instructions for use Diagnosis    ACE/ARB/ARNI NOT PRESCRIBED (INTENTIONAL)      ACE & ARB not prescribed due to Intolerance-cough    Type  2 diabetes, HbA1c goal < 7% (H)       aspirin 81 MG tablet     100    ONE DAILY    Type II or unspecified type diabetes mellitus without mention of complication, not stated as uncontrolled       atorvastatin 40 MG tablet    LIPITOR    90 tablet    Take 1 tablet (40 mg) by mouth At Bedtime    Dyslipidemia       blood glucose monitoring test strip    no brand specified    2 Box    1 strip by In Vitro route 3 times daily. Has a Freestyle    Type 2 diabetes, HbA1c goal < 7% (H)       buPROPion 150 MG 24 hr tablet    WELLBUTRIN XL    30 tablet    Take 1 tablet (150 mg) by mouth every morning    Mild recurrent major depression (H), Grief       clonazePAM 0.5 MG tablet    klonoPIN    180 tablet    Take 1 mg by mouth At Bedtime        DAILY MULTIVITAMIN PO      One tablet daily        DULoxetine 60 MG EC capsule    CYMBALTA    30 capsule    TAKE ONE CAPSULE BY MOUTH ONCE DAILY    Major depressive disorder, recurrent episode, mild (H)       fluocinonide 0.05 % ointment    LIDEX    15 g    Apply sparingly to affected area twice daily as needed for mouth ulcer.    Aphthae, oral       hydrochlorothiazide 12.5 MG Tabs tablet     30 tablet    Take 1 tablet (12.5 mg) by mouth daily    Essential hypertension with goal blood pressure less than 140/90       hydrocortisone 1 % cream    CORTAID    30 g    Apply sparingly to affected area of lip corners two times daily for 1 week.    Angular cheilitis       insulin detemir 100 UNIT/ML injection    LEVEMIR FLEXPEN/FLEXTOUCH    15 mL    7 units at bedtime    Type 2 diabetes mellitus without complication, with long-term current use of insulin (H)       insulin pen needle 32G X 4 MM    BD MARIIA U/F    90 each    Use one pen daily    Type 2 diabetes mellitus without complication, with long-term current use of insulin (H)       ketoconazole 2 % cream    NIZORAL    30 g    Apply topically 2 times daily To corners of lips for 2 weeks.    Angular cheilitis       metFORMIN 500 MG 24 hr tablet     GLUCOPHAGE-XR    180 tablet    Take 1 tablet (500 mg) by mouth 2 times daily (with meals)    Type 2 diabetes mellitus without complication, with long-term current use of insulin (H)       metoprolol 25 MG 24 hr tablet    TOPROL-XL    30 tablet    Take 1 tablet (25 mg) by mouth daily    Essential hypertension with goal blood pressure less than 140/90       nitroGLYcerin 0.4 MG sublingual tablet    NITROSTAT    25 tablet    For chest pain place 1 tablet under the tongue every 5 minutes for 3 doses. If symptoms persist 5 minutes after 1st dose call 911.    Chest pressure       * nystatin cream    MYCOSTATIN    60 g    Apply twice daily for yeast rash for 3 weeks.    Intertrigo       * nystatin 243953 UNIT/GM Powd    MYCOSTATIN    60 g    Apply 1 g topically 3 times daily as needed    Other specified erythematous condition(110.01)       olopatadine 0.1 % ophthalmic solution    PATANOL    5 mL    Place 1 drop into both eyes 2 times daily    Allergic conjunctivitis, bilateral       order for DME      Equipment being ordered: CPAP Patient Theresa Bill received a Resmed Airsense 10  Auto. Pressures were set at Auto 10 - 15 cm H2O.        order for DME     1 Device    Equipment being ordered: Knee compression sleeve    Fall at home, subsequent encounter, Pain in joint, lower leg, unspecified laterality, Effusion of knee joint right, Arthritis of knee, degenerative       pantoprazole 40 MG EC tablet    PROTONIX    30 tablet    Take 1 tablet (40 mg) by mouth daily    Gastroesophageal reflux disease, esophagitis presence not specified       ranitidine 150 MG tablet    ZANTAC    60 tablet    TAKE ONE TABLET BY MOUTH TWICE DAILY    Gastroesophageal reflux disease with esophagitis, Esophageal dysmotility, Chronic cough       traMADol 50 MG tablet    ULTRAM    30 tablet    TAKE ONE TABLET BY MOUTH EVERY 6 HOURS AS NEEDED FOR  MODERATE  PAIN    Radicular leg pain       * Notice:  This list has 2 medication(s) that are the same as  other medications prescribed for you. Read the directions carefully, and ask your doctor or other care provider to review them with you.

## 2017-11-27 NOTE — NURSING NOTE
"Chief Complaint   Patient presents with     Depression       Initial /70  Pulse 70  Temp 97  F (36.1  C) (Tympanic)  Resp 14  Ht 5' 1\" (1.549 m)  Wt 168 lb (76.2 kg)  BMI 31.74 kg/m2 Estimated body mass index is 31.74 kg/(m^2) as calculated from the following:    Height as of this encounter: 5' 1\" (1.549 m).    Weight as of this encounter: 168 lb (76.2 kg).  Medication Reconciliation: complete    Health Maintenance that is potentially due pending provider review:  NONE        Is there anyone who you would like to be able to receive your results? If yes have patient fill out RICH    "

## 2017-11-28 LAB
BACTERIA SPEC CULT: ABNORMAL
Lab: ABNORMAL
SPECIMEN SOURCE: ABNORMAL

## 2017-11-28 RX ORDER — CEPHALEXIN 500 MG/1
500 CAPSULE ORAL 2 TIMES DAILY
Qty: 10 CAPSULE | Refills: 0 | Status: SHIPPED | OUTPATIENT
Start: 2017-11-28 | End: 2017-12-04

## 2017-11-28 ASSESSMENT — ANXIETY QUESTIONNAIRES: GAD7 TOTAL SCORE: 3

## 2017-11-28 NOTE — PROGRESS NOTES
Amber Cardenas,    Urine protein test is normal.      Please call clinic at [phone] if you have questions.  Blood salts and kidney function normal.  A1c is 7.5, which is increased but not concerning.  Urine test in process since the  thought your urine looked like an infection.    Shae Pierre PA-C

## 2017-11-28 NOTE — PROGRESS NOTES
Amber Cardenas,    Your urine culture suggests infection.  We don't treat just based on lab, but on whether you have symptoms.  Sometimes this can be worsening of your overactive bladder but I also wonder about possible bladder infection with how much trouble you're suddenly having with your restless legs and the sudden jump in your blood sugars.  Let's see if any of these improve with treating for possible bladder infection.  Prescription sent.    Please call clinic at [phone] if you have questions.    Shae Pierre PA-C

## 2017-12-01 ENCOUNTER — OFFICE VISIT (OUTPATIENT)
Dept: CARDIOLOGY | Facility: CLINIC | Age: 72
End: 2017-12-01
Payer: COMMERCIAL

## 2017-12-01 VITALS
HEART RATE: 76 BPM | WEIGHT: 169.8 LBS | BODY MASS INDEX: 32.08 KG/M2 | SYSTOLIC BLOOD PRESSURE: 128 MMHG | DIASTOLIC BLOOD PRESSURE: 75 MMHG | OXYGEN SATURATION: 95 %

## 2017-12-01 DIAGNOSIS — E78.5 HYPERLIPIDEMIA LDL GOAL <70: ICD-10-CM

## 2017-12-01 DIAGNOSIS — I10 ESSENTIAL HYPERTENSION: Primary | ICD-10-CM

## 2017-12-01 PROCEDURE — 99213 OFFICE O/P EST LOW 20 MIN: CPT | Performed by: NURSE PRACTITIONER

## 2017-12-01 RX ORDER — SAW/VIT E/SOD SEL/LYC/BETA/PYG 160-100
TABLET ORAL
COMMUNITY
End: 2019-03-11

## 2017-12-01 NOTE — PROGRESS NOTES
Cardiology Clinic Progress Note  Theresa Bill MRN# [id number]   YOB: 1945 Age: 71 year old     Reason for visit:  follow-up cardiac testing            Assessment and Plan:     1. Minimal, nonocclusive coronary artery disease    Continue risk factor modification including blood pressure control, hyperlipidemia management and diabetes.  These management.    Follow-up in one year or p.r.n. per patient preference.         History of Presenting Illness:    Theresa Bill is a very pleasant 71 year old patient of Dr. Guzman who presents today to review a CT angiogram and Holter monitor.  She she was referred to cardiology for chest discomfort, palpitations and dizziness in the setting of a significant amount of stress.  Her  passed away two months ago after about cancer.  She has a past medical history significant for hypertension, hyperlipidemia and type 2 diabetes.    She was seen in the emergency department in September for symptoms of epigastric/chest discomfort and diaphoresis with associated palpitations.  She had a stress echocardiogram that demonstrated reduced functional capacity but no evidence of ischemia.  The sensitivity of the study was reduced as she was unable to reach her max predicted heart rate.  Dr. Guzman recommended that she undergo a CT coronary angiogram as well as a Holter monitor.    The CT coronary angiogram showed minimal nonobstructive CAD with a total calcium score of 54 all located in the LAD placing her in the 54th percentile compared to age and gender..  There was an incidental finding of a large hiatal hernia.  Dr. Guzman recommended continued risk factor modification and to follow-up with her primary for the treatment of her large hiatal hernia. The Holter monitor showed a principal rhythm of sinus rhythm.  Average heart rate was 72 bpm with occasional PVCs and PACs.    Today in clinic she states she's had only one further episode of chest discomfort while eating a sandwich  that lasted approximately one minute and was not associated with diaphoresis or palpitations.  He does have a history of reflux and will follow-up with her primary care provider.  She continues to be saddened by the loss of her  but her stress level has decreased.  She was at her sister and has children living nearby.  She states the Thanksgiving holiday was enjoyable clouded by the loss of her .          This note was completed in part using Dragon voice recognition software. Although reviewed after completion, some word and grammatical errors may occur       Review of Systems:   Review of Systems:  Skin:  Positive for bruising   Eyes:  Negative    ENT:  Negative    Respiratory:  Negative    Cardiovascular:    Positive for;lower extremity symptoms;edema  Gastroenterology: Negative    Genitourinary:  Positive for urinary frequency;urgency  Musculoskeletal:  Positive for arthritis;joint pain;joint swelling;joint stiffness;nocturnal cramping  Neurologic:  Negative    Psychiatric:  Positive for depression  Heme/Lymph/Imm:  Negative    Endocrine:  Positive for diabetes              Physical Exam:     Vitals: /75 (BP Location: Right arm, Patient Position: Sitting, Cuff Size: Adult Regular)  Pulse 76  Wt 77 kg (169 lb 12.8 oz)  SpO2 95%  BMI 32.08 kg/m2  Constitutional:  cooperative        Skin:  warm and dry to the touch        Head:  normocephalic        Eyes:  pupils equal and round        ENT:  no pallor or cyanosis        Neck:           Chest:           Cardiac: regular rhythm                  Abdomen:  abdomen soft        Vascular: pulses full and equal                                      Extremities and Back:  no edema        Neurological:  no gross motor deficits;affect appropriate                 Medications:     Current Outpatient Prescriptions   Medication Sig Dispense Refill     Probiotic Product (PROBIOTIC & ACIDOPHILUS EX ST) CAPS        cephALEXin (KEFLEX) 500 MG capsule Take 1  capsule (500 mg) by mouth 2 times daily 10 capsule 0     buPROPion (WELLBUTRIN XL) 150 MG 24 hr tablet Take 1 tablet (150 mg) by mouth every morning 30 tablet 3     insulin detemir (LEVEMIR FLEXPEN/FLEXTOUCH) 100 UNIT/ML injection 7 units at bedtime 15 mL 3     metoprolol (TOPROL-XL) 25 MG 24 hr tablet Take 1 tablet (25 mg) by mouth daily 30 tablet 0     hydrochlorothiazide 12.5 MG TABS tablet Take 1 tablet (12.5 mg) by mouth daily 30 tablet 0     ranitidine (ZANTAC) 150 MG tablet TAKE ONE TABLET BY MOUTH TWICE DAILY 60 tablet 3     DULoxetine (CYMBALTA) 60 MG EC capsule TAKE ONE CAPSULE BY MOUTH ONCE DAILY 30 capsule 5     pantoprazole (PROTONIX) 40 MG EC tablet Take 1 tablet (40 mg) by mouth daily 30 tablet 5     traMADol (ULTRAM) 50 MG tablet TAKE ONE TABLET BY MOUTH EVERY 6 HOURS AS NEEDED FOR  MODERATE  PAIN 30 tablet 5     nitroGLYcerin (NITROSTAT) 0.4 MG sublingual tablet For chest pain place 1 tablet under the tongue every 5 minutes for 3 doses. If symptoms persist 5 minutes after 1st dose call 911. 25 tablet 0     fluocinonide (LIDEX) 0.05 % ointment Apply sparingly to affected area twice daily as needed for mouth ulcer. 15 g 0     metFORMIN (GLUCOPHAGE-XR) 500 MG 24 hr tablet Take 1 tablet (500 mg) by mouth 2 times daily (with meals) 180 tablet 3     atorvastatin (LIPITOR) 40 MG tablet Take 1 tablet (40 mg) by mouth At Bedtime 90 tablet 3     nystatin (MYCOSTATIN) 662620 UNIT/GM POWD Apply 1 g topically 3 times daily as needed 60 g 1     ketoconazole (NIZORAL) 2 % cream Apply topically 2 times daily To corners of lips for 2 weeks. 30 g 1     clonazePAM (KLONOPIN) 0.5 MG tablet Take 1 mg by mouth At Bedtime  180 tablet 2     olopatadine (PATANOL) 0.1 % ophthalmic solution Place 1 drop into both eyes 2 times daily 5 mL 3     hydrocortisone 1 % cream Apply sparingly to affected area of lip corners two times daily for 1 week. 30 g 0     nystatin (MYCOSTATIN) cream Apply twice daily for yeast rash for 3 weeks.  60 g 3     Multiple Vitamin (DAILY MULTIVITAMIN PO) One tablet daily       ASPIRIN 81 MG OR TABS ONE DAILY 100 3     insulin pen needle (BD MARIIA U/F) 32G X 4 MM Use one pen daily (Patient not taking: Reported on 12/1/2017) 90 each 0     order for DME Equipment being ordered: CPAP Patient Theresa Bill received a Resmed Airsense 10  Auto. Pressures were set at Auto 10 - 15 cm H2O.       ORDER FOR DME Equipment being ordered: Knee compression sleeve (Patient not taking: Reported on 12/1/2017) 1 Device 0     ACE/ARB NOT PRESCRIBED, INTENTIONAL, ACE & ARB not prescribed due to Intolerance-cough       (Patient not taking: Reported on 12/1/2017)       glucose blood VI test strips strip 1 strip by In Vitro route 3 times daily. Has a Freestyle (Patient not taking: Reported on 12/1/2017) 2 Box 12           Family History   Problem Relation Age of Onset     CANCER Mother      brain cancer, ovarian cancer     CANCER Father      lung cancer     GASTROINTESTINAL DISEASE Father      colitis     Alcohol/Drug Brother      Alcohol/Drug Son      Alcohol/Drug Brother      GASTROINTESTINAL DISEASE Sister      DIABETES Sister      CANCER Sister      3 sisters .w ovarian cancer and one also with uterine cancer     Gynecology Son      kidney stones     HEART DISEASE Sister      MI age 64     GASTROINTESTINAL DISEASE Other      colitis/colitis/colitis/colitis       Social History     Social History     Marital status:      Spouse name: N/A     Number of children: N/A     Years of education: N/A     Occupational History     Not on file.     Social History Main Topics     Smoking status: Never Smoker     Smokeless tobacco: Never Used     Alcohol use No     Drug use: No     Sexual activity: Yes     Partners: Male     Other Topics Concern     Parent/Sibling W/ Cabg, Mi Or Angioplasty Before 65f 55m? Yes     Social History Narrative            Past Medical History:     Past Medical History:   Diagnosis Date     Depression      Diabetes  (H)      Diabetes mellitus (H)     NIDDM     Dysphagia      GERD (gastroesophageal reflux disease)      Hypertension               Past Surgical History:     Past Surgical History:   Procedure Laterality Date     BUNIONECTOMY CRISELDA  7/1/2011    Procedure:BUNIONECTOMY CRISELDA; weil osteotomy & Lapidtus Bunionectomy; Surgeon:HYACINTH SANTO; Location:[facility]     BUNIONECTOMY CHEVRON  4/6/2012    Procedure:BUNIONECTOMY CHEVRON; Lapidus bunionectomy, plantar plate repair second and third metatarsophalangeal joints,left foot-popliteal block left lower extremity; Surgeon:HYACINTH SANTO; Location:[facility]     C TOTAL ABDOM HYSTERECTOMY  1992    ovaries removed     COLONOSCOPY  9/11/2012    Procedure: COLONOSCOPY;  Colonoscopy;  Surgeon: Alyssa Foster MD;  Location: [facility]     NISSEN FUNDOPLICATION      2003     REPAIR HAMMER TOE  7/1/2011    Procedure:REPAIR HAMMER TOE; hammertoe correction right 2nd digit; Surgeon:HYACINTH SANTO; Location:[facility]     SURGICAL HISTORY OF -   1979    Tubal     SURGICAL HISTORY OF -   1993    Stamey bladder surgery     SURGICAL HISTORY OF -       Lipoma removed f/back     SURGICAL HISTORY OF -   1995    (L) Foot surgery     SURGICAL HISTORY OF -   05/09/2002    Colonoscopy     SURGICAL HISTORY OF -       knee arthroscopy left     SURGICAL HISTORY OF -   2007-two phases    interstim bladder implant     SURGICAL HISTORY OF -   Sept. 13, 2007    laparoscopic Nissen fundoplication              Allergies:   Nkda [no known drug allergies]       Data:   All laboratory data reviewed:    LAST CHOLESTEROL:  Lab Results   Component Value Date    CHOL 151 04/21/2017     Lab Results   Component Value Date    HDL 59 04/21/2017     Lab Results   Component Value Date    LDL 69 04/21/2017     Lab Results   Component Value Date    TRIG 113 04/21/2017     Lab Results   Component Value Date    CHOLHDLRATIO 3.2 04/30/2015       LAST BMP:  Lab Results   Component Value Date      11/27/2017      Lab Results   Component Value Date    POTASSIUM 3.6 11/27/2017     Lab Results   Component Value Date    CHLORIDE 105 11/27/2017     Lab Results   Component Value Date    CYNTHIA 8.8 11/27/2017     Lab Results   Component Value Date    CO2 32 11/27/2017     Lab Results   Component Value Date    BUN 14 11/27/2017     Lab Results   Component Value Date    CR 0.80 11/27/2017     Lab Results   Component Value Date     11/27/2017       LAST CBC:  Lab Results   Component Value Date    WBC 8.8 09/23/2017     Lab Results   Component Value Date    RBC 4.63 09/23/2017     Lab Results   Component Value Date    HGB 13.9 09/23/2017     Lab Results   Component Value Date    HCT 42.0 09/23/2017     Lab Results   Component Value Date    MCV 91 09/23/2017     Lab Results   Component Value Date    MCH 30.0 09/23/2017     Lab Results   Component Value Date    MCHC 33.1 09/23/2017     Lab Results   Component Value Date    RDW 13.9 09/23/2017     Lab Results   Component Value Date     09/23/2017         MONA MILIAN, NICHELLE, CNP

## 2017-12-01 NOTE — MR AVS SNAPSHOT
After Visit Summary   12/1/2017    Theresa Bill    MRN: [id number]           Patient Information     Date Of Birth          1945        Visit Information        Provider Department      12/1/2017 11:00 AM Leigh Ann Alberts APRN CNP [facility]        Today's Diagnoses     Essential hypertension    -  1    Hyperlipidemia LDL goal <70          Care Instructions    Thank you for your  Heart Care visit today. Your provider has recommended the following:  Medication Changes:  None  Recommendations:  Continue managing cardiac risk factors (high blood pressure, cholesterol, and diabetes)  Follow-up:  See Dr. Guzman for cardiology follow up in 1 year or as needed.  We kindly ask that you call cardiology scheduling at [phone] three months prior to requested revisit date to schedule future cardiology appointments.  Reminder:  1. Please bring in your current medication list or your medication, over the counter supplements and vitamin bottles as we will review these at each office visit.               [facility]~[address]~[place], [place]~[address]  Questions about your visit today?  Call your Cardiology Clinic RN's-Eun Crespo and/or Hilary Tracy at [phone].                Follow-ups after your visit        Additional Services     Follow-Up with Cardiologist                 Your next 10 appointments already scheduled     Dec 14, 2017 10:30 AM CST   Return Visit with Sam Jaime   [facility]    [address] [address]   [phone]            Dec 28, 2017 10:30 AM CST   Return Visit with Sam Jaime   [facility] ([facility])    [address] [address]   [phone]            Jan 11, 2018 10:30 AM CST   Return Visit with Sam WATSON [facility]  [place])    [address]  [facility] [address]   [phone]              Future tests that were ordered for you today     Open Future Orders        Priority Expected Expires Ordered    Follow-Up with Cardiologist Routine 12/1/2018 12/2/2018 12/1/2017            Who to contact     If you have questions or need follow up information about today's clinic visit or your schedule please contact [facility] directly at [phone].  Normal or non-critical lab and imaging results will be communicated to you by Hittahemhart, letter or phone within 4 business days after the clinic has received the results. If you do not hear from us within 7 days, please contact the clinic through [employer] or phone. If you have a critical or abnormal lab result, we will notify you by phone as soon as possible.  Submit refill requests through [employer] or call your pharmacy and they will forward the refill request to us. Please allow 3 business days for your refill to be completed.          Additional Information About Your Visit        [employer] Information     [employer] gives you secure access to your electronic health record. If you see a primary care provider, you can also send messages to your care team and make appointments. If you have questions, please call your primary care clinic.  If you do not have a primary care provider, please call [phone] and they will assist you.        Care EveryWhere ID     This is your Care EveryWhere ID. This could be used by other organizations to access your [place] medical records  [id number]        Your Vitals Were     Pulse Pulse Oximetry BMI (Body Mass Index)             76 95% 32.08 kg/m2          Blood Pressure from Last 3 Encounters:   12/01/17 128/75   11/27/17 92/57   11/10/17 110/72    Weight from Last 3 Encounters:   12/01/17 77 kg (169 lb 12.8 oz)   11/27/17 76.2 kg (168 lb)   11/10/17 77.6 kg (171 lb)               Primary Care Provider  Office Phone # Fax #    Shae Pierre PA-C [phone] [address] [place] [id number]        Equal Access to Services     SABAS VUONG : Hadrobyn aad ku hadsummer Agrawal, wayoselynda luqadaha, qaybta kaalmada juan jose, ángel gonzales laZoemontse jauregui. So [facility] [phone].    ATENCIÓN: Si habla español, tiene a urban disposición servicios gratuitos de asistencia lingüística. Llame al [phone].    We comply with applicable federal civil rights laws and [place] laws. We do not discriminate on the basis of race, color, national origin, age, disability, sex, sexual orientation, or gender identity.            Thank you!     Thank you for choosing [facility]  for your care. Our goal is always to provide you with excellent care. Hearing back from our patients is one way we can continue to improve our services. Please take a few minutes to complete the written survey that you may receive in the mail after your visit with us. Thank you!             Your Updated Medication List - Protect others around you: Learn how to safely use, store and throw away your medicines at www.disposemymeds.org.          This list is accurate as of: 12/1/17 11:11 AM.  Always use your most recent med list.                   Brand Name Dispense Instructions for use Diagnosis    ACE/ARB/ARNI NOT PRESCRIBED (INTENTIONAL)      ACE & ARB not prescribed due to Intolerance-cough    Type 2 diabetes, HbA1c goal < 7% (H)       aspirin 81 MG tablet     100    ONE DAILY    Type II or unspecified type diabetes mellitus without mention of complication, not stated as uncontrolled       atorvastatin 40 MG tablet    LIPITOR    90 tablet    Take 1 tablet (40 mg) by mouth At Bedtime    Dyslipidemia       blood glucose monitoring test strip    no brand specified    2 Box    1 strip by In Vitro route 3 times daily. Has a Freestyle    Type 2 diabetes, HbA1c goal < 7% (H)       buPROPion  150 MG 24 hr tablet    WELLBUTRIN XL    30 tablet    Take 1 tablet (150 mg) by mouth every morning    Mild recurrent major depression (H), Grief       cephALEXin 500 MG capsule    KEFLEX    10 capsule    Take 1 capsule (500 mg) by mouth 2 times daily    Frequent UTI       clonazePAM 0.5 MG tablet    klonoPIN    180 tablet    Take 1 mg by mouth At Bedtime        DAILY MULTIVITAMIN PO      One tablet daily        DULoxetine 60 MG EC capsule    CYMBALTA    30 capsule    TAKE ONE CAPSULE BY MOUTH ONCE DAILY    Major depressive disorder, recurrent episode, mild (H)       fluocinonide 0.05 % ointment    LIDEX    15 g    Apply sparingly to affected area twice daily as needed for mouth ulcer.    Aphthae, oral       hydrochlorothiazide 12.5 MG Tabs tablet     30 tablet    Take 1 tablet (12.5 mg) by mouth daily    Essential hypertension with goal blood pressure less than 140/90       hydrocortisone 1 % cream    CORTAID    30 g    Apply sparingly to affected area of lip corners two times daily for 1 week.    Angular cheilitis       insulin detemir 100 UNIT/ML injection    LEVEMIR FLEXPEN/FLEXTOUCH    15 mL    7 units at bedtime    Type 2 diabetes mellitus without complication, with long-term current use of insulin (H)       insulin pen needle 32G X 4 MM    BD MARIIA U/F    90 each    Use one pen daily    Type 2 diabetes mellitus without complication, with long-term current use of insulin (H)       ketoconazole 2 % cream    NIZORAL    30 g    Apply topically 2 times daily To corners of lips for 2 weeks.    Angular cheilitis       metFORMIN 500 MG 24 hr tablet    GLUCOPHAGE-XR    180 tablet    Take 1 tablet (500 mg) by mouth 2 times daily (with meals)    Type 2 diabetes mellitus without complication, with long-term current use of insulin (H)       metoprolol 25 MG 24 hr tablet    TOPROL-XL    30 tablet    Take 1 tablet (25 mg) by mouth daily    Essential hypertension with goal blood pressure less than 140/90       nitroGLYcerin  0.4 MG sublingual tablet    NITROSTAT    25 tablet    For chest pain place 1 tablet under the tongue every 5 minutes for 3 doses. If symptoms persist 5 minutes after 1st dose call 911.    Chest pressure       * nystatin cream    MYCOSTATIN    60 g    Apply twice daily for yeast rash for 3 weeks.    Intertrigo       * nystatin 889080 UNIT/GM Powd    MYCOSTATIN    60 g    Apply 1 g topically 3 times daily as needed    Other specified erythematous condition(225.89)       olopatadine 0.1 % ophthalmic solution    PATANOL    5 mL    Place 1 drop into both eyes 2 times daily    Allergic conjunctivitis, bilateral       order for DME      Equipment being ordered: CPAP Patient Theresa Bill received a MiFi Airsense 10  Auto. Pressures were set at Auto 10 - 15 cm H2O.        order for DME     1 Device    Equipment being ordered: Knee compression sleeve    Fall at home, subsequent encounter, Pain in joint, lower leg, unspecified laterality, Effusion of knee joint right, Arthritis of knee, degenerative       pantoprazole 40 MG EC tablet    PROTONIX    30 tablet    Take 1 tablet (40 mg) by mouth daily    Gastroesophageal reflux disease, esophagitis presence not specified       PROBIOTIC & ACIDOPHILUS EX ST Caps           ranitidine 150 MG tablet    ZANTAC    60 tablet    TAKE ONE TABLET BY MOUTH TWICE DAILY    Gastroesophageal reflux disease with esophagitis, Esophageal dysmotility, Chronic cough       traMADol 50 MG tablet    ULTRAM    30 tablet    TAKE ONE TABLET BY MOUTH EVERY 6 HOURS AS NEEDED FOR  MODERATE  PAIN    Radicular leg pain       * Notice:  This list has 2 medication(s) that are the same as other medications prescribed for you. Read the directions carefully, and ask your doctor or other care provider to review them with you.

## 2017-12-01 NOTE — PATIENT INSTRUCTIONS
Thank you for your [facility] visit today. Your provider has recommended the following:  Medication Changes:  None  Recommendations:  Continue managing cardiac risk factors (high blood pressure, cholesterol, and diabetes)  Follow-up:  See Dr. Guzman for cardiology follow up in 1 year or as needed.  We kindly ask that you call cardiology scheduling at [phone] three months prior to requested revisit date to schedule future cardiology appointments.  Reminder:  1. Please bring in your current medication list or your medication, over the counter supplements and vitamin bottles as we will review these at each office visit.               [facility]  [facility]~[address]~[place], [place]~[address]  Questions about your visit today?  Call your Cardiology Clinic RN's-Eun Crespo and/or Hilary Tracy at [phone].

## 2017-12-01 NOTE — LETTER
12/1/2017    Shae Pierre PA-C  [address] [place] [id number]    RE: Theresa Bill       Dear Colleague,    I had the pleasure of seeing Theresa Bill in the [facility].    Cardiology Clinic Progress Note  Theresa Bill MRN# [id number]   YOB: 1945 Age: 71 year old     Reason for visit:  follow-up cardiac testing            Assessment and Plan:     1. Minimal, nonocclusive coronary artery disease    Continue risk factor modification including blood pressure control, hyperlipidemia management and diabetes.  These management.    Follow-up in one year or p.r.n. per patient preference.         History of Presenting Illness:    Theresa Bill is a very pleasant 71 year old patient of Dr. Guzman who presents today to review a CT angiogram and Holter monitor.  She she was referred to cardiology for chest discomfort, palpitations and dizziness in the setting of a significant amount of stress.  Her  passed away two months ago after about cancer.  She has a past medical history significant for hypertension, hyperlipidemia and type 2 diabetes.    She was seen in the emergency department in September for symptoms of epigastric/chest discomfort and diaphoresis with associated palpitations.  She had a stress echocardiogram that demonstrated reduced functional capacity but no evidence of ischemia.  The sensitivity of the study was reduced as she was unable to reach her max predicted heart rate.  Dr. Guzman recommended that she undergo a CT coronary angiogram as well as a Holter monitor.    The CT coronary angiogram showed minimal nonobstructive CAD with a total calcium score of 54 all located in the LAD placing her in the 54th percentile compared to age and gender..  There was an incidental finding of a large hiatal hernia.  Dr. Guzman recommended continued risk factor modification and to follow-up with her primary for the treatment of her large hiatal hernia. The Holter  monitor showed a principal rhythm of sinus rhythm.  Average heart rate was 72 bpm with occasional PVCs and PACs.    Today in clinic she states she's had only one further episode of chest discomfort while eating a sandwich that lasted approximately one minute and was not associated with diaphoresis or palpitations.  He does have a history of reflux and will follow-up with her primary care provider.  She continues to be saddened by the loss of her  but her stress level has decreased.  She was at her sister and has children living nearby.  She states the Thanksgiving holiday was enjoyable clouded by the loss of her .          This note was completed in part using Dragon voice recognition software. Although reviewed after completion, some word and grammatical errors may occur       Review of Systems:   Review of Systems:  Skin:  Positive for bruising   Eyes:  Negative    ENT:  Negative    Respiratory:  Negative    Cardiovascular:    Positive for;lower extremity symptoms;edema  Gastroenterology: Negative    Genitourinary:  Positive for urinary frequency;urgency  Musculoskeletal:  Positive for arthritis;joint pain;joint swelling;joint stiffness;nocturnal cramping  Neurologic:  Negative    Psychiatric:  Positive for depression  Heme/Lymph/Imm:  Negative    Endocrine:  Positive for diabetes              Physical Exam:     Vitals: /75 (BP Location: Right arm, Patient Position: Sitting, Cuff Size: Adult Regular)  Pulse 76  Wt 77 kg (169 lb 12.8 oz)  SpO2 95%  BMI 32.08 kg/m2  Constitutional:  cooperative        Skin:  warm and dry to the touch        Head:  normocephalic        Eyes:  pupils equal and round        ENT:  no pallor or cyanosis        Neck:           Chest:           Cardiac: regular rhythm                  Abdomen:  abdomen soft        Vascular: pulses full and equal                                      Extremities and Back:  no edema        Neurological:  no gross motor deficits;affect  appropriate                 Medications:     Current Outpatient Prescriptions   Medication Sig Dispense Refill     Probiotic Product (PROBIOTIC & ACIDOPHILUS EX ST) CAPS        cephALEXin (KEFLEX) 500 MG capsule Take 1 capsule (500 mg) by mouth 2 times daily 10 capsule 0     buPROPion (WELLBUTRIN XL) 150 MG 24 hr tablet Take 1 tablet (150 mg) by mouth every morning 30 tablet 3     insulin detemir (LEVEMIR FLEXPEN/FLEXTOUCH) 100 UNIT/ML injection 7 units at bedtime 15 mL 3     metoprolol (TOPROL-XL) 25 MG 24 hr tablet Take 1 tablet (25 mg) by mouth daily 30 tablet 0     hydrochlorothiazide 12.5 MG TABS tablet Take 1 tablet (12.5 mg) by mouth daily 30 tablet 0     ranitidine (ZANTAC) 150 MG tablet TAKE ONE TABLET BY MOUTH TWICE DAILY 60 tablet 3     DULoxetine (CYMBALTA) 60 MG EC capsule TAKE ONE CAPSULE BY MOUTH ONCE DAILY 30 capsule 5     pantoprazole (PROTONIX) 40 MG EC tablet Take 1 tablet (40 mg) by mouth daily 30 tablet 5     traMADol (ULTRAM) 50 MG tablet TAKE ONE TABLET BY MOUTH EVERY 6 HOURS AS NEEDED FOR  MODERATE  PAIN 30 tablet 5     nitroGLYcerin (NITROSTAT) 0.4 MG sublingual tablet For chest pain place 1 tablet under the tongue every 5 minutes for 3 doses. If symptoms persist 5 minutes after 1st dose call 911. 25 tablet 0     fluocinonide (LIDEX) 0.05 % ointment Apply sparingly to affected area twice daily as needed for mouth ulcer. 15 g 0     metFORMIN (GLUCOPHAGE-XR) 500 MG 24 hr tablet Take 1 tablet (500 mg) by mouth 2 times daily (with meals) 180 tablet 3     atorvastatin (LIPITOR) 40 MG tablet Take 1 tablet (40 mg) by mouth At Bedtime 90 tablet 3     nystatin (MYCOSTATIN) 630217 UNIT/GM POWD Apply 1 g topically 3 times daily as needed 60 g 1     ketoconazole (NIZORAL) 2 % cream Apply topically 2 times daily To corners of lips for 2 weeks. 30 g 1     clonazePAM (KLONOPIN) 0.5 MG tablet Take 1 mg by mouth At Bedtime  180 tablet 2     olopatadine (PATANOL) 0.1 % ophthalmic solution Place 1 drop into  both eyes 2 times daily 5 mL 3     hydrocortisone 1 % cream Apply sparingly to affected area of lip corners two times daily for 1 week. 30 g 0     nystatin (MYCOSTATIN) cream Apply twice daily for yeast rash for 3 weeks. 60 g 3     Multiple Vitamin (DAILY MULTIVITAMIN PO) One tablet daily       ASPIRIN 81 MG OR TABS ONE DAILY 100 3     insulin pen needle (BD MARIIA U/F) 32G X 4 MM Use one pen daily (Patient not taking: Reported on 12/1/2017) 90 each 0     order for DME Equipment being ordered: CPAP Patient Theresa Bill received a Resmed Airsense 10  Auto. Pressures were set at Auto 10 - 15 cm H2O.       ORDER FOR DME Equipment being ordered: Knee compression sleeve (Patient not taking: Reported on 12/1/2017) 1 Device 0     ACE/ARB NOT PRESCRIBED, INTENTIONAL, ACE & ARB not prescribed due to Intolerance-cough       (Patient not taking: Reported on 12/1/2017)       glucose blood VI test strips strip 1 strip by In Vitro route 3 times daily. Has a Freestyle (Patient not taking: Reported on 12/1/2017) 2 Box 12           Family History   Problem Relation Age of Onset     CANCER Mother      brain cancer, ovarian cancer     CANCER Father      lung cancer     GASTROINTESTINAL DISEASE Father      colitis     Alcohol/Drug Brother      Alcohol/Drug Son      Alcohol/Drug Brother      GASTROINTESTINAL DISEASE Sister      DIABETES Sister      CANCER Sister      3 sisters .w ovarian cancer and one also with uterine cancer     Gynecology Son      kidney stones     HEART DISEASE Sister      MI age 64     GASTROINTESTINAL DISEASE Other      colitis/colitis/colitis/colitis       Social History     Social History     Marital status:      Spouse name: N/A     Number of children: N/A     Years of education: N/A     Occupational History     Not on file.     Social History Main Topics     Smoking status: Never Smoker     Smokeless tobacco: Never Used     Alcohol use No     Drug use: No     Sexual activity: Yes     Partners: Male      Other Topics Concern     Parent/Sibling W/ Cabg, Mi Or Angioplasty Before 65f 55m? Yes     Social History Narrative            Past Medical History:     Past Medical History:   Diagnosis Date     Depression      Diabetes (H)      Diabetes mellitus (H)     NIDDM     Dysphagia      GERD (gastroesophageal reflux disease)      Hypertension               Past Surgical History:     Past Surgical History:   Procedure Laterality Date     BUNIONECTOMY CRISELDA  7/1/2011    Procedure:BUNIONECTOMY CRISELDA; weil osteotomy & Lapidtus Bunionectomy; Surgeon:HYACINTH SANTO; Location:[facility]     BUNIONECTOMY CHEVRON  4/6/2012    Procedure:BUNIONECTOMY CHEVRON; Lapidus bunionectomy, plantar plate repair second and third metatarsophalangeal joints,left foot-popliteal block left lower extremity; Surgeon:HYACINTH SANTO; Location:[facility]     C TOTAL ABDOM HYSTERECTOMY  1992    ovaries removed     COLONOSCOPY  9/11/2012    Procedure: COLONOSCOPY;  Colonoscopy;  Surgeon: Alyssa Foster MD;  Location: [facility]     NISSEN FUNDOPLICATION      2003     REPAIR HAMMER TOE  7/1/2011    Procedure:REPAIR HAMMER TOE; hammertoe correction right 2nd digit; Surgeon:HYACINTH SANTO; Location:[facility]     SURGICAL HISTORY OF -   1979    Tubal     SURGICAL HISTORY OF -   1993    Stamey bladder surgery     SURGICAL HISTORY OF -       Lipoma removed f/back     SURGICAL HISTORY OF -   1995    (L) Foot surgery     SURGICAL HISTORY OF -   05/09/2002    Colonoscopy     SURGICAL HISTORY OF -       knee arthroscopy left     SURGICAL HISTORY OF -   2007-two phases    interstim bladder implant     SURGICAL HISTORY OF -   Sept. 13, 2007    laparoscopic Nissen fundoplication              Allergies:   Nkda [no known drug allergies]       Data:   All laboratory data reviewed:    LAST CHOLESTEROL:  Lab Results   Component Value Date    CHOL 151 04/21/2017     Lab Results   Component Value Date    HDL 59 04/21/2017     Lab Results   Component Value  Date    LDL 69 04/21/2017     Lab Results   Component Value Date    TRIG 113 04/21/2017     Lab Results   Component Value Date    CHOLHDLRATIO 3.2 04/30/2015       LAST BMP:  Lab Results   Component Value Date     11/27/2017      Lab Results   Component Value Date    POTASSIUM 3.6 11/27/2017     Lab Results   Component Value Date    CHLORIDE 105 11/27/2017     Lab Results   Component Value Date    CYNTHIA 8.8 11/27/2017     Lab Results   Component Value Date    CO2 32 11/27/2017     Lab Results   Component Value Date    BUN 14 11/27/2017     Lab Results   Component Value Date    CR 0.80 11/27/2017     Lab Results   Component Value Date     11/27/2017       LAST CBC:  Lab Results   Component Value Date    WBC 8.8 09/23/2017     Lab Results   Component Value Date    RBC 4.63 09/23/2017     Lab Results   Component Value Date    HGB 13.9 09/23/2017     Lab Results   Component Value Date    HCT 42.0 09/23/2017     Lab Results   Component Value Date    MCV 91 09/23/2017     Lab Results   Component Value Date    MCH 30.0 09/23/2017     Lab Results   Component Value Date    MCHC 33.1 09/23/2017     Lab Results   Component Value Date    RDW 13.9 09/23/2017     Lab Results   Component Value Date     09/23/2017     Thank you for allowing me to participate in the care of your patient.    Sincerely,     NICHELLE PERRY [facility]

## 2017-12-04 ENCOUNTER — TELEPHONE (OUTPATIENT)
Dept: FAMILY MEDICINE | Facility: CLINIC | Age: 72
End: 2017-12-04

## 2017-12-04 DIAGNOSIS — N39.0 FREQUENT UTI: ICD-10-CM

## 2017-12-04 RX ORDER — CEPHALEXIN 500 MG/1
500 CAPSULE ORAL 2 TIMES DAILY
Qty: 4 CAPSULE | Refills: 0 | Status: SHIPPED | OUTPATIENT
Start: 2017-12-04 | End: 2017-12-18

## 2017-12-04 NOTE — TELEPHONE ENCOUNTER
Reason for call:  Patient reporting a symptom    Symptom or request:  Theresa says she was seen by Shae for UTI and was prescribed Keflex. She has 2 left. She feels the infection is worse. She says she goes to the bathroom all the time but when she goes very little comes out and then it's very painful and spasms.   Additional comments: [employer] [facility]    Phone Number patient can be reached at:  Cell number on file:    Telephone Information:   Mobile [phone]       Best Time:  anytime    Can we leave a detailed message on this number:  YES    Call taken on 12/4/2017 at 3:05 PM by Aliza Jolley

## 2017-12-04 NOTE — TELEPHONE ENCOUNTER
S-(situation): patient states that it hurts when she urinates and has spasms    B-(background): has taken the Keflex as directed.  No fever, nausea, vomiting, abdominal pain, or back ache.  States is drinking 1/2 gallon fluid daily.  Keflex drug of choice per culture    A-(assessment): UTI    R-(recommendations): discussed with Shae Pierre PA-C and christophe extend the Keflex 2 days.  She will call if worse or no better  Briseyda Rome RN

## 2017-12-06 NOTE — PROGRESS NOTES
Outpatient Physical Therapy Discharge Note     Patient: Theresa Bill  : 1945    Beginning/End Dates of Reporting Period:  17 to 2017    Referring Provider: Dr. Tijerina    Therapy Diagnosis: decreased ROM and weakness in bi knees associated with OA     Client Self Report: Pt relates she fell over when gardening. She relates her L knee is more sore today but she is okay.     Objective Measurements:  Objective Measure: R ROM     Objective Measure: L ROM     Objective Measure: R MMT     Objective Measure: L MMT      Objective Measure: balance     Objective Measure: Special test  Details: L : anterior/posterior drawer:neg  varus: neg valgus: pos  meniscus: neg  - no increased swelling on L noted         Goals:  Goal Identifier knee ext   Goal Description Pt will demonstrate 0 deg of knee extension in order to be able to walk with normal gait    Target Date 17   Date Met      Progress:not met     Goal Identifier squat to get object off floor.    Goal Description Pt will be able to demonstrate full squat with less than 1/10 pain in order to be able to  object off of the ground    Target Date 17   Date Met      Progress:not assessed at last visit      Goal Identifier pain after standing    Goal Description Pt will relate being able to sit for 45 min and stand with less than 1/10 pain in knees to help imprve mobility   Target Date 10/04/17   Date Met      Progress:not assessed at last visit      Goal Identifier stairs   Goal Description Pt will be able to ascend/descend full flight of stairs with less than 1/10 pain and reciprocal gait in order to demonstrate improve strength and LE function and reduce risk of falls    Target Date 10/04/17   Date Met      Progress:not assessed at last visit        Progress Toward Goals:   Progress this reporting period: Pt was seen for 3 visits in physical therapy over this POC. Objective measures are all taken from last visit. At time of last visit pt  had a recent fall but was doing well/  Pt has failed to schedule f/u visits within 30 days from last visit as instructed thus is being d/c from therapy at this time. Current status is unknown at this time.        Plan:  Discharge from therapy.    Discharge:    Reason for Discharge: Patient has failed to schedule further appointments.    Equipment Issued: NA    Discharge Plan: Patient to continue home program.    Sena Batista  Physical Therapist  [address] [address]  [email or web address]   [email or web address]   Office: [phone] Fax: [phone]

## 2017-12-11 ENCOUNTER — TRANSFERRED RECORDS (OUTPATIENT)
Dept: HEALTH INFORMATION MANAGEMENT | Facility: CLINIC | Age: 72
End: 2017-12-11

## 2017-12-11 ENCOUNTER — TELEPHONE (OUTPATIENT)
Dept: FAMILY MEDICINE | Facility: CLINIC | Age: 72
End: 2017-12-11

## 2017-12-14 ENCOUNTER — OFFICE VISIT (OUTPATIENT)
Dept: PSYCHOLOGY | Facility: CLINIC | Age: 72
End: 2017-12-14
Payer: COMMERCIAL

## 2017-12-14 DIAGNOSIS — F32.0 MILD MAJOR DEPRESSION (H): Primary | ICD-10-CM

## 2017-12-14 DIAGNOSIS — F43.21 GRIEF: ICD-10-CM

## 2017-12-14 PROCEDURE — 90834 PSYTX W PT 45 MINUTES: CPT | Performed by: PSYCHOLOGIST

## 2017-12-14 NOTE — MR AVS SNAPSHOT
MRN:[id number]                      After Visit Summary   12/14/2017    Theresa Bill    MRN: [id number]           Visit Information        Provider Department      12/14/2017 10:30 AM Sam Jaime [facility] Generic      Your next 10 appointments already scheduled     Dec 18, 2017 10:00 AM CST   SHORT with Shae Pierre PA-C   [facility]    [address] [place] [id number]   [phone]            Dec 28, 2017 10:30 AM CST   Return Visit with Sam Jaime   [facility] ([facility])    [address] [facility] [id number] [phone]            Jan 11, 2018 10:30 AM CST   Return Visit with Sam Jaime   [facility] ([facility])    [address] [facility] [id number] [phone]              MyChart Information     [employer] gives you secure access to your electronic health record. If you see a primary care provider, you can also send messages to your care team and make appointments. If you have questions, please call your primary care clinic.  If you do not have a primary care provider, please call [phone] and they will assist you.        Care EveryWhere ID     This is your Care EveryWhere ID. This could be used by other organizations to access your [facility] medical records  [id number]        Equal Access to Services     SABAS VUONG : Hadii lamonte shankaro Soelielali, waaxda luqadaha, qaybta kaalmada adeegyada, ángel jauregui. So [facility] [phone].    ATENCIÓN: Si habla español, tiene a urban disposición servicios gratuitos de asistencia lingüística. Llame al [phone].    We comply with applicable federal civil rights laws and [place] laws. We do not discriminate on the basis of race, color, national origin, age, disability, sex, sexual orientation, or gender identity.

## 2017-12-15 NOTE — PROGRESS NOTES
Progress Note  Disclaimer: This note consists of symbols derived from keyboarding, dictation and/or voice recognition software. As a result, there may be errors in the script that have gone undetected. Please consider this when interpreting information found in this chart.    Client Name: Theresa Bill  Date: 12/14/2017         Service Type: Individual      Session Start Time: 10:30 AM  Session End Time: 11:15 AM      Session Length: 45     Session #: 4     Attendees: Client attended alone    Treatment Plan Last Reviewed: November 3, 2017       DATA   Client reports continuing concerns of anhedonia and isolating. Noted skipping was very hard for her as this is the first one without her . She does not want to go into his room though she has gotten rid of most of his clothes. She is doing things to care for herself and maintain contact with supportive others. She has a cookie exchange this weekend, bingo on Tuesday nights and spends time with her sister. Notes she is uncomfortable being by herself, in particular there are too many reminders of her late  around the house.   Progress Since Last Session (Related to Symptoms / Goals / Homework):   Symptoms: Stable    Homework: not applicable      Episode of Care Goals: Satisfactory progress - CONTEMPLATION (Considering change and yet undecided); Intervened by assessing the negative and positive thinking (ambivalence) about behavior change     Current / Ongoing Stressors and Concerns:   Recent bereavement, prior caregiver stress     Treatment Objective(s) Addressed in This Session:   increase understanding of steps in the grief process  Utilize family support     Intervention:   Interpersonal Therapy: facilitating appropriate expressions of emotion and grief. behavioral activation explored what self care strategies she is currently using and what she might be able to add.        ASSESSMENT: Current Emotional /  Mental Status (status of significant symptoms):   Risk status (Self / Other harm or suicidal ideation)   Client denies current fears or concerns for personal safety.   Client denies current or recent suicidal ideation or behaviors.   Client denies current or recent homicidal ideation or behaviors.   Client denies current or recent self injurious behavior or ideation.   Client denies other safety concerns.   A safety and risk management plan has not been developed at this time, however client was given the after-hours number / 911 should there be a change in any of these risk factors.     Appearance:   Appropriate    Eye Contact:   Good    Psychomotor Behavior: Retarded (Slowed)    Attitude:   Cooperative    Orientation:   All   Speech    Rate / Production: Slow     Volume:  Soft    Mood:    Depressed    Affect:    Appropriate    Thought Content:  Clear    Thought Form:  Coherent  Logical  difficult to think of anything but the loss of her    Insight:    Fair      Medication Review:   No changes to current psychiatric medication(s)     Medication Compliance:   Yes     Changes in Health Issues:   None reported     Chemical Use Review:   Substance Use: Chemical use reviewed, no active concerns identified      Tobacco Use: No current tobacco use.       Collateral Reports Completed:   Not Applicable    PLAN: (Client Tasks / Therapist Tasks / Other)  Client is doing exactly what she needs to be doing. Therapist encouraged client in the appropriate expressions of grief as well as of her love for her late . We will meet as often as the client would like for the next few weeks and taper as appropriate.        Sam Jaime                                                         ________________________________________________________________________    Treatment Plan    Client's Name: Theresa Bill  YOB: 1945    Date: November 6, 2017    Referral / Collaboration:  Referral to another  professional/service is not indicated at this time..    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 Health conditions                   include                        disease or illnesses, other health problems that may be short or long lasting,                    injuries, mental health or emotional problems, and problems with alcohol or drugs.                              Think back over the past 30 days and answer these questions, thinking about how much              difficulty you had doing the following activities. For each question, please [place] only                   one response.      S1 Standing for long periods such as 30 minutes? Moderate =   3   S2 Taking care of household responsibilities? None =         1   S3 Learning a new task, for example, learning how to get to a new place? Mild =           2   S4 How much of a problem do you have joining community activities (for example, festivals, Rastafari or other activities) in the same way as anyone else can? None =         1   S5 How much have you been emotionally affected by your health problems? None =         1               In the past 30 days, how much difficulty did you have in:   S6 Concentrating on doing something for ten minutes? None =         1   S7 Walking a long distance such as a kilometer (or equivalent)? Moderate =   3   S8 Washing your whole body? None =         1   S9 Getting dressed? None =         1   S10 Dealing with people you do not know? None =         1   S11 Maintaining a friendship? None =         1   S12 Your day to day work? None =         1       H1 Overall, in  the past 30 days, how many days were these difficulties present? Record number of days 1    H2 In the past 30 days, for how many days were you totally unable to carry out your usual activities or work because of any health condition? Record number of days  0    H3 In the past 30 days, not counting the days that you were totally unable, for how many days did you cut back or reduce your usual activities or work because of any health condition? Record number of days 0      Goals  1. Education- the stages of grief  a. Client will be able to describe the stages of grief; denial, bargaining, anger, depression, and acceptance and give examples of how each have felt for her.  2. Behavioral Activation  a. Client will learn to assess their emotional stateon a day to day basis  b. Client will Identify two forms of exercise/activity and engage in them 3 times per week  c. Client will Identify 3 things that make them laugh, and engage in these 5 times per week.  d. Client will Identify 1-2Creative activities or hobbies  and engage in them 2 times per week  e. Client will identify music, movies, books that make them feel good and use them 3-4 times per week  3. Self-care  a. Client will identify 5 things they can do just for themselves  b. Client will take time for quiet, reflection, meditation 5 times per week  c. Client will Learn to set boundaries when appropriate  d. Client will Identify 2 individuals they can call on for support, distraction  4. Assessment of progress  a. Client will engage in assessment of their progress on a regular basis      Client has reviewed and agreed to the above plan.      Sam Jaime  November 6, 2017

## 2017-12-18 ENCOUNTER — OFFICE VISIT (OUTPATIENT)
Dept: FAMILY MEDICINE | Facility: CLINIC | Age: 72
End: 2017-12-18
Payer: COMMERCIAL

## 2017-12-18 VITALS
DIASTOLIC BLOOD PRESSURE: 78 MMHG | WEIGHT: 167 LBS | SYSTOLIC BLOOD PRESSURE: 127 MMHG | TEMPERATURE: 97.8 F | HEIGHT: 61 IN | HEART RATE: 109 BPM | BODY MASS INDEX: 31.53 KG/M2

## 2017-12-18 DIAGNOSIS — F43.21 GRIEF: ICD-10-CM

## 2017-12-18 DIAGNOSIS — E11.40 TYPE 2 DIABETES MELLITUS WITH DIABETIC NEUROPATHY, WITH LONG-TERM CURRENT USE OF INSULIN (H): ICD-10-CM

## 2017-12-18 DIAGNOSIS — R29.6 FALLS FREQUENTLY: Primary | ICD-10-CM

## 2017-12-18 DIAGNOSIS — N39.0 URINARY TRACT INFECTION WITHOUT HEMATURIA, SITE UNSPECIFIED: ICD-10-CM

## 2017-12-18 DIAGNOSIS — G47.00 INSOMNIA, UNSPECIFIED TYPE: ICD-10-CM

## 2017-12-18 DIAGNOSIS — Z79.4 TYPE 2 DIABETES MELLITUS WITH DIABETIC NEUROPATHY, WITH LONG-TERM CURRENT USE OF INSULIN (H): ICD-10-CM

## 2017-12-18 LAB
ALBUMIN UR-MCNC: 100 MG/DL
APPEARANCE UR: ABNORMAL
BACTERIA #/AREA URNS HPF: ABNORMAL /HPF
BILIRUB UR QL STRIP: ABNORMAL
COLOR UR AUTO: YELLOW
GLUCOSE UR STRIP-MCNC: NEGATIVE MG/DL
HGB UR QL STRIP: NEGATIVE
KETONES UR STRIP-MCNC: ABNORMAL MG/DL
LEUKOCYTE ESTERASE UR QL STRIP: ABNORMAL
MUCOUS THREADS #/AREA URNS LPF: PRESENT /LPF
NITRATE UR QL: POSITIVE
NON-SQ EPI CELLS #/AREA URNS LPF: ABNORMAL /LPF
PH UR STRIP: 7 PH (ref 5–7)
RBC #/AREA URNS AUTO: ABNORMAL /HPF
SOURCE: ABNORMAL
SP GR UR STRIP: 1.02 (ref 1–1.03)
UROBILINOGEN UR STRIP-ACNC: 1 EU/DL (ref 0.2–1)
WBC #/AREA URNS AUTO: ABNORMAL /HPF

## 2017-12-18 PROCEDURE — 99214 OFFICE O/P EST MOD 30 MIN: CPT | Performed by: PHYSICIAN ASSISTANT

## 2017-12-18 PROCEDURE — 81001 URINALYSIS AUTO W/SCOPE: CPT | Performed by: PHYSICIAN ASSISTANT

## 2017-12-18 RX ORDER — CIPROFLOXACIN 500 MG/1
500 TABLET, FILM COATED ORAL 2 TIMES DAILY
Qty: 10 TABLET | Refills: 0 | Status: SHIPPED | OUTPATIENT
Start: 2017-12-18 | End: 2017-12-23

## 2017-12-18 NOTE — MR AVS SNAPSHOT
After Visit Summary   12/18/2017    Theresa Bill    MRN: [id number]           Patient Information     Date Of Birth          1945        Visit Information        Provider Department      12/18/2017 10:00 AM Shae Pierre PA-C [facility]        Today's Diagnoses     Urinary tract infection without hematuria, site unspecified    -  1      Care Instructions    Sleep -  Discussed avoiding any sleep meds due to risk of falls  Keep working with counselor    Falls -  Start Physical Therapy as you have planned  STOP taking Hydrochlorothiazide.  Take BPs twice daily and update me in a week.  Discussed checking BPs laying/sitting/standing here today, but unclear how representative these would be of your usual readings since having some stomach symptoms lately.      Urinary symptoms -  Urine test today   Start antibiotics              Follow-ups after your visit        Your next 10 appointments already scheduled     Dec 28, 2017 10:30 AM CST   Return Visit with [facility]    [address] [address]   [phone]            Jan 11, 2018 10:30 AM CST   Return Visit with Sam SHIRLEY [facility]    [address] [address]   [phone]              Who to contact     If you have questions or need follow up information about today's clinic visit or your schedule please contact [facility] directly at [phone].  Normal or non-critical lab and imaging results will be communicated to you by MyChart, letter or phone within 4 business days after the clinic has received the results. If you do not hear from us within 7 days, please contact the clinic through MyChart or phone. If you have a critical or abnormal lab result, we will notify you by phone as soon as possible.  Submit refill requests through [employer] or call your  "pharmacy and they will forward the refill request to us. Please allow 3 business days for your refill to be completed.          Additional Information About Your Visit        [employer] Information     [employer] gives you secure access to your electronic health record. If you see a primary care provider, you can also send messages to your care team and make appointments. If you have questions, please call your primary care clinic.  If you do not have a primary care provider, please call [phone] and they will assist you.        Care EveryWhere ID     This is your Care EveryWhere ID. This could be used by other organizations to access your [place] medical records  [id number]        Your Vitals Were     Pulse Temperature Height BMI (Body Mass Index)          109 97.8  F (36.6  C) (Tympanic) 5' 1\" (1.549 m) 31.55 kg/m2         Blood Pressure from Last 3 Encounters:   12/18/17 127/78   12/01/17 128/75   11/27/17 92/57    Weight from Last 3 Encounters:   12/18/17 167 lb (75.8 kg)   12/01/17 169 lb 12.8 oz (77 kg)   11/27/17 168 lb (76.2 kg)              We Performed the Following     *UA reflex to Microscopic     Urine Microscopic          Today's Medication Changes          These changes are accurate as of: 12/18/17 12:08 PM.  If you have any questions, ask your nurse or doctor.               Start taking these medicines.        Dose/Directions    ciprofloxacin 500 MG tablet   Commonly known as:  CIPRO   Used for:  Urinary tract infection without hematuria, site unspecified   Started by:  Shae Pierre PA-C        Dose:  500 mg   Take 1 tablet (500 mg) by mouth 2 times daily for 5 days   Quantity:  10 tablet   Refills:  0         Stop taking these medicines if you haven't already. Please contact your care team if you have questions.     cephALEXin 500 MG capsule   Commonly known as:  KEFLEX   Stopped by:  Shae Pierre PA-C                Where to get your medicines      These medications were sent " to [facility] [address] [place] [address] - [address]  [address], [facility] [address]     Phone:  [phone]     ciprofloxacin 500 MG tablet                Primary Care Provider Office Phone # Fax #    Shae Pierre PA-C [phone] [address] [facility] [address]        Equal Access to Services     SABAS VUONG : Hadii aad ku hadasho Soomaali, waaxda luqadaha, qaybta kaalmada adeegyada, waxay idiin hayaan adeeg khtawannash la'aan ah. So [facility] [phone].    ATENCIÓN: Si habla español, tiene a urban disposición servicios gratuitos de asistencia lingüística. [place] al [phone].    We comply with applicable federal civil rights laws and [place] laws. We do not discriminate on the basis of race, color, national origin, age, disability, sex, sexual orientation, or gender identity.            Thank you!     Thank you for choosing [facility]  for your care. Our goal is always to provide you with excellent care. Hearing back from our patients is one way we can continue to improve our services. Please take a few minutes to complete the written survey that you may receive in the mail after your visit with us. Thank you!             Your Updated Medication List - Protect others around you: Learn how to safely use, store and throw away your medicines at www.disposemymeds.org.          This list is accurate as of: 12/18/17 12:08 PM.  Always use your most recent med list.                   Brand Name Dispense Instructions for use Diagnosis    ACE/ARB/ARNI NOT PRESCRIBED (INTENTIONAL)      ACE & ARB not prescribed due to Intolerance-cough    Type 2 diabetes, HbA1c goal < 7% (H)       aspirin 81 MG tablet     100    ONE DAILY    Type II or unspecified type diabetes mellitus without mention of complication, not stated as uncontrolled       atorvastatin 40 MG tablet    LIPITOR    90 tablet    Take 1 tablet (40 mg) by mouth At Bedtime    Dyslipidemia        blood glucose monitoring test strip    no brand specified    2 Box    1 strip by In Vitro route 3 times daily. Has a Freestyle    Type 2 diabetes, HbA1c goal < 7% (H)       buPROPion 150 MG 24 hr tablet    WELLBUTRIN XL    30 tablet    Take 1 tablet (150 mg) by mouth every morning    Mild recurrent major depression (H), Grief       ciprofloxacin 500 MG tablet    CIPRO    10 tablet    Take 1 tablet (500 mg) by mouth 2 times daily for 5 days    Urinary tract infection without hematuria, site unspecified       clonazePAM 0.5 MG tablet    klonoPIN    180 tablet    Take 1 mg by mouth At Bedtime        DAILY MULTIVITAMIN PO      One tablet daily        DULoxetine 60 MG EC capsule    CYMBALTA    30 capsule    TAKE ONE CAPSULE BY MOUTH ONCE DAILY    Major depressive disorder, recurrent episode, mild (H)       fluocinonide 0.05 % ointment    LIDEX    15 g    Apply sparingly to affected area twice daily as needed for mouth ulcer.    Aphthae, oral       hydrochlorothiazide 12.5 MG Tabs tablet     30 tablet    Take 1 tablet (12.5 mg) by mouth daily    Essential hypertension with goal blood pressure less than 140/90       hydrocortisone 1 % cream    CORTAID    30 g    Apply sparingly to affected area of lip corners two times daily for 1 week.    Angular cheilitis       insulin detemir 100 UNIT/ML injection    LEVEMIR FLEXPEN/FLEXTOUCH    15 mL    7 units at bedtime    Type 2 diabetes mellitus without complication, with long-term current use of insulin (H)       insulin pen needle 32G X 4 MM    BD MARIIA U/F    90 each    Use one pen daily    Type 2 diabetes mellitus without complication, with long-term current use of insulin (H)       ketoconazole 2 % cream    NIZORAL    30 g    Apply topically 2 times daily To corners of lips for 2 weeks.    Angular cheilitis       metFORMIN 500 MG 24 hr tablet    GLUCOPHAGE-XR    180 tablet    Take 1 tablet (500 mg) by mouth 2 times daily (with meals)    Type 2 diabetes mellitus without  complication, with long-term current use of insulin (H)       metoprolol 25 MG 24 hr tablet    TOPROL-XL    30 tablet    Take 1 tablet (25 mg) by mouth daily    Essential hypertension with goal blood pressure less than 140/90       nitroGLYcerin 0.4 MG sublingual tablet    NITROSTAT    25 tablet    For chest pain place 1 tablet under the tongue every 5 minutes for 3 doses. If symptoms persist 5 minutes after 1st dose call 911.    Chest pressure       * nystatin cream    MYCOSTATIN    60 g    Apply twice daily for yeast rash for 3 weeks.    Intertrigo       * nystatin [id number] UNIT/GM Powd    MYCOSTATIN    60 g    Apply 1 g topically 3 times daily as needed    Other specified erythematous condition(667.27)       olopatadine 0.1 % ophthalmic solution    PATANOL    5 mL    Place 1 drop into both eyes 2 times daily    Allergic conjunctivitis, bilateral       order for DME      Equipment being ordered: CPAP Patient Theresa Bill received a Assured Labor Airsense 10  Auto. Pressures were set at Auto 10 - 15 cm H2O.        order for DME     1 Device    Equipment being ordered: Knee compression sleeve    Fall at home, subsequent encounter, Pain in joint, lower leg, unspecified laterality, Effusion of knee joint right, Arthritis of knee, degenerative       pantoprazole 40 MG EC tablet    PROTONIX    30 tablet    Take 1 tablet (40 mg) by mouth daily    Gastroesophageal reflux disease, esophagitis presence not specified       PROBIOTIC & ACIDOPHILUS EX ST Caps           ranitidine 150 MG tablet    ZANTAC    60 tablet    TAKE ONE TABLET BY MOUTH TWICE DAILY    Gastroesophageal reflux disease with esophagitis, Esophageal dysmotility, Chronic cough       traMADol 50 MG tablet    ULTRAM    30 tablet    TAKE ONE TABLET BY MOUTH EVERY 6 HOURS AS NEEDED FOR  MODERATE  PAIN    Radicular leg pain       * Notice:  This list has 2 medication(s) that are the same as other medications prescribed for you. Read the directions carefully, and ask your  doctor or other care provider to review them with you.

## 2017-12-18 NOTE — NURSING NOTE
"Chief Complaint   Patient presents with     UTI     Fall       Initial /78 (BP Location: Right arm, Patient Position: Chair, Cuff Size: Adult Large)  Pulse 109  Temp 97.8  F (36.6  C) (Tympanic)  Ht 5' 1\" (1.549 m)  Wt 167 lb (75.8 kg)  BMI 31.55 kg/m2 Estimated body mass index is 31.55 kg/(m^2) as calculated from the following:    Height as of this encounter: 5' 1\" (1.549 m).    Weight as of this encounter: 167 lb (75.8 kg).  Medication Reconciliation: complete    Health Maintenance that is potentially due pending provider review:  NONE        Is there anyone who you would like to be able to receive your results? No  If yes have patient fill out RICH      "

## 2017-12-18 NOTE — PATIENT INSTRUCTIONS
Sleep -  Discussed avoiding any sleep meds due to risk of falls  Keep working with counselor    Falls -  Start Physical Therapy as you have planned  STOP taking Hydrochlorothiazide.  Take BPs twice daily and update me in a week.  Discussed checking BPs laying/sitting/standing here today, but unclear how representative these would be of your usual readings since having some stomach symptoms lately.      Urinary symptoms -  Urine test today   Start antibiotics

## 2017-12-18 NOTE — PROGRESS NOTES
SUBJECTIVE:   Theresa Bill is a 72 year old female who presents to clinic today for the following health issues:      ED/UC Followup:    Facility:  [facility]  Date of visit: 12/9/2017  Reason for visit: Fall, Facial Laceration  Current Status: Would like her wound check, on forehead.  Used glue     * From Neurology clinic, is told she needs to start physical therapy for her balance.  Has been falling frequently.    Falls lately - at last visit decreased BP meds after orthostatic readings.  Unfortunately she doesn't feel that this helped.  Recently tripped over nothing and got forehead laceration.  Is getting new glasses.    Still having trouble sleeping - not from RLS but grief.  Sees all of his stuff, but doesn't want to remove, even temporarily, any of this stuff.  Considering visiting her sister.  Sees counselor every 2 wks, feels helpful but doesn't feel more freq visits would help further.    Had GI illness starting Thurs.  Occasional diarrhea.  Cough that is sometimes causing vomiting.  Has returned to GI specialist and no further options for her cough 2nd to reflux.    * UTI-  Feels she is still having having UTI symptoms, is still having dysuria, itching, frequency.  Recently treated with keflex.     Problem list and histories reviewed & adjusted, as indicated.  Additional history: as documented    BP Readings from Last 3 Encounters:   12/18/17 127/78   12/01/17 128/75   11/27/17 92/57    Wt Readings from Last 3 Encounters:   12/18/17 167 lb (75.8 kg)   12/01/17 169 lb 12.8 oz (77 kg)   11/27/17 168 lb (76.2 kg)        Labs reviewed in EPIC      Reviewed and updated as needed this visit by clinical staff  Tobacco  Allergies  Meds  Problems  Med Hx  Surg Hx  Fam Hx  Soc Hx        Reviewed and updated as needed this visit by Provider  Tobacco  Allergies  Meds  Problems  Med Hx  Surg Hx  Fam Hx  Soc Hx          ROS:  Constitutional, cardiovascular, pulmonary, GI, ,  "musculoskeletal, neuro, endocrine and psych systems are negative, except as otherwise noted.    OBJECTIVE:   /78 (BP Location: Right arm, Patient Position: Chair, Cuff Size: Adult Large)  Pulse 109  Temp 97.8  F (36.6  C) (Tympanic)  Ht 5' 1\" (1.549 m)  Wt 167 lb (75.8 kg)  BMI 31.55 kg/m2  Body mass index is 31.55 kg/(m^2).  GENERAL: healthy, alert and no distress  PSYCH: mentation appears normal, affect normal/bright    See UA    ASSESSMENT/PLAN:       ICD-10-CM    1. Falls frequently R29.6    2. Grief F43.20    3. Insomnia, unspecified type G47.00    4. Urinary tract infection without hematuria, site unspecified N39.0 *UA reflex to Microscopic     Urine Microscopic     ciprofloxacin (CIPRO) 500 MG tablet   5. Type 2 diabetes mellitus with diabetic neuropathy, with long-term current use of insulin (H) E11.40     Z79.4      Deferred orthostatics today - unclear if current BPs are accurate v represent worsened numbers secondary to any mild dehydration.  BP goal <130/80 which would be close to today's numbers, but will try reducing BP meds to see if any change in falls.  Patient Instructions   Sleep -  Discussed avoiding any sleep meds due to risk of falls  Keep working with counselor    Falls -  Start Physical Therapy as you have planned  STOP taking Hydrochlorothiazide.  Take BPs twice daily and update me in a week.  Discussed checking BPs laying/sitting/standing here today, but unclear how representative these would be of your usual readings since having some stomach symptoms lately.      Urinary symptoms -  Urine test today   Start antibiotics          Shae Pierre PA-C  [facility]  "

## 2017-12-28 ENCOUNTER — OFFICE VISIT (OUTPATIENT)
Dept: PSYCHOLOGY | Facility: CLINIC | Age: 72
End: 2017-12-28
Payer: COMMERCIAL

## 2017-12-28 DIAGNOSIS — F43.21 GRIEF: ICD-10-CM

## 2017-12-28 DIAGNOSIS — F32.0 MILD MAJOR DEPRESSION (H): Primary | ICD-10-CM

## 2017-12-28 PROCEDURE — 90834 PSYTX W PT 45 MINUTES: CPT | Performed by: PSYCHOLOGIST

## 2017-12-28 NOTE — Clinical Note
I am really quite proud of Theresa.  She is actively working through her loss and is caring for herself quite well.  I wish I could have her teach class on grief and how to get through it.

## 2017-12-28 NOTE — MR AVS SNAPSHOT
MRN:[id number]                      After Visit Summary   12/28/2017    Theresa Bill    MRN: [id number]           Visit Information        Provider Department      12/28/2017 10:30 AM Sam Jaime [facility] Generic      Your next 10 appointments already scheduled     Jan 11, 2018 10:30 AM CST   Return Visit with Sam Jaime   [facility] ([facility])    [address] [address]   [phone]            Jan 25, 2018  1:00 PM CST   Return Visit with Sam Jaime   [facility] ([facility])    [address]  [facility] [address]   [phone]              MyChart Information     [employer] gives you secure access to your electronic health record. If you see a primary care provider, you can also send messages to your care team and make appointments. If you have questions, please call your primary care clinic.  If you do not have a primary care provider, please call [phone] and they will assist you.        Care EveryWhere ID     This is your Care EveryWhere ID. This could be used by other organizations to access your [facility] medical records  [id number]        Equal Access to Services     SABAS VUONG : Annalisa Agrawal, hiral zambrano, qarupali kaaljefe ingram, ángel jauregui. So [facility] [phone].    ATENCIÓN: Si habla español, tiene a urban disposición servicios gratuitos de asistencia lingüística. Llame al [phone].    We comply with applicable federal civil rights laws and [place] laws. We do not discriminate on the basis of race, color, national origin, age, disability, sex, sexual orientation, or gender identity.

## 2017-12-29 NOTE — PROGRESS NOTES
Progress Note  Disclaimer: This note consists of symbols derived from keyboarding, dictation and/or voice recognition software. As a result, there may be errors in the script that have gone undetected. Please consider this when interpreting information found in this chart.    Client Name: Theresa Bill  Date: 2017         Service Type: Individual      Session Start Time: 10:30 AM  Session End Time: 11:15 AM      Session Length: 45     Session #: 5     Attendees: Client attended alone    Treatment Plan Last Reviewed: November 3, 2017       DATA   Client reports continuing she is doing quite well, actively taking care of herself at multiple levels.  She notes B[place] was difficult at her house and she found it difficult to concentrate and participate she has been doing some household rearranging to personalize her home.  She is crocheting and visiting friends, going to [place] every Tuesday.  She look into volunteering at the local food [employer].  She also attended a special service through the  home that handled her 's final arrangements for vests that have  this year.  She did request information on local grief support groups and I was able to locate some.     Progress Since Last Session (Related to Symptoms / Goals / Homework):   Symptoms: Stable    Homework: not applicable      Episode of Care Goals: Satisfactory progress - CONTEMPLATION (Considering change and yet undecided); Intervened by assessing the negative and positive thinking (ambivalence) about behavior change     Current / Ongoing Stressors and Concerns:   Recent bereavement, prior caregiver stress     Treatment Objective(s) Addressed in This Session:   increase understanding of steps in the grief process  Utilize family support     Intervention:   Interpersonal Therapy: facilitating appropriate expressions of emotion and grief. behavioral activation explored what self care strategies she  is currently using and what she might be able to add.  Reinforced the many steps she has taken, provided information on local grief support groups.        ASSESSMENT: Current Emotional / Mental Status (status of significant symptoms):   Risk status (Self / Other harm or suicidal ideation)   Client denies current fears or concerns for personal safety.   Client denies current or recent suicidal ideation or behaviors.   Client denies current or recent homicidal ideation or behaviors.   Client denies current or recent self injurious behavior or ideation.   Client denies other safety concerns.   A safety and risk management plan has not been developed at this time, however client was given the after-hours number / 911 should there be a change in any of these risk factors.     Appearance:   Appropriate    Eye Contact:   Good    Psychomotor Behavior: Retarded (Slowed)    Attitude:   Cooperative    Orientation:   All   Speech    Rate / Production: Slow     Volume:  Soft    Mood:    Depressed    Affect:    Appropriate    Thought Content:  Clear    Thought Form:  Coherent  Logical  difficult to think of anything but the loss of her    Insight:    Fair      Medication Review:   No changes to current psychiatric medication(s)     Medication Compliance:   Yes     Changes in Health Issues:   None reported     Chemical Use Review:   Substance Use: Chemical use reviewed, no active concerns identified      Tobacco Use: No current tobacco use.       Collateral Reports Completed:   Not Applicable    PLAN: (Client Tasks / Therapist Tasks / Other)  Client is doing exactly what she needs to be doing. Therapist encouraged client in the appropriate expressions of grief as well as of her love for her late . We will meet as often as the client would like for the next few weeks and taper as appropriate.        Sam Jaime                                                          ________________________________________________________________________    Treatment Plan    Client's Name: Theresa Bill  YOB: 1945    Date: November 6, 2017    Referral / Collaboration:  Referral to another professional/service is not indicated at this time..    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 Health conditions                   include                        disease or illnesses, other health problems that may be short or long lasting,                    injuries, mental health or emotional problems, and problems with alcohol or drugs.                              Think back over the past 30 days and answer these questions, thinking about how much              difficulty you had doing the following activities. For each question, please [place] only                   one response.      S1 Standing for long periods such as 30 minutes? Moderate =   3   S2 Taking care of household responsibilities? None =         1   S3 Learning a new task, for example, learning how to get to a new place? Mild =           2   S4 How much of a problem do you have joining community activities (for example, festivals, Mosque or other activities) in the same way as anyone else can? None =         1   S5 How much have you been emotionally affected by your health problems? None =         1               In the past 30 days, how much difficulty did you have in:   S6 Concentrating on doing something for ten minutes? None =         1   S7 Walking a long distance such as a kilometer (or equivalent)? Moderate =   3   S8 Washing your whole body? None  =         1   S9 Getting dressed? None =         1   S10 Dealing with people you do not know? None =         1   S11 Maintaining a friendship? None =         1   S12 Your day to day work? None =         1       H1 Overall, in the past 30 days, how many days were these difficulties present? Record number of days 1    H2 In the past 30 days, for how many days were you totally unable to carry out your usual activities or work because of any health condition? Record number of days  0    H3 In the past 30 days, not counting the days that you were totally unable, for how many days did you cut back or reduce your usual activities or work because of any health condition? Record number of days 0      Goals  1. Education- the stages of grief  a. Client will be able to describe the stages of grief; denial, bargaining, anger, depression, and acceptance and give examples of how each have felt for her.  2. Behavioral Activation  a. Client will learn to assess their emotional stateon a day to day basis  b. Client will Identify two forms of exercise/activity and engage in them 3 times per week  c. Client will Identify 3 things that make them laugh, and engage in these 5 times per week.  d. Client will Identify 1-2Creative activities or hobbies  and engage in them 2 times per week  e. Client will identify music, movies, books that make them feel good and use them 3-4 times per week  3. Self-care  a. Client will identify 5 things they can do just for themselves  b. Client will take time for quiet, reflection, meditation 5 times per week  c. Client will Learn to set boundaries when appropriate  d. Client will Identify 2 individuals they can call on for support, distraction  4. Assessment of progress  a. Client will engage in assessment of their progress on a regular basis      Client has reviewed and agreed to the above plan.      Sam Jaime  November 6, 2017

## 2018-01-11 ENCOUNTER — OFFICE VISIT (OUTPATIENT)
Dept: PSYCHOLOGY | Facility: CLINIC | Age: 73
End: 2018-01-11
Payer: COMMERCIAL

## 2018-01-11 DIAGNOSIS — F32.0 MILD MAJOR DEPRESSION (H): Primary | ICD-10-CM

## 2018-01-11 DIAGNOSIS — F43.21 GRIEF: ICD-10-CM

## 2018-01-11 PROCEDURE — 90834 PSYTX W PT 45 MINUTES: CPT | Performed by: PSYCHOLOGIST

## 2018-01-11 NOTE — MR AVS SNAPSHOT
MRN:[id number]                      After Visit Summary   1/11/2018    Theresa Bill    MRN: [id number]           Visit Information        Provider Department      1/11/2018 10:30 AM Sam Jaime [facility] Generic      Your next 10 appointments already scheduled     Jan 25, 2018  1:00 PM CST   Return Visit with Sam Jaime   [facility] ([facility])    [address] [address]   [phone]            Feb 09, 2018  8:30 AM CST   Return Visit with Sam Jaime   [facility] ([facility])    [address] [address]   [phone]              MyChart Information     Anna [employer] gives you secure access to your electronic health record. If you see a primary care provider, you can also send messages to your care team and make appointments. If you have questions, please call your primary care clinic.  If you do not have a primary care provider, please call [phone] and they will assist you.        Care EveryWhere ID     This is your Care EveryWhere ID. This could be used by other organizations to access your [place] medical records  [id number]        Equal Access to Services     SABAS VUONG : Annalisa Agrawal, wapaulette zambrano, qamauriciota kaalmada juan jose, ángel jauregui. So [facility] [phone].    ATENCIÓN: Si habla español, tiene a urban disposición servicios gratuitos de asistencia lingüística. Llame al [phone].    We comply with applicable federal civil rights laws and [place] laws. We do not discriminate on the basis of race, color, national origin, age, disability, sex, sexual orientation, or gender identity.

## 2018-01-12 NOTE — PROGRESS NOTES
Progress Note  Disclaimer: This note consists of symbols derived from keyboarding, dictation and/or voice recognition software. As a result, there may be errors in the script that have gone undetected. Please consider this when interpreting information found in this chart.    Client Name: Theresa Bill  Date: 1/11/2018         Service Type: Individual      Session Start Time: 10:30 AM  Session End Time: 11:15 AM      Session Length: 45     Session #: 6     Attendees: Client attended alone    Treatment Plan Last Reviewed: November 3, 2017       DATA   Client reports continuing she is doing quite well, actively taking care of herself at multiple levels.  Sleep is still sporadic but improving.  She is worried about 1 of her sons possibly breaking into her home if she leaves on vacation to obtain drug money.    Reports she is enjoying making decisions for herself.   Progress Since Last Session (Related to Symptoms / Goals / Homework):   Symptoms: Stable    Homework: not applicable      Episode of Care Goals: Satisfactory progress - CONTEMPLATION (Considering change and yet undecided); Intervened by assessing the negative and positive thinking (ambivalence) about behavior change     Current / Ongoing Stressors and Concerns:   Recent bereavement, prior caregiver stress     Treatment Objective(s) Addressed in This Session:   increase understanding of steps in the grief process  Utilize family support     Intervention:   Interpersonal Therapy: facilitating appropriate expressions of emotion and grief. behavioral activation explored what self care strategies she is currently using and what she might be able to add.  Reinforced the many steps she has taken, provided information on local grief support groups.        ASSESSMENT: Current Emotional / Mental Status (status of significant symptoms):   Risk status (Self / Other harm or suicidal ideation)   Client denies current fears or  concerns for personal safety.   Client denies current or recent suicidal ideation or behaviors.   Client denies current or recent homicidal ideation or behaviors.   Client denies current or recent self injurious behavior or ideation.   Client denies other safety concerns.   A safety and risk management plan has not been developed at this time, however client was given the after-hours number / 911 should there be a change in any of these risk factors.     Appearance:   Appropriate    Eye Contact:   Good    Psychomotor Behavior: Normal    Attitude:   Cooperative    Orientation:   All   Speech    Rate / Production: Slow     Volume:  Soft    Mood:    Depressed    Affect:    Appropriate    Thought Content:  Clear    Thought Form:  Coherent  Logical  difficult to think of anything but the loss of her    Insight:    Fair      Medication Review:   No changes to current psychiatric medication(s)     Medication Compliance:   Yes     Changes in Health Issues:   None reported     Chemical Use Review:   Substance Use: Chemical use reviewed, no active concerns identified      Tobacco Use: No current tobacco use.       Collateral Reports Completed:   Not Applicable    PLAN: (Client Tasks / Therapist Tasks / Other)  Client is doing exactly what she needs to be doing. Therapist encouraged client in the appropriate expressions of grief as well as of her love for her late . We will meet as often as the client would like for the next few weeks and taper as appropriate.        Sam Jaime                                                         ________________________________________________________________________    Treatment Plan    Client's Name: Theresa Bill  YOB: 1945    Date: November 6, 2017    Referral / Collaboration:  Referral to another professional/service is not indicated at this time..    Anticipated number of session or this episode of care: 25       DSM5 Diagnoses: (Sustained by DSM5  Criteria Listed Above)  Diagnoses:            296.21 (F32.0) Major Depressive Disorder, Single Episode, Mild _ grief  Psychosocial & Contextual Factors: Caregiver stress, recent death of her   WHODAS 2.0 (12 item)                          This questionnaire asks about difficulties due to health conditions. Health conditions                   include                        disease or illnesses, other health problems that may be short or long lasting,                    injuries, mental health or emotional problems, and problems with alcohol or drugs.                              Think back over the past 30 days and answer these questions, thinking about how much              difficulty you had doing the following activities. For each question, please [place] only                   one response.      S1 Standing for long periods such as 30 minutes? Moderate =   3   S2 Taking care of household responsibilities? None =         1   S3 Learning a new task, for example, learning how to get to a new place? Mild =           2   S4 How much of a problem do you have joining community activities (for example, festivals, Bahai or other activities) in the same way as anyone else can? None =         1   S5 How much have you been emotionally affected by your health problems? None =         1               In the past 30 days, how much difficulty did you have in:   S6 Concentrating on doing something for ten minutes? None =         1   S7 Walking a long distance such as a kilometer (or equivalent)? Moderate =   3   S8 Washing your whole body? None =         1   S9 Getting dressed? None =         1   S10 Dealing with people you do not know? None =         1   S11 Maintaining a friendship? None =         1   S12 Your day to day work? None =         1       H1 Overall, in the past 30 days, how many days were these difficulties present? Record number of days 1    H2 In the past 30 days, for how many days were you totally  unable to carry out your usual activities or work because of any health condition? Record number of days  0    H3 In the past 30 days, not counting the days that you were totally unable, for how many days did you cut back or reduce your usual activities or work because of any health condition? Record number of days 0      Goals  1. Education- the stages of grief  a. Client will be able to describe the stages of grief; denial, bargaining, anger, depression, and acceptance and give examples of how each have felt for her.  2. Behavioral Activation  a. Client will learn to assess their emotional stateon a day to day basis  b. Client will Identify two forms of exercise/activity and engage in them 3 times per week  c. Client will Identify 3 things that make them laugh, and engage in these 5 times per week.  d. Client will Identify 1-2Creative activities or hobbies  and engage in them 2 times per week  e. Client will identify music, movies, books that make them feel good and use them 3-4 times per week  3. Self-care  a. Client will identify 5 things they can do just for themselves  b. Client will take time for quiet, reflection, meditation 5 times per week  c. Client will Learn to set boundaries when appropriate  d. Client will Identify 2 individuals they can call on for support, distraction  4. Assessment of progress  a. Client will engage in assessment of their progress on a regular basis      Client has reviewed and agreed to the above plan.      Sam Jaime  November 6, 2017

## 2018-02-09 ENCOUNTER — TRANSFERRED RECORDS (OUTPATIENT)
Dept: HEALTH INFORMATION MANAGEMENT | Facility: CLINIC | Age: 73
End: 2018-02-09

## 2018-02-09 ENCOUNTER — OFFICE VISIT (OUTPATIENT)
Dept: PSYCHOLOGY | Facility: CLINIC | Age: 73
End: 2018-02-09
Payer: COMMERCIAL

## 2018-02-09 DIAGNOSIS — F32.0 MILD MAJOR DEPRESSION (H): Primary | ICD-10-CM

## 2018-02-09 DIAGNOSIS — F43.21 GRIEF: ICD-10-CM

## 2018-02-09 PROCEDURE — 90834 PSYTX W PT 45 MINUTES: CPT | Performed by: PSYCHOLOGIST

## 2018-02-09 NOTE — MR AVS SNAPSHOT
MRN:[id number]                      After Visit Summary   2/9/2018    Theresa Bill    MRN: [id number]           Visit Information        Provider Department      2/9/2018 8:30 AM Sam Jaime [facility] Generic      Your next 10 appointments already scheduled     Mar 12, 2018  9:30 AM CDT   Return Visit with Sam Jaime   [facility] ([facility])    [address] [id number] [phone]            Mar 26, 2018  9:30 AM CDT   Return Visit with Sam Jaime   [facility] ([facility])    [address] [id number] [phone]              MyChart Information     [employer] gives you secure access to your electronic health record. If you see a primary care provider, you can also send messages to your care team and make appointments. If you have questions, please call your primary care clinic.  If you do not have a primary care provider, please call [phone] and they will assist you.        Care EveryWhere ID     This is your Care EveryWhere ID. This could be used by other organizations to access your [facility] medical records  [id number]        Equal Access to Services     SABAS VUONG : Annalisa Agrawal, wayoselynda kenya, qaybta kaalmada adecris, ángel jauregui. So [facility] [phone].    ATENCIÓN: Si habla español, tiene a urban disposición servicios gratuitos de asistencia lingüística. Llame al [phone].    We comply with applicable federal civil rights laws and [place] laws. We do not discriminate on the basis of race, color, national origin, age, disability, sex, sexual orientation, or gender identity.

## 2018-02-09 NOTE — PROGRESS NOTES
"                                             Progress Note  Disclaimer: This note consists of symbols derived from keyboarding, dictation and/or voice recognition software. As a result, there may be errors in the script that have gone undetected. Please consider this when interpreting information found in this chart.    Client Name: Theresa Bill  Date: 2/9/2018         Service Type: Individual      Session Start Time: 8:30 AM  Session End Time: 9:15 AM      Session Length: 45     Session #: 7     Attendees: Client attended alone    Treatment Plan Last Reviewed: November 3, 2017       DATA   Client reports feeling in a bit of a slump recently.  She has been feeling more down than up for the last couple of weeks, she also notes she has had a lot of \"female problems\" which are also getting her down.  She has a trip planned to [place] to visit her siblings at the end of February and is looking forward to it immensely.  Also notes her blood sugars have been off.  She is not eating regularly but her sugars are still high.  Does report she is sleeping better but usually on the couch, possibly sleeping too much.     Progress Since Last Session (Related to Symptoms / Goals / Homework):   Symptoms: Stable    Homework: not applicable      Episode of Care Goals: Satisfactory progress - CONTEMPLATION (Considering change and yet undecided); Intervened by assessing the negative and positive thinking (ambivalence) about behavior change     Current / Ongoing Stressors and Concerns:   Recent bereavement, prior caregiver stress     Treatment Objective(s) Addressed in This Session:   increase understanding of steps in the grief process  Utilize family support     Intervention:   Interpersonal Therapy: facilitating appropriate expressions of emotion and grief. behavioral activation explored what self care strategies she is currently using and what she might be able to add. Went over the relapsing remitting nature of grief and " depression. Also reviewed sleep hygiene.          ASSESSMENT: Current Emotional / Mental Status (status of significant symptoms):   Risk status (Self / Other harm or suicidal ideation)   Client denies current fears or concerns for personal safety.   Client denies current or recent suicidal ideation or behaviors.   Client denies current or recent homicidal ideation or behaviors.   Client denies current or recent self injurious behavior or ideation.   Client denies other safety concerns.   A safety and risk management plan has not been developed at this time, however client was given the after-hours number / 911 should there be a change in any of these risk factors.     Appearance:   Appropriate    Eye Contact:   Good    Psychomotor Behavior: Normal    Attitude:   Cooperative    Orientation:   All   Speech    Rate / Production: Slow     Volume:  Soft    Mood:    Depressed    Affect:    Appropriate    Thought Content:  Clear    Thought Form:  Coherent  Logical  difficult to think of anything but the loss of her    Insight:    Fair      Medication Review:   No changes to current psychiatric medication(s)     Medication Compliance:   Yes     Changes in Health Issues:   None reported     Chemical Use Review:   Substance Use: Chemical use reviewed, no active concerns identified      Tobacco Use: No current tobacco use.       Collateral Reports Completed:   Not Applicable    PLAN: (Client Tasks / Therapist Tasks / Other)  Client is doing exactly what she needs to be doing.  Including having a slump.  Therapist encouraged client in the appropriate expressions of grief as well as of her love for her late .  Client to limit naps during the day to 45 minutes or less, make an attempt to sleep in bed.        Sam Jaime                                                         ________________________________________________________________________    Treatment Plan    Client's Name: Theresa Bill  Date Of  Birth: 1945    Date: November 6, 2017    Referral / Collaboration:  Referral to another professional/service is not indicated at this time..    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 Health conditions                   include                        disease or illnesses, other health problems that may be short or long lasting,                    injuries, mental health or emotional problems, and problems with alcohol or drugs.                              Think back over the past 30 days and answer these questions, thinking about how much              difficulty you had doing the following activities. For each question, please [place] only                   one response.      S1 Standing for long periods such as 30 minutes? Moderate =   3   S2 Taking care of household responsibilities? None =         1   S3 Learning a new task, for example, learning how to get to a new place? Mild =           2   S4 How much of a problem do you have joining community activities (for example, festivals, Congregation or other activities) in the same way as anyone else can? None =         1   S5 How much have you been emotionally affected by your health problems? None =         1               In the past 30 days, how much difficulty did you have in:   S6 Concentrating on doing something for ten minutes? None =         1   S7 Walking a long distance such as a kilometer (or equivalent)? Moderate =   3   S8 Washing your whole body? None =         1   S9 Getting dressed? None =         1   S10 Dealing with people you do not know? None =         1   S11 Maintaining a  friendship? None =         1   S12 Your day to day work? None =         1       H1 Overall, in the past 30 days, how many days were these difficulties present? Record number of days 1    H2 In the past 30 days, for how many days were you totally unable to carry out your usual activities or work because of any health condition? Record number of days  0    H3 In the past 30 days, not counting the days that you were totally unable, for how many days did you cut back or reduce your usual activities or work because of any health condition? Record number of days 0      Goals  1. Education- the stages of grief  a. Client will be able to describe the stages of grief; denial, bargaining, anger, depression, and acceptance and give examples of how each have felt for her.  2. Behavioral Activation  a. Client will learn to assess their emotional stateon a day to day basis  b. Client will Identify two forms of exercise/activity and engage in them 3 times per week  c. Client will Identify 3 things that make them laugh, and engage in these 5 times per week.  d. Client will Identify 1-2Creative activities or hobbies  and engage in them 2 times per week  e. Client will identify music, movies, books that make them feel good and use them 3-4 times per week  3. Self-care  a. Client will identify 5 things they can do just for themselves  b. Client will take time for quiet, reflection, meditation 5 times per week  c. Client will Learn to set boundaries when appropriate  d. Client will Identify 2 individuals they can call on for support, distraction  4. Assessment of progress  a. Client will engage in assessment of their progress on a regular basis      Client has reviewed and agreed to the above plan.      Sam Jaime  November 6, 2017

## 2018-02-15 ENCOUNTER — OFFICE VISIT (OUTPATIENT)
Dept: FAMILY MEDICINE | Facility: CLINIC | Age: 73
End: 2018-02-15
Payer: COMMERCIAL

## 2018-02-15 VITALS
HEIGHT: 61 IN | OXYGEN SATURATION: 92 % | SYSTOLIC BLOOD PRESSURE: 128 MMHG | TEMPERATURE: 98.9 F | WEIGHT: 170 LBS | BODY MASS INDEX: 32.1 KG/M2 | HEART RATE: 84 BPM | RESPIRATION RATE: 16 BRPM | DIASTOLIC BLOOD PRESSURE: 74 MMHG

## 2018-02-15 DIAGNOSIS — I10 ESSENTIAL HYPERTENSION: Primary | Chronic | ICD-10-CM

## 2018-02-15 DIAGNOSIS — E11.9 TYPE 2 DIABETES MELLITUS WITHOUT COMPLICATION, WITH LONG-TERM CURRENT USE OF INSULIN (H): Chronic | ICD-10-CM

## 2018-02-15 DIAGNOSIS — F32.0 MILD MAJOR DEPRESSION (H): Chronic | ICD-10-CM

## 2018-02-15 DIAGNOSIS — Z79.4 TYPE 2 DIABETES MELLITUS WITHOUT COMPLICATION, WITH LONG-TERM CURRENT USE OF INSULIN (H): Chronic | ICD-10-CM

## 2018-02-15 DIAGNOSIS — K13.0 ANGULAR CHEILITIS: ICD-10-CM

## 2018-02-15 PROCEDURE — 99214 OFFICE O/P EST MOD 30 MIN: CPT | Performed by: PHYSICIAN ASSISTANT

## 2018-02-15 RX ORDER — OXYBUTYNIN CHLORIDE 15 MG/1
TABLET, EXTENDED RELEASE ORAL
COMMUNITY
Start: 2018-02-13 | End: 2018-10-24

## 2018-02-15 RX ORDER — METOPROLOL SUCCINATE 25 MG/1
25 TABLET, EXTENDED RELEASE ORAL DAILY
Qty: 90 TABLET | Refills: 1 | Status: SHIPPED | OUTPATIENT
Start: 2018-02-15 | End: 2018-05-21

## 2018-02-15 RX ORDER — HYDROCHLOROTHIAZIDE 12.5 MG/1
12.5 TABLET ORAL DAILY
Qty: 90 TABLET | Refills: 1 | Status: SHIPPED | OUTPATIENT
Start: 2018-02-15 | End: 2018-03-23

## 2018-02-15 RX ORDER — NITROFURANTOIN 25; 75 MG/1; MG/1
CAPSULE ORAL
COMMUNITY
Start: 2018-02-12 | End: 2018-03-09

## 2018-02-15 RX ORDER — KETOCONAZOLE 20 MG/G
CREAM TOPICAL 2 TIMES DAILY
Qty: 30 G | Refills: 1 | Status: SHIPPED | OUTPATIENT
Start: 2018-02-15 | End: 2019-03-11

## 2018-02-15 NOTE — PROGRESS NOTES
SUBJECTIVE:   Theresa Bill is a 72 year old female who presents to clinic today for the following health issues:      Hypertension Follow-up      Outpatient blood pressures are being checked at home.  Results are ranging anywhere from 125-157/67-95.  Has a BP log starting 17- 18    Low Salt Diet: no added salt      Amount of exercise or physical activity: 4-5 days/week for an average of 30-45 minutes    Problems taking medications regularly: No    Medication side effects: none    Diet: regular (no restrictions)        Emotions - still up and down a lot since  , still can't sleep in bedroom or be in 's chair in living room.  Starting to walk the dogs.  Still working with counselor.  Going on long vacation to [place] with sister, then to [place] train to [place] other siblings, then [place] to [place] on train.      Incontinence - no good answer from recent urology appt.    RLS - neurology no further change, is to be doing Physical Therapy for balance.    DM2 - sugars seem to be running high, 150s-210    Angular cheilitis - continues to need med occasionally.    Problem list and histories reviewed & adjusted, as indicated.  Additional history: as documented    BP Readings from Last 3 Encounters:   02/15/18 128/74   17 127/78   17 128/75    Wt Readings from Last 3 Encounters:   02/15/18 170 lb (77.1 kg)   17 167 lb (75.8 kg)   17 169 lb 12.8 oz (77 kg)           Labs reviewed in EPIC    Reviewed and updated as needed this visit by clinical staff  Tobacco  Allergies  Meds  Problems  Med Hx  Surg Hx  Fam Hx  Soc Hx        Reviewed and updated as needed this visit by Provider  Tobacco  Allergies  Meds  Problems  Med Hx  Surg Hx  Fam Hx  Soc Hx          ROS:  Constitutional, cardiovascular, pulmonary, , musculoskeletal, neuro, endocrine and psych systems are negative, except as otherwise noted.    OBJECTIVE:     /74 (BP Location: Right arm, Patient Position:  "Chair, Cuff Size: Adult Regular)  Pulse 84  Temp 98.9  F (37.2  C) (Tympanic)  Resp 16  Ht 5' 1\" (1.549 m)  Wt 170 lb (77.1 kg)  SpO2 92%  BMI 32.12 kg/m2  Body mass index is 32.12 kg/(m^2).  GENERAL: healthy, alert and no distress  RESP: lungs clear to auscultation - no rales, rhonchi or wheezes  CV: regular rate and rhythm, normal S1 S2, no S3 or S4, no murmur, click or rub  PSYCH: mentation appears normal, affect normal/bright    ASSESSMENT/PLAN:       ICD-10-CM    1. Essential hypertension I10    2. Type 2 diabetes mellitus without complication, with long-term current use of insulin (H) E11.9 insulin detemir (LEVEMIR FLEXPEN/FLEXTOUCH) 100 UNIT/ML injection    Z79.4    3. Mild major depression (H) F32.0    4. Angular cheilitis K13.0 ketoconazole (NIZORAL) 2 % cream       Patient Instructions   BP -  Start taking the Hydrochlorothiazide again  Goal would generally be BPs under 130/80 - unless getting lightheaded, falling, etc  Update me if needed    Diabetes -  Increase insulin to 10 units.  Update me or Sena on numbers  A1c when back from vacation    Mood and grief-  Keep with counseling  Enjoy vacation    Refilled med for kesha of lips       Shae Pierre PA-C  [facility]    "

## 2018-02-15 NOTE — MR AVS SNAPSHOT
After Visit Summary   2/15/2018    Theresa Bill    MRN: [id number]           Patient Information     Date Of Birth          1945        Visit Information        Provider Department      2/15/2018 10:20 AM Shae Pierre PA-C [facility]        Today's Diagnoses     Essential hypertension    -  1    Type 2 diabetes mellitus without complication, with long-term current use of insulin (H)        Mild major depression (H)        Angular cheilitis          Care Instructions    BP -  Start taking the Hydrochlorothiazide again  Goal would generally be BPs under 130/80 - unless getting lightheaded, falling, etc  Update me if needed    Diabetes -  Increase insulin to 10 units.  Update me or Sena on numbers  A1c when back from vacation    Mood and grief-  Keep with counseling  Enjoy vacation    Refilled med for corners of lips           Follow-ups after your visit        Your next 10 appointments already scheduled     Mar 12, 2018  9:30 AM CDT   Return Visit with [facility]    [address] [address] [phone]            Mar 26, 2018  9:30 AM CDT   Return Visit with [facility]    [address] [address] [phone]              Who to contact     If you have questions or need follow up information about today's clinic visit or your schedule please contact [facility] directly at [phone].  Normal or non-critical lab and imaging results will be communicated to you by MyChart, letter or phone within 4 business days after the clinic has received the results. If you do not hear from us within 7 days, please contact the clinic through MyChart or phone. If you have a critical or abnormal lab result, we will notify you by phone as soon as possible.  Submit refill requests through [email or web address] or call your  "pharmacy and they will forward the refill request to us. Please allow 3 business days for your refill to be completed.          Additional Information About Your Visit        [employer] Information     [employer] gives you secure access to your electronic health record. If you see a primary care provider, you can also send messages to your care team and make appointments. If you have questions, please call your primary care clinic.  If you do not have a primary care provider, please call [phone] and they will assist you.        Care EveryWhere ID     This is your Care EveryWhere ID. This could be used by other organizations to access your [facility] medical records  [id number]        Your Vitals Were     Pulse Temperature Respirations Height Pulse Oximetry BMI (Body Mass Index)    84 98.9  F (37.2  C) (Tympanic) 16 5' 1\" (1.549 m) 92% 32.12 kg/m2       Blood Pressure from Last 3 Encounters:   02/15/18 128/74   12/18/17 127/78   12/01/17 128/75    Weight from Last 3 Encounters:   02/15/18 170 lb (77.1 kg)   12/18/17 167 lb (75.8 kg)   12/01/17 169 lb 12.8 oz (77 kg)              Today, you had the following     No orders found for display         Today's Medication Changes          These changes are accurate as of 2/15/18 11:08 AM.  If you have any questions, ask your nurse or doctor.               These medicines have changed or have updated prescriptions.        Dose/Directions    insulin detemir 100 UNIT/ML injection   Commonly known as:  LEVEMIR FLEXPEN/FLEXTOUCH   This may have changed:  additional instructions   Used for:  Type 2 diabetes mellitus without complication, with long-term current use of insulin (H)   Changed by:  Shae Pierre PA-C        10 units at bedtime   Quantity:  15 mL   Refills:  3       ketoconazole 2 % cream   Commonly known as:  NIZORAL   This may have changed:  additional instructions   Used for:  Angular cheilitis   Changed by:  Shae Pierre PA-C        Apply " topically 2 times daily To corners of lips for 2 weeks or as needed   Quantity:  30 g   Refills:  1            Where to get your medicines      These medications were sent to [facility] [place], [place] - [address]  [address] [facility] [address]     Phone:  [phone]     hydrochlorothiazide 12.5 MG Tabs tablet    insulin detemir 100 UNIT/ML injection    ketoconazole 2 % cream    metoprolol succinate 25 MG 24 hr tablet                Primary Care Provider Office Phone # Fax #    Shae Pierre PA-C [phone] [address] [facility] [address]        Equal Access to Services     SABAS VUONG : Hadii lamonte starr hadasho Sodanielle, waaxda luqadaha, qaybta kaalmada juan jose, ángel jauregui. [facility] [phone].    ATENCIÓN: Si habla español, tiene a urban disposición servicios gratuitos de asistencia lingüística. [facility] [phone].    We comply with applicable federal civil rights laws and [place] laws. We do not discriminate on the basis of race, color, national origin, age, disability, sex, sexual orientation, or gender identity.            Thank you!     Thank you for choosing [facility]  for your care. Our goal is always to provide you with excellent care. Hearing back from our patients is one way we can continue to improve our services. Please take a few minutes to complete the written survey that you may receive in the mail after your visit with us. Thank you!             Your Updated Medication List - Protect others around you: Learn how to safely use, store and throw away your medicines at www.disposemymeds.org.          This list is accurate as of 2/15/18 11:08 AM.  Always use your most recent med list.                   Brand Name Dispense Instructions for use Diagnosis    ACE/ARB/ARNI NOT PRESCRIBED (INTENTIONAL)      ACE & ARB not prescribed due to Intolerance-cough    Type 2 diabetes, HbA1c goal <  7% (H)       aspirin 81 MG tablet     100    ONE DAILY    Type II or unspecified type diabetes mellitus without mention of complication, not stated as uncontrolled       atorvastatin 40 MG tablet    LIPITOR    90 tablet    Take 1 tablet (40 mg) by mouth At Bedtime    Dyslipidemia       blood glucose monitoring test strip    no brand specified    2 Box    1 strip by In Vitro route 3 times daily. Has a Freestyle    Type 2 diabetes, HbA1c goal < 7% (H)       buPROPion 150 MG 24 hr tablet    WELLBUTRIN XL    30 tablet    Take 1 tablet (150 mg) by mouth every morning    Mild recurrent major depression (H), Grief       clonazePAM 0.5 MG tablet    klonoPIN    180 tablet    Take 1 mg by mouth At Bedtime        DAILY MULTIVITAMIN PO      One tablet daily        DULoxetine 60 MG EC capsule    CYMBALTA    30 capsule    TAKE ONE CAPSULE BY MOUTH ONCE DAILY    Major depressive disorder, recurrent episode, mild (H)       fluocinonide 0.05 % ointment    LIDEX    15 g    Apply sparingly to affected area twice daily as needed for mouth ulcer.    Aphthae, oral       hydrochlorothiazide 12.5 MG Tabs tablet     90 tablet    Take 1 tablet (12.5 mg) by mouth daily        hydrocortisone 1 % cream    CORTAID    30 g    Apply sparingly to affected area of lip corners two times daily for 1 week.    Angular cheilitis       insulin detemir 100 UNIT/ML injection    LEVEMIR FLEXPEN/FLEXTOUCH    15 mL    10 units at bedtime    Type 2 diabetes mellitus without complication, with long-term current use of insulin (H)       insulin pen needle 32G X 4 MM    BD MARIIA U/F    90 each    Use one pen daily    Type 2 diabetes mellitus without complication, with long-term current use of insulin (H)       ketoconazole 2 % cream    NIZORAL    30 g    Apply topically 2 times daily To corners of lips for 2 weeks or as needed    Angular cheilitis       metFORMIN 500 MG 24 hr tablet    GLUCOPHAGE-XR    180 tablet    Take 1 tablet (500 mg) by mouth 2 times daily  (with meals)    Type 2 diabetes mellitus without complication, with long-term current use of insulin (H)       metoprolol succinate 25 MG 24 hr tablet    TOPROL-XL    90 tablet    Take 1 tablet (25 mg) by mouth daily        nitroFURantoin (macrocrystal-monohydrate) 100 MG capsule    MACROBID          nitroGLYcerin 0.4 MG sublingual tablet    NITROSTAT    25 tablet    For chest pain place 1 tablet under the tongue every 5 minutes for 3 doses. If symptoms persist 5 minutes after 1st dose call 911.    Chest pressure       * nystatin cream    MYCOSTATIN    60 g    Apply twice daily for yeast rash for 3 weeks.    Intertrigo       * nystatin 420315 UNIT/GM Powd    MYCOSTATIN    60 g    Apply 1 g topically 3 times daily as needed    Other specified erythematous condition(967.53)       olopatadine 0.1 % ophthalmic solution    PATANOL    5 mL    Place 1 drop into both eyes 2 times daily    Allergic conjunctivitis, bilateral       order for DME      Equipment being ordered: CPAP Patient Theresa Bill received a Resmed Airsense 10  Auto. Pressures were set at Auto 10 - 15 cm H2O.        order for DME     1 Device    Equipment being ordered: Knee compression sleeve    Fall at home, subsequent encounter, Pain in joint, lower leg, unspecified laterality, Effusion of knee joint right, Arthritis of knee, degenerative       oxybutynin chloride 15 MG Tb24           pantoprazole 40 MG EC tablet    PROTONIX    30 tablet    Take 1 tablet (40 mg) by mouth daily    Gastroesophageal reflux disease, esophagitis presence not specified       PROBIOTIC & ACIDOPHILUS EX ST Caps           ranitidine 150 MG tablet    ZANTAC    60 tablet    TAKE ONE TABLET BY MOUTH TWICE DAILY    Gastroesophageal reflux disease with esophagitis, Esophageal dysmotility, Chronic cough       traMADol 50 MG tablet    ULTRAM    30 tablet    TAKE ONE TABLET BY MOUTH EVERY 6 HOURS AS NEEDED FOR  MODERATE  PAIN    Radicular leg pain       * Notice:  This list has 2  medication(s) that are the same as other medications prescribed for you. Read the directions carefully, and ask your doctor or other care provider to review them with you.

## 2018-02-15 NOTE — NURSING NOTE
"Chief Complaint   Patient presents with     Hypertension       Initial /74 (BP Location: Right arm, Patient Position: Chair, Cuff Size: Adult Regular)  Pulse 84  Temp 98.9  F (37.2  C) (Tympanic)  Resp 16  Ht 5' 1\" (1.549 m)  Wt 170 lb (77.1 kg)  SpO2 92%  BMI 32.12 kg/m2 Estimated body mass index is 32.12 kg/(m^2) as calculated from the following:    Height as of this encounter: 5' 1\" (1.549 m).    Weight as of this encounter: 170 lb (77.1 kg).      Health Maintenance that is potentially due pending provider review:  NONE        Is there anyone who you would like to be able to receive your results? No  If yes have patient fill out RICH      "

## 2018-02-15 NOTE — PATIENT INSTRUCTIONS
BP -  Start taking the Hydrochlorothiazide again  Goal would generally be BPs under 130/80 - unless getting lightheaded, falling, etc  Update me if needed    Diabetes -  Increase insulin to 10 units.  Update me or Sena on numbers  A1c when back from vacation    Mood and grief-  Keep with counseling  Enjoy vacation    Refilled med for corners of lips

## 2018-03-09 ENCOUNTER — RADIANT APPOINTMENT (OUTPATIENT)
Dept: GENERAL RADIOLOGY | Facility: CLINIC | Age: 73
End: 2018-03-09
Attending: NURSE PRACTITIONER
Payer: COMMERCIAL

## 2018-03-09 ENCOUNTER — OFFICE VISIT (OUTPATIENT)
Dept: URGENT CARE | Facility: URGENT CARE | Age: 73
End: 2018-03-09
Payer: COMMERCIAL

## 2018-03-09 VITALS
RESPIRATION RATE: 18 BRPM | BODY MASS INDEX: 32.12 KG/M2 | OXYGEN SATURATION: 96 % | TEMPERATURE: 97 F | DIASTOLIC BLOOD PRESSURE: 74 MMHG | WEIGHT: 170 LBS | SYSTOLIC BLOOD PRESSURE: 117 MMHG | HEART RATE: 68 BPM

## 2018-03-09 DIAGNOSIS — J45.20 REACTIVE AIRWAY DISEASE, MILD INTERMITTENT, UNCOMPLICATED: ICD-10-CM

## 2018-03-09 DIAGNOSIS — J20.9 ACUTE BRONCHITIS WITH COEXISTING CONDITION REQUIRING PROPHYLACTIC TREATMENT: Primary | ICD-10-CM

## 2018-03-09 DIAGNOSIS — B37.0 THRUSH: ICD-10-CM

## 2018-03-09 DIAGNOSIS — E11.40 TYPE 2 DIABETES MELLITUS WITH DIABETIC NEUROPATHY, UNSPECIFIED LONG TERM INSULIN USE STATUS: ICD-10-CM

## 2018-03-09 DIAGNOSIS — R05.9 COUGH: ICD-10-CM

## 2018-03-09 PROCEDURE — 71046 X-RAY EXAM CHEST 2 VIEWS: CPT | Mod: FY

## 2018-03-09 PROCEDURE — 99214 OFFICE O/P EST MOD 30 MIN: CPT | Performed by: NURSE PRACTITIONER

## 2018-03-09 RX ORDER — NYSTATIN 100000/ML
500000 SUSPENSION, ORAL (FINAL DOSE FORM) ORAL 4 TIMES DAILY
Qty: 60 ML | Refills: 0 | Status: SHIPPED | OUTPATIENT
Start: 2018-03-09 | End: 2018-03-23

## 2018-03-09 RX ORDER — BENZONATATE 200 MG/1
200 CAPSULE ORAL 3 TIMES DAILY PRN
Qty: 30 CAPSULE | Refills: 0 | Status: SHIPPED | OUTPATIENT
Start: 2018-03-09 | End: 2018-03-23

## 2018-03-09 RX ORDER — AZITHROMYCIN 250 MG/1
TABLET, FILM COATED ORAL
Qty: 6 TABLET | Refills: 0 | Status: SHIPPED | OUTPATIENT
Start: 2018-03-09 | End: 2018-03-23

## 2018-03-09 NOTE — PROGRESS NOTES
SUBJECTIVE:   Theresa Bill is a 72 year old female who presents to clinic today for the following health issues:  Chief Complaint   Patient presents with     Cough     for about 3 weeks.  Has gone through 1.5 bottles of OTC cough syrup.  None productive.      Pharyngitis     about a week.  sore and roof of mouth sore.                  Problem list and histories reviewed & adjusted, as indicated.  Additional history: as documented    Patient Active Problem List   Diagnosis     Lumbago     Pain in limb     Mild major depression (H)     Essential hypertension     Type 2 diabetes mellitus with diabetic neuropathy (H)     Osteoarthritis     Mixed incontinence urge and stress (male)(female)     Restless legs     Microalbuminuria     Obesity     Esophageal reflux     GI bleed     Dyslipidemia     Iron deficiency anemia     Falls frequently     ACP (advance care planning)     SHANTI (obstructive sleep apnea)     Other chronic pain     Reactive airway disease, mild intermittent, uncomplicated     Simple chronic bronchitis (H)     LPRD (laryngopharyngeal reflux disease)     Past Surgical History:   Procedure Laterality Date     BUNIONECTOMY CRISELDA  7/1/2011    Procedure:BUNIONECTOMY CRISELDA; weil osteotomy & Lapidtus Bunionectomy; Surgeon:HYACITNH SANTO; Location:[facility]     BUNIONECTOMY CHEVRON  4/6/2012    Procedure:BUNIONECTOMY CHEVRON; Lapidus bunionectomy, plantar plate repair second and third metatarsophalangeal joints,left foot-popliteal block left lower extremity; Surgeon:HYACINTH SANTO; Location:[facility]     C TOTAL ABDOM HYSTERECTOMY  1992    ovaries removed     COLONOSCOPY  9/11/2012    Procedure: COLONOSCOPY;  Colonoscopy;  Surgeon: Alyssa Foster MD;  Location: [facility]     NISSEN FUNDOPLICATION      2003     REPAIR HAMMER TOE  7/1/2011    Procedure:REPAIR HAMMER TOE; hammertoe correction right 2nd digit; Surgeon:HYACINTH SANTO; Location:[facility]     SURGICAL HISTORY OF -   1979    Tubal      SURGICAL HISTORY OF -   1993    Stamey bladder surgery     SURGICAL HISTORY OF -       Lipoma removed f/back     SURGICAL HISTORY OF -   1995    (L) Foot surgery     SURGICAL HISTORY OF -   05/09/2002    Colonoscopy     SURGICAL HISTORY OF -       knee arthroscopy left     SURGICAL HISTORY OF -   2007-two phases    interstim bladder implant     SURGICAL HISTORY OF -   Sept. 13, 2007    laparoscopic Nissen fundoplication       Social History   Substance Use Topics     Smoking status: Never Smoker     Smokeless tobacco: Never Used     Alcohol use No     Family History   Problem Relation Age of Onset     CANCER Mother      brain cancer, ovarian cancer     CANCER Father      lung cancer     GASTROINTESTINAL DISEASE Father      colitis     Alcohol/Drug Brother      Alcohol/Drug Son      Alcohol/Drug Brother      GASTROINTESTINAL DISEASE Sister      DIABETES Sister      CANCER Sister      3 sisters .w ovarian cancer and one also with uterine cancer     Gynecology Son      kidney stones     HEART DISEASE Sister      MI age 64     GASTROINTESTINAL DISEASE Other      colitis/colitis/colitis/colitis         Current Outpatient Prescriptions   Medication Sig Dispense Refill     azithromycin (ZITHROMAX Z-MINH) 250 MG tablet Take 2 tablets on day 1 and then take 1 tablet days 2-5 6 tablet 0     benzonatate (TESSALON) 200 MG capsule Take 1 capsule (200 mg) by mouth 3 times daily as needed 30 capsule 0     nystatin (MYCOSTATIN) 156931 UNIT/ML suspension Take 5 mLs (500,000 Units) by mouth 4 times daily Swish and swallow or spit out. 60 mL 0     oxybutynin chloride 15 MG TB24        metoprolol succinate (TOPROL-XL) 25 MG 24 hr tablet Take 1 tablet (25 mg) by mouth daily 90 tablet 1     hydrochlorothiazide 12.5 MG TABS tablet Take 1 tablet (12.5 mg) by mouth daily 90 tablet 1     ketoconazole (NIZORAL) 2 % cream Apply topically 2 times daily To corners of lips for 2 weeks or as needed 30 g 1     insulin detemir (LEVEMIR  FLEXPEN/FLEXTOUCH) 100 UNIT/ML injection 10 units at bedtime 15 mL 3     Probiotic Product (PROBIOTIC & ACIDOPHILUS EX ST) CAPS        buPROPion (WELLBUTRIN XL) 150 MG 24 hr tablet Take 1 tablet (150 mg) by mouth every morning 30 tablet 3     ranitidine (ZANTAC) 150 MG tablet TAKE ONE TABLET BY MOUTH TWICE DAILY 60 tablet 3     DULoxetine (CYMBALTA) 60 MG EC capsule TAKE ONE CAPSULE BY MOUTH ONCE DAILY 30 capsule 5     pantoprazole (PROTONIX) 40 MG EC tablet Take 1 tablet (40 mg) by mouth daily 30 tablet 5     traMADol (ULTRAM) 50 MG tablet TAKE ONE TABLET BY MOUTH EVERY 6 HOURS AS NEEDED FOR  MODERATE  PAIN 30 tablet 5     nitroGLYcerin (NITROSTAT) 0.4 MG sublingual tablet For chest pain place 1 tablet under the tongue every 5 minutes for 3 doses. If symptoms persist 5 minutes after 1st dose call 911. 25 tablet 0     insulin pen needle (BD MARIIA U/F) 32G X 4 MM Use one pen daily 90 each 0     fluocinonide (LIDEX) 0.05 % ointment Apply sparingly to affected area twice daily as needed for mouth ulcer. 15 g 0     metFORMIN (GLUCOPHAGE-XR) 500 MG 24 hr tablet Take 1 tablet (500 mg) by mouth 2 times daily (with meals) 180 tablet 3     atorvastatin (LIPITOR) 40 MG tablet Take 1 tablet (40 mg) by mouth At Bedtime 90 tablet 3     nystatin (MYCOSTATIN) 135685 UNIT/GM POWD Apply 1 g topically 3 times daily as needed 60 g 1     clonazePAM (KLONOPIN) 0.5 MG tablet Take 1 mg by mouth At Bedtime  180 tablet 2     order for DME Equipment being ordered: CPAP Patient Theresa Bill received a [employer] Airsense 10  Auto. Pressures were set at Auto 10 - 15 cm H2O.       hydrocortisone 1 % cream Apply sparingly to affected area of lip corners two times daily for 1 week. 30 g 0     nystatin (MYCOSTATIN) cream Apply twice daily for yeast rash for 3 weeks. 60 g 3     Multiple Vitamin (DAILY MULTIVITAMIN PO) One tablet daily       ASPIRIN 81 MG OR TABS ONE DAILY 100 3     ORDER FOR DME Equipment being ordered: Knee compression sleeve (Patient  not taking: Reported on 12/1/2017) 1 Device 0     olopatadine (PATANOL) 0.1 % ophthalmic solution Place 1 drop into both eyes 2 times daily (Patient not taking: Reported on 3/9/2018) 5 mL 3     ACE/ARB NOT PRESCRIBED, INTENTIONAL, ACE & ARB not prescribed due to Intolerance-cough       (Patient not taking: Reported on 3/9/2018)       glucose blood VI test strips strip 1 strip by In Vitro route 3 times daily. Has a Freestyle (Patient not taking: Reported on 12/1/2017) 2 Box 12     Allergies   Allergen Reactions     Nkda [No Known Drug Allergies]      Labs reviewed in EPIC    Reviewed and updated as needed this visit by clinical staff  Tobacco  Allergies  Meds  Problems  Med Hx  Surg Hx  Fam Hx  Soc Hx        Reviewed and updated as needed this visit by Provider  Allergies  Meds  Problems         ROS:  Constitutional, HEENT, cardiovascular, pulmonary, GI, , musculoskeletal, neuro, skin, endocrine and psych systems are negative, except as otherwise noted.    OBJECTIVE:     /74 (BP Location: Right arm, Cuff Size: Adult Regular)  Pulse 68  Temp 97  F (36.1  C) (Tympanic)  Resp 18  Wt 170 lb (77.1 kg)  SpO2 96%  BMI 32.12 kg/m2  Body mass index is 32.12 kg/(m^2).   GENERAL: healthy, alert and no distress, nontoxic in appearance  EYES: Eyes grossly normal to inspection, PERRL and conjunctivae and sclerae normal  HENT: ear canals and TM's normal, nose and mouth without ulcers or lesions, looks like she could have a mild case of thrush with some redness in mouth and white on roof of mouth.  NECK: no adenopathy, supple with full ROM  RESP: lungs clear to auscultation - no rales, rhonchi or wheezes  CV: regular rate and rhythm, normal S1 S2, no S3 or S4, no murmur, click or rub, no peripheral edema   ABDOMEN: soft, nontender, no hepatosplenomegaly, no masses and bowel sounds normal  MS: no gross musculoskeletal defects noted, no edema  No rash    Diagnostic Test Results:XR CHEST 2 VW 3/9/2018 5:51  PM     COMPARISON: 9/23/2017     HISTORY: Cough         IMPRESSION: Cardiac silhouette and pulmonary vasculature are within  normal limits. No focal airspace disease, pleural effusion or  pneumothorax.     DERIC TORRES MD  No results found for this or any previous visit (from the past 24 hour(s)).    ASSESSMENT/PLAN:     Problem List Items Addressed This Visit     Reactive airway disease, mild intermittent, uncomplicated    Type 2 diabetes mellitus with diabetic neuropathy (H)      Other Visit Diagnoses     Acute bronchitis with coexisting condition requiring prophylactic treatment    -  Primary    Relevant Medications    azithromycin (ZITHROMAX Z-MINH) 250 MG tablet    benzonatate (TESSALON) 200 MG capsule    Cough        Relevant Medications    benzonatate (TESSALON) 200 MG capsule    Other Relevant Orders    XR Chest 2 Views (Completed)    Thrush        Relevant Medications    nystatin (MYCOSTATIN) 923565 UNIT/ML suspension               Patient Instructions     Increase rest and fluids. Tylenol and/or Ibuprofen for comfort. Cool mist vaporizer. If your symptoms worsen or do not resolve follow up with your primary care provider in 1-2 weeks and sooner if needed.        Indications for emergent return to emergency department discussed with patient, who verbalized good understanding and agreement.  Patient understands the limitations of today's evaluation.             Bronchitis, Antibiotic Treatment (Adult)    Bronchitis is an infection of the air passages (bronchial tubes) in your lungs. It often occurs when you have a cold. This illness is contagious during the first few days and is spread through the air by coughing and sneezing, or by direct contact (touching the sick person and then touching your own eyes, nose, or mouth).  Symptoms of bronchitis include cough with mucus (phlegm) and low-grade fever. Bronchitis usually lasts 7 to 14 days. Mild cases can be treated with simple home remedies. More severe  infection is treated with an antibiotic.  Home care  Follow these guidelines when caring for yourself at home:    If your symptoms are severe, rest at home for the first 2 to 3 days. When you go back to your usual activities, don't let yourself get too tired.    Do not smoke. Also avoid being exposed to secondhand smoke.    You may use over-the-counter medicines to control fever or pain, unless another medicine was prescribed. (Note: If you have chronic liver or kidney disease or have ever had a stomach ulcer or gastrointestinal bleeding, talk with your healthcare provider before using these medicines. Also talk to your provider if you are taking medicine to prevent blood clots.) Aspirin should never be given to anyone younger than 18 years of age who is ill with a viral infection or fever. It may cause severe liver or brain damage.    Your appetite may be poor, so a light diet is fine. Avoid dehydration by drinking 6 to 8 glasses of fluids per day (such as water, soft drinks, sports drinks, juices, tea, or soup). Extra fluids will help loosen secretions in the nose and lungs.    Over-the-counter cough, cold, and sore-throat medicines will not shorten the length of the illness, but they may be helpful to reduce symptoms. (Note: Do not use decongestants if you have high blood pressure.)    Finish all antibiotic medicine. Do this even if you are feeling better after only a few days.  Follow-up care  Follow up with your healthcare provider, or as advised. If you had an X-ray or ECG (electrocardiogram), a specialist will review it. You will be notified of any new findings that may affect your care.  Note: If you are age 65 or older, or if you have a chronic lung disease or condition that affects your immune system, or you smoke, talk to your healthcare provider about having pneumococcal vaccinations and a yearly influenza vaccination (flu shot).  When to seek medical advice  Call your healthcare provider right away if  any of these occur:    Fever of 100.4 F (38 C) or higher    Coughing up increased amounts of colored sputum    Weakness, drowsiness, headache, facial pain, ear pain, or a stiff neck  Call 911, or get immediate medical care  Contact emergency services right away if any of these occur.    Coughing up blood    Worsening weakness, drowsiness, headache, or stiff neck    Trouble breathing, wheezing, or pain with breathing  Date Last Reviewed: 9/13/2015 2000-2017 [employer]. [address] [place] [address]. All rights reserved. This information is not intended as a substitute for professional medical care. Always follow your healthcare professional's instructions.        Candida Infection: Thrush  Thrush is a fungal infection in the mouth and throat. Thrush does not usually affect healthy adults. It is more common in people with a weak immune system. It is also more likely if you take antibiotics. Thrush is normally not contagious.  Understanding fungus in the mouth and throat  Your mouth and throat normally contain millions of tiny organisms. These include bacteria and yeasts. Many of these do not cause any problems. In fact, they may help fight disease.  Yeasts are a type of fungus. A type of yeast called Candida normally lives on the membranes of your mouth and throat. Usually, this yeast grows only in small amounts and is harmless. But in some cases, Candida can grow out of control and cause thrush. Thrush is related to other kinds of Candida infections that can grow all over the body. Thrush refers to an infection of only the mouth and throat.  What causes thrush?  Thrush happens when something lets too much Candida grow inside your mouth and throat. Certain things that change the normal balance of organisms in the mouth can lead to thrush. One example is antibiotic medicine. This medicine may kill some of the normal bacteria in your mouth. Candida can then grow freely. People on  antibiotics have an increased risk for thrush.  You have a higher risk for thrush if you:    Wear dentures    Are getting chemotherapy    Are getting radiation therapy    Have diabetes    Have a transplanted organ    Use corticosteroids, including inhaled corticosteroids for lung disease    Have a weak immune system, such as from AIDS    Are an older adult  Symptoms of thrush  Symptoms of thrush can include:    A dry, cottony feeling in your mouth    Cracking at the corners of the mouth    Loss of taste    Pain while eating or swallowing    White patches on the tongue and around the sides of the mouth  Diagnosing thrush  Your healthcare provider will ask about your medical history and your symptoms. He or she will look closely at your mouth and throat. White or red patches will be scraped with a tongue depressor. The sample will be sent to a lab to test. This test can usually confirm thrush.  If you have thrush, you may also have esophageal candidiasis. This is common in people who have HIV or a weak immune system. Your healthcare provider may check for this condition with an upper endoscopy. This is a procedure to look at the esophagus. A tissue sample may be taken to test.  Treatment for thrush  Thrush is usually treated with antifungal medicine. The medicine is put directly in your mouth and throat. You may be given a  swish and swallow  medicine or an antifungal lozenge.  In some cases, you may need an antifungal pill. This can remove Candida throughout your body. Or you may need medicine through an intravenous line ( IV). These treatments depend on how severe your infection is, and what other health conditions you have.  If you are at high risk for thrush, you may need to keep taking oral antifungal medicine. This is to help prevent thrush in the future.  What happens if you don t get treated for thrush?  If untreated, the Candida may spread throughout your body. They may even enter your bloodstream. This can  cause serious problems, such as organ failure and even death. Bloodstream infection may need to be treated with high doses of antifungal medicine through an IV.  Systemic infection is much more likely in people who are very ill. It is also more common in those who have serious problems with their immune system. Additional risk factors for systemic infection in very ill people include:    Central venous lines    IV nutrition    Use of broad-spectrum antibiotics    Kidney failure    Recent surgery  Preventing thrush  You may be able to help prevent some cases of thrush. Make sure to:    Practice good oral hygiene. Try using a chlorhexidine mouthwash.    Clean your dentures regularly as instructed. Make sure they fit you correctly.    After using a corticosteroid inhaler, rinse out your mouth with water or mouthwash.    Do not use broad-spectrum antibiotics, if possible.    Get treated for health problems that increase your risk for thrush, such as diabetes.     When to call the healthcare provider  Call your healthcare provider right away if you have any of these:    Cottony feeling in your mouth    Loss of taste    Pain while eating or swallowing    White patches or plaques on your tongue or inside your mouth   Date Last Reviewed: 5/1/2017 2000-2017 [employer]. [address] [place] [address]. All rights reserved. This information is not intended as a substitute for professional medical care. Always follow your healthcare professional's instructions.            NICHELLE Durbin [facility]

## 2018-03-09 NOTE — NURSING NOTE
"Chief Complaint   Patient presents with     Cough     for about 3 weeks.  Has gone through 1.5 bottles of OTC cough syrup.  None productive.      Pharyngitis     about a week.  sore and roof of mouth sore.        Initial /74 (BP Location: Right arm, Cuff Size: Adult Regular)  Pulse 68  Temp 97  F (36.1  C) (Tympanic)  Resp 18  Wt 170 lb (77.1 kg)  SpO2 96%  BMI 32.12 kg/m2 Estimated body mass index is 32.12 kg/(m^2) as calculated from the following:    Height as of 2/15/18: 5' 1\" (1.549 m).    Weight as of this encounter: 170 lb (77.1 kg).      Health Maintenance that is potentially due pending provider review:  NONE    n/a    Is there anyone who you would like to be able to receive your results? Not Applicable  If yes have patient fill out RICH Benson M.A.        "

## 2018-03-09 NOTE — MR AVS SNAPSHOT
After Visit Summary   3/9/2018    Theresa Bill    MRN: [id number]           Patient Information     Date Of Birth          1945        Visit Information        Provider Department      3/9/2018 5:30 PM Elisabeth Membreno APRN [facility]        Today's Diagnoses     Acute bronchitis with coexisting condition requiring prophylactic treatment    -  1    Cough        Thrush        Type 2 diabetes mellitus with diabetic neuropathy, unspecified long term insulin use status (H)        Reactive airway disease, mild intermittent, uncomplicated          Care Instructions    Increase rest and fluids. Tylenol and/or Ibuprofen for comfort. Cool mist vaporizer. If your symptoms worsen or do not resolve follow up with your primary care provider in 1-2 weeks and sooner if needed.        Indications for emergent return to emergency department discussed with patient, who verbalized good understanding and agreement.  Patient understands the limitations of today's evaluation.             Bronchitis, Antibiotic Treatment (Adult)    Bronchitis is an infection of the air passages (bronchial tubes) in your lungs. It often occurs when you have a cold. This illness is contagious during the first few days and is spread through the air by coughing and sneezing, or by direct contact (touching the sick person and then touching your own eyes, nose, or mouth).  Symptoms of bronchitis include cough with mucus (phlegm) and low-grade fever. Bronchitis usually lasts 7 to 14 days. Mild cases can be treated with simple home remedies. More severe infection is treated with an antibiotic.  Home care  Follow these guidelines when caring for yourself at home:    If your symptoms are severe, rest at home for the first 2 to 3 days. When you go back to your usual activities, don't let yourself get too tired.    Do not smoke. Also avoid being exposed to secondhand smoke.    You may use over-the-counter  medicines to control fever or pain, unless another medicine was prescribed. (Note: If you have chronic liver or kidney disease or have ever had a stomach ulcer or gastrointestinal bleeding, talk with your healthcare provider before using these medicines. Also talk to your provider if you are taking medicine to prevent blood clots.) Aspirin should never be given to anyone younger than 18 years of age who is ill with a viral infection or fever. It may cause severe liver or brain damage.    Your appetite may be poor, so a light diet is fine. Avoid dehydration by drinking 6 to 8 glasses of fluids per day (such as water, soft drinks, sports drinks, juices, tea, or soup). Extra fluids will help loosen secretions in the nose and lungs.    Over-the-counter cough, cold, and sore-throat medicines will not shorten the length of the illness, but they may be helpful to reduce symptoms. (Note: Do not use decongestants if you have high blood pressure.)    Finish all antibiotic medicine. Do this even if you are feeling better after only a few days.  Follow-up care  Follow up with your healthcare provider, or as advised. If you had an X-ray or ECG (electrocardiogram), a specialist will review it. You will be notified of any new findings that may affect your care.  Note: If you are age 65 or older, or if you have a chronic lung disease or condition that affects your immune system, or you smoke, talk to your healthcare provider about having pneumococcal vaccinations and a yearly influenza vaccination (flu shot).  When to seek medical advice  Call your healthcare provider right away if any of these occur:    Fever of 100.4 F (38 C) or higher    Coughing up increased amounts of colored sputum    Weakness, drowsiness, headache, facial pain, ear pain, or a stiff neck  Call 911, or get immediate medical care  Contact emergency services right away if any of these occur.    Coughing up blood    Worsening weakness, drowsiness, headache, or  stiff neck    Trouble breathing, wheezing, or pain with breathing  Date Last Reviewed: 9/13/2015 2000-2017 [employer]. [address] [place] [address], [place] [address]. All rights reserved. This information is not intended as a substitute for professional medical care. Always follow your healthcare professional's instructions.        Candida Infection: Thrush  Thrush is a fungal infection in the mouth and throat. Thrush does not usually affect healthy adults. It is more common in people with a weak immune system. It is also more likely if you take antibiotics. Thrush is normally not contagious.  Understanding fungus in the mouth and throat  Your mouth and throat normally contain millions of tiny organisms. These include bacteria and yeasts. Many of these do not cause any problems. In fact, they may help fight disease.  Yeasts are a type of fungus. A type of yeast called Candida normally lives on the membranes of your mouth and throat. Usually, this yeast grows only in small amounts and is harmless. But in some cases, Candida can grow out of control and cause thrush. Thrush is related to other kinds of Candida infections that can grow all over the body. Thrush refers to an infection of only the mouth and throat.  What causes thrush?  Thrush happens when something lets too much Candida grow inside your mouth and throat. Certain things that change the normal balance of organisms in the mouth can lead to thrush. One example is antibiotic medicine. This medicine may kill some of the normal bacteria in your mouth. Candida can then grow freely. People on antibiotics have an increased risk for thrush.  You have a higher risk for thrush if you:    Wear dentures    Are getting chemotherapy    Are getting radiation therapy    Have diabetes    Have a transplanted organ    Use corticosteroids, including inhaled corticosteroids for lung disease    Have a weak immune system, such as from AIDS    Are an older  adult  Symptoms of thrush  Symptoms of thrush can include:    A dry, cottony feeling in your mouth    Cracking at the corners of the mouth    Loss of taste    Pain while eating or swallowing    White patches on the tongue and around the sides of the mouth  Diagnosing thrush  Your healthcare provider will ask about your medical history and your symptoms. He or she will look closely at your mouth and throat. White or red patches will be scraped with a tongue depressor. The sample will be sent to a lab to test. This test can usually confirm thrush.  If you have thrush, you may also have esophageal candidiasis. This is common in people who have HIV or a weak immune system. Your healthcare provider may check for this condition with an upper endoscopy. This is a procedure to look at the esophagus. A tissue sample may be taken to test.  Treatment for thrush  Thrush is usually treated with antifungal medicine. The medicine is put directly in your mouth and throat. You may be given a  swish and swallow  medicine or an antifungal lozenge.  In some cases, you may need an antifungal pill. This can remove Candida throughout your body. Or you may need medicine through an intravenous line ( IV). These treatments depend on how severe your infection is, and what other health conditions you have.  If you are at high risk for thrush, you may need to keep taking oral antifungal medicine. This is to help prevent thrush in the future.  What happens if you don t get treated for thrush?  If untreated, the Candida may spread throughout your body. They may even enter your bloodstream. This can cause serious problems, such as organ failure and even death. Bloodstream infection may need to be treated with high doses of antifungal medicine through an IV.  Systemic infection is much more likely in people who are very ill. It is also more common in those who have serious problems with their immune system. Additional risk factors for systemic  infection in very ill people include:    Central venous lines    IV nutrition    Use of broad-spectrum antibiotics    Kidney failure    Recent surgery  Preventing thrush  You may be able to help prevent some cases of thrush. Make sure to:    Practice good oral hygiene. Try using a chlorhexidine mouthwash.    Clean your dentures regularly as instructed. Make sure they fit you correctly.    After using a corticosteroid inhaler, rinse out your mouth with water or mouthwash.    Do not use broad-spectrum antibiotics, if possible.    Get treated for health problems that increase your risk for thrush, such as diabetes.     When to call the healthcare provider  Call your healthcare provider right away if you have any of these:    Cottony feeling in your mouth    Loss of taste    Pain while eating or swallowing    White patches or plaques on your tongue or inside your mouth   Date Last Reviewed: 5/1/2017 2000-2017 [employer]. [address] [place] [address]. All rights reserved. This information is not intended as a substitute for professional medical care. Always follow your healthcare professional's instructions.                Follow-ups after your visit        Follow-up notes from your care team     See patient instructions section of the AVS Return if symptoms worsen or fail to improve, for Follow up with your primary care provider.      Your next 10 appointments already scheduled     Mar 12, 2018  9:30 AM CDT   Return Visit with Sam Jaime   [facility] ([facility])    [address] [facility] [address]   [phone]            Mar 14, 2018  9:40 AM CDT   [facility] with Shae Pierre PA-C   [facility]    [address] [place] [id number] [phone]            Mar 26, 2018  9:30 AM CDT   Return Visit with Sam Jaime   [facility] ([facility])    [address]  [facility] [address]   [phone]              Who to contact     If you have questions or need follow up information about today's clinic visit or your schedule please contact [facility] directly at [phone].  Normal or non-critical lab and imaging results will be communicated to you by MyChart, letter or phone within 4 business days after the clinic has received the results. If you do not hear from us within 7 days, please contact the clinic through [employer] or phone. If you have a critical or abnormal lab result, we will notify you by phone as soon as possible.  Submit refill requests through [employer] or call your pharmacy and they will forward the refill request to us. Please allow 3 business days for your refill to be completed.          Additional Information About Your Visit        MyChart Information     [employer] gives you secure access to your electronic health record. If you see a primary care provider, you can also send messages to your care team and make appointments. If you have questions, please call your primary care clinic.  If you do not have a primary care provider, please call [phone] and they will assist you.        Care EveryWhere ID     This is your Care EveryWhere ID. This could be used by other organizations to access your [facility] medical records  [id number]        Your Vitals Were     Pulse Temperature Respirations Pulse Oximetry BMI (Body Mass Index)       68 97  F (36.1  C) (Tympanic) 18 96% 32.12 kg/m2        Blood Pressure from Last 3 Encounters:   03/09/18 117/74   02/15/18 128/74   12/18/17 127/78    Weight from Last 3 Encounters:   03/09/18 170 lb (77.1 kg)   02/15/18 170 lb (77.1 kg)   12/18/17 167 lb (75.8 kg)                 Today's Medication Changes          These changes are accurate as of 3/9/18  6:14 PM.  If you have any questions, ask your nurse or doctor.               Start taking these medicines.         Dose/Directions    azithromycin 250 MG tablet   Commonly known as:  ZITHROMAX Z-MINH   Used for:  Acute bronchitis with coexisting condition requiring prophylactic treatment   Started by:  Elisabeth Membreno APRN CNP        Take 2 tablets on day 1 and then take 1 tablet days 2-5   Quantity:  6 tablet   Refills:  0       benzonatate 200 MG capsule   Commonly known as:  TESSALON   Used for:  Cough   Started by:  Elisabeth Membreno APRN CNP        Dose:  200 mg   Take 1 capsule (200 mg) by mouth 3 times daily as needed   Quantity:  30 capsule   Refills:  0       nystatin [id number] UNIT/ML suspension   Commonly known as:  MYCOSTATIN   Used for:  Thrush   Started by:  Elisabeth Membreno APRN CNP        Dose:  232238 Units   Take 5 mLs (500,000 Units) by mouth 4 times daily Swish and swallow or spit out.   Quantity:  60 mL   Refills:  0            Where to get your medicines      These medications were sent to [facility] [address] [place] [address]  [address] [facility] [address]     Phone:  [phone]     azithromycin 250 MG tablet    benzonatate 200 MG capsule    nystatin [id number] UNIT/ML suspension                Primary Care Provider Office Phone # Fax #    Shae Pierre PA-C [phone] [address] [place] [address]        Equal Access to Services     SABAS VUONG AH: Hadii lamonte starr hadasho Soelielali, waaxda luqadaha, qaybta kaalmada adeegyada, ángel oconnor . So [facility] [phone].    ATENCIÓN: Si habla español, tiene a urban disposición servicios gratuitos de asistencia lingüística. Elaina al [phone].    We comply with applicable federal civil rights laws and [place] laws. We do not discriminate on the basis of race, color, national origin, age, disability, sex, sexual orientation, or gender identity.            Thank you!     Thank you for choosing [facility]  for your care. Our  goal is always to provide you with excellent care. Hearing back from our patients is one way we can continue to improve our services. Please take a few minutes to complete the written survey that you may receive in the mail after your visit with us. Thank you!             Your Updated Medication List - Protect others around you: Learn how to safely use, store and throw away your medicines at www.disposemymeds.org.          This list is accurate as of 3/9/18  6:14 PM.  Always use your most recent med list.                   Brand Name Dispense Instructions for use Diagnosis    ACE/ARB/ARNI NOT PRESCRIBED (INTENTIONAL)      ACE & ARB not prescribed due to Intolerance-cough    Type 2 diabetes, HbA1c goal < 7% (H)       aspirin 81 MG tablet     100    ONE DAILY    Type II or unspecified type diabetes mellitus without mention of complication, not stated as uncontrolled       atorvastatin 40 MG tablet    LIPITOR    90 tablet    Take 1 tablet (40 mg) by mouth At Bedtime    Dyslipidemia       azithromycin 250 MG tablet    ZITHROMAX Z-MINH    6 tablet    Take 2 tablets on day 1 and then take 1 tablet days 2-5    Acute bronchitis with coexisting condition requiring prophylactic treatment       benzonatate 200 MG capsule    TESSALON    30 capsule    Take 1 capsule (200 mg) by mouth 3 times daily as needed    Cough       blood glucose monitoring test strip    no brand specified    2 Box    1 strip by In Vitro route 3 times daily. Has a Freestyle    Type 2 diabetes, HbA1c goal < 7% (H)       buPROPion 150 MG 24 hr tablet    WELLBUTRIN XL    30 tablet    Take 1 tablet (150 mg) by mouth every morning    Mild recurrent major depression (H), Grief       clonazePAM 0.5 MG tablet    klonoPIN    180 tablet    Take 1 mg by mouth At Bedtime        DAILY MULTIVITAMIN PO      One tablet daily        DULoxetine 60 MG EC capsule    CYMBALTA    30 capsule    TAKE ONE CAPSULE BY MOUTH ONCE DAILY    Major depressive disorder, recurrent episode,  mild (H)       fluocinonide 0.05 % ointment    LIDEX    15 g    Apply sparingly to affected area twice daily as needed for mouth ulcer.    Aphthae, oral       hydrochlorothiazide 12.5 MG Tabs tablet     90 tablet    Take 1 tablet (12.5 mg) by mouth daily        hydrocortisone 1 % cream    CORTAID    30 g    Apply sparingly to affected area of lip corners two times daily for 1 week.    Angular cheilitis       insulin detemir 100 UNIT/ML injection    LEVEMIR FLEXPEN/FLEXTOUCH    15 mL    10 units at bedtime    Type 2 diabetes mellitus without complication, with long-term current use of insulin (H)       insulin pen needle 32G X 4 MM    BD MARIIA U/F    90 each    Use one pen daily    Type 2 diabetes mellitus without complication, with long-term current use of insulin (H)       ketoconazole 2 % cream    NIZORAL    30 g    Apply topically 2 times daily To corners of lips for 2 weeks or as needed    Angular cheilitis       metFORMIN 500 MG 24 hr tablet    GLUCOPHAGE-XR    180 tablet    Take 1 tablet (500 mg) by mouth 2 times daily (with meals)    Type 2 diabetes mellitus without complication, with long-term current use of insulin (H)       metoprolol succinate 25 MG 24 hr tablet    TOPROL-XL    90 tablet    Take 1 tablet (25 mg) by mouth daily        nitroGLYcerin 0.4 MG sublingual tablet    NITROSTAT    25 tablet    For chest pain place 1 tablet under the tongue every 5 minutes for 3 doses. If symptoms persist 5 minutes after 1st dose call 911.    Chest pressure       nystatin [id number] UNIT/ML suspension    MYCOSTATIN    60 mL    Take 5 mLs (500,000 Units) by mouth 4 times daily Swish and swallow or spit out.    Thrush       * nystatin cream    MYCOSTATIN    60 g    Apply twice daily for yeast rash for 3 weeks.    Intertrigo       * nystatin [id number] UNIT/GM Powd    MYCOSTATIN    60 g    Apply 1 g topically 3 times daily as needed    Other specified erythematous condition(390.97)       olopatadine 0.1 % ophthalmic solution     PATANOL    5 mL    Place 1 drop into both eyes 2 times daily    Allergic conjunctivitis, bilateral       order for DME      Equipment being ordered: CPAP Patient Theresa Bill received a [employer]med Airsense 10  Auto. Pressures were set at Auto 10 - 15 cm H2O.        order for DME     1 Device    Equipment being ordered: Knee compression sleeve    Fall at home, subsequent encounter, Pain in joint, lower leg, unspecified laterality, Effusion of knee joint right, Arthritis of knee, degenerative       oxybutynin chloride 15 MG Tb24           pantoprazole 40 MG EC tablet    PROTONIX    30 tablet    Take 1 tablet (40 mg) by mouth daily    Gastroesophageal reflux disease, esophagitis presence not specified       PROBIOTIC & ACIDOPHILUS EX ST Caps           ranitidine 150 MG tablet    ZANTAC    60 tablet    TAKE ONE TABLET BY MOUTH TWICE DAILY    Gastroesophageal reflux disease with esophagitis, Esophageal dysmotility, Chronic cough       traMADol 50 MG tablet    ULTRAM    30 tablet    TAKE ONE TABLET BY MOUTH EVERY 6 HOURS AS NEEDED FOR  MODERATE  PAIN    Radicular leg pain       * Notice:  This list has 2 medication(s) that are the same as other medications prescribed for you. Read the directions carefully, and ask your doctor or other care provider to review them with you.

## 2018-03-10 NOTE — PATIENT INSTRUCTIONS
Increase rest and fluids. Tylenol and/or Ibuprofen for comfort. Cool mist vaporizer. If your symptoms worsen or do not resolve follow up with your primary care provider in 1-2 weeks and sooner if needed.        Indications for emergent return to emergency department discussed with patient, who verbalized good understanding and agreement.  Patient understands the limitations of today's evaluation.             Bronchitis, Antibiotic Treatment (Adult)    Bronchitis is an infection of the air passages (bronchial tubes) in your lungs. It often occurs when you have a cold. This illness is contagious during the first few days and is spread through the air by coughing and sneezing, or by direct contact (touching the sick person and then touching your own eyes, nose, or mouth).  Symptoms of bronchitis include cough with mucus (phlegm) and low-grade fever. Bronchitis usually lasts 7 to 14 days. Mild cases can be treated with simple home remedies. More severe infection is treated with an antibiotic.  Home care  Follow these guidelines when caring for yourself at home:    If your symptoms are severe, rest at home for the first 2 to 3 days. When you go back to your usual activities, don't let yourself get too tired.    Do not smoke. Also avoid being exposed to secondhand smoke.    You may use over-the-counter medicines to control fever or pain, unless another medicine was prescribed. (Note: If you have chronic liver or kidney disease or have ever had a stomach ulcer or gastrointestinal bleeding, talk with your healthcare provider before using these medicines. Also talk to your provider if you are taking medicine to prevent blood clots.) Aspirin should never be given to anyone younger than 18 years of age who is ill with a viral infection or fever. It may cause severe liver or brain damage.    Your appetite may be poor, so a light diet is fine. Avoid dehydration by drinking 6 to 8 glasses of fluids per day (such as water, soft  drinks, sports drinks, juices, tea, or soup). Extra fluids will help loosen secretions in the nose and lungs.    Over-the-counter cough, cold, and sore-throat medicines will not shorten the length of the illness, but they may be helpful to reduce symptoms. (Note: Do not use decongestants if you have high blood pressure.)    Finish all antibiotic medicine. Do this even if you are feeling better after only a few days.  Follow-up care  Follow up with your healthcare provider, or as advised. If you had an X-ray or ECG (electrocardiogram), a specialist will review it. You will be notified of any new findings that may affect your care.  Note: If you are age 65 or older, or if you have a chronic lung disease or condition that affects your immune system, or you smoke, talk to your healthcare provider about having pneumococcal vaccinations and a yearly influenza vaccination (flu shot).  When to seek medical advice  Call your healthcare provider right away if any of these occur:    Fever of 100.4 F (38 C) or higher    Coughing up increased amounts of colored sputum    Weakness, drowsiness, headache, facial pain, ear pain, or a stiff neck  Call 911, or get immediate medical care  Contact emergency services right away if any of these occur.    Coughing up blood    Worsening weakness, drowsiness, headache, or stiff neck    Trouble breathing, wheezing, or pain with breathing  Date Last Reviewed: 9/13/2015 2000-2017 [employer]. [address] [place], [place] [id number]. All rights reserved. This information is not intended as a substitute for professional medical care. Always follow your healthcare professional's instructions.        Candida Infection: Thrush  Thrush is a fungal infection in the mouth and throat. Thrush does not usually affect healthy adults. It is more common in people with a weak immune system. It is also more likely if you take antibiotics. Thrush is normally not contagious.  Understanding  fungus in the mouth and throat  Your mouth and throat normally contain millions of tiny organisms. These include bacteria and yeasts. Many of these do not cause any problems. In fact, they may help fight disease.  Yeasts are a type of fungus. A type of yeast called Candida normally lives on the membranes of your mouth and throat. Usually, this yeast grows only in small amounts and is harmless. But in some cases, Candida can grow out of control and cause thrush. Thrush is related to other kinds of Candida infections that can grow all over the body. Thrush refers to an infection of only the mouth and throat.  What causes thrush?  Thrush happens when something lets too much Candida grow inside your mouth and throat. Certain things that change the normal balance of organisms in the mouth can lead to thrush. One example is antibiotic medicine. This medicine may kill some of the normal bacteria in your mouth. Candida can then grow freely. People on antibiotics have an increased risk for thrush.  You have a higher risk for thrush if you:    Wear dentures    Are getting chemotherapy    Are getting radiation therapy    Have diabetes    Have a transplanted organ    Use corticosteroids, including inhaled corticosteroids for lung disease    Have a weak immune system, such as from AIDS    Are an older adult  Symptoms of thrush  Symptoms of thrush can include:    A dry, cottony feeling in your mouth    Cracking at the corners of the mouth    Loss of taste    Pain while eating or swallowing    White patches on the tongue and around the sides of the mouth  Diagnosing thrush  Your healthcare provider will ask about your medical history and your symptoms. He or she will look closely at your mouth and throat. White or red patches will be scraped with a tongue depressor. The sample will be sent to a lab to test. This test can usually confirm thrush.  If you have thrush, you may also have esophageal candidiasis. This is common in people  who have HIV or a weak immune system. Your healthcare provider may check for this condition with an upper endoscopy. This is a procedure to look at the esophagus. A tissue sample may be taken to test.  Treatment for thrush  Thrush is usually treated with antifungal medicine. The medicine is put directly in your mouth and throat. You may be given a  swish and swallow  medicine or an antifungal lozenge.  In some cases, you may need an antifungal pill. This can remove Candida throughout your body. Or you may need medicine through an intravenous line ( IV). These treatments depend on how severe your infection is, and what other health conditions you have.  If you are at high risk for thrush, you may need to keep taking oral antifungal medicine. This is to help prevent thrush in the future.  What happens if you don t get treated for thrush?  If untreated, the Candida may spread throughout your body. They may even enter your bloodstream. This can cause serious problems, such as organ failure and even death. Bloodstream infection may need to be treated with high doses of antifungal medicine through an IV.  Systemic infection is much more likely in people who are very ill. It is also more common in those who have serious problems with their immune system. Additional risk factors for systemic infection in very ill people include:    Central venous lines    IV nutrition    Use of broad-spectrum antibiotics    Kidney failure    Recent surgery  Preventing thrush  You may be able to help prevent some cases of thrush. Make sure to:    Practice good oral hygiene. Try using a chlorhexidine mouthwash.    Clean your dentures regularly as instructed. Make sure they fit you correctly.    After using a corticosteroid inhaler, rinse out your mouth with water or mouthwash.    Do not use broad-spectrum antibiotics, if possible.    Get treated for health problems that increase your risk for thrush, such as diabetes.     When to call the  healthcare provider  Call your healthcare provider right away if you have any of these:    Cottony feeling in your mouth    Loss of taste    Pain while eating or swallowing    White patches or plaques on your tongue or inside your mouth   Date Last Reviewed: 5/1/2017 2000-2017 [employer]. [address] [place] [id number]. All rights reserved. This information is not intended as a substitute for professional medical care. Always follow your healthcare professional's instructions.

## 2018-03-12 ENCOUNTER — OFFICE VISIT (OUTPATIENT)
Dept: PSYCHOLOGY | Facility: CLINIC | Age: 73
End: 2018-03-12
Payer: COMMERCIAL

## 2018-03-12 DIAGNOSIS — F32.0 MILD MAJOR DEPRESSION (H): Primary | ICD-10-CM

## 2018-03-12 DIAGNOSIS — F43.21 GRIEF: ICD-10-CM

## 2018-03-12 PROCEDURE — 90834 PSYTX W PT 45 MINUTES: CPT | Performed by: PSYCHOLOGIST

## 2018-03-12 NOTE — MR AVS SNAPSHOT
MRN:[id number]                      After Visit Summary   3/12/2018    Theresa Bill    MRN: [id number]           Visit Information        Provider Department      3/12/2018 9:30 AM Addison Sam [facility] Generic      Your next 10 appointments already scheduled     Mar 14, 2018  9:40 AM CDT   SHORT with Shae Pierre PA-C   [facility] ([facility]    [address] [place] [address] [phone]            Mar 26, 2018  9:30 AM CDT   Return Visit with Sam Jaime   [facility] ([facility])    [address] [facility] [address] [phone]              MyChart Information     [employer] gives you secure access to your electronic health record. If you see a primary care provider, you can also send messages to your care team and make appointments. If you have questions, please call your primary care clinic.  If you do not have a primary care provider, please call [phone] and they will assist you.        Care EveryWhere ID     This is your Care EveryWhere ID. This could be used by other organizations to access your [place] medical records  [id number]        Equal Access to Services     SABAS VUONG : Annalisa hall Sodanielle, waaxda luqadaha, qaybta kaalmada adecris, ángel jauregui. So [facility] [phone].    ATENCIÓN: Si habla español, tiene a urban disposición servicios gratuitos de asistencia lingüística. Llame al [phone].    We comply with applicable federal civil rights laws and [place] laws. We do not discriminate on the basis of race, color, national origin, age, disability, sex, sexual orientation, or gender identity.

## 2018-03-13 NOTE — PROGRESS NOTES
Progress Note  Disclaimer: This note consists of symbols derived from keyboarding, dictation and/or voice recognition software. As a result, there may be errors in the script that have gone undetected. Please consider this when interpreting information found in this chart.    Client Name: Theresa Bill  Date: 3/12/2018         Service Type: Individual      Session Start Time: 8:30 AM  Session End Time: 9:15 AM      Session Length: 45     Session #: 8     Attendees: Client attended alone    Treatment Plan Last Reviewed: November 3, 2017       DATA   Client reports recently Feeling a little anxious.  She is not sure why.  Describes the sensation as butterflies in her stomach.  Reported a great trip with her sisters to [place] and [place].  She has had a couple of bad days where she has felt lonely and left out because she did not have anyone with her.  All in all proceeding through her loss very well.     Progress Since Last Session (Related to Symptoms / Goals / Homework):   Symptoms: Stable    Homework: not applicable      Episode of Care Goals: Satisfactory progress - CONTEMPLATION (Considering change and yet undecided); Intervened by assessing the negative and positive thinking (ambivalence) about behavior change     Current / Ongoing Stressors and Concerns:   Recent bereavement, prior caregiver stress     Treatment Objective(s) Addressed in This Session:   increase understanding of steps in the grief process  Utilize family support     Intervention:   Interpersonal Therapy: facilitating appropriate expressions of emotion and grief. behavioral activation explored what self care strategies she is currently using and what she might be able to add. Went over the relapsing remitting nature of grief and depression. Also reviewed sleep hygiene.          ASSESSMENT: Current Emotional / Mental Status (status of significant symptoms):   Risk status (Self / Other harm or  suicidal ideation)   Client denies current fears or concerns for personal safety.   Client denies current or recent suicidal ideation or behaviors.   Client denies current or recent homicidal ideation or behaviors.   Client denies current or recent self injurious behavior or ideation.   Client denies other safety concerns.   A safety and risk management plan has not been developed at this time, however client was given the after-hours number / 911 should there be a change in any of these risk factors.     Appearance:   Appropriate    Eye Contact:   Good    Psychomotor Behavior: Normal    Attitude:   Cooperative    Orientation:   All   Speech    Rate / Production: Slow     Volume:  Soft    Mood:    Depressed    Affect:    Appropriate    Thought Content:  Clear    Thought Form:  Coherent  Logical  difficult to think of anything but the loss of her    Insight:    Fair      Medication Review:   No changes to current psychiatric medication(s)     Medication Compliance:   Yes     Changes in Health Issues:   None reported     Chemical Use Review:   Substance Use: Chemical use reviewed, no active concerns identified      Tobacco Use: No current tobacco use.       Collateral Reports Completed:   Not Applicable    PLAN: (Client Tasks / Therapist Tasks / Other)  Client is doing exactly what she needs to be doing.  Including having a slump.  Therapist encouraged client in the appropriate expressions of grief as well as of her love for her late .  Client to limit naps during the day to 45 minutes or less, make an attempt to sleep in bed.        Sam Jaime                                                         ________________________________________________________________________    Treatment Plan    Client's Name: Theresa Bill  YOB: 1945    Date: November 6, 2017    Referral / Collaboration:  Referral to another professional/service is not indicated at this time..    Anticipated number of  session or this episode of care: 25       DSM5 Diagnoses: (Sustained by DSM5 Criteria Listed Above)  Diagnoses:            296.21 (F32.0) Major Depressive Disorder, Single Episode, Mild _ grief  Psychosocial & Contextual Factors: Caregiver stress, recent death of her   WHODAS 2.0 (12 item)                          This questionnaire asks about difficulties due to health conditions. Health conditions                   include                        disease or illnesses, other health problems that may be short or long lasting,                    injuries, mental health or emotional problems, and problems with alcohol or drugs.                              Think back over the past 30 days and answer these questions, thinking about how much              difficulty you had doing the following activities. For each question, please Ken[place]ze only                   one response.      S1 Standing for long periods such as 30 minutes? Moderate =   3   S2 Taking care of household responsibilities? None =         1   S3 Learning a new task, for example, learning how to get to a new place? Mild =           2   S4 How much of a problem do you have joining community activities (for example, festivals, Zoroastrianism or other activities) in the same way as anyone else can? None =         1   S5 How much have you been emotionally affected by your health problems? None =         1               In the past 30 days, how much difficulty did you have in:   S6 Concentrating on doing something for ten minutes? None =         1   S7 Walking a long distance such as a kilometer (or equivalent)? Moderate =   3   S8 Washing your whole body? None =         1   S9 Getting dressed? None =         1   S10 Dealing with people you do not know? None =         1   S11 Maintaining a friendship? None =         1   S12 Your day to day work? None =         1       H1 Overall, in the past 30 days, how many days were these difficulties present? Record number  of days 1    H2 In the past 30 days, for how many days were you totally unable to carry out your usual activities or work because of any health condition? Record number of days  0    H3 In the past 30 days, not counting the days that you were totally unable, for how many days did you cut back or reduce your usual activities or work because of any health condition? Record number of days 0      Goals  1. Education- the stages of grief  a. Client will be able to describe the stages of grief; denial, bargaining, anger, depression, and acceptance and give examples of how each have felt for her.  2. Behavioral Activation  a. Client will learn to assess their emotional stateon a day to day basis  b. Client will Identify two forms of exercise/activity and engage in them 3 times per week  c. Client will Identify 3 things that make them laugh, and engage in these 5 times per week.  d. Client will Identify 1-2Creative activities or hobbies  and engage in them 2 times per week  e. Client will identify music, movies, books that make them feel good and use them 3-4 times per week  3. Self-care  a. Client will identify 5 things they can do just for themselves  b. Client will take time for quiet, reflection, meditation 5 times per week  c. Client will Learn to set boundaries when appropriate  d. Client will Identify 2 individuals they can call on for support, distraction  4. Assessment of progress  a. Client will engage in assessment of their progress on a regular basis      Client has reviewed and agreed to the above plan.      Sam Jaime  November 6, 2017

## 2018-03-14 ENCOUNTER — OFFICE VISIT (OUTPATIENT)
Dept: FAMILY MEDICINE | Facility: CLINIC | Age: 73
End: 2018-03-14
Payer: COMMERCIAL

## 2018-03-14 VITALS
RESPIRATION RATE: 16 BRPM | WEIGHT: 169 LBS | HEART RATE: 60 BPM | SYSTOLIC BLOOD PRESSURE: 118 MMHG | DIASTOLIC BLOOD PRESSURE: 80 MMHG | TEMPERATURE: 97.9 F | BODY MASS INDEX: 31.93 KG/M2

## 2018-03-14 DIAGNOSIS — E11.40 TYPE 2 DIABETES MELLITUS WITH DIABETIC NEUROPATHY, WITH LONG-TERM CURRENT USE OF INSULIN (H): ICD-10-CM

## 2018-03-14 DIAGNOSIS — I10 BENIGN ESSENTIAL HYPERTENSION: ICD-10-CM

## 2018-03-14 DIAGNOSIS — E78.5 DYSLIPIDEMIA: ICD-10-CM

## 2018-03-14 DIAGNOSIS — R07.81 RIB PAIN: Primary | ICD-10-CM
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
## 2018-03-14 DIAGNOSIS — F32.0 MILD MAJOR DEPRESSION (H): Chronic | ICD-10-CM

## 2018-03-14 DIAGNOSIS — K21.9 GASTROESOPHAGEAL REFLUX DISEASE, ESOPHAGITIS PRESENCE NOT SPECIFIED: ICD-10-CM

## 2018-03-14 DIAGNOSIS — Z79.4 TYPE 2 DIABETES MELLITUS WITH DIABETIC NEUROPATHY, WITH LONG-TERM CURRENT USE OF INSULIN (H): ICD-10-CM

## 2018-03-14 DIAGNOSIS — F33.0 MAJOR DEPRESSIVE DISORDER, RECURRENT EPISODE, MILD (H): Chronic | ICD-10-CM

## 2018-03-14 DIAGNOSIS — Z91.81 AT RISK FOR FALLING: ICD-10-CM

## 2018-03-14 LAB
ANION GAP SERPL CALCULATED.3IONS-SCNC: 7 MMOL/L (ref 3–14)
BUN SERPL-MCNC: 12 MG/DL (ref 7–30)
CALCIUM SERPL-MCNC: 8.9 MG/DL (ref 8.5–10.1)
CHLORIDE SERPL-SCNC: 101 MMOL/L (ref 94–109)
CHOLEST SERPL-MCNC: 131 MG/DL
CO2 SERPL-SCNC: 30 MMOL/L (ref 20–32)
CREAT SERPL-MCNC: 0.81 MG/DL (ref 0.52–1.04)
GFR SERPL CREATININE-BSD FRML MDRD: 69 ML/MIN/1.7M2
GLUCOSE SERPL-MCNC: 166 MG/DL (ref 70–99)
HBA1C MFR BLD: 7.1 % (ref 4.3–6)
HDLC SERPL-MCNC: 45 MG/DL
LDLC SERPL CALC-MCNC: 52 MG/DL
NONHDLC SERPL-MCNC: 86 MG/DL
POTASSIUM SERPL-SCNC: 4.4 MMOL/L (ref 3.4–5.3)
SODIUM SERPL-SCNC: 138 MMOL/L (ref 133–144)
TRIGL SERPL-MCNC: 169 MG/DL

## 2018-03-14 PROCEDURE — 80048 BASIC METABOLIC PNL TOTAL CA: CPT | Performed by: PHYSICIAN ASSISTANT

## 2018-03-14 PROCEDURE — 99214 OFFICE O/P EST MOD 30 MIN: CPT | Performed by: PHYSICIAN ASSISTANT

## 2018-03-14 PROCEDURE — 83036 HEMOGLOBIN GLYCOSYLATED A1C: CPT | Performed by: PHYSICIAN ASSISTANT

## 2018-03-14 PROCEDURE — 80061 LIPID PANEL: CPT | Performed by: PHYSICIAN ASSISTANT

## 2018-03-14 PROCEDURE — 36415 COLL VENOUS BLD VENIPUNCTURE: CPT | Performed by: PHYSICIAN ASSISTANT

## 2018-03-14 RX ORDER — PANTOPRAZOLE SODIUM 40 MG/1
40 TABLET, DELAYED RELEASE ORAL DAILY
Qty: 90 TABLET | Refills: 3 | Status: SHIPPED | OUTPATIENT
Start: 2018-03-14 | End: 2018-10-26

## 2018-03-14 RX ORDER — ATORVASTATIN CALCIUM 40 MG/1
40 TABLET, FILM COATED ORAL AT BEDTIME
Qty: 90 TABLET | Refills: 3 | Status: SHIPPED | OUTPATIENT
Start: 2018-03-14 | End: 2019-03-11

## 2018-03-14 RX ORDER — DULOXETIN HYDROCHLORIDE 60 MG/1
CAPSULE, DELAYED RELEASE ORAL
Qty: 90 CAPSULE | Refills: 3 | Status: SHIPPED | OUTPATIENT
Start: 2018-03-14 | End: 2018-12-05

## 2018-03-14 ASSESSMENT — PAIN SCALES - GENERAL: PAINLEVEL: SEVERE PAIN (6)

## 2018-03-14 NOTE — NURSING NOTE
"Chief Complaint   Patient presents with     Fall     fell twice in [place] 2/24 & 2/25       Initial /80 (BP Location: Right arm)  Pulse 60  Temp 97.9  F (36.6  C) (Tympanic)  Resp 16  Wt 169 lb (76.7 kg)  BMI 31.93 kg/m2 Estimated body mass index is 31.93 kg/(m^2) as calculated from the following:    Height as of 2/15/18: 5' 1\" (1.549 m).    Weight as of this encounter: 169 lb (76.7 kg).      Health Maintenance that is potentially due pending provider review:  NONE    n/a    Is there anyone who you would like to be able to receive your results? No  If yes have patient fill out RICH    "

## 2018-03-14 NOTE — PATIENT INSTRUCTIONS
Labs today     Start Physical Therapy for fall risk     Doubt broken rib - keep letting it heal    Call if questions    See me in 6 mo, or in July  Perhaps see Sara Ellis when I'm on maternity leave

## 2018-03-14 NOTE — LETTER
March 15, 2018      Theresa Bill  [address] [place] [address]        Dear ,    We are writing to inform you of your test results.    Your test results fall within the expected range(s) or remain unchanged from previous results.  Please continue with current treatment plan.  Cholesterol looks fine.  Kidney function looks fine.  A1c is great at 7.1.  No changes needed at this time    Resulted Orders   Hemoglobin A1c   Result Value Ref Range    Hemoglobin A1C 7.1 (H) 4.3 - 6.0 %   Basic metabolic panel  (Ca, Cl, CO2, Creat, Gluc, K, Na, BUN)   Result Value Ref Range    Sodium 138 133 - 144 mmol/L    Potassium 4.4 3.4 - 5.3 mmol/L    Chloride 101 94 - 109 mmol/L    Carbon Dioxide 30 20 - 32 mmol/L    Anion Gap 7 3 - 14 mmol/L    Glucose 166 (H) 70 - 99 mg/dL      Comment:      Fasting specimen    Urea Nitrogen 12 7 - 30 mg/dL    Creatinine 0.81 0.52 - 1.04 mg/dL    GFR Estimate 69 >60 mL/min/1.7m2      Comment:      Non  GFR Calc    GFR Estimate If Black 84 >60 mL/min/1.7m2      Comment:       GFR Calc    Calcium 8.9 8.5 - 10.1 mg/dL   Lipid panel reflex to direct LDL Non-fasting   Result Value Ref Range    Cholesterol 131 <200 mg/dL    Triglycerides 169 (H) <150 mg/dL      Comment:      Borderline high:  150-199 mg/dl  High:             200-499 mg/dl  Very high:       >499 mg/dl  Fasting specimen      HDL Cholesterol 45 (L) >49 mg/dL    LDL Cholesterol Calculated 52 <100 mg/dL      Comment:      Desirable:       <100 mg/dl    Non HDL Cholesterol 86 <130 mg/dL       If you have any questions or concerns, please call the clinic at the number listed above.       Sincerely,        Shae Pierre PA-C/gui

## 2018-03-14 NOTE — MR AVS SNAPSHOT
After Visit Summary   3/14/2018    Theresa Bill    MRN: [id number]           Patient Information     Date Of Birth          1945        Visit Information        Provider Department      3/14/2018 9:40 AM Shae Pierre PA-C [facility]        Today's Diagnoses     Rib pain    -  1    Fall, initial encounter        At risk for falling        Mild major depression (H)        Type 2 diabetes mellitus with diabetic neuropathy, with long-term current use of insulin (H)        Benign essential hypertension        Major depressive disorder, recurrent episode, mild (H)        Dyslipidemia        Gastroesophageal reflux disease, esophagitis presence not specified          Care Instructions    Labs today     Start Physical Therapy for fall risk     Doubt broken rib - keep letting it heal    Call if questions    See me in 6 mo, or in July  Perhaps see Sara Ellis when I'm on maternity leave          Follow-ups after your visit        Additional Services     PHYSICAL THERAPY REFERRAL       *This therapy referral will be filtered to a centralized scheduling office at [facility] and the patient will receive a call to schedule an appointment at a [place] location most convenient for them. *     [facility] provides Physical Therapy evaluation and treatment and many specialty services across the [place] system.  If requesting a specialty program, please choose from the list below.    If you have not heard from the scheduling office within 2 business days, please call [phone] for all locations, with the exception of [place], please call [phone] and [facility], please call [phone]  Treatment: Evaluation & Treatment  Special Instructions/Modalities: eval and treat for balance/fall risk, strength  Special Programs: None    Please be aware that coverage of these services is subject to the terms and limitations of your health  "insurance plan.  Call member services at your health plan with any benefit or coverage questions.      **Note to Provider:  If you are referring outside of [facility] for the therapy appointment, please list the name of the location in the \"special instructions\" above, print the referral and give to the patient to schedule the appointment.                  Your next 10 appointments already scheduled     Mar 26, 2018  9:30 AM CDT   Return Visit with Sam Jaime   [facility]    [address] [address] [phone]              Who to contact     If you have questions or need follow up information about today's clinic visit or your schedule please contact [facility] directly at [phone].  Normal or non-critical lab and imaging results will be communicated to you by MyChart, letter or phone within 4 business days after the clinic has received the results. If you do not hear from us within 7 days, please contact the clinic through MyChart or phone. If you have a critical or abnormal lab result, we will notify you by phone as soon as possible.  Submit refill requests through [employer] or call your pharmacy and they will forward the refill request to us. Please allow 3 business days for your refill to be completed.          Additional Information About Your Visit        [employer] Information     [employer] gives you secure access to your electronic health record. If you see a primary care provider, you can also send messages to your care team and make appointments. If you have questions, please call your primary care clinic.  If you do not have a primary care provider, please call [phone] and they will assist you.        Care EveryWhere ID     This is your Care EveryWhere ID. This could be used by other organizations to access your [facility] medical records  [id number]        Your Vitals Were     Pulse Temperature Respirations BMI (Body " Mass Index)          60 97.9  F (36.6  C) (Tympanic) 16 31.93 kg/m2         Blood Pressure from Last 3 Encounters:   03/14/18 118/80   03/09/18 117/74   02/15/18 128/74    Weight from Last 3 Encounters:   03/14/18 169 lb (76.7 kg)   03/09/18 170 lb (77.1 kg)   02/15/18 170 lb (77.1 kg)              We Performed the Following     Basic metabolic panel  (Ca, Cl, CO2, Creat, Gluc, K, Na, BUN)     Hemoglobin A1c     Lipid panel reflex to direct LDL Non-fasting     PHYSICAL THERAPY REFERRAL          Where to get your medicines      These medications were sent to [facility] [address] [place] [address] [address] [facility]  [address] [facility] [address]     Phone:  [phone]     atorvastatin 40 MG tablet    DULoxetine 60 MG EC capsule    pantoprazole 40 MG EC tablet          Primary Care Provider Office Phone # Fax #    Shae Pierre PA-C [phone] [address] [place] [address]        Equal Access to Services     [facility] Center[facility] : Hadii aad ku hadasho Soomaali, waaxda luqadaha, qaybta kaalmada adeegyaestrellita, ángel jauregui. So [facility] [phone].    ATENCIÓN: Si habla español, tiene a urban disposición servicios gratuitos de asistencia lingüística. [facility] [phone].    We comply with applicable federal civil rights laws and [place] laws. We do not discriminate on the basis of race, color, national origin, age, disability, sex, sexual orientation, or gender identity.            Thank you!     Thank you for choosing [facility]  for your care. Our goal is always to provide you with excellent care. Hearing back from our patients is one way we can continue to improve our services. Please take a few minutes to complete the written survey that you may receive in the mail after your visit with us. Thank you!             Your Updated Medication List - Protect others around you: Learn how to safely use, store and throw away your medicines  at www.disposemymeds.org.          This list is accurate as of 3/14/18 10:42 AM.  Always use your most recent med list.                   Brand Name Dispense Instructions for use Diagnosis    ACE/ARB/ARNI NOT PRESCRIBED (INTENTIONAL)      ACE & ARB not prescribed due to Intolerance-cough    Type 2 diabetes, HbA1c goal < 7% (H)       aspirin 81 MG tablet     100    ONE DAILY    Type II or unspecified type diabetes mellitus without mention of complication, not stated as uncontrolled       atorvastatin 40 MG tablet    LIPITOR    90 tablet    Take 1 tablet (40 mg) by mouth At Bedtime    Dyslipidemia       azithromycin 250 MG tablet    ZITHROMAX Z-MINH    6 tablet    Take 2 tablets on day 1 and then take 1 tablet days 2-5    Acute bronchitis with coexisting condition requiring prophylactic treatment       benzonatate 200 MG capsule    TESSALON    30 capsule    Take 1 capsule (200 mg) by mouth 3 times daily as needed    Cough       blood glucose monitoring test strip    no brand specified    2 Box    1 strip by In Vitro route 3 times daily. Has a Freestyle    Type 2 diabetes, HbA1c goal < 7% (H)       buPROPion 150 MG 24 hr tablet    WELLBUTRIN XL    30 tablet    Take 1 tablet (150 mg) by mouth every morning    Mild recurrent major depression (H), Grief       clonazePAM 0.5 MG tablet    klonoPIN    180 tablet    Take 1 mg by mouth At Bedtime        DAILY MULTIVITAMIN PO      One tablet daily        DULoxetine 60 MG EC capsule    CYMBALTA    90 capsule    TAKE ONE CAPSULE BY MOUTH ONCE DAILY    Major depressive disorder, recurrent episode, mild (H)       fluocinonide 0.05 % ointment    LIDEX    15 g    Apply sparingly to affected area twice daily as needed for mouth ulcer.    Aphthae, oral       hydrochlorothiazide 12.5 MG Tabs tablet     90 tablet    Take 1 tablet (12.5 mg) by mouth daily        hydrocortisone 1 % cream    CORTAID    30 g    Apply sparingly to affected area of lip corners two times daily for 1 week.     Angular cheilitis       insulin detemir 100 UNIT/ML injection    LEVEMIR FLEXPEN/FLEXTOUCH    15 mL    10 units at bedtime    Type 2 diabetes mellitus without complication, with long-term current use of insulin (H)       insulin pen needle 32G X 4 MM    BD MARIIA U/F    90 each    Use one pen daily    Type 2 diabetes mellitus without complication, with long-term current use of insulin (H)       ketoconazole 2 % cream    NIZORAL    30 g    Apply topically 2 times daily To corners of lips for 2 weeks or as needed    Angular cheilitis       metFORMIN 500 MG 24 hr tablet    GLUCOPHAGE-XR    180 tablet    Take 1 tablet (500 mg) by mouth 2 times daily (with meals)    Type 2 diabetes mellitus without complication, with long-term current use of insulin (H)       metoprolol succinate 25 MG 24 hr tablet    TOPROL-XL    90 tablet    Take 1 tablet (25 mg) by mouth daily        nitroGLYcerin 0.4 MG sublingual tablet    NITROSTAT    25 tablet    For chest pain place 1 tablet under the tongue every 5 minutes for 3 doses. If symptoms persist 5 minutes after 1st dose call 911.    Chest pressure       nystatin [id number] UNIT/ML suspension    MYCOSTATIN    60 mL    Take 5 mLs (500,000 Units) by mouth 4 times daily Swish and swallow or spit out.    Thrush       * nystatin cream    MYCOSTATIN    60 g    Apply twice daily for yeast rash for 3 weeks.    Intertrigo       * nystatin [id number] UNIT/GM Powd    MYCOSTATIN    60 g    Apply 1 g topically 3 times daily as needed    Other specified erythematous condition(468.26)       olopatadine 0.1 % ophthalmic solution    PATANOL    5 mL    Place 1 drop into both eyes 2 times daily    Allergic conjunctivitis, bilateral       order for DME      Equipment being ordered: CPAP Patient Theresa Bill received a Resmed Airsense 10  Auto. Pressures were set at Auto 10 - 15 cm H2O.        order for DME     1 Device    Equipment being ordered: Knee compression sleeve    Fall at home, subsequent encounter, Pain in  joint, lower leg, unspecified laterality, Effusion of knee joint right, Arthritis of knee, degenerative       oxybutynin chloride 15 MG Tb24           pantoprazole 40 MG EC tablet    PROTONIX    90 tablet    Take 1 tablet (40 mg) by mouth daily    Gastroesophageal reflux disease, esophagitis presence not specified       PROBIOTIC & ACIDOPHILUS EX ST Caps           ranitidine 150 MG tablet    ZANTAC    60 tablet    TAKE ONE TABLET BY MOUTH TWICE DAILY    Gastroesophageal reflux disease with esophagitis, Esophageal dysmotility, Chronic cough       traMADol 50 MG tablet    ULTRAM    30 tablet    TAKE ONE TABLET BY MOUTH EVERY 6 HOURS AS NEEDED FOR  MODERATE  PAIN    Radicular leg pain       * Notice:  This list has 2 medication(s) that are the same as other medications prescribed for you. Read the directions carefully, and ask your doctor or other care provider to review them with you.

## 2018-03-15 NOTE — PROGRESS NOTES
Amber Cardenas,    Cholesterol looks fine.  Kidney function looks fine.  A1c is great at 7.1.  No changes needed at this time.    Please contact me if you have questions.    Shae Pierre PA-C

## 2018-03-22 ENCOUNTER — HOSPITAL ENCOUNTER (OUTPATIENT)
Dept: PHYSICAL THERAPY | Facility: CLINIC | Age: 73
Setting detail: THERAPIES SERIES
End: 2018-03-22
Attending: PHYSICIAN ASSISTANT
Payer: MEDICARE

## 2018-03-22 ENCOUNTER — TELEPHONE (OUTPATIENT)
Dept: FAMILY MEDICINE | Facility: CLINIC | Age: 73
End: 2018-03-22

## 2018-03-22 PROCEDURE — 40000718 ZZHC STATISTIC PT DEPARTMENT ORTHO VISIT: Performed by: PHYSICAL THERAPIST

## 2018-03-22 PROCEDURE — 97161 PT EVAL LOW COMPLEX 20 MIN: CPT | Mod: GP | Performed by: PHYSICAL THERAPIST

## 2018-03-22 PROCEDURE — 97110 THERAPEUTIC EXERCISES: CPT | Mod: GP | Performed by: PHYSICAL THERAPIST

## 2018-03-22 PROCEDURE — G8978 MOBILITY CURRENT STATUS: HCPCS | Mod: GP,CK | Performed by: PHYSICAL THERAPIST

## 2018-03-22 PROCEDURE — G8979 MOBILITY GOAL STATUS: HCPCS | Mod: GP,CI | Performed by: PHYSICAL THERAPIST

## 2018-03-22 NOTE — PROGRESS NOTES
"   18 1000   Quick Adds   Quick Adds Certification   Type of Visit Initial OP PT Evaluation   General Information   Start of Care Date 18   Referring Physician Ignacio   Orders Evaluate and Treat as Indicated   Order Date 18   Medical Diagnosis Falls Risk   Onset of illness/injury or Date of Surgery 18   Precautions/Limitations fall precautions   Pertinent history of current problem (include personal factors and/or comorbidities that impact the POC) pt states she started falling more after her   this past September. pt states she would turn around and fall. pt staets she also fell twice ont he snow. Last night, pt states she was on the deck, not sure at all how she fell, landed on her back. pt states \"blacking out\" when walking, has had a decrease in medications to reduce the falling.    Pertinent Visual History  regular visits, 3-4 months ago   Current Assistive Devices Standard Cane;Quad Cane   Patient/Family Goals Statement decrease falls to walk dogs, walk on unlevel surface of the backyard, putting on your pants without fall risk.   Fall Risk Screen   Fall screen completed by PT   Have you fallen 2 or more times in the past year? Yes   Have you fallen and had an injury in the past year? No   Gait   Gait Comments pt demonstrates impaired hip ROM as observed with lacking hip extension with gait, maintains hip flexion throughout gait cycle, decreased terminal knee extension and decreased foot clearance. pt observed scissoring gait with increased speed.   Gait Special Tests   Gait Special Tests FUNCTIONAL GAIT ASSESSMENT   Gait Special Tests Functional Gait Assessment Score out of 30   Score out of 30 15   Comments pt demonstrates increased difficulty with dual task walking and with narrow NARDA. pt required CGA with gait belt in place throughout examination due to impaired gait quality and speed.   Balance Special Tests   Balance Special Tests Timed up and go;Modified CTSIB " "Conditions   Balance Special Tests Timed Up and Go   Seconds 9.5 Seconds   Comments pt demonstrates impaired gait including decreased NARDA, decreased foot clearance B and scissoring gait with turning around.   Balance Special Tests Modified CTSIB Conditions   Condition 1, seconds 30 Seconds   Condition 2, seconds 30 Seconds   Condition 4, seconds 30 Seconds   Condition 5, seconds 30 Seconds   Planned Therapy Interventions   Planned Therapy Interventions IADL retraining;balance training;ROM;strengthening;stretching;transfer training;bed mobility training;joint mobilization;gait training;manual therapy;neuromuscular re-education;motor coordination training   Clinical Impression   Criteria for Skilled Therapeutic Interventions Met yes, treatment indicated   PT Diagnosis Impaired gait secondary to Increased fall risk   Influenced by the following impairments impaired FGA with walking   Functional limitations due to impairments increased falls risk   Clinical Presentation Stable/Uncomplicated   Clinical Presentation Rationale pt presents with stable comorbidities, currently seeking medical management for recurrent falls and \"blackout\" episodes   Clinical Decision Making (Complexity) Low complexity   Therapy Frequency 1 time/week   Predicted Duration of Therapy Intervention (days/wks) 10 weeks   Risk & Benefits of therapy have been explained Yes   Patient, Family & other staff in agreement with plan of care Yes   Education Assessment   Preferred Learning Style Pictures/video   Barriers to Learning No barriers   GOALS   PT Eval Goals 1;2;3   Goal 1   Goal Identifier FGA   Goal Description pt will demonstrate increased score of atleast 10% in order to decrease fall risk with ambulation.   Target Date 05/31/18   Goal 2   Goal Identifier Confidence   Goal Description Pt will indicate >50% confidence level with walking across unlevel surface and walking up stairs.w   Target Date 05/31/18   Goal 3   Goal Identifier Hip ROM "   Goal Description Pt will demonstrate increased hip ROM in order to put on her pants without fear of falling   Target Date 05/17/18   Total Evaluation Time   Total Evaluation Time (Minutes) 35   Therapy Certification   Certification date from 03/22/18   Certification date to 05/31/18   Medical Diagnosis Falls Risk   Certification I certify the need for these services furnished under this plan of treatment and while under my care.  (Physician co-signature of this document indicates review and certification of the therapy plan).

## 2018-03-22 NOTE — PROGRESS NOTES
[facility]        OUTPATIENT PHYSICAL THERAPY FUNCTIONAL EVALUATION  PLAN OF TREATMENT FOR OUTPATIENT REHABILITATION  (COMPLETE FOR INITIAL CLAIMS ONLY)  Patient's Last Name, First Name, M.I.  YOB: 1945  Thereas Bill     Provider's Name   [facility]   Medical Record No.  [id number]     Start of Care Date:  03/22/18   Onset Date:  02/22/18   Type:     _X__PT   ____OT  ____SLP Medical Diagnosis:  Falls Risk     PT Diagnosis:  Impaired gait secondary to Increased fall risk Visits from SOC:  1                              __________________________________________________________________________________  Plan of Treatment/Functional Goals:  IADL retraining, balance training, ROM, strengthening, stretching, transfer training, bed mobility training, joint mobilization, gait training, manual therapy, neuromuscular re-education, motor coordination training           GOALS  FGA  pt will demonstrate increased score of atleast 10% in order to decrease fall risk with ambulation.  05/31/18    Confidence  Pt will indicate >50% confidence level with walking across unlevel surface and walking up stairs.w  05/31/18    Hip ROM  Pt will demonstrate increased hip ROM in order to put on her pants without fear of falling  05/17/18      Therapy Frequency:  1 time/week   Predicted Duration of Therapy Intervention:  10 weeks    Consuelo Chaparro, PT                                    I CERTIFY THE NEED FOR THESE SERVICES FURNISHED UNDER        THIS PLAN OF TREATMENT AND WHILE UNDER MY CARE     (Physician co-signature of this document indicates review and certification of the therapy plan).                Certification Date From:  03/22/18   Certification Date To:  05/31/18    Referring Provider:  Ingacio    Initial Assessment  See Epic Evaluation- Start of Care Date: 03/22/18              I CERTIFY THE NEED FOR THESE SERVICES FURNISHED UNDER        THIS PLAN OF TREATMENT AND WHILE UNDER MY CARE       Shae Pierre  3/22/2018

## 2018-03-22 NOTE — TELEPHONE ENCOUNTER
"Please call patient to have her set up an appt to discuss apparent blacking out episodes.  I do not recall her having any previously.      Staff message from Physical Therapy:  \"Shae,     I wanted to let you know that I just got done evaluating Theresa for her frequent Falls. I am going to see her for PT 1x a week for extended sessions to work on her balance.     I wanted to also let you know that she described having another \"blackout\" episode last night in which she fell and is not sure what happened, she woke up on the floor. She mentioned that you were changing some of her medication dosages. She did not have any episodes today.     Thanks,   Arelis Chaparro, PT, DPT\"  "

## 2018-03-23 ENCOUNTER — OFFICE VISIT (OUTPATIENT)
Dept: FAMILY MEDICINE | Facility: CLINIC | Age: 73
End: 2018-03-23
Payer: COMMERCIAL

## 2018-03-23 VITALS
DIASTOLIC BLOOD PRESSURE: 54 MMHG | WEIGHT: 169 LBS | HEART RATE: 91 BPM | TEMPERATURE: 98.3 F | BODY MASS INDEX: 31.91 KG/M2 | HEIGHT: 61 IN | SYSTOLIC BLOOD PRESSURE: 91 MMHG

## 2018-03-23 DIAGNOSIS — Z79.4 TYPE 2 DIABETES MELLITUS WITH DIABETIC NEUROPATHY, WITH LONG-TERM CURRENT USE OF INSULIN (H): ICD-10-CM

## 2018-03-23 DIAGNOSIS — I95.1 ORTHOSTATIC HYPOTENSION: ICD-10-CM

## 2018-03-23 DIAGNOSIS — R29.6 FALLS FREQUENTLY: Primary | ICD-10-CM

## 2018-03-23 DIAGNOSIS — E11.40 TYPE 2 DIABETES MELLITUS WITH DIABETIC NEUROPATHY, WITH LONG-TERM CURRENT USE OF INSULIN (H): ICD-10-CM

## 2018-03-23 DIAGNOSIS — I10 ESSENTIAL HYPERTENSION: Chronic | ICD-10-CM

## 2018-03-23 PROCEDURE — 99214 OFFICE O/P EST MOD 30 MIN: CPT | Performed by: PHYSICIAN ASSISTANT

## 2018-03-23 NOTE — PATIENT INSTRUCTIONS
Not clear that you passed out versus just fell  BPs laying/sitting/standing   Continue to monitor for falls and circumstances of falls  Get Senior Alert necklace or similar  Keep working with Physical Therapy on balance  Get Physical Therapy's opinion on whether you need walker  If Physical Therapy feels would be helpful, inner ear testing.  Referral placed.  I'm not convinced need at this point.  If we decide are blacking out, will do further heart testing     Stop Hydrochlorothiazide and update me on BPs    Can try melatonin right at bedtime to see if helps keep you asleep  Sleep meds otherwise would likely be dangerous

## 2018-03-23 NOTE — NURSING NOTE
"Chief Complaint   Patient presents with     Fall       Initial /74 (BP Location: Right arm, Patient Position: Chair, Cuff Size: Adult Regular)  Pulse 76  Temp 98.3  F (36.8  C) (Tympanic)  Ht 5' 1\" (1.549 m)  Wt 169 lb (76.7 kg)  BMI 31.93 kg/m2 Estimated body mass index is 31.93 kg/(m^2) as calculated from the following:    Height as of this encounter: 5' 1\" (1.549 m).    Weight as of this encounter: 169 lb (76.7 kg).      Health Maintenance that is potentially due pending provider review:  NONE        Is there anyone who you would like to be able to receive your results? No  If yes have patient fill out RICH      "

## 2018-03-23 NOTE — PROGRESS NOTES
"  SUBJECTIVE:   Theresa Bill is a 72 year old female who presents to clinic today for the following health issues:          * Fall- 2 nights ago went out onto her deck to let her dogs out.  Before she knew it, she fell.  Doesn't know if she blacked out at all.  PT suggested she follow up.    Home BPs 120/80  Pulses unsure  No palpitations  Falls are not when first standing  Has full recall of fall  Did have a black out 3 yrs ago - none known since.  Just started with Physical Therapy for balance, diabetic neuropathy.  No dizziness.    Problem list and histories reviewed & adjusted, as indicated.  Additional history: as documented    BP Readings from Last 3 Encounters:   03/23/18 91/54   03/14/18 118/80   03/09/18 117/74    Wt Readings from Last 3 Encounters:   03/23/18 169 lb (76.7 kg)   03/14/18 169 lb (76.7 kg)   03/09/18 170 lb (77.1 kg)         Labs reviewed in EPIC    Reviewed and updated as needed this visit by clinical staff  Tobacco  Allergies  Meds  Problems  Med Hx  Surg Hx  Fam Hx  Soc Hx        Reviewed and updated as needed this visit by Provider  Tobacco  Allergies  Meds  Problems  Med Hx  Surg Hx  Fam Hx  Soc Hx          ROS:  Constitutional, HEENT, cardiovascular, pulmonary, musculoskeletal, neuro, systems are negative, except as otherwise noted.    OBJECTIVE:     BP 91/54 (Patient Position: Standing)  Pulse 91  Temp 98.3  F (36.8  C) (Tympanic)  Ht 5' 1\" (1.549 m)  Wt 169 lb (76.7 kg)  BMI 31.93 kg/m2  Body mass index is 31.93 kg/(m^2).  GENERAL: healthy, alert and no distress  RESP: lungs clear to auscultation - no rales, rhonchi or wheezes  CV: regular rate and rhythm, normal S1 S2, no S3 or S4, no murmur, click or rub  NEURO: gait normal, speech normal, pupils equal  ASSESSMENT/PLAN:       ICD-10-CM    1. Falls frequently R29.6 AUDIOLOGY ADULT REFERRAL   2. Orthostatic hypotension I95.1    3. Type 2 diabetes mellitus with diabetic neuropathy, with long-term current use of " insulin (H) E11.40     Z79.4    4. Essential hypertension I10      History not fitting orthostatic but clearly orthostatic on testing today -will see if makes a difference  Patient Instructions   Not clear that you passed out versus just fell  BPs laying/sitting/standing   Continue to monitor for falls and circumstances of falls  Get Senior Alert necklace or similar  Keep working with Physical Therapy on balance  Get Physical Therapy's opinion on whether you need walker  If Physical Therapy feels would be helpful, inner ear testing.  Referral placed.  I'm not convinced need at this point.  If we decide are blacking out, will do further heart testing     Stop Hydrochlorothiazide and update me on BPs    Can try melatonin right at bedtime to see if helps keep you asleep  Sleep meds otherwise would likely be dangerous      Shae Pierre PA-C  [facility]

## 2018-03-23 NOTE — MR AVS SNAPSHOT
After Visit Summary   3/23/2018    Theresa Bill    MRN: [id number]           Patient Information     Date Of Birth          1945        Visit Information        Provider Department      3/23/2018 2:40 PM Shae Pierre PA-C [facility]        Today's Diagnoses     Falls frequently    -  1      Care Instructions    Not clear that you passed out versus just fell  BPs laying/sitting/standing   Continue to monitor for falls and circumstances of falls  Get Senior Alert necklace or similar  Keep working with Physical Therapy on balance  Get Physical Therapy's opinion on whether you need walker  If Physical Therapy feels would be helpful, inner ear testing.  Referral placed.  I'm not convinced need at this point.  If we decide are blacking out, will do further heart testing     Can try melatonin right at bedtime to see if helps keep you asleep  Sleep meds otherwise would likely be dangerous          Follow-ups after your visit        Additional Services     AUDIOLOGY ADULT REFERRAL       Your provider has referred you to: [facility] [phone]   [email or web address]    Specialty Testing:  Inner ear testing for balance                  Your next 10 appointments already scheduled     Mar 26, 2018  9:30 AM CDT   Return Visit with Sam Jaime   [facility] ([facility])    [address] [facility] [address]   [phone]            Mar 30, 2018 12:30 PM CDT   Ortho Treatment with Consuelo Chaparro, PT   [facility] ([facility])    [address] [place] [id number] [phone]            Apr 06, 2018 10:30 AM CDT   Ortho Treatment with Consuelo Chaparro, PT   [facility] ([facility])    [address] [place] [id number] [phone]              Who to contact     If you have  "questions or need follow up information about today's clinic visit or your schedule please contact [facility] directly at [phone].  Normal or non-critical lab and imaging results will be communicated to you by MyChart, letter or phone within 4 business days after the clinic has received the results. If you do not hear from us within 7 days, please contact the clinic through [employer] or phone. If you have a critical or abnormal lab result, we will notify you by phone as soon as possible.  Submit refill requests through [employer] or call your pharmacy and they will forward the refill request to us. Please allow 3 business days for your refill to be completed.          Additional Information About Your Visit        [employer] Information     [employer] gives you secure access to your electronic health record. If you see a primary care provider, you can also send messages to your care team and make appointments. If you have questions, please call your primary care clinic.  If you do not have a primary care provider, please call [phone] and they will assist you.        Care EveryWhere ID     This is your Care EveryWhere ID. This could be used by other organizations to access your [facility] medical records  [id number]        Your Vitals Were     Pulse Temperature Height BMI (Body Mass Index)          76 98.3  F (36.8  C) (Tympanic) 5' 1\" (1.549 m) 31.93 kg/m2         Blood Pressure from Last 3 Encounters:   03/23/18 110/74   03/14/18 118/80   03/09/18 117/74    Weight from Last 3 Encounters:   03/23/18 169 lb (76.7 kg)   03/14/18 169 lb (76.7 kg)   03/09/18 170 lb (77.1 kg)              We Performed the Following     AUDIOLOGY ADULT REFERRAL          Today's Medication Changes          These changes are accurate as of 3/23/18  2:55 PM.  If you have any questions, ask your nurse or doctor.               Stop taking these medicines if you haven't already. Please contact your care team if you have " questions.     azithromycin 250 MG tablet   Commonly known as:  ZITHROMAX Z-MINH   Stopped by:  Shae Pierre PA-C           benzonatate 200 MG capsule   Commonly known as:  TESSALON   Stopped by:  Shae Pierre PA-C           nystatin 528739 UNIT/ML suspension   Commonly known as:  MYCOSTATIN   Stopped by:  Shae Pierre PA-C                    Primary Care Provider Office Phone # Fax #    Shae Pierre PA-C [phone] [address] [place] [address]        Equal Access to Services     [facility]: Hadii aad ku hadasho Soomaali, waaxda luqadaha, qaybta kaalmada adeegyada, ángel oconnor . So [facility] [phone].    ATENCIÓN: Si habla español, tiene a urban disposición servicios gratuitos de asistencia lingüística. Llame al [phone].    We comply with applicable federal civil rights laws and [place] laws. We do not discriminate on the basis of race, color, national origin, age, disability, sex, sexual orientation, or gender identity.            Thank you!     Thank you for choosing [facility]  for your care. Our goal is always to provide you with excellent care. Hearing back from our patients is one way we can continue to improve our services. Please take a few minutes to complete the written survey that you may receive in the mail after your visit with us. Thank you!             Your Updated Medication List - Protect others around you: Learn how to safely use, store and throw away your medicines at www.disposemymeds.org.          This list is accurate as of 3/23/18  2:55 PM.  Always use your most recent med list.                   Brand Name Dispense Instructions for use Diagnosis    ACE/ARB/ARNI NOT PRESCRIBED (INTENTIONAL)      ACE & ARB not prescribed due to Intolerance-cough    Type 2 diabetes, HbA1c goal < 7% (H)       aspirin 81 MG tablet     100    ONE DAILY    Type II or unspecified type diabetes  mellitus without mention of complication, not stated as uncontrolled       atorvastatin 40 MG tablet    LIPITOR    90 tablet    Take 1 tablet (40 mg) by mouth At Bedtime    Dyslipidemia       blood glucose monitoring test strip    no brand specified    2 Box    1 strip by In Vitro route 3 times daily. Has a Freestyle    Type 2 diabetes, HbA1c goal < 7% (H)       buPROPion 150 MG 24 hr tablet    WELLBUTRIN XL    30 tablet    Take 1 tablet (150 mg) by mouth every morning    Mild recurrent major depression (H), Grief       clonazePAM 0.5 MG tablet    klonoPIN    180 tablet    Take 1 mg by mouth At Bedtime        DAILY MULTIVITAMIN PO      One tablet daily        DULoxetine 60 MG EC capsule    CYMBALTA    90 capsule    TAKE ONE CAPSULE BY MOUTH ONCE DAILY    Major depressive disorder, recurrent episode, mild (H)       fluocinonide 0.05 % ointment    LIDEX    15 g    Apply sparingly to affected area twice daily as needed for mouth ulcer.    Aphthae, oral       hydrochlorothiazide 12.5 MG Tabs tablet     90 tablet    Take 1 tablet (12.5 mg) by mouth daily        hydrocortisone 1 % cream    CORTAID    30 g    Apply sparingly to affected area of lip corners two times daily for 1 week.    Angular cheilitis       insulin detemir 100 UNIT/ML injection    LEVEMIR FLEXPEN/FLEXTOUCH    15 mL    10 units at bedtime    Type 2 diabetes mellitus without complication, with long-term current use of insulin (H)       insulin pen needle 32G X 4 MM    BD MARIIA U/F    90 each    Use one pen daily    Type 2 diabetes mellitus without complication, with long-term current use of insulin (H)       ketoconazole 2 % cream    NIZORAL    30 g    Apply topically 2 times daily To corners of lips for 2 weeks or as needed    Angular cheilitis       metFORMIN 500 MG 24 hr tablet    GLUCOPHAGE-XR    180 tablet    Take 1 tablet (500 mg) by mouth 2 times daily (with meals)    Type 2 diabetes mellitus without complication, with long-term current use of  insulin (H)       metoprolol succinate 25 MG 24 hr tablet    TOPROL-XL    90 tablet    Take 1 tablet (25 mg) by mouth daily        nitroGLYcerin 0.4 MG sublingual tablet    NITROSTAT    25 tablet    For chest pain place 1 tablet under the tongue every 5 minutes for 3 doses. If symptoms persist 5 minutes after 1st dose call 911.    Chest pressure       * nystatin cream    MYCOSTATIN    60 g    Apply twice daily for yeast rash for 3 weeks.    Intertrigo       * nystatin 218041 UNIT/GM Powd    MYCOSTATIN    60 g    Apply 1 g topically 3 times daily as needed    Other specified erythematous condition(348.70)       olopatadine 0.1 % ophthalmic solution    PATANOL    5 mL    Place 1 drop into both eyes 2 times daily    Allergic conjunctivitis, bilateral       order for DME      Equipment being ordered: CPAP Patient Theresa Bill received a [employer] Airsense 10  Auto. Pressures were set at Auto 10 - 15 cm H2O.        order for DME     1 Device    Equipment being ordered: Knee compression sleeve    Fall at home, subsequent encounter, Pain in joint, lower leg, unspecified laterality, Effusion of knee joint right, Arthritis of knee, degenerative       oxybutynin chloride 15 MG Tb24           pantoprazole 40 MG EC tablet    PROTONIX    90 tablet    Take 1 tablet (40 mg) by mouth daily    Gastroesophageal reflux disease, esophagitis presence not specified       PROBIOTIC & ACIDOPHILUS EX ST Caps           ranitidine 150 MG tablet    ZANTAC    60 tablet    TAKE ONE TABLET BY MOUTH TWICE DAILY    Gastroesophageal reflux disease with esophagitis, Esophageal dysmotility, Chronic cough       traMADol 50 MG tablet    ULTRAM    30 tablet    TAKE ONE TABLET BY MOUTH EVERY 6 HOURS AS NEEDED FOR  MODERATE  PAIN    Radicular leg pain       * Notice:  This list has 2 medication(s) that are the same as other medications prescribed for you. Read the directions carefully, and ask your doctor or other care provider to review them with you.

## 2018-03-26 ENCOUNTER — OFFICE VISIT (OUTPATIENT)
Dept: PSYCHOLOGY | Facility: CLINIC | Age: 73
End: 2018-03-26
Payer: COMMERCIAL

## 2018-03-26 DIAGNOSIS — F43.21 GRIEF: ICD-10-CM

## 2018-03-26 DIAGNOSIS — F32.0 MILD MAJOR DEPRESSION (H): Primary | ICD-10-CM

## 2018-03-26 PROCEDURE — 90834 PSYTX W PT 45 MINUTES: CPT | Performed by: PSYCHOLOGIST

## 2018-03-28 ASSESSMENT — ANXIETY QUESTIONNAIRES
3. WORRYING TOO MUCH ABOUT DIFFERENT THINGS: SEVERAL DAYS
2. NOT BEING ABLE TO STOP OR CONTROL WORRYING: NOT AT ALL
4. TROUBLE RELAXING: NOT AT ALL
7. FEELING AFRAID AS IF SOMETHING AWFUL MIGHT HAPPEN: NOT AT ALL
6. BECOMING EASILY ANNOYED OR IRRITABLE: NOT AT ALL
5. BEING SO RESTLESS THAT IT IS HARD TO SIT STILL: NOT AT ALL
GAD7 TOTAL SCORE: 1
1. FEELING NERVOUS, ANXIOUS, OR ON EDGE: NOT AT ALL

## 2018-03-28 NOTE — PROGRESS NOTES
Progress Note  Disclaimer: This note consists of symbols derived from keyboarding, dictation and/or voice recognition software. As a result, there may be errors in the script that have gone undetected. Please consider this when interpreting information found in this chart.    Client Name: Theresa Bill  Date: 3/26/2018         Service Type: Individual      Session Start Time: 8:30 AM  Session End Time: 9:15 AM      Session Length: 45     Session #: 9     Attendees: Client attended alone    Treatment Plan Last Reviewed: November 3, 2017       DATA   Client reports she has been having a few more medical issues, has been experiencing more heartburn.  She has fallen 3 times since I saw her last twice in her backyard.  She is doing physical therapy for balance.  She is looking forward to her oldest son getting out of senior care on May 10.  He already has a job and living arrangements wind up.  Spent some time talking about the possibility of getting a medic alert dependent.  Client is going out with friends, playing bingo and considering doing some volunteer work.  With the spring she notes a lot of increased thoughts of her late  as he was a very big .   Progress Since Last Session (Related to Symptoms / Goals / Homework):   Symptoms: Stable    Homework: not applicable      Episode of Care Goals: Satisfactory progress - CONTEMPLATION (Considering change and yet undecided); Intervened by assessing the negative and positive thinking (ambivalence) about behavior change     Current / Ongoing Stressors and Concerns:   Recent bereavement, prior caregiver stress     Treatment Objective(s) Addressed in This Session:   increase understanding of steps in the grief process  Utilize family support, work on accepting herself as she is in the moment.     Intervention:   Interpersonal Therapy: facilitating appropriate expressions of emotion and grief. behavioral activation  explored what self care strategies she is currently using and what she might be able to add. Went over the relapsing remitting nature of grief and depression. Also reviewed sleep hygiene.          ASSESSMENT: Current Emotional / Mental Status (status of significant symptoms):   Risk status (Self / Other harm or suicidal ideation)   Client denies current fears or concerns for personal safety.   Client denies current or recent suicidal ideation or behaviors.   Client denies current or recent homicidal ideation or behaviors.   Client denies current or recent self injurious behavior or ideation.   Client denies other safety concerns.   A safety and risk management plan has not been developed at this time, however client was given the after-hours number / 911 should there be a change in any of these risk factors.     Appearance:   Appropriate    Eye Contact:   Good    Psychomotor Behavior: Normal    Attitude:   Cooperative    Orientation:   All   Speech    Rate / Production: Slow     Volume:  Soft    Mood:    Depressed    Affect:    Appropriate    Thought Content:  Clear    Thought Form:  Coherent  Logical  difficult to think of anything but the loss of her    Insight:    Fair      Medication Review:   No changes to current psychiatric medication(s)     Medication Compliance:   Yes     Changes in Health Issues:   None reported     Chemical Use Review:   Substance Use: Chemical use reviewed, no active concerns identified      Tobacco Use: No current tobacco use.       Collateral Reports Completed:   Not Applicable    PLAN: (Client Tasks / Therapist Tasks / Other)  Client is doing exactly what she needs to be doing.  Including having a slump.  Therapist encouraged client in the appropriate expressions of grief as well as of her love for her late .  Client to follow-up with all physical therapy appointments and consider medical alert system.        Sam Jaime                                                          ________________________________________________________________________    Treatment Plan    Client's Name: Theresa Bill  YOB: 1945    Date: November 6, 2017    Referral / Collaboration:  Referral to another professional/service is not indicated at this time..    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 Health conditions                   include                        disease or illnesses, other health problems that may be short or long lasting,                    injuries, mental health or emotional problems, and problems with alcohol or drugs.                              Think back over the past 30 days and answer these questions, thinking about how much              difficulty you had doing the following activities. For each question, please [place] only                   one response.      S1 Standing for long periods such as 30 minutes? Moderate =   3   S2 Taking care of household responsibilities? None =         1   S3 Learning a new task, for example, learning how to get to a new place? Mild =           2   S4 How much of a problem do you have joining community activities (for example, festivals, [employer]ology or other activities) in the same way as anyone else can? None =         1   S5 How much have you been emotionally affected by your health problems? None =         1               In the past 30 days, how much difficulty did you have in:   S6 Concentrating on doing something for ten minutes? None =         1   S7 Walking a long distance such as a kilometer (or equivalent)? Moderate =   3   S8 Washing your whole body?  None =         1   S9 Getting dressed? None =         1   S10 Dealing with people you do not know? None =         1   S11 Maintaining a friendship? None =         1   S12 Your day to day work? None =         1       H1 Overall, in the past 30 days, how many days were these difficulties present? Record number of days 1    H2 In the past 30 days, for how many days were you totally unable to carry out your usual activities or work because of any health condition? Record number of days  0    H3 In the past 30 days, not counting the days that you were totally unable, for how many days did you cut back or reduce your usual activities or work because of any health condition? Record number of days 0      Goals  1. Education- the stages of grief  a. Client will be able to describe the stages of grief; denial, bargaining, anger, depression, and acceptance and give examples of how each have felt for her.  2. Behavioral Activation  a. Client will learn to assess their emotional stateon a day to day basis  b. Client will Identify two forms of exercise/activity and engage in them 3 times per week  c. Client will Identify 3 things that make them laugh, and engage in these 5 times per week.  d. Client will Identify 1-2Creative activities or hobbies  and engage in them 2 times per week  e. Client will identify music, movies, books that make them feel good and use them 3-4 times per week  3. Self-care  a. Client will identify 5 things they can do just for themselves  b. Client will take time for quiet, reflection, meditation 5 times per week  c. Client will Learn to set boundaries when appropriate  d. Client will Identify 2 individuals they can call on for support, distraction  4. Assessment of progress  a. Client will engage in assessment of their progress on a regular basis      Client has reviewed and agreed to the above plan.      Sam Jaime  November 6, 2017

## 2018-03-29 ENCOUNTER — MEDICAL CORRESPONDENCE (OUTPATIENT)
Dept: HEALTH INFORMATION MANAGEMENT | Facility: CLINIC | Age: 73
End: 2018-03-29

## 2018-03-29 ASSESSMENT — PATIENT HEALTH QUESTIONNAIRE - PHQ9: SUM OF ALL RESPONSES TO PHQ QUESTIONS 1-9: 4

## 2018-03-29 ASSESSMENT — ANXIETY QUESTIONNAIRES: GAD7 TOTAL SCORE: 1

## 2018-04-02 ENCOUNTER — TELEPHONE (OUTPATIENT)
Dept: FAMILY MEDICINE | Facility: CLINIC | Age: 73
End: 2018-04-02

## 2018-04-02 DIAGNOSIS — Z79.4 TYPE 2 DIABETES MELLITUS WITHOUT COMPLICATION, WITH LONG-TERM CURRENT USE OF INSULIN (H): Chronic | ICD-10-CM

## 2018-04-02 DIAGNOSIS — E11.9 TYPE 2 DIABETES MELLITUS WITHOUT COMPLICATION, WITH LONG-TERM CURRENT USE OF INSULIN (H): Chronic | ICD-10-CM

## 2018-04-02 NOTE — TELEPHONE ENCOUNTER
BPs appear generally 119-147/60s-80s, few outliers - 73/49, 178/30, 167/107.  Pulses 55-94.  Sugars now looking high, 169 and higher typically, but few readings overall.  Sent to scanning.  Attempted to call patient to discuss, no answer, will attempt to call again later.    Notes BP very low x 2 now.  When rechecked 5 minutes later, is high.      No falls since Hydrochlorothiazide was discontinued.      Blood sugars - feels eating hasn't changed, no change in levemir or metformin.    PLAN: will get more data on BPs.  Will increase levemir to 12 units daily, continue metformin.  Will update me with logs on both in 1 wk.

## 2018-04-09 ENCOUNTER — OFFICE VISIT (OUTPATIENT)
Dept: PSYCHOLOGY | Facility: CLINIC | Age: 73
End: 2018-04-09
Payer: COMMERCIAL

## 2018-04-09 DIAGNOSIS — F32.0 MILD MAJOR DEPRESSION (H): Primary | ICD-10-CM

## 2018-04-09 DIAGNOSIS — F43.21 GRIEF: ICD-10-CM

## 2018-04-09 PROCEDURE — 90834 PSYTX W PT 45 MINUTES: CPT | Performed by: PSYCHOLOGIST

## 2018-04-09 NOTE — MR AVS SNAPSHOT
MRN:[id number]                      After Visit Summary   4/9/2018    Theresa Bill    MRN: [id number]           Visit Information        Provider Department      4/9/2018 9:30 AM Addison Sam [facility] Generic      Your next 10 appointments already scheduled     Apr 13, 2018  2:00 PM CDT   Ortho Treatment with Consuelo Chaparro, PT   [facility] ([facility])    [address] [place] [address]   [phone]            Apr 30, 2018  9:30 AM CDT   Return Visit with Sam Jaime   [facility] ([facility])    [address] [facility] [id number] [phone]            May 14, 2018  9:30 AM CDT   Return Visit with Sam Jaime   [facility] ([facility])    [address] [facility] [id number] [phone]              MyChart Information     [employer] gives you secure access to your electronic health record. If you see a primary care provider, you can also send messages to your care team and make appointments. If you have questions, please call your primary care clinic.  If you do not have a primary care provider, please call [phone] and they will assist you.        Care EveryWhere ID     This is your Care EveryWhere ID. This could be used by other organizations to access your [facility] medical records  [id number]        Equal Access to Services     SABAS VUONG : Hadii lamonte shankaro Soelielali, waaxda luqadaha, qaybta kaalmada adeegyada, ángel thomas adecyrus jauregui. So [facility] [phone].    ATENCIÓN: Si habla español, tiene a urban disposición servicios gratuitos de asistencia lingüística. Llame al [phone].    We comply with applicable federal civil rights laws and [place] laws. We do not discriminate on the basis of race, color, national origin, age, disability, sex, sexual orientation, or gender identity.

## 2018-04-10 NOTE — PROGRESS NOTES
Progress Note  Disclaimer: This note consists of symbols derived from keyboarding, dictation and/or voice recognition software. As a result, there may be errors in the script that have gone undetected. Please consider this when interpreting information found in this chart.    Client Name: Theresa Bill  Date: 4/9/2018         Service Type: Individual      Session Start Time: 8:30 AM  Session End Time: 9:15 AM      Session Length: 45     Session #: 10     Attendees: Client attended alone    Treatment Plan Last Reviewed:  4/9/2018       DATA   Client reports she is largely had a good couple of weeks.  She notes her blood sugars have been high and has had some changes in medications.  She has noted some periods of confusion during the days, principally forgetting what she is doing when she walks into a room.  Still having some balance problems and is working with therapy on this.  She did get out and go bowling with her nephew and sister and is enjoying spending time with friends and family.   Progress Since Last Session (Related to Symptoms / Goals / Homework):   Symptoms: Stable    Homework: not applicable      Episode of Care Goals: Satisfactory progress - CONTEMPLATION (Considering change and yet undecided); Intervened by assessing the negative and positive thinking (ambivalence) about behavior change     Current / Ongoing Stressors and Concerns:   Recent bereavement, prior caregiver stress     Treatment Objective(s) Addressed in This Session:   increase understanding of steps in the grief process  Utilize family support, work on accepting herself as she is in the moment.     Intervention:   Interpersonal Therapy: facilitating appropriate expressions of emotion and grief. behavioral activation explored what self care strategies she is currently using and what she might be able to add. Went over the relapsing remitting nature of grief and depression. Also reviewed sleep  hygiene.  Suggested she contact her physician regarding confusion and blood sugars.        ASSESSMENT: Current Emotional / Mental Status (status of significant symptoms):   Risk status (Self / Other harm or suicidal ideation)   Client denies current fears or concerns for personal safety.   Client denies current or recent suicidal ideation or behaviors.   Client denies current or recent homicidal ideation or behaviors.   Client denies current or recent self injurious behavior or ideation.   Client denies other safety concerns.   A safety and risk management plan has not been developed at this time, however client was given the after-hours number / 911 should there be a change in any of these risk factors.     Appearance:   Appropriate    Eye Contact:   Good    Psychomotor Behavior: Normal    Attitude:   Cooperative    Orientation:   All   Speech    Rate / Production: Slow     Volume:  Soft    Mood:    Depressed    Affect:    Appropriate    Thought Content:  Clear    Thought Form:  Coherent  Logical  difficult to think of anything but the loss of her    Insight:    Fair      Medication Review:   No changes to current psychiatric medication(s)     Medication Compliance:   Yes     Changes in Health Issues:   None reported     Chemical Use Review:   Substance Use: Chemical use reviewed, no active concerns identified      Tobacco Use: No current tobacco use.       Collateral Reports Completed:   Not Applicable    PLAN: (Client Tasks / Therapist Tasks / Other)   Therapist encouraged client in the appropriate expressions of grief as well as of her love for her late .  Client to follow-up with all physical therapy appointments .        Sam Jaime                                                         ________________________________________________________________________    Treatment Plan    Client's Name: Theresa Bill  YOB: 1945    Date: 4/9/2018    Referral / Collaboration:  Referral to  another professional/service is not indicated at this time..    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 Health conditions                   include                        disease or illnesses, other health problems that may be short or long lasting,                    injuries, mental health or emotional problems, and problems with alcohol or drugs.                              Think back over the past 30 days and answer these questions, thinking about how much              difficulty you had doing the following activities. For each question, please [place] only                   one response.      S1 Standing for long periods such as 30 minutes? Moderate =   3   S2 Taking care of household responsibilities? None =         1   S3 Learning a new task, for example, learning how to get to a new place? Mild =           2   S4 How much of a problem do you have joining community activities (for example, festivals, [employer] or other activities) in the same way as anyone else can? None =         1   S5 How much have you been emotionally affected by your health problems? None =         1               In the past 30 days, how much difficulty did you have in:   S6 Concentrating on doing something for ten minutes? None =         1   S7 Walking a long distance such as a kilometer (or equivalent)? Moderate =   3   S8 Washing your whole body? None =         1   S9 Getting dressed? None =         1   S10 Dealing with people you do not know? None =         1   S11 Maintaining a friendship? None =         1   S12 Your day to day work? None =         1       H1  Overall, in the past 30 days, how many days were these difficulties present? Record number of days 1    H2 In the past 30 days, for how many days were you totally unable to carry out your usual activities or work because of any health condition? Record number of days  0    H3 In the past 30 days, not counting the days that you were totally unable, for how many days did you cut back or reduce your usual activities or work because of any health condition? Record number of days 0      Goals  1. Education- the stages of grief  a. Client will be able to describe the stages of grief; denial, bargaining, anger, depression, and acceptance and give examples of how each have felt for her.  2. Behavioral Activation  a. Client will learn to assess their emotional stateon a day to day basis  b. Client will Identify two forms of exercise/activity and engage in them 3 times per week  c. Client will Identify 3 things that make them laugh, and engage in these 5 times per week.  d. Client will Identify 1-2Creative activities or hobbies  and engage in them 2 times per week  e. Client will identify music, movies, books that make them feel good and use them 3-4 times per week  3. Self-care  a. Client will identify 5 things they can do just for themselves  b. Client will take time for quiet, reflection, meditation 5 times per week  c. Client will Learn to set boundaries when appropriate  d. Client will Identify 2 individuals they can call on for support, distraction  4. Assessment of progress  a. Client will engage in assessment of their progress on a regular basis      Client has reviewed and agreed to the above plan.      Sam Jaime  4/9/2018

## 2018-04-13 ENCOUNTER — HOSPITAL ENCOUNTER (OUTPATIENT)
Dept: PHYSICAL THERAPY | Facility: CLINIC | Age: 73
Setting detail: THERAPIES SERIES
End: 2018-04-13
Attending: PHYSICIAN ASSISTANT
Payer: MEDICARE

## 2018-04-13 PROCEDURE — 97110 THERAPEUTIC EXERCISES: CPT | Mod: GP | Performed by: PHYSICAL THERAPIST

## 2018-04-13 PROCEDURE — 40000718 ZZHC STATISTIC PT DEPARTMENT ORTHO VISIT: Performed by: PHYSICAL THERAPIST

## 2018-04-13 PROCEDURE — 97112 NEUROMUSCULAR REEDUCATION: CPT | Mod: GP | Performed by: PHYSICAL THERAPIST

## 2018-04-18 ENCOUNTER — MEDICAL CORRESPONDENCE (OUTPATIENT)
Dept: HEALTH INFORMATION MANAGEMENT | Facility: CLINIC | Age: 73
End: 2018-04-18

## 2018-04-24 DIAGNOSIS — M54.10 RADICULAR LEG PAIN: ICD-10-CM

## 2018-04-24 DIAGNOSIS — E11.9 TYPE 2 DIABETES MELLITUS WITHOUT COMPLICATION, WITH LONG-TERM CURRENT USE OF INSULIN (H): Chronic | ICD-10-CM

## 2018-04-24 DIAGNOSIS — Z79.4 TYPE 2 DIABETES MELLITUS WITHOUT COMPLICATION, WITH LONG-TERM CURRENT USE OF INSULIN (H): Chronic | ICD-10-CM

## 2018-04-24 RX ORDER — METFORMIN HCL 500 MG
TABLET, EXTENDED RELEASE 24 HR ORAL
Qty: 180 TABLET | Refills: 3 | Status: SHIPPED | OUTPATIENT
Start: 2018-04-24 | End: 2019-03-11 | Stop reason: SINTOL

## 2018-04-24 NOTE — TELEPHONE ENCOUNTER
Requested Prescriptions   Pending Prescriptions Disp Refills     traMADol (ULTRAM) 50 MG tablet [Pharmacy Med Name: TRAMADOL HCL 50MG   TAB] 30 tablet 5     Sig: TAKE ONE TABLET BY MOUTH EVERY 6 HOURS AS NEEDED FOR  MODERATE  PAIN    There is no refill protocol information for this order    traMADol (ULTRAM) 50 MG tablet      Last Written Prescription Date:  10/14/17  Last Fill Quantity: 30,   # refills: 5  Last Office Visit: 3/23/18  Future Office visit:    Next 5 appointments (look out 90 days)     Apr 30, 2018  9:30 AM CDT   Return Visit with [facility] ([facility])    [address] [address]   [phone]            May 14, 2018  9:30 AM CDT   Return Visit with [facility] ([facility])    [address] [address]   [phone]                   Routing refill request to provider for review/approval because:  Drug not on the G, P or [facility] refill protocol or controlled substance          metFORMIN (GLUCOPHAGE-XR) 500 MG 24 hr tablet [Pharmacy Med Name: MetFORMIN ER 500MG  TAB] 180 tablet 3     Sig: TAKE ONE TABLET BY MOUTH TWICE DAILY WITH MEALS    Biguanide Agents Passed    4/24/2018 10:32 AM       Passed - Blood pressure less than 140/90 in past 6 months    BP Readings from Last 3 Encounters:   03/23/18 91/54   03/14/18 118/80   03/09/18 117/74                Passed - Patient has documented LDL within the past 12 mos.    Recent Labs   Lab Test  03/14/18   1052   LDL  52            Passed - Patient has had a Microalbumin in the past 12 mos.    Recent Labs   Lab Test  11/27/17   1101   MICROL  14   UMALCR  14.78            Passed - Patient is age 10 or older       Passed - Patient has documented A1c within the specified period of time.    Recent Labs   Lab Test  03/14/18   1052   A1C  7.1*            Passed - Patient's CR is NOT>1.4 OR Patient's EGFR is NOT<45 within past 12  "mos.    Recent Labs   Lab Test  03/14/18   1052   GFRESTIMATED  69   GFRESTBLACK  84       Recent Labs   Lab Test  03/14/18   1052   CR  0.81            Passed - Patient does NOT have a diagnosis of CHF.       Passed - Patient is not pregnant       Passed - Patient has not had a positive pregnancy test within the past 12 mos.        Passed - Recent (6 mo) or future (30 days) visit within the authorizing provider's specialty    Patient had office visit in the last 6 months or has a visit in the next 30 days with authorizing provider or within the authorizing provider's specialty.  See \"Patient Info\" tab in inbasket, or \"Choose Columns\" in Meds & Orders section of the refill encounter.      Last Written Prescription Date:  4/21/17  Last Fill Quantity: 180,  # refills: 3   Last office visit: 3/23/2018 with prescribing provider:     Future Office Visit:   Next 5 appointments (look out 90 days)     Apr 30, 2018  9:30 AM CDT   Return Visit with [facility] ([facility])    [address] [address]   [phone]            May 14, 2018  9:30 AM CDT   Return Visit with [facility] ([facility])    [address] [address]   [phone]                             "

## 2018-04-25 RX ORDER — TRAMADOL HYDROCHLORIDE 50 MG/1
TABLET ORAL
Qty: 30 TABLET | Refills: 5 | Status: SHIPPED | OUTPATIENT
Start: 2018-04-25 | End: 2018-12-11

## 2018-04-30 ENCOUNTER — OFFICE VISIT (OUTPATIENT)
Dept: PSYCHOLOGY | Facility: CLINIC | Age: 73
End: 2018-04-30
Payer: COMMERCIAL

## 2018-04-30 ENCOUNTER — TELEPHONE (OUTPATIENT)
Dept: FAMILY MEDICINE | Facility: CLINIC | Age: 73
End: 2018-04-30

## 2018-04-30 DIAGNOSIS — E11.9 TYPE 2 DIABETES MELLITUS WITHOUT COMPLICATION, WITH LONG-TERM CURRENT USE OF INSULIN (H): Chronic | ICD-10-CM

## 2018-04-30 DIAGNOSIS — F32.0 MILD MAJOR DEPRESSION (H): Primary | ICD-10-CM

## 2018-04-30 DIAGNOSIS — I10 ESSENTIAL HYPERTENSION: Primary | Chronic | ICD-10-CM

## 2018-04-30 DIAGNOSIS — Z79.4 TYPE 2 DIABETES MELLITUS WITHOUT COMPLICATION, WITH LONG-TERM CURRENT USE OF INSULIN (H): Chronic | ICD-10-CM

## 2018-04-30 DIAGNOSIS — F43.21 GRIEF: ICD-10-CM

## 2018-04-30 PROCEDURE — 90834 PSYTX W PT 45 MINUTES: CPT | Performed by: PSYCHOLOGIST

## 2018-04-30 RX ORDER — LISINOPRIL 10 MG/1
10 TABLET ORAL DAILY
Qty: 30 TABLET | Refills: 0 | Status: SHIPPED | OUTPATIENT
Start: 2018-04-30 | End: 2018-06-11 | Stop reason: SINTOL

## 2018-04-30 NOTE — MR AVS SNAPSHOT
"                  MRN:[id number]                      After Visit Summary   2018    Theresa Bill    MRN: [id number]           Visit Information        Provider Department      2018 9:30 AM Sam Jaime [facility] Generic      Your next 10 appointments already scheduled     May 14, 2018  9:30 AM CDT   Return Visit with Sam Jaime   [facility] ([facility])    [address] [facility] [address]   [phone]            May 21, 2018  8:00 AM CDT   SHORT with Shae Pierre PA-C   [facility] ([facility])    [address] [place] [address]   [phone]            May 30, 2018  1:00 PM CDT   Return Visit with Sam Jaime   [facility] ([facility])    [address] [facility] [address]   [phone]              MyChart Information     [employer] lets you send messages to your doctor, view your test results, renew your prescriptions, schedule appointments and more. To sign up, go to [email or web address] . Click on \"Log in\" on the left side of the screen, which will take you to the Welcome page. Then click on \"Sign up Now\" on the right side of the page.     You will be asked to enter the access code listed below, as well as some personal information. Please follow the directions to create your username and password.     Your access code is: [email or web address]  Expires: 2018 12:15 PM     Your access code will  in 90 days. If you need help or a new code, please call your [facility] clinic or [phone].        Care EveryWhere ID     This is your Care EveryWhere ID. This could be used by other organizations to access your [facility] medical records  [id number]        Equal Access to Services     SABAS VUONG : Annalisa Agrawal, hiral zambrano, ángel lindquist. So [facility] " [phone].    ATENCIÓN: Si habla español, tiene a urban disposición servicios gratuitos de asistencia lingüística. Llame al [phone].    We comply with applicable federal civil rights laws and [place] laws. We do not discriminate on the basis of race, color, national origin, age, disability, sex, sexual orientation, or gender identity.

## 2018-04-30 NOTE — TELEPHONE ENCOUNTER
Shae Pierre PA-C   to Theresa LUDY Bill           4/30/18 1:17 PM   Hi Theresa -   It looks like blood sugars are consistently running high.  Continue metformin.  Increase levemir to 14 units.  You can further increase by 2 units every 3 days if sugars 2 hours after eating are above 180 (but only if morning sugars above 110).   If this does not make sense or you are unsure, please ask before increasing.  New prescription info sent to pharmacy.  Call them to refill when you are needing it.     BP - It looks like Bps generally are high.  Add lisinopril 10 mg.  I've sent this to pharmacy.  Keep monitoring BPs and for any falls.       Recheck diabetes and BP in clinic in 2-4 weeks.     Shae   I read the above My Chart message to Theresa.  She voiced understanding, however I did tell her to call if has questions about increasing the Levemir.  Briseyda Rome RN

## 2018-05-01 ENCOUNTER — TELEPHONE (OUTPATIENT)
Dept: FAMILY MEDICINE | Facility: CLINIC | Age: 73
End: 2018-05-01

## 2018-05-01 DIAGNOSIS — Z79.4 TYPE 2 DIABETES MELLITUS WITHOUT COMPLICATION, WITH LONG-TERM CURRENT USE OF INSULIN (H): Chronic | ICD-10-CM

## 2018-05-01 DIAGNOSIS — E11.9 TYPE 2 DIABETES MELLITUS WITHOUT COMPLICATION, WITH LONG-TERM CURRENT USE OF INSULIN (H): Chronic | ICD-10-CM

## 2018-05-01 NOTE — TELEPHONE ENCOUNTER
Received fax from [facility] regarding Levemir Flextouch:    **URGENT** Need Max Dose of Units Per Day for Ins - Fax new RX    Tasha [facility]

## 2018-05-02 ENCOUNTER — TRANSFERRED RECORDS (OUTPATIENT)
Dept: HEALTH INFORMATION MANAGEMENT | Facility: CLINIC | Age: 73
End: 2018-05-02

## 2018-05-02 ASSESSMENT — ANXIETY QUESTIONNAIRES
4. TROUBLE RELAXING: MORE THAN HALF THE DAYS
GAD7 TOTAL SCORE: 6
6. BECOMING EASILY ANNOYED OR IRRITABLE: NOT AT ALL
3. WORRYING TOO MUCH ABOUT DIFFERENT THINGS: NOT AT ALL
1. FEELING NERVOUS, ANXIOUS, OR ON EDGE: MORE THAN HALF THE DAYS
5. BEING SO RESTLESS THAT IT IS HARD TO SIT STILL: MORE THAN HALF THE DAYS
7. FEELING AFRAID AS IF SOMETHING AWFUL MIGHT HAPPEN: NOT AT ALL
2. NOT BEING ABLE TO STOP OR CONTROL WORRYING: NOT AT ALL

## 2018-05-02 NOTE — PROGRESS NOTES
Progress Note  Disclaimer: This note consists of symbols derived from keyboarding, dictation and/or voice recognition software. As a result, there may be errors in the script that have gone undetected. Please consider this when interpreting information found in this chart.    Client Name: Theresa Bill  Date: 4/30/2018         Service Type: Individual      Session Start Time: 8: 9:30 AM  Session End Time: 10:15 AM      Session Length: 45     Session #: 11     Attendees: Client attended alone    Treatment Plan Last Reviewed:  4/9/2018       DATA   Client reports she  Has had a tough couple of weeks.  She was just stopped by the police on her way in due to someonecomplaining she was driving over the line.  She is afraid she will lose her license soon.  She does notice herself drifting around from time to time.  She also notes that she has felt down lately and is not sure why.  Her son is getting released from group home next week and she is looking forward to seeing him.  He will be living with her for a short time.  Client is worried about various dysfunctions within the family, particularly disagreements amongst her children.  She is making an effort to get out to bingo with her sister every Tuesday night.     Progress Since Last Session (Related to Symptoms / Goals / Homework):   Symptoms: Stable    Homework: not applicable      Episode of Care Goals: Satisfactory progress - CONTEMPLATION (Considering change and yet undecided); Intervened by assessing the negative and positive thinking (ambivalence) about behavior change     Current / Ongoing Stressors and Concerns:   Recent bereavement, prior caregiver stress     Treatment Objective(s) Addressed in This Session:   increase understanding of steps in the grief process  Utilize family support, work on accepting herself as she is in the moment.     Intervention:   Interpersonal Therapy: facilitating appropriate expressions of  emotion and grief. behavioral activation explored what self care strategies she is currently using and what she might be able to add. Went over the relapsing remitting nature of grief and depression.    ASSESSMENT: Current Emotional / Mental Status (status of significant symptoms):   Risk status (Self / Other harm or suicidal ideation)   Client denies current fears or concerns for personal safety.   Client denies current or recent suicidal ideation or behaviors.   Client denies current or recent homicidal ideation or behaviors.   Client denies current or recent self injurious behavior or ideation.   Client denies other safety concerns.   A safety and risk management plan has not been developed at this time, however client was given the after-hours number / 911 should there be a change in any of these risk factors.     Appearance:   Appropriate    Eye Contact:   Good    Psychomotor Behavior: Normal    Attitude:   Cooperative    Orientation:   All   Speech    Rate / Production: Slow     Volume:  Soft    Mood:    Depressed    Affect:    Appropriate    Thought Content:  Clear    Thought Form:  Coherent  Logical  difficult to think of anything but the loss of her    Insight:    Fair      Medication Review:   No changes to current psychiatric medication(s)     Medication Compliance:   Yes     Changes in Health Issues:   None reported     Chemical Use Review:   Substance Use: Chemical use reviewed, no active concerns identified      Tobacco Use: No current tobacco use.       Collateral Reports Completed:   Not Applicable    PLAN: (Client Tasks / Therapist Tasks / Other)   Therapist encouraged client in the appropriate expressions of grief as well as of her love for her late .  Client to follow-up with all physical therapy appointments .        Sam Jaime                                                         ________________________________________________________________________    Treatment  Plan    Client's Name: Theresa Bill  YOB: 1945    Date: 4/9/2018    Referral / Collaboration:  Referral to another professional/service is not indicated at this time..    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 Health conditions                   include                        disease or illnesses, other health problems that may be short or long lasting,                    injuries, mental health or emotional problems, and problems with alcohol or drugs.                              Think back over the past 30 days and answer these questions, thinking about how much              difficulty you had doing the following activities. For each question, please [place] only                   one response.      S1 Standing for long periods such as 30 minutes? Moderate =   3   S2 Taking care of household responsibilities? None =         1   S3 Learning a new task, for example, learning how to get to a new place? Mild =           2   S4 How much of a problem do you have joining community activities (for example, festivals, [employer] or other activities) in the same way as anyone else can? None =         1   S5 How much have you been emotionally affected by your health problems? None =         1               In the past 30 days, how much difficulty did you have in:   S6 Concentrating on doing something for ten minutes? None =         1   S7 Walking a long distance such as a kilometer (or equivalent)? Moderate =   3   S8 Washing your whole body? None =         1   S9 Getting dressed? None =         1   S10 Dealing with people you do not know?  None =         1   S11 Maintaining a friendship? None =         1   S12 Your day to day work? None =         1       H1 Overall, in the past 30 days, how many days were these difficulties present? Record number of days 1    H2 In the past 30 days, for how many days were you totally unable to carry out your usual activities or work because of any health condition? Record number of days  0    H3 In the past 30 days, not counting the days that you were totally unable, for how many days did you cut back or reduce your usual activities or work because of any health condition? Record number of days 0      Goals  1. Education- the stages of grief  a. Client will be able to describe the stages of grief; denial, bargaining, anger, depression, and acceptance and give examples of how each have felt for her.  2. Behavioral Activation  a. Client will learn to assess their emotional stateon a day to day basis  b. Client will Identify two forms of exercise/activity and engage in them 3 times per week  c. Client will Identify 3 things that make them laugh, and engage in these 5 times per week.  d. Client will Identify 1-2Creative activities or hobbies  and engage in them 2 times per week  e. Client will identify music, movies, books that make them feel good and use them 3-4 times per week  3. Self-care  a. Client will identify 5 things they can do just for themselves  b. Client will take time for quiet, reflection, meditation 5 times per week  c. Client will Learn to set boundaries when appropriate  d. Client will Identify 2 individuals they can call on for support, distraction  4. Assessment of progress  a. Client will engage in assessment of their progress on a regular basis      Client has reviewed and agreed to the above plan.      Sam Jaime  4/9/2018

## 2018-05-03 ENCOUNTER — TELEPHONE (OUTPATIENT)
Dept: CARE COORDINATION | Facility: CLINIC | Age: 73
End: 2018-05-03

## 2018-05-03 ENCOUNTER — PATIENT OUTREACH (OUTPATIENT)
Dept: CARE COORDINATION | Facility: CLINIC | Age: 73
End: 2018-05-03

## 2018-05-03 ASSESSMENT — ANXIETY QUESTIONNAIRES: GAD7 TOTAL SCORE: 6

## 2018-05-03 ASSESSMENT — PATIENT HEALTH QUESTIONNAIRE - PHQ9: SUM OF ALL RESPONSES TO PHQ QUESTIONS 1-9: 8

## 2018-05-03 NOTE — PROGRESS NOTES
Clinic Care Coordination Contact  OUTREACH  Referral Information:  Referral Source: [facility]  Primary Diagnosis: Injury/Fall (fall with contusions)  Chief Complaint   Patient presents with     Clinic Care Coordination - Initial     [facility] ED F/U RN CC        Universal Utilization:  Utilization    Last refreshed: 5/3/2018  9:50 AM:  No Show Count (past year) 6       Last refreshed: 5/3/2018  9:50 AM:  ED visits 1       Last refreshed: 5/3/2018  9:50 AM:  Hospital admissions 0          Current as of: 5/3/2018  9:50 AM           Clinical Concerns:  Current Medical Concerns: Visit to [facility] after falling on stairs at home. No fracture subluxation or soft tissue abnormality was noted. After work up, thought to have contusions. Was evaluated and released to go home with sister.    Current Behavioral Concerns: None at present. Receives care at [facility] for depression  Education Provided to patient: Care on stairs, role of CC, f/u with PCP   Health Maintenance Reviewed: Due/Overdue: (COPD AP, Asthma AP, U Tox, Eye)    Medication Management:  No new meds. Verbalizes good understanding of current medications. Has sister to assist her if needed    Functional Status:  Mobility Status: Independent  Equipment Currently Used at Home: none  Transportation means:: Accessible car, Family, Regular car (currently drives bit thinking of giving it up as she has had some issues and ha family to drive her)     Psychosocial:  Current living arrangement: I live in a private home with family (sister lives with her)  Type of residence:: Private home - stairs  Financial/Insurance: No concerns     Resources and Interventions:  Current Resources: None  Advanced Care Plans/Directives on file: No     Patient/Caregiver understanding: Patient reports she is doing well this AM. Able to get up and about without difficulty. Reports she is sore but glad to know no injury like a broken bone.Reports it is not her first  fall. Reports her sister lives with her and her son checks on her. Reports he is going to try to fix something up on the deck stairs as this is where she has had her falls, He toe got caught this time. Discussed CC enrollment. She declines as she is planning to change PCP to [facility] as this is much closer for her. Has appointment with PCP at [facility] on 5/21. Intends to keep this as she wants to see provider one last time. Will discuss with PCP who she might see at [facility].Would like CC materials in case something does not work out with her transition and she needs CC follow up.     Future Appointments              In 1 week Sam Jaime [facility], [facility]    In 2 weeks Shae Pierre PA-C [facility]    In 3 weeks AddisonSam boyer [facility], [facility]           Plan: No further outreach as patient declined R/T switching to a closer clinic outside of [place]. Will send letter with CC materials.    Arsh CHÁVEZ,RN- BC  Clinic Care Coordinator  [facility]  Phone: [phone]

## 2018-05-03 NOTE — LETTER
[place] [id number] [place] [address]        May 3, 2018      Theresa Barryk  [address] [place] [address]    Dear Theresa,    I am a clinic care coordinator who works with Shae Pierre PA-C at the [facility]. I wanted to thank you for spending the time to talk with me. I also wanted to provide you with my contact information so that you can call me with questions or concerns about your health care. Below is a description of clinic care coordination and how I can further assist you.     The clinic care coordinator is a registered nurse and/or  who understand the health care system. The goal of clinic care coordination is to help you manage your health and improve access to the [facility] in the most efficient manner. The registered nurse can assist you in meeting your health care goals by providing education, coordinating services, and strengthening the communication among your providers. The  can assist you with financial, behavioral, psychosocial, chemical dependency, counseling, and/or psychiatric resources.    Please feel free to contact me at [phone], with any questions or concerns. We at [facility] are focused on providing you with the highest-quality healthcare experience possible and that all starts with you.     Sincerely,     Arsh CHÁVEZ,RN- BC  Clinic Care Coordinator  [facility]  Phone: [phone]    Enclosed: I have enclosed a copy of a 24 Hour Access Plan. This has helpful phone numbers for you to call when needed. Please keep this in an easy to access place to use as needed. and I have enclosed helpful educational material. Please review and call me with any questions.

## 2018-05-03 NOTE — LETTER
Health Care Home - Access Care Plan  About Me  Patient Name:  Theresa Crow  YOB: 1945  Age:                             72 year old   Yahaira MRN:            [id number] Telephone Information:     Home Phone [phone]   Mobile [phone]       Address:    [address] [place] [address] Email address:  [email or web address]      Emergency Contact(s)  Name Relationship Lgl Grd Work Phone Home Phone Mobile Phone   1. KATERINE CROW Son  none [phone] none   2. MICHAEL MARES Sister   [phone]         My Access Plan  Medical Emergency 911   Questions or concerns during clinic hours Primary Clinic Line, I will call the clinic directly: [facility] - [phone]   24 Hour Appointment Line [phone] or  [phone]) (toll free)   24 Hour Nurse Line [phone] (toll free)   Questions or concerns outside clinic hours 24 Hour Appointment Line, I will call the after-hours on-call line:   [facility] [phone] or [phone] ([phone]) (toll-free)   Preferred Urgent Care     [facility]  [phone]   Preferred Pharmacy [facility] [address] [place] [address] AT [facility] [address]     Behavioral Health Crisis Line The National Suicide Prevention Lifeline at 1-693.605.5902 or 911                   My Care Team Members  Patient Care Team       Relationship Specialty Notifications Start End    Shae Pierre PADeniseC PCP - General Physician Assistant  10/6/14     Phone: [phone] Fax: [phone]         [address] [address]    Rony Persaud MD MD Family Practice  1/2/17     Phone: [phone] Fax: [phone]         [address] [facility] [address]    Jeramy Cedeño Medical Student   1/2/17            My Medical and Care Information  Problem List   Patient Active Problem List   Diagnosis     Lumbago     Pain in limb     Mild  major depression (H)     Essential hypertension     Type 2 diabetes mellitus with diabetic neuropathy (H)     Osteoarthritis     Mixed incontinence urge and stress (male)(female)     Restless legs     Microalbuminuria     Obesity     Esophageal reflux     GI bleed     Dyslipidemia     Iron deficiency anemia     Falls frequently     ACP (advance care planning)     SHANTI (obstructive sleep apnea)     Other chronic pain     Reactive airway disease, mild intermittent, uncomplicated     Simple chronic bronchitis (H)     LPRD (laryngopharyngeal reflux disease)      Current Medications and Allergies:  See printed Medication Report

## 2018-05-07 ENCOUNTER — TRANSFERRED RECORDS (OUTPATIENT)
Dept: HEALTH INFORMATION MANAGEMENT | Facility: CLINIC | Age: 73
End: 2018-05-07

## 2018-05-14 ENCOUNTER — OFFICE VISIT (OUTPATIENT)
Dept: PSYCHOLOGY | Facility: CLINIC | Age: 73
End: 2018-05-14
Payer: COMMERCIAL

## 2018-05-14 DIAGNOSIS — F32.0 MILD MAJOR DEPRESSION (H): Primary | ICD-10-CM

## 2018-05-14 DIAGNOSIS — F43.21 GRIEF: ICD-10-CM

## 2018-05-14 PROCEDURE — 90834 PSYTX W PT 45 MINUTES: CPT | Performed by: PSYCHOLOGIST

## 2018-05-14 NOTE — MR AVS SNAPSHOT
"                  MRN:[id number]                      After Visit Summary   2018    Theresa Bill    MRN: [id number]           Visit Information        Provider Department      2018 9:30 AM Addison Sam [facility]      Your next 10 appointments already scheduled     May 21, 2018  8:00 AM CDT   SHORT with Shae Pierre PA-C   [facility]    [address] [place] [address]   [phone]            May 30, 2018  1:00 PM CDT   Return Visit with Sam Jaime   [facility] ([facility]    [address] [facility] [address] [phone]            2018  8:30 AM CDT   Return Visit with Sam Jaime   [facility]    [address] [facility] [address]   [phone]              MyChart Information     [facility] lets you send messages to your doctor, view your test results, renew your prescriptions, schedule appointments and more. To sign up, go to [email or web address] . Click on \"Log in\" on the left side of the screen, which will take you to the Welcome page. Then click on \"Sign up Now\" on the right side of the page.     You will be asked to enter the access code listed below, as well as some personal information. Please follow the directions to create your username and password.     Your access code is: [email or web address]  Expires: 2018 12:15 PM     Your access code will  in 90 days. If you need help or a new code, please call your [facility] clinic or [phone].        Care EveryWhere ID     This is your Care EveryWhere ID. This could be used by other organizations to access your [facility] medical records  [id number]        Equal Access to Services     SABAS VUONG : Annalisa Agrawal, hiral zambrano, ángel lindquist . So [facility] " [phone].    ATENCIÓN: Si habla español, tiene a urban disposición servicios gratuitos de asistencia lingüística. Llame al [phone].    We comply with applicable federal civil rights laws and [place] laws. We do not discriminate on the basis of race, color, national origin, age, disability, sex, sexual orientation, or gender identity.

## 2018-05-15 ASSESSMENT — ANXIETY QUESTIONNAIRES
6. BECOMING EASILY ANNOYED OR IRRITABLE: NOT AT ALL
GAD7 TOTAL SCORE: 3
2. NOT BEING ABLE TO STOP OR CONTROL WORRYING: NOT AT ALL
7. FEELING AFRAID AS IF SOMETHING AWFUL MIGHT HAPPEN: NOT AT ALL
5. BEING SO RESTLESS THAT IT IS HARD TO SIT STILL: NOT AT ALL
3. WORRYING TOO MUCH ABOUT DIFFERENT THINGS: NOT AT ALL
4. TROUBLE RELAXING: MORE THAN HALF THE DAYS
1. FEELING NERVOUS, ANXIOUS, OR ON EDGE: SEVERAL DAYS

## 2018-05-15 NOTE — PROGRESS NOTES
Progress Note  Disclaimer: This note consists of symbols derived from keyboarding, dictation and/or voice recognition software. As a result, there may be errors in the script that have gone undetected. Please consider this when interpreting information found in this chart.    Client Name: Theresa Bill  Date: 5/14/2018         Service Type: Individual      Session Start Time: 8:30 AM  Session End Time: 9:15 AM      Session Length: 45     Session #: 12     Attendees: Client attended alone    Treatment Plan Last Reviewed:  4/9/2018       DATA   Client reports she has been having more trouble feeling afraid of being alone recently.  She describes a feeling of overwhelming loneliness when her sister or others are gone.  Her son is living with her now and this is helping.  She also had a recent fall going up the steps and was hospitalized briefly with no injury found.  Starting to believe that her coughing may be a result of nervousness.  She does report sleeping better, knowing someone is there.     Progress Since Last Session (Related to Symptoms / Goals / Homework):   Symptoms: Stable    Homework: not applicable      Episode of Care Goals: Satisfactory progress - CONTEMPLATION (Considering change and yet undecided); Intervened by assessing the negative and positive thinking (ambivalence) about behavior change     Current / Ongoing Stressors and Concerns:   Recent bereavement, prior caregiver stress     Treatment Objective(s) Addressed in This Session:   increase understanding of steps in the grief process  Utilize family support, work on accepting herself as she is in the moment.     Intervention:   Interpersonal Therapy: facilitating appropriate expressions of emotion and grief. behavioral activation explored what self care strategies she is currently using and what she might be able to add. Went over the relapsing remitting nature of grief and depression.    ASSESSMENT:  Current Emotional / Mental Status (status of significant symptoms):   Risk status (Self / Other harm or suicidal ideation)   Client denies current fears or concerns for personal safety.   Client denies current or recent suicidal ideation or behaviors.   Client denies current or recent homicidal ideation or behaviors.   Client denies current or recent self injurious behavior or ideation.   Client denies other safety concerns.   A safety and risk management plan has not been developed at this time, however client was given the after-hours number / 911 should there be a change in any of these risk factors.     Appearance:   Appropriate    Eye Contact:   Good    Psychomotor Behavior: Normal    Attitude:   Cooperative    Orientation:   All   Speech    Rate / Production: Slow     Volume:  Soft    Mood:    Depressed    Affect:    Appropriate    Thought Content:  Clear    Thought Form:  Coherent  Logical  difficult to think of anything but the loss of her    Insight:    Fair      Medication Review:   No changes to current psychiatric medication(s)     Medication Compliance:   Yes     Changes in Health Issues:   None reported     Chemical Use Review:   Substance Use: Chemical use reviewed, no active concerns identified      Tobacco Use: No current tobacco use.       Collateral Reports Completed:   Not Applicable    PLAN: (Client Tasks / Therapist Tasks / Other)   Therapist encouraged client in the appropriate expressions of grief as well as of her love for her late .  Client to follow-up with all physical therapy appointments .        Sam Jaime                                                         ________________________________________________________________________    Treatment Plan    Client's Name: Theresa Bill  YOB: 1945    Date: 4/9/2018    Referral / Collaboration:  Referral to another professional/service is not indicated at this time..    Anticipated number of session or this  episode of care: 25       DSM5 Diagnoses: (Sustained by DSM5 Criteria Listed Above)  Diagnoses:            296.21 (F32.0) Major Depressive Disorder, Single Episode, Mild _ grief  Psychosocial & Contextual Factors: Caregiver stress, recent death of her   WHODAS 2.0 (12 item)                          This questionnaire asks about difficulties due to health conditions. Health conditions                   include                        disease or illnesses, other health problems that may be short or long lasting,                    injuries, mental health or emotional problems, and problems with alcohol or drugs.                              Think back over the past 30 days and answer these questions, thinking about how much              difficulty you had doing the following activities. For each question, please [place] only                   one response.      S1 Standing for long periods such as 30 minutes? Moderate =   3   S2 Taking care of household responsibilities? None =         1   S3 Learning a new task, for example, learning how to get to a new place? Mild =           2   S4 How much of a problem do you have joining community activities (for example, festivals, Mandaeism or other activities) in the same way as anyone else can? None =         1   S5 How much have you been emotionally affected by your health problems? None =         1               In the past 30 days, how much difficulty did you have in:   S6 Concentrating on doing something for ten minutes? None =         1   S7 Walking a long distance such as a kilometer (or equivalent)? Moderate =   3   S8 Washing your whole body? None =         1   S9 Getting dressed? None =         1   S10 Dealing with people you do not know? None =         1   S11 Maintaining a friendship? None =         1   S12 Your day to day work? None =         1       H1 Overall, in the past 30 days, how many days were these difficulties present? Record number of days 1    H2  In the past 30 days, for how many days were you totally unable to carry out your usual activities or work because of any health condition? Record number of days  0    H3 In the past 30 days, not counting the days that you were totally unable, for how many days did you cut back or reduce your usual activities or work because of any health condition? Record number of days 0      Goals  1. Education- the stages of grief  a. Client will be able to describe the stages of grief; denial, bargaining, anger, depression, and acceptance and give examples of how each have felt for her.  2. Behavioral Activation  a. Client will learn to assess their emotional stateon a day to day basis  b. Client will Identify two forms of exercise/activity and engage in them 3 times per week  c. Client will Identify 3 things that make them laugh, and engage in these 5 times per week.  d. Client will Identify 1-2Creative activities or hobbies  and engage in them 2 times per week  e. Client will identify music, movies, books that make them feel good and use them 3-4 times per week  3. Self-care  a. Client will identify 5 things they can do just for themselves  b. Client will take time for quiet, reflection, meditation 5 times per week  c. Client will Learn to set boundaries when appropriate  d. Client will Identify 2 individuals they can call on for support, distraction  4. Assessment of progress  a. Client will engage in assessment of their progress on a regular basis      Client has reviewed and agreed to the above plan.      Sam Jaime  4/9/2018

## 2018-05-16 ASSESSMENT — PATIENT HEALTH QUESTIONNAIRE - PHQ9: SUM OF ALL RESPONSES TO PHQ QUESTIONS 1-9: 6

## 2018-05-16 ASSESSMENT — ANXIETY QUESTIONNAIRES: GAD7 TOTAL SCORE: 3

## 2018-05-21 ENCOUNTER — OFFICE VISIT (OUTPATIENT)
Dept: FAMILY MEDICINE | Facility: CLINIC | Age: 73
End: 2018-05-21
Payer: COMMERCIAL

## 2018-05-21 VITALS
DIASTOLIC BLOOD PRESSURE: 72 MMHG | BODY MASS INDEX: 32.28 KG/M2 | HEART RATE: 68 BPM | RESPIRATION RATE: 16 BRPM | SYSTOLIC BLOOD PRESSURE: 112 MMHG | TEMPERATURE: 98 F | HEIGHT: 61 IN | WEIGHT: 171 LBS

## 2018-05-21 DIAGNOSIS — K21.9 LPRD (LARYNGOPHARYNGEAL REFLUX DISEASE): ICD-10-CM

## 2018-05-21 DIAGNOSIS — Z79.4 TYPE 2 DIABETES MELLITUS WITH DIABETIC NEUROPATHY, WITH LONG-TERM CURRENT USE OF INSULIN (H): ICD-10-CM

## 2018-05-21 DIAGNOSIS — R68.2 DRY MOUTH: ICD-10-CM

## 2018-05-21 DIAGNOSIS — R29.6 FALLING: ICD-10-CM

## 2018-05-21 DIAGNOSIS — G25.81 RESTLESS LEGS SYNDROME (RLS): ICD-10-CM

## 2018-05-21 DIAGNOSIS — Z79.899 POLYPHARMACY: Primary | ICD-10-CM

## 2018-05-21 DIAGNOSIS — E11.40 TYPE 2 DIABETES MELLITUS WITH DIABETIC NEUROPATHY, WITH LONG-TERM CURRENT USE OF INSULIN (H): ICD-10-CM

## 2018-05-21 DIAGNOSIS — F32.0 MILD MAJOR DEPRESSION (H): Chronic | ICD-10-CM

## 2018-05-21 PROCEDURE — 99215 OFFICE O/P EST HI 40 MIN: CPT | Performed by: PHYSICIAN ASSISTANT

## 2018-05-21 RX ORDER — CLONAZEPAM 0.5 MG/1
TABLET ORAL
Refills: 0
Start: 2018-05-21 | End: 2018-06-11

## 2018-05-21 NOTE — NURSING NOTE
"Chief Complaint   Patient presents with     Diabetes     Hypertension       Initial /84 (BP Location: Right arm, Patient Position: Chair, Cuff Size: Adult Large)  Pulse 64  Temp 98  F (36.7  C) (Tympanic)  Resp 16  Ht 5' 1\" (1.549 m)  Wt 171 lb (77.6 kg)  BMI 32.31 kg/m2 Estimated body mass index is 32.31 kg/(m^2) as calculated from the following:    Height as of this encounter: 5' 1\" (1.549 m).    Weight as of this encounter: 171 lb (77.6 kg).      Health Maintenance that is potentially due pending provider review:  NONE        Is there anyone who you would like to be able to receive your results? No  If yes have patient fill out RICH      "

## 2018-05-21 NOTE — MR AVS SNAPSHOT
"              After Visit Summary   5/21/2018    Theresa Bill    MRN: [id number]           Patient Information     Date Of Birth          1945        Visit Information        Provider Department      5/21/2018 8:00 AM Shae Pierre PA-C [facility]        Today's Diagnoses     Type 2 diabetes mellitus with diabetic neuropathy, with long-term current use of insulin (H)    -  1    Falling        Dry mouth        LPRD (laryngopharyngeal reflux disease)        Restless legs syndrome (RLS)          Care Instructions    Try decreasing clonazepam to 0.5 tab at bedtime   Decided to try stopping wellbutrin (bupropion)  Can try stopping metoprolol.  Will see how BP does.  Also need to recheck BP laying/sitting/standing today.  For cough and reflux - can try stopping ranitidine.  Use of this medication is optional.    Optional multivitamin and probiotic - can stop  Simplified list by stopping old nystatin cream - using powder instead  Left on list allergy eye drops, nitroglycerin \"chest pain emergency\" med even though not needing often  Consider return to Physical Therapy   Can try reducing how often take tramadol and see if makes difference for balance  Consider discussing meds with urology and neurology - but I think these meds will be hard to change    Recheck in 3 wks -  Let me know balance, sleep, mood, BP, pain    Tentatively plan lab in mid June for A1c (diabetes), kidney recheck          Follow-ups after your visit        Your next 10 appointments already scheduled     May 30, 2018  1:00 PM CDT   Return Visit with Sam Sebastianifton   [facility] ([facility])    [address] [address]   [phone]            Jun 13, 2018  8:30 AM CDT   [facility]    [address] [address]   [phone]              Who to contact     If you have questions or " "need follow up information about today's clinic visit or your schedule please contact [facility] directly at [phone].  Normal or non-critical lab and imaging results will be communicated to you by [employer], letter or phone within 4 business days after the clinic has received the results. If you do not hear from us within 7 days, please contact the clinic through [employer] or phone. If you have a critical or abnormal lab result, we will notify you by phone as soon as possible.  Submit refill requests through [employer] or call your pharmacy and they will forward the refill request to us. Please allow 3 business days for your refill to be completed.          Additional Information About Your Visit        [employer] Information     [employer] lets you send messages to your doctor, view your test results, renew your prescriptions, schedule appointments and more. To sign up, go to [email or web address] . Click on \"Log in\" on the left side of the screen, which will take you to the Welcome page. Then click on \"Sign up Now\" on the right side of the page.     You will be asked to enter the access code listed below, as well as some personal information. Please follow the directions to create your username and password.     Your access code is: [email or web address]  Expires: 2018 12:15 PM     Your access code will  in 90 days. If you need help or a new code, please call your [place] clinic or [phone].        Care EveryWhere ID     This is your Care EveryWhere ID. This could be used by other organizations to access your [facility] medical records  [id number]        Your Vitals Were     Pulse Temperature Respirations Height BMI (Body Mass Index)       64 98  F (36.7  C) (Tympanic) 16 5' 1\" (1.549 m) 32.31 kg/m2        Blood Pressure from Last 3 Encounters:   18 138/84   18 91/54   18 118/80    Weight from Last 3 Encounters:   18 171 lb (77.6 kg)   18 169 lb (76.7 kg) "   03/14/18 169 lb (76.7 kg)              Today, you had the following     No orders found for display         Today's Medication Changes          These changes are accurate as of 5/21/18  8:52 AM.  If you have any questions, ask your nurse or doctor.               These medicines have changed or have updated prescriptions.        Dose/Directions    clonazePAM 0.5 MG tablet   Commonly known as:  klonoPIN   This may have changed:    - how much to take  - how to take this  - when to take this  - additional instructions   Used for:  Restless legs syndrome (RLS)   Changed by:  Shae Pierre PA-C        Going to try to decrease to 0.5 tab daily with hope of weaning off.  Prescribed by neurology.   Refills:  0       nystatin 315330 UNIT/GM Powd   Commonly known as:  MYCOSTATIN   This may have changed:  Another medication with the same name was removed. Continue taking this medication, and follow the directions you see here.   Used for:  Other specified erythematous condition(695.89)   Changed by:  Shae Pierre PA-C        Dose:  1 g   Apply 1 g topically 3 times daily as needed   Quantity:  60 g   Refills:  1            Where to get your medicines      Some of these will need a paper prescription and others can be bought over the counter.  Ask your nurse if you have questions.     You don't need a prescription for these medications     clonazePAM 0.5 MG tablet                Primary Care Provider Office Phone # Fax #    Shae Pierre PA-C [phone] [address] [place], [place] [address]        Equal Access to Services     SABAS VUONG AH: Annalisa shankaro Sodanielle, waaxda luqadaha, qaybta kaalmada juan jose, ángel jauregui. So [facility] [phone].    ATENCIÓN: Si habla español, tiene a urban disposición servicios gratuitos de asistencia lingüística. Llame al [phone].    We comply with applicable federal civil rights laws and [place] laws. We do  not discriminate on the basis of race, color, national origin, age, disability, sex, sexual orientation, or gender identity.            Thank you!     Thank you for choosing [facility]  for your care. Our goal is always to provide you with excellent care. Hearing back from our patients is one way we can continue to improve our services. Please take a few minutes to complete the written survey that you may receive in the mail after your visit with us. Thank you!             Your Updated Medication List - Protect others around you: Learn how to safely use, store and throw away your medicines at www.disposemymeds.org.          This list is accurate as of 5/21/18  8:52 AM.  Always use your most recent med list.                   Brand Name Dispense Instructions for use Diagnosis    ACE/ARB/ARNI NOT PRESCRIBED (INTENTIONAL)      ACE & ARB not prescribed due to Intolerance-cough    Type 2 diabetes, HbA1c goal < 7% (H)       aspirin 81 MG tablet     100    ONE DAILY    Type II or unspecified type diabetes mellitus without mention of complication, not stated as uncontrolled       atorvastatin 40 MG tablet    LIPITOR    90 tablet    Take 1 tablet (40 mg) by mouth At Bedtime    Dyslipidemia       blood glucose monitoring test strip    no brand specified    2 Box    1 strip by In Vitro route 3 times daily. Has a Freestyle    Type 2 diabetes, HbA1c goal < 7% (H)       clonazePAM 0.5 MG tablet    klonoPIN     Going to try to decrease to 0.5 tab daily with hope of weaning off.  Prescribed by neurology.    Restless legs syndrome (RLS)       DAILY MULTIVITAMIN PO      One tablet daily        DULoxetine 60 MG EC capsule    CYMBALTA    90 capsule    TAKE ONE CAPSULE BY MOUTH ONCE DAILY    Major depressive disorder, recurrent episode, mild (H)       fluocinonide 0.05 % ointment    LIDEX    15 g    Apply sparingly to affected area twice daily as needed for mouth ulcer.    Aphthae, oral       hydrocortisone 1 % cream     CORTAID    30 g    Apply sparingly to affected area of lip corners two times daily for 1 week.    Angular cheilitis       insulin detemir 100 UNIT/ML injection    LEVEMIR FLEXPEN/FLEXTOUCH    15 mL    Inject 14 Units Subcutaneous daily Increase by 2 units every 3 days if sugars 2 hours after eating are above 180 (only if morning sugars above 110).    Type 2 diabetes mellitus without complication, with long-term current use of insulin (H)       insulin pen needle 32G X 4 MM    BD MARIIA U/F    90 each    Use one pen daily    Type 2 diabetes mellitus without complication, with long-term current use of insulin (H)       ketoconazole 2 % cream    NIZORAL    30 g    Apply topically 2 times daily To corners of lips for 2 weeks or as needed    Angular cheilitis       lisinopril 10 MG tablet    PRINIVIL/ZESTRIL    30 tablet    Take 1 tablet (10 mg) by mouth daily    Essential hypertension       metFORMIN 500 MG 24 hr tablet    GLUCOPHAGE-XR    180 tablet    TAKE ONE TABLET BY MOUTH TWICE DAILY WITH MEALS    Type 2 diabetes mellitus without complication, with long-term current use of insulin (H)       nitroGLYcerin 0.4 MG sublingual tablet    NITROSTAT    25 tablet    For chest pain place 1 tablet under the tongue every 5 minutes for 3 doses. If symptoms persist 5 minutes after 1st dose call 911.    Chest pressure       nystatin 336835 UNIT/GM Powd    MYCOSTATIN    60 g    Apply 1 g topically 3 times daily as needed    Other specified erythematous condition(693.89)       olopatadine 0.1 % ophthalmic solution    PATANOL    5 mL    Place 1 drop into both eyes 2 times daily    Allergic conjunctivitis, bilateral       order for DME      Equipment being ordered: CPAP Patient Theresa Bill received a Resmed Airsense 10  Auto. Pressures were set at Auto 10 - 15 cm H2O.        order for DME     1 Device    Equipment being ordered: Knee compression sleeve    Fall at home, subsequent encounter, Pain in joint, lower leg, unspecified  laterality, Effusion of knee joint right, Arthritis of knee, degenerative       oxybutynin chloride 15 MG Tb24           pantoprazole 40 MG EC tablet    PROTONIX    90 tablet    Take 1 tablet (40 mg) by mouth daily    Gastroesophageal reflux disease, esophagitis presence not specified       PROBIOTIC & ACIDOPHILUS EX ST Caps           ranitidine 150 MG tablet    ZANTAC    60 tablet    TAKE ONE TABLET BY MOUTH TWICE DAILY    Gastroesophageal reflux disease with esophagitis, Esophageal dysmotility, Chronic cough       traMADol 50 MG tablet    ULTRAM    30 tablet    TAKE ONE TABLET BY MOUTH EVERY 6 HOURS AS NEEDED FOR  MODERATE  PAIN    Radicular leg pain

## 2018-05-21 NOTE — PATIENT INSTRUCTIONS
"Try decreasing clonazepam to 0.5 tab at bedtime   Decided to try stopping wellbutrin (bupropion)  Can try stopping metoprolol.  Will see how BP does.  Also need to recheck BP laying/sitting/standing today.  For cough and reflux - can try stopping ranitidine.  Use of this medication is optional.    Optional multivitamin and probiotic - can stop  Simplified list by stopping old nystatin cream - using powder instead  Left on list allergy eye drops, nitroglycerin \"chest pain emergency\" med even though not needing often  Consider return to Physical Therapy   Can try reducing how often take tramadol and see if makes difference for balance  Consider discussing meds with urology and neurology - but I think these meds will be hard to change    Recheck in 3 wks -  Let me know balance, sleep, mood, BP, pain    Tentatively plan lab in mid June for A1c (diabetes), kidney recheck  "

## 2018-05-21 NOTE — PROGRESS NOTES
SUBJECTIVE:   Theresa Bill is a 72 year old female who presents to clinic today for the following health issues:      Diabetes Follow-up    Patient is checking blood sugars: once daily.  Results are as follows:         am - 120-180.  Checks before eating breakfast    Diabetic concerns: None     Symptoms of hypoglycemia (low blood sugar): none     Paresthesias (numbness or burning in feet) or sores: Yes tingling in toes     Date of last diabetic eye exam: Feb 2018    BP Readings from Last 2 Encounters:   05/21/18 112/72   03/23/18 91/54     Hemoglobin A1C (%)   Date Value   03/14/2018 7.1 (H)   11/27/2017 7.5 (H)     LDL Cholesterol Calculated (mg/dL)   Date Value   03/14/2018 52   04/21/2017 69     Hypertension Follow-up      Outpatient blood pressures are being checked at home.  Results are both high and low.    Low Salt Diet: no added salt      * Go over medications, son feels she doesn't need half of them      Amount of exercise or physical activity: 2-3 days/week for an average of 15-30 minutes    Problems taking medications regularly: No    Medication side effects: none    Diet: regular (no restrictions)    Here with son who is wondering if half of meds are causing dry throat to cause her chronic cough.  LRPD.  Wanting refill of tessalon pearls.  Seems to help.    Falling more.  Using cane more.  Son feels the only place she has troubles is with uneven ground - curbs, etc.  Had started seeing Physical Therapy but this got disrupted.      HTN - not checking.  DM2 - sugars as above.  Son trying to get her to eat healthier.  They also plan to be walking daily.    RLS - willing to try cutting back on clonazepam.  Sees neurology.  Bladder - continues to have OAB and freq UTI, sees urology. On oxybutynin.  LRPD - on pantoprazole, ranitidine.  History nissen.  No recent GI visit.  Diab neuropathy - tries to minimize use of ultram.  Hasn't noted any change in her falls.  Depression - mood doing better again.  Pleased  "to have son living with her.    Discuss intertrigo, angular cheilitis.    Problem list and histories reviewed & adjusted, as indicated.  Additional history: as documented    BP Readings from Last 3 Encounters:   05/21/18 112/72   03/23/18 91/54   03/14/18 118/80    Wt Readings from Last 3 Encounters:   05/21/18 171 lb (77.6 kg)   03/23/18 169 lb (76.7 kg)   03/14/18 169 lb (76.7 kg)        Labs reviewed in EPIC    Reviewed and updated as needed this visit by clinical staff  Tobacco  Allergies  Meds  Problems  Med Hx  Surg Hx  Fam Hx  Soc Hx        Reviewed and updated as needed this visit by Provider  Tobacco  Allergies  Meds  Problems  Med Hx  Surg Hx  Fam Hx  Soc Hx          ROS:  Constitutional, HEENT, cardiovascular, pulmonary, GI, , musculoskeletal, neuro, skin, endocrine and psych systems are negative, except as otherwise noted.    OBJECTIVE:     /72 (BP Location: Right arm, Patient Position: Standing, Cuff Size: Adult Large)  Pulse 68  Temp 98  F (36.7  C) (Tympanic)  Resp 16  Ht 5' 1\" (1.549 m)  Wt 171 lb (77.6 kg)  BMI 32.31 kg/m2  Body mass index is 32.31 kg/(m^2).  GENERAL: healthy, alert and no distress  PSYCH: mentation appears normal, affect normal/bright    See orthostatics    ASSESSMENT/PLAN:       ICD-10-CM    1. Polypharmacy Z79.899    2. LPRD (laryngopharyngeal reflux disease) K21.9    3. Mild major depression (H) F32.0    4. Type 2 diabetes mellitus with diabetic neuropathy, with long-term current use of insulin (H) E11.40     Z79.4    5. Falling R29.6    6. Dry mouth R68.2    7. Restless legs syndrome (RLS) G25.81 clonazePAM (KLONOPIN) 0.5 MG tablet     40 min spent with patient and son, over half in review and discussion of med list and options.  Offered MTM.    Continues to have sit to stand orthostatic hypotension  Patient Instructions   Try decreasing clonazepam to 0.5 tab at bedtime   Decided to try stopping wellbutrin (bupropion)  Can try stopping metoprolol.  " "Will see how BP does.  Also need to recheck BP laying/sitting/standing today.  For cough and reflux - can try stopping ranitidine.  Use of this medication is optional.    Optional multivitamin and probiotic - can stop  Simplified list by stopping old nystatin cream - using powder instead  Left on list allergy eye drops, nitroglycerin \"chest pain emergency\" med even though not needing often  Consider return to Physical Therapy   Can try reducing how often take tramadol and see if makes difference for balance  Consider discussing meds with urology and neurology - but I think these meds will be hard to change    Recheck in 3 wks -  Let me know balance, sleep, mood, BP, pain    Tentatively plan lab in mid June for A1c (diabetes), kidney recheck      Shae Pierre PA-C  [facility]    "

## 2018-05-30 ENCOUNTER — OFFICE VISIT (OUTPATIENT)
Dept: PSYCHOLOGY | Facility: CLINIC | Age: 73
End: 2018-05-30
Payer: COMMERCIAL

## 2018-05-30 DIAGNOSIS — F32.0 MILD MAJOR DEPRESSION (H): Primary | ICD-10-CM

## 2018-05-30 DIAGNOSIS — F43.21 GRIEF: ICD-10-CM

## 2018-05-30 PROCEDURE — 90791 PSYCH DIAGNOSTIC EVALUATION: CPT | Performed by: PSYCHOLOGIST

## 2018-05-30 NOTE — MR AVS SNAPSHOT
"                  MRN:[id number]                      After Visit Summary   2018    Theresa Bill    MRN: [id number]           Visit Information        Provider Department      2018 1:00 PM Sam Jaime [facility]      Your next 10 appointments already scheduled     2018  8:20 AM CDT   SHORT with Shae Pierre PA-C   [facility] ([facility])    [address] [place] [address]   [phone]            2018  8:30 AM CDT   Return Visit with Sam Jaime   [facility] ([facility])    [address] [address] [phone]            2018  2:00 PM CDT   Return Visit with Sam Jaime   [facility] ([facility])    [address] [address] [phone]              MyChart Information     [employer] lets you send messages to your doctor, view your test results, renew your prescriptions, schedule appointments and more. To sign up, go to [email or web address] . Click on \"Log in\" on the left side of the screen, which will take you to the Welcome page. Then click on \"Sign up Now\" on the right side of the page.     You will be asked to enter the access code listed below, as well as some personal information. Please follow the directions to create your username and password.     Your access code is: [email or web address]  Expires: 2018 12:15 PM     Your access code will  in 90 days. If you need help or a new code, please call your [place] clinic or [phone].        Care EveryWhere ID     This is your Care EveryWhere ID. This could be used by other organizations to access your [place] medical records  [id number]        Equal Access to Services     SABAS VUONG : Annalisa Agrawal, hiral zambrano, ángel lindquist . So [facility] " [phone].    ATENCIÓN: Si habla español, tiene a urban disposición servicios gratuitos de asistencia lingüística. Llame al [phone].    We comply with applicable federal civil rights laws and [place] laws. We do not discriminate on the basis of race, color, national origin, age, disability, sex, sexual orientation, or gender identity.

## 2018-06-01 ENCOUNTER — HOSPITAL ENCOUNTER (OUTPATIENT)
Dept: PHYSICAL THERAPY | Facility: CLINIC | Age: 73
Setting detail: THERAPIES SERIES
End: 2018-06-01
Attending: PHYSICIAN ASSISTANT
Payer: MEDICARE

## 2018-06-01 ENCOUNTER — RADIANT APPOINTMENT (OUTPATIENT)
Dept: GENERAL RADIOLOGY | Facility: CLINIC | Age: 73
End: 2018-06-01
Attending: PHYSICIAN ASSISTANT
Payer: COMMERCIAL

## 2018-06-01 ENCOUNTER — OFFICE VISIT (OUTPATIENT)
Dept: FAMILY MEDICINE | Facility: CLINIC | Age: 73
End: 2018-06-01
Payer: COMMERCIAL

## 2018-06-01 VITALS
RESPIRATION RATE: 16 BRPM | TEMPERATURE: 97.4 F | DIASTOLIC BLOOD PRESSURE: 74 MMHG | HEART RATE: 82 BPM | OXYGEN SATURATION: 94 % | WEIGHT: 171 LBS | HEIGHT: 61 IN | SYSTOLIC BLOOD PRESSURE: 130 MMHG | BODY MASS INDEX: 32.28 KG/M2

## 2018-06-01 DIAGNOSIS — K21.9 GASTROESOPHAGEAL REFLUX DISEASE, ESOPHAGITIS PRESENCE NOT SPECIFIED: ICD-10-CM

## 2018-06-01 DIAGNOSIS — K21.9 LPRD (LARYNGOPHARYNGEAL REFLUX DISEASE): ICD-10-CM

## 2018-06-01 DIAGNOSIS — R05.8 DRY COUGH: Primary | ICD-10-CM

## 2018-06-01 DIAGNOSIS — I95.1 ORTHOSTATIC HYPOTENSION: ICD-10-CM

## 2018-06-01 DIAGNOSIS — R05.8 DRY COUGH: ICD-10-CM

## 2018-06-01 DIAGNOSIS — I10 BENIGN ESSENTIAL HYPERTENSION: ICD-10-CM

## 2018-06-01 PROCEDURE — 40000718 ZZHC STATISTIC PT DEPARTMENT ORTHO VISIT: Performed by: PHYSICAL THERAPIST

## 2018-06-01 PROCEDURE — 99214 OFFICE O/P EST MOD 30 MIN: CPT | Performed by: PHYSICIAN ASSISTANT

## 2018-06-01 PROCEDURE — 71046 X-RAY EXAM CHEST 2 VIEWS: CPT | Mod: FY

## 2018-06-01 PROCEDURE — G8979 MOBILITY GOAL STATUS: HCPCS | Mod: GP,CI | Performed by: PHYSICAL THERAPIST

## 2018-06-01 PROCEDURE — G8980 MOBILITY D/C STATUS: HCPCS | Mod: GP,CI | Performed by: PHYSICAL THERAPIST

## 2018-06-01 PROCEDURE — 97112 NEUROMUSCULAR REEDUCATION: CPT | Mod: GP | Performed by: PHYSICAL THERAPIST

## 2018-06-01 RX ORDER — PANTOPRAZOLE SODIUM 40 MG/1
40 TABLET, DELAYED RELEASE ORAL DAILY
Qty: 90 TABLET | Refills: 3 | Status: CANCELLED | OUTPATIENT
Start: 2018-06-01

## 2018-06-01 ASSESSMENT — ANXIETY QUESTIONNAIRES
GAD7 TOTAL SCORE: 2
3. WORRYING TOO MUCH ABOUT DIFFERENT THINGS: NOT AT ALL
2. NOT BEING ABLE TO STOP OR CONTROL WORRYING: NOT AT ALL
7. FEELING AFRAID AS IF SOMETHING AWFUL MIGHT HAPPEN: NOT AT ALL
5. BEING SO RESTLESS THAT IT IS HARD TO SIT STILL: NOT AT ALL
4. TROUBLE RELAXING: NOT AT ALL
1. FEELING NERVOUS, ANXIOUS, OR ON EDGE: MORE THAN HALF THE DAYS
6. BECOMING EASILY ANNOYED OR IRRITABLE: NOT AT ALL

## 2018-06-01 NOTE — PROGRESS NOTES
RECERTIFICATION    Theresa Bill  1945    Session Number: 3/10, 1x per week x10 weeks since start of care.    Reasons for Continuing Treatment:   Pt provided treatment until 6/1/2018 due to follow regarding balance and progression of care.    Frequency/Duration  1 times per week for 1 weeks for a total of 1 visits.    Recertification Period  5/31/2018 - 6/1/2018    Physician Signature:    Date:    X_______________________________________________________    Physician Name: Sobeida    I certify the need for these services furnished under this plan of treatment and while under my care. Physician co-signature of this document indicates review and certification of the therapy plan.  This signature may be written on paper, or electronically signed within EPIC.

## 2018-06-01 NOTE — NURSING NOTE
"Chief Complaint   Patient presents with     Cough       Initial /74 (BP Location: Right arm, Patient Position: Chair, Cuff Size: Adult Large)  Pulse 82  Temp 97.4  F (36.3  C) (Tympanic)  Resp 16  Ht 5' 1\" (1.549 m)  Wt 171 lb (77.6 kg)  SpO2 94%  BMI 32.31 kg/m2 Estimated body mass index is 32.31 kg/(m^2) as calculated from the following:    Height as of this encounter: 5' 1\" (1.549 m).    Weight as of this encounter: 171 lb (77.6 kg).      Health Maintenance that is potentially due pending provider review:  NONE        Is there anyone who you would like to be able to receive your results? No  If yes have patient fill out RICH      "

## 2018-06-01 NOTE — PROGRESS NOTES
Progress Note  Disclaimer: This note consists of symbols derived from keyboarding, dictation and/or voice recognition software. As a result, there may be errors in the script that have gone undetected. Please consider this when interpreting information found in this chart.    Client Name: Theresa Bill  Date: 2018         Service Type: Individual      Session Start Time: 1:00 PM  Session End Time: 1:45 PM      Session Length: 45     Session #: 13     Attendees: Client attended alone    Treatment Plan Last Reviewed:  2018       DATA   Client reports she has been feeling a little more down.  It has been 8 months since her  .  She noticed feeling down particularly when her sister was away and she was alone in the house for an afternoon.  Her family is attempting to help alleviate feelings of loneliness, they picked her up for a trip to the beach and would not take no for an answer.  Client reported she felt better after this but had some anxiety.  Stated she feels like crying all the time.  Having her son living with her helps a little.  She still experiences anxiety over driving as she has been stopped 3 times.  She has been maintaining her activity schedule with her sisters.     Progress Since Last Session (Related to Symptoms / Goals / Homework):   Symptoms: Stable    Homework: not applicable      Episode of Care Goals: Satisfactory progress - CONTEMPLATION (Considering change and yet undecided); Intervened by assessing the negative and positive thinking (ambivalence) about behavior change     Current / Ongoing Stressors and Concerns:   Recent bereavement, prior caregiver stress     Treatment Objective(s) Addressed in This Session:   increase understanding of steps in the grief process  Utilize family support, work on accepting herself as she is in the moment.     Intervention:   Interpersonal Therapy: facilitating appropriate expressions of emotion and  grief. behavioral activation explored what self care strategies she is currently using and what she might be able to add. Went over the relapsing remitting nature of grief and depression.    ASSESSMENT: Current Emotional / Mental Status (status of significant symptoms):   Risk status (Self / Other harm or suicidal ideation)   Client denies current fears or concerns for personal safety.   Client denies current or recent suicidal ideation or behaviors.   Client denies current or recent homicidal ideation or behaviors.   Client denies current or recent self injurious behavior or ideation.   Client denies other safety concerns.   A safety and risk management plan has not been developed at this time, however client was given the after-hours number / 911 should there be a change in any of these risk factors.     Appearance:   Appropriate    Eye Contact:   Good    Psychomotor Behavior: Normal    Attitude:   Cooperative    Orientation:   All   Speech    Rate / Production: Slow     Volume:  Soft    Mood:    Depressed    Affect:    Appropriate    Thought Content:  Clear    Thought Form:  Coherent  Logical  difficult to think of anything but the loss of her    Insight:    Fair      Medication Review:   No changes to current psychiatric medication(s)     Medication Compliance:   Yes     Changes in Health Issues:   None reported     Chemical Use Review:   Substance Use: Chemical use reviewed, no active concerns identified      Tobacco Use: No current tobacco use.       Collateral Reports Completed:   Not Applicable    PLAN: (Client Tasks / Therapist Tasks / Other)   Therapist encouraged client in the appropriate expressions of grief as well as of her love for her late .  Client to follow-up with all physical therapy appointments .        Sam Jaime                                                         ________________________________________________________________________    Treatment Plan    Client's Name:  Theresa Bill  YOB: 1945    Date: 4/9/2018    Referral / Collaboration:  Referral to another professional/service is not indicated at this time..    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 Health conditions                   include                        disease or illnesses, other health problems that may be short or long lasting,                    injuries, mental health or emotional problems, and problems with alcohol or drugs.                              Think back over the past 30 days and answer these questions, thinking about how much              difficulty you had doing the following activities. For each question, please [place] only                   one response.      S1 Standing for long periods such as 30 minutes? Moderate =   3   S2 Taking care of household responsibilities? None =         1   S3 Learning a new task, for example, learning how to get to a new place? Mild =           2   S4 How much of a problem do you have joining community activities (for example, festivals, [employer] or other activities) in the same way as anyone else can? None =         1   S5 How much have you been emotionally affected by your health problems? None =         1               In the past 30 days, how much difficulty did you have in:   S6 Concentrating on doing something for ten minutes? None =         1   S7 Walking a long distance such as a kilometer (or equivalent)? Moderate =   3   S8 Washing your whole body? None =         1   S9 Getting dressed? None =         1   S10 Dealing with people you do not know? None =         1   S11  Maintaining a friendship? None =         1   S12 Your day to day work? None =         1       H1 Overall, in the past 30 days, how many days were these difficulties present? Record number of days 1    H2 In the past 30 days, for how many days were you totally unable to carry out your usual activities or work because of any health condition? Record number of days  0    H3 In the past 30 days, not counting the days that you were totally unable, for how many days did you cut back or reduce your usual activities or work because of any health condition? Record number of days 0      Goals  1. Education- the stages of grief  a. Client will be able to describe the stages of grief; denial, bargaining, anger, depression, and acceptance and give examples of how each have felt for her.  2. Behavioral Activation  a. Client will learn to assess their emotional stateon a day to day basis  b. Client will Identify two forms of exercise/activity and engage in them 3 times per week  c. Client will Identify 3 things that make them laugh, and engage in these 5 times per week.  d. Client will Identify 1-2Creative activities or hobbies  and engage in them 2 times per week  e. Client will identify music, movies, books that make them feel good and use them 3-4 times per week  3. Self-care  a. Client will identify 5 things they can do just for themselves  b. Client will take time for quiet, reflection, meditation 5 times per week  c. Client will Learn to set boundaries when appropriate  d. Client will Identify 2 individuals they can call on for support, distraction  4. Assessment of progress  a. Client will engage in assessment of their progress on a regular basis      Client has reviewed and agreed to the above plan.      Sam Jaime  4/9/2018

## 2018-06-01 NOTE — PROGRESS NOTES
SUBJECTIVE:   Theresa Bill is a 72 year old female who presents to clinic today for the following health issues:      ENT Symptoms             Symptoms: cc Present Absent Comment   Fever/Chills   x    Fatigue   x    Muscle Aches   x    Eye Irritation   x    Sneezing   x    Nasal Nathan/Drg   x    Sinus Pressure/Pain   x    Loss of smell   x    Dental pain   x    Sore Throat   x    Swollen Glands   x    Ear Pain/Fullness   x    Cough  x  Cough has gotten so bad to the point where she is now throwing up from coughing. Feels this cough is different from what she has been having for the past year.  Wants to get to the bottom of her cough, doesn't want any medications to mask it.  Dry cough   Wheeze   x    Chest Pain   x    Shortness of breath   x    Rash   x    Other   x    Feels cough more x few months, from 3/9 bronchitis, but now vomiting after cough x few weeks.  Was treated with azithromycin and Can throw up multiple times per day, all after coughing.  SOB with cough yesterday enough that had to pull off road yesterday.  Never lightheaded with cough.  No fever.  Still daily heartburn, not worse since stopping ranitidine.  History nissen.    Last visit stopped ranitidine, clonazepam and metoprolol, decreased tramadol.  Has only fallen once in last month now.  Balance great at Physical Therapy today, discharged.  Decreased tra  Sleeping better.    Mood so far ok after dc wellbutrin last visit.  Stress with niece, but not having to deal with her so much lately.    She brings home BP log - often 150s-160s but with 2 documented times of orthostatic hypotension - up to 20+ systolic drop on one read - at home.  Sent to scanning.  Never has been lightheaded before falls.    Problem list and histories reviewed & adjusted, as indicated.  Additional history: as documented    BP Readings from Last 3 Encounters:   06/01/18 130/74   05/21/18 112/72   03/23/18 91/54    Wt Readings from Last 3 Encounters:   06/01/18 171 lb (77.6  "kg)   05/21/18 171 lb (77.6 kg)   03/23/18 169 lb (76.7 kg)         Labs reviewed in EPIC    Reviewed and updated as needed this visit by clinical staff  Tobacco  Allergies  Meds  Problems  Med Hx  Surg Hx  Fam Hx  Soc Hx        Reviewed and updated as needed this visit by Provider  Tobacco  Allergies  Meds  Problems  Med Hx  Surg Hx  Fam Hx  Soc Hx          ROS:  Constitutional, HEENT, cardiovascular, pulmonary, GI, musculoskeletal, neuro, and psych systems are negative, except as otherwise noted.    OBJECTIVE:     /74 (BP Location: Right arm, Patient Position: Chair, Cuff Size: Adult Large)  Pulse 82  Temp 97.4  F (36.3  C) (Tympanic)  Resp 16  Ht 5' 1\" (1.549 m)  Wt 171 lb (77.6 kg)  SpO2 94%  BMI 32.31 kg/m2  Body mass index is 32.31 kg/(m^2).  GENERAL: healthy, alert and no distress  EYES: Eyes grossly normal to inspection, conjunctivae and sclerae normal  HENT: ear canals and TM's normal, nose and mouth without ulcers or lesions  NECK: no adenopathy, no asymmetry, masses, or scars and thyroid normal to palpation  RESP: lungs clear to auscultation - no rales, rhonchi or wheezes  CV: regular rate and rhythm, normal S1 S2, no S3 or S4, no murmur, click or rub  PSYCH: mentation appears normal, affect normal/bright    CXR no infiltrate    ASSESSMENT/PLAN:       ICD-10-CM    1. Dry cough R05 XR Chest 2 Views   2. Gastroesophageal reflux disease, esophagitis presence not specified K21.9    3. LPRD (laryngopharyngeal reflux disease) K21.9    4. Benign essential hypertension I10    5. Orthostatic hypotension I95.1    no apparent aspiration pneumonia  Has already tried azithromycin  Some ? Worsening in last 3 wks, and did dc ranitidine 3 wks ago, but she feels is not related  Patient Instructions   Cough -  No pneumonia seen   [place] to GI doc [place] [facility] [place] possible 24 hr acid monitor but unclear what more they could do about it, so deferring test to them   If getting to desperate " before you see GI, let me know  Can also try stopping lisinopril x 3-5 days to see if helps - if doesn't, resume; if does help, call me    BP -  Overall looks like higher than my ideal goal for you but not wanting to risk more falls or worse orthostatic hypotension since some documented by you at home  If falling more, will consider further decrease BP med   Continue to keep occasional eye on this    Call if questions        Shae Pierre PA-C  [facility]

## 2018-06-01 NOTE — PATIENT INSTRUCTIONS
Cough -  No pneumonia seen   [place] [facility] [place] acid monitor but unclear what more they could do about it, so deferring test to them   If getting to desperate before you see GI, let me know  Can also try stopping lisinopril x 3-5 days to see if helps - if doesn't, resume; if does help, call me    BP -  Overall looks like higher than my ideal goal for you but not wanting to risk more falls or worse orthostatic hypotension since some documented by you at home  If falling more, will consider further decrease BP med   Continue to keep occasional eye on this    Call if questions

## 2018-06-01 NOTE — MR AVS SNAPSHOT
After Visit Summary   6/1/2018    Theresa Bill    MRN: [id number]           Patient Information     Date Of Birth          1945        Visit Information        Provider Department      6/1/2018 12:40 PM Shae Pierre PA-C [facility]        Today's Diagnoses     Dry cough    -  1    Gastroesophageal reflux disease, esophagitis presence not specified        LPRD (laryngopharyngeal reflux disease)        Benign essential hypertension        Orthostatic hypotension          Care Instructions    Cough -  No pneumonia seen   Return to GI doc - [place] Razo at [facility] [place] - possible 24 hr acid monitor but unclear what more they could do about it, so deferring test to them   If getting to desperate before you see GI, let me know  Can also try stopping lisinopril x 3-5 days to see if helps - if doesn't, resume; if does help, call me    BP -  Overall looks like higher than my ideal goal for you but not wanting to risk more falls or worse orthostatic hypotension since some documented by you at home  If falling more, will consider further decrease BP med   Continue to keep occasional eye on this    Call if questions            Follow-ups after your visit        Your next 10 appointments already scheduled     Jun 11, 2018  8:20 AM CDT   SHORT with Shae Pierre PA-C   [facility] ([facility])    [address] [place] [id number]   [phone]            Jun 13, 2018  8:30 AM CDT   Return Visit with Sam Jaime   [facility] ([facility])    [address] [address]   [phone]            Jun 28, 2018  2:00 PM CDT   Return Visit with Sam WATSON [facility] ([facility])    [address] [address]   [phone]              Who to contact     If you have questions or need follow up information about today's clinic visit or your  "schedule please contact [facility] directly at [phone].  Normal or non-critical lab and imaging results will be communicated to you by MyChart, letter or phone within 4 business days after the clinic has received the results. If you do not hear from us within 7 days, please contact the clinic through [employer] or phone. If you have a critical or abnormal lab result, we will notify you by phone as soon as possible.  Submit refill requests through [employer] or call your pharmacy and they will forward the refill request to us. Please allow 3 business days for your refill to be completed.          Additional Information About Your Visit        [employer] Information     [employer] lets you send messages to your doctor, view your test results, renew your prescriptions, schedule appointments and more. To sign up, go to [email or web address] . Click on \"Log in\" on the left side of the screen, which will take you to the Welcome page. Then click on \"Sign up Now\" on the right side of the page.     You will be asked to enter the access code listed below, as well as some personal information. Please follow the directions to create your username and password.     Your access code is: [email or web address]  Expires: 2018 12:15 PM     Your access code will  in 90 days. If you need help or a new code, please call your [place] clinic or [phone].        Care EveryWhere ID     This is your Care EveryWhere ID. This could be used by other organizations to access your [place] medical records  [id number]        Your Vitals Were     Pulse Temperature Respirations Height Pulse Oximetry BMI (Body Mass Index)    82 97.4  F (36.3  C) (Tympanic) 16 5' 1\" (1.549 m) 94% 32.31 kg/m2       Blood Pressure from Last 3 Encounters:   18 130/74   18 112/72   18 91/54    Weight from Last 3 Encounters:   18 171 lb (77.6 kg)   18 171 lb (77.6 kg)   18 169 lb (76.7 kg)                 Today's " Medication Changes          These changes are accurate as of 6/1/18  1:55 PM.  If you have any questions, ask your nurse or doctor.               Stop taking these medicines if you haven't already. Please contact your care team if you have questions.     order for DME   Stopped by:  Shae Pierre PA-C                    Primary Care Provider Office Phone # Fax #    Shae Pierre PA-C [phone] [address] [place] [address]        Equal Access to Services     GILDA VUONG : Hadii aad ku hadasho Soomaali, waaxda luqadaha, qaybta kaalmada adeegyada, waxay idiin hayaan adeeg khgenia oconnor . So [facility] [phone].    ATENCIÓN: Si habla español, tiene a urban disposición servicios gratuitos de asistencia lingüística. Llame al [phone].    We comply with applicable federal civil rights laws and [place] laws. We do not discriminate on the basis of race, color, national origin, age, disability, sex, sexual orientation, or gender identity.            Thank you!     Thank you for choosing [facility]  for your care. Our goal is always to provide you with excellent care. Hearing back from our patients is one way we can continue to improve our services. Please take a few minutes to complete the written survey that you may receive in the mail after your visit with us. Thank you!             Your Updated Medication List - Protect others around you: Learn how to safely use, store and throw away your medicines at www.disposemymeds.org.          This list is accurate as of 6/1/18  1:55 PM.  Always use your most recent med list.                   Brand Name Dispense Instructions for use Diagnosis    ACE/ARB/ARNI NOT PRESCRIBED (INTENTIONAL)      ACE & ARB not prescribed due to Intolerance-cough    Type 2 diabetes, HbA1c goal < 7% (H)       aspirin 81 MG tablet     100    ONE DAILY    Type II or unspecified type diabetes mellitus without mention of complication, not  stated as uncontrolled       atorvastatin 40 MG tablet    LIPITOR    90 tablet    Take 1 tablet (40 mg) by mouth At Bedtime    Dyslipidemia       blood glucose monitoring test strip    no brand specified    2 Box    1 strip by In Vitro route 3 times daily. Has a Freestyle    Type 2 diabetes, HbA1c goal < 7% (H)       clonazePAM 0.5 MG tablet    klonoPIN     Going to try to decrease to 0.5 tab daily with hope of weaning off.  Prescribed by neurology.    Restless legs syndrome (RLS)       DAILY MULTIVITAMIN PO      One tablet daily        DULoxetine 60 MG EC capsule    CYMBALTA    90 capsule    TAKE ONE CAPSULE BY MOUTH ONCE DAILY    Major depressive disorder, recurrent episode, mild (H)       fluocinonide 0.05 % ointment    LIDEX    15 g    Apply sparingly to affected area twice daily as needed for mouth ulcer.    Aphthae, oral       hydrocortisone 1 % cream    CORTAID    30 g    Apply sparingly to affected area of lip corners two times daily for 1 week.    Angular cheilitis       insulin detemir 100 UNIT/ML injection    LEVEMIR FLEXPEN/FLEXTOUCH    15 mL    Inject 14 Units Subcutaneous daily Increase by 2 units every 3 days if sugars 2 hours after eating are above 180 (only if morning sugars above 110).    Type 2 diabetes mellitus without complication, with long-term current use of insulin (H)       insulin pen needle 32G X 4 MM    BD MARIIA U/F    90 each    Use one pen daily    Type 2 diabetes mellitus without complication, with long-term current use of insulin (H)       ketoconazole 2 % cream    NIZORAL    30 g    Apply topically 2 times daily To corners of lips for 2 weeks or as needed    Angular cheilitis       lisinopril 10 MG tablet    PRINIVIL/ZESTRIL    30 tablet    Take 1 tablet (10 mg) by mouth daily    Essential hypertension       metFORMIN 500 MG 24 hr tablet    GLUCOPHAGE-XR    180 tablet    TAKE ONE TABLET BY MOUTH TWICE DAILY WITH MEALS    Type 2 diabetes mellitus without complication, with long-term  current use of insulin (H)       nitroGLYcerin 0.4 MG sublingual tablet    NITROSTAT    25 tablet    For chest pain place 1 tablet under the tongue every 5 minutes for 3 doses. If symptoms persist 5 minutes after 1st dose call 911.    Chest pressure       nystatin 736134 UNIT/GM Powd    MYCOSTATIN    60 g    Apply 1 g topically 3 times daily as needed    Other specified erythematous condition(065.89)       olopatadine 0.1 % ophthalmic solution    PATANOL    5 mL    Place 1 drop into both eyes 2 times daily    Allergic conjunctivitis, bilateral       order for DME      Equipment being ordered: CPAP Patient Theresa Bill received a Bloxy Airsense 10  Auto. Pressures were set at Auto 10 - 15 cm H2O.        oxybutynin chloride 15 MG Tb24           pantoprazole 40 MG EC tablet    PROTONIX    90 tablet    Take 1 tablet (40 mg) by mouth daily    Gastroesophageal reflux disease, esophagitis presence not specified       PROBIOTIC & ACIDOPHILUS EX ST Caps           ranitidine 150 MG tablet    ZANTAC    60 tablet    TAKE ONE TABLET BY MOUTH TWICE DAILY    Gastroesophageal reflux disease with esophagitis, Esophageal dysmotility, Chronic cough       traMADol 50 MG tablet    ULTRAM    30 tablet    TAKE ONE TABLET BY MOUTH EVERY 6 HOURS AS NEEDED FOR  MODERATE  PAIN    Radicular leg pain

## 2018-06-01 NOTE — PROGRESS NOTES
Outpatient Physical Therapy Discharge Note     Patient: Theresa Bill  : 1945    Beginning/End Dates of Reporting Period:  3/22/2018 to 2018    Referring Provider: Sobeida Dubose Diagnosis: Increased falls risk secondary to impaired balance      Client Self Report: pt states she fell fell about a month ago, went to [facility]. pt's sister, Li, states she had pain in the back and shoulder. pt states she has had her bad days, memorial day.  pt states overmedicated, feels a lot better. pt states now she has more to do, taking her son to work and has been moving more.     Goals:  Goal Identifier FGA   Goal Description pt will demonstrate increased score of atleast 10% in order to decrease fall risk with ambulation.   Target Date 18   Date Met  18   Progress:     Goal Identifier Confidence   Goal Description Pt will indicate >50% confidence level with walking across unlevel surface and walking up stairs.w   Target Date 18   Date Met  18   Progress:     Goal Identifier Hip ROM   Goal Description Pt will demonstrate increased hip ROM in order to put on her pants without fear of falling   Target Date 18   Date Met  18   Progress:     Progress Toward Goals:   Progress this reporting period: pt has demonstrated large gains including walking without AD. Pt initially demonstrated a 15/30  FGA, now 27/30 FGA score, demonstrating increased safety with gait.    Plan:  Discharge from therapy.    Discharge:    Reason for Discharge: Patient has met all goals.    Equipment Issued: none    Discharge Plan: Patient to continue home program.

## 2018-06-02 ASSESSMENT — PATIENT HEALTH QUESTIONNAIRE - PHQ9: SUM OF ALL RESPONSES TO PHQ QUESTIONS 1-9: 0

## 2018-06-02 ASSESSMENT — ANXIETY QUESTIONNAIRES: GAD7 TOTAL SCORE: 2

## 2018-06-07 ENCOUNTER — TELEPHONE (OUTPATIENT)
Dept: FAMILY MEDICINE | Facility: CLINIC | Age: 73
End: 2018-06-07

## 2018-06-07 NOTE — TELEPHONE ENCOUNTER
Reason for call:  Patient reporting a symptom    Symptom or request: Theresa was told to stop her Lisinopril for 3-5 days to see if she noticed a difference in her cough and vomiting. She says she vomited last night when she was sleeping and it woke her up and one other time when eating. She says she still has a cough but it's not as bad.    Phone Number patient can be reached at:  Home number on file [phone] (home)    Best Time:  anytime    Can we leave a detailed message on this number:  YES    Call taken on 6/7/2018 at 10:31 AM by Aliza Jolley

## 2018-06-11 ENCOUNTER — OFFICE VISIT (OUTPATIENT)
Dept: FAMILY MEDICINE | Facility: CLINIC | Age: 73
End: 2018-06-11
Payer: COMMERCIAL

## 2018-06-11 VITALS
WEIGHT: 171 LBS | HEART RATE: 72 BPM | BODY MASS INDEX: 32.28 KG/M2 | RESPIRATION RATE: 16 BRPM | DIASTOLIC BLOOD PRESSURE: 80 MMHG | HEIGHT: 61 IN | SYSTOLIC BLOOD PRESSURE: 132 MMHG | TEMPERATURE: 98.2 F

## 2018-06-11 DIAGNOSIS — G25.81 RESTLESS LEGS SYNDROME (RLS): ICD-10-CM

## 2018-06-11 DIAGNOSIS — Z91.81 PERSONAL HISTORY OF FALL: ICD-10-CM

## 2018-06-11 DIAGNOSIS — Z79.4 TYPE 2 DIABETES MELLITUS WITH DIABETIC NEUROPATHY, WITH LONG-TERM CURRENT USE OF INSULIN (H): ICD-10-CM

## 2018-06-11 DIAGNOSIS — I10 ESSENTIAL HYPERTENSION: Primary | Chronic | ICD-10-CM

## 2018-06-11 DIAGNOSIS — K21.9 GASTROESOPHAGEAL REFLUX DISEASE, ESOPHAGITIS PRESENCE NOT SPECIFIED: ICD-10-CM

## 2018-06-11 DIAGNOSIS — F33.40 DEPRESSION, MAJOR, RECURRENT, IN REMISSION (H): ICD-10-CM

## 2018-06-11 DIAGNOSIS — E11.40 TYPE 2 DIABETES MELLITUS WITH DIABETIC NEUROPATHY, WITH LONG-TERM CURRENT USE OF INSULIN (H): ICD-10-CM

## 2018-06-11 DIAGNOSIS — R05.9 COUGH: ICD-10-CM

## 2018-06-11 PROCEDURE — 99214 OFFICE O/P EST MOD 30 MIN: CPT | Performed by: PHYSICIAN ASSISTANT

## 2018-06-11 RX ORDER — CLONAZEPAM 0.5 MG/1
TABLET ORAL
Refills: 0 | COMMUNITY
Start: 2018-06-11 | End: 2019-03-11

## 2018-06-11 RX ORDER — LOSARTAN POTASSIUM 25 MG/1
25 TABLET ORAL DAILY
Qty: 30 TABLET | Refills: 0 | Status: SHIPPED | OUTPATIENT
Start: 2018-06-11 | End: 2018-06-27

## 2018-06-11 NOTE — PROGRESS NOTES
SUBJECTIVE:   Theresa Bill is a 72 year old female who presents to clinic today for the following health issues:        Hypertension Follow-up      Outpatient blood pressures are being checked at home.  Results are: forgot her BP log.    Low Salt Diet: no added salt      Amount of exercise or physical activity: 2-3 days/week for an average of 45-60 minutes    Problems taking medications regularly: No    Medication side effects: none    Diet: regular (no restrictions)      * Stopped lisinopril- did help her cough when she stopped it.  Did call on 6/7/18 with an update.      Notes cough is softer than it was and drastically less post cough emesis.  Thrown up twice since quit lisinopril - once with alcoholic beverage, another after eating.  Cough 50% better.      BPs since stopping lisinopril - 170s/80s.  Had been running 150s-160s/xx, but with well documented orthostasis with tighter control.    Notes has been 1 mo since last fell.  Did do a 2 mi walk last week.  Plans to resume walking.    DM2 - not checking sugars of late, despite some troubles with highs this spring.  Notes she is now eating better, that sister is cooking.  Sister newly diagnosed diabetic.      Sleeping better - now sleeping all night.  Has taken 2 tramadol in past 1 month.  1 clonazepam in past 2 weeks.      Mood - doing well despite recent dc depression medication.  Has taken up bowling again for first time in 40 yrs.  Continues with counselor.      Problem list and histories reviewed & adjusted, as indicated.  Additional history: as documented    BP Readings from Last 3 Encounters:   06/11/18 132/80   06/01/18 130/74   05/21/18 112/72    Wt Readings from Last 3 Encounters:   06/11/18 171 lb (77.6 kg)   06/01/18 171 lb (77.6 kg)   05/21/18 171 lb (77.6 kg)         Labs reviewed in EPIC    Reviewed and updated as needed this visit by clinical staff  Tobacco  Allergies  Meds  Problems  Med Hx  Surg Hx  Fam Hx  Soc Hx        Reviewed and  "updated as needed this visit by Provider  Tobacco  Allergies  Meds  Problems  Med Hx  Surg Hx  Fam Hx  Soc Hx          ROS:  Constitutional, HEENT, cardiovascular, pulmonary, GI, musculoskeletal, neuro, endocrine and psych systems are negative, except as otherwise noted.    OBJECTIVE:     /80 (BP Location: Right arm, Patient Position: Chair, Cuff Size: Adult Large)  Pulse 72  Temp 98.2  F (36.8  C) (Tympanic)  Resp 16  Ht 5' 1\" (1.549 m)  Wt 171 lb (77.6 kg)  BMI 32.31 kg/m2  Body mass index is 32.31 kg/(m^2).  GENERAL: healthy, alert and no distress  RESP: lungs clear to auscultation - no rales, rhonchi or wheezes  CV: regular rate and rhythm, normal S1 S2, no S3 or S4, no murmur, click or rub  PSYCH: mentation appears normal, affect normal/bright    ASSESSMENT/PLAN:       ICD-10-CM    1. Essential hypertension I10 losartan (COZAAR) 25 MG tablet     **Basic metabolic panel FUTURE anytime   2. Cough R05    3. Gastroesophageal reflux disease, esophagitis presence not specified K21.9    4. Type 2 diabetes mellitus with diabetic neuropathy, with long-term current use of insulin (H) E11.40 **A1C FUTURE anytime    Z79.4    5. Depression, major, recurrent, in remission (H) F33.40    6. Restless legs syndrome (RLS) G25.81 clonazePAM (KLONOPIN) 0.5 MG tablet   7. Personal history of fall Z91.81      Patient Instructions   Diabetes -  Check sugars occasionally especially now that diet has changed and planning to walk  A1c due anytime     BP -  Try losartan, if getting more cough stop med and let me know  If not causing cough, then update me with BPs including laying/sitting/standing  When on right dose of med, due for lab to recheck kidney function and blood salts.  Can be lab only. Will combine with A1c.      Cough -   Off lisinopril  Try losartan but update me  Still set up with GI doctor    Depression -  Keep monitoring self    If sugars doing well, not falling and BPs stable, see you in 6 months - " sooner if needed      Shae Pierre PA-C  [facility]

## 2018-06-11 NOTE — PATIENT INSTRUCTIONS
Diabetes -  Check sugars occasionally especially now that diet has changed and planning to walk  A1c due anytime     BP -  Try losartan, if getting more cough stop med and let me know  If not causing cough, then update me with BPs including laying/sitting/standing  When on right dose of med, due for lab to recheck kidney function and blood salts.  Can be lab only. Will combine with A1c.      Cough -   Off lisinopril  Try losartan but update me  Still set up with GI doctor    Depression -  Keep monitoring self    If sugars doing well, not falling and BPs stable, see you in 6 months - sooner if needed

## 2018-06-11 NOTE — NURSING NOTE
"Chief Complaint   Patient presents with     Hypertension       Initial /76 (BP Location: Right arm, Patient Position: Chair, Cuff Size: Adult Large)  Pulse 104  Temp 98.2  F (36.8  C) (Tympanic)  Resp 16  Ht 5' 1\" (1.549 m)  Wt 171 lb (77.6 kg)  BMI 32.31 kg/m2 Estimated body mass index is 32.31 kg/(m^2) as calculated from the following:    Height as of this encounter: 5' 1\" (1.549 m).    Weight as of this encounter: 171 lb (77.6 kg).      Health Maintenance that is potentially due pending provider review:  NONE        Is there anyone who you would like to be able to receive your results? No  If yes have patient fill out RICH      "

## 2018-06-11 NOTE — MR AVS SNAPSHOT
After Visit Summary   6/11/2018    Theresa Bill    MRN: [id number]           Patient Information     Date Of Birth          1945        Visit Information        Provider Department      6/11/2018 8:20 AM Shae Pierre PA-C [facility]        Today's Diagnoses     Essential hypertension    -  1    Cough        Gastroesophageal reflux disease, esophagitis presence not specified        Type 2 diabetes mellitus with diabetic neuropathy, with long-term current use of insulin (H)          Care Instructions    Diabetes -  Check sugars occasionally especially now that diet has changed and planning to walk  A1c due anytime     BP -  Try losartan, if getting more cough stop med and let me know  If not causing cough, then update me with BPs including laying/sitting/standing  When on right dose of med, due for lab to recheck kidney function and blood salts.  Can be lab only. Will combine with A1c.      Cough -   Off lisinopril  Try losartan but update me  Still set up with GI doctor    Depression -  Keep monitoring self    If sugars doing well, not falling and BPs stable, see you in 6 months - sooner if needed          Follow-ups after your visit        Your next 10 appointments already scheduled     Jun 13, 2018  8:30 AM CDT   Return Visit with [facility] ([facility])    [address] [address]   [phone]            Jun 28, 2018  2:00 PM CDT   Return Visit with [facility] ([facility])    [address] [address]   [phone]              Future tests that were ordered for you today     Open Future Orders        Priority Expected Expires Ordered    **A1C FUTURE anytime Routine 6/11/2018 6/11/2019 6/11/2018    **Basic metabolic panel FUTURE anytime Routine 6/11/2018 6/11/2019 6/11/2018            Who to contact     If you have questions or need  "follow up information about today's clinic visit or your schedule please contact [facility] directly at [phone].  Normal or non-critical lab and imaging results will be communicated to you by [employer], letter or phone within 4 business days after the clinic has received the results. If you do not hear from us within 7 days, please contact the clinic through [employer] or phone. If you have a critical or abnormal lab result, we will notify you by phone as soon as possible.  Submit refill requests through [employer] or call your pharmacy and they will forward the refill request to us. Please allow 3 business days for your refill to be completed.          Additional Information About Your Visit        [employer] Information     [employer] lets you send messages to your doctor, view your test results, renew your prescriptions, schedule appointments and more. To sign up, go to [email or web address] . Click on \"Log in\" on the left side of the screen, which will take you to the Welcome page. Then click on \"Sign up Now\" on the right side of the page.     You will be asked to enter the access code listed below, as well as some personal information. Please follow the directions to create your username and password.     Your access code is: [email or web address]  Expires: 2018 12:15 PM     Your access code will  in 90 days. If you need help or a new code, please call your [place] clinic or [phone].        Care EveryWhere ID     This is your Care EveryWhere ID. This could be used by other organizations to access your [place] medical records  [id number]        Your Vitals Were     Pulse Temperature Respirations Height BMI (Body Mass Index)       104 98.2  F (36.8  C) (Tympanic) 16 5' 1\" (1.549 m) 32.31 kg/m2        Blood Pressure from Last 3 Encounters:   18 147/76   18 130/74   18 112/72    Weight from Last 3 Encounters:   18 171 lb (77.6 kg)   18 171 lb (77.6 kg)   18 " 171 lb (77.6 kg)                 Today's Medication Changes          These changes are accurate as of 6/11/18  8:57 AM.  If you have any questions, ask your nurse or doctor.               Start taking these medicines.        Dose/Directions    losartan 25 MG tablet   Commonly known as:  COZAAR   Used for:  Essential hypertension   Started by:  Shae Pierre PA-C        Dose:  25 mg   Take 1 tablet (25 mg) by mouth daily   Quantity:  30 tablet   Refills:  0         Stop taking these medicines if you haven't already. Please contact your care team if you have questions.     lisinopril 10 MG tablet   Commonly known as:  PRINIVIL/ZESTRIL   Stopped by:  Shae Pierre PA-C           ranitidine 150 MG tablet   Commonly known as:  ZANTAC   Stopped by:  Shae Pierre PA-C                Where to get your medicines      These medications were sent to [facility] [address] [place] [address] [address] [facility]  [address] [facility] [address]     Phone:  [phone]     losartan 25 MG tablet                Primary Care Provider Office Phone # Fax #    Shae Pierre PA-C [phone] [address] [place] [id number]        Equal Access to Services     SABAS VUONG AH: Hadii lamonte starr hadasho Soomaali, waaxda luqadaha, qaybta kaalmada adeegyada, ángel jauregui. So [facility] [phone].    ATENCIÓN: Si habla español, tiene a urban disposición servicios gratuitos de asistencia lingüística. Llame al [phone].    We comply with applicable federal civil rights laws and [place] laws. We do not discriminate on the basis of race, color, national origin, age, disability, sex, sexual orientation, or gender identity.            Thank you!     Thank you for choosing [facility]  for your care. Our goal is always to provide you with excellent care. Hearing back from our patients is one way we can continue to improve our services.  Please take a few minutes to complete the written survey that you may receive in the mail after your visit with us. Thank you!             Your Updated Medication List - Protect others around you: Learn how to safely use, store and throw away your medicines at www.disposemymeds.org.          This list is accurate as of 6/11/18  8:57 AM.  Always use your most recent med list.                   Brand Name Dispense Instructions for use Diagnosis    ACE/ARB/ARNI NOT PRESCRIBED (INTENTIONAL)      ACE & ARB not prescribed due to Intolerance-cough    Type 2 diabetes, HbA1c goal < 7% (H)       aspirin 81 MG tablet     100    ONE DAILY    Type II or unspecified type diabetes mellitus without mention of complication, not stated as uncontrolled       atorvastatin 40 MG tablet    LIPITOR    90 tablet    Take 1 tablet (40 mg) by mouth At Bedtime    Dyslipidemia       blood glucose monitoring test strip    no brand specified    2 Box    1 strip by In Vitro route 3 times daily. Has a Freestyle    Type 2 diabetes, HbA1c goal < 7% (H)       clonazePAM 0.5 MG tablet    klonoPIN     Going to try to decrease to 0.5 tab daily with hope of weaning off.  Prescribed by neurology.    Restless legs syndrome (RLS)       DAILY MULTIVITAMIN PO      One tablet daily        DULoxetine 60 MG EC capsule    CYMBALTA    90 capsule    TAKE ONE CAPSULE BY MOUTH ONCE DAILY    Major depressive disorder, recurrent episode, mild (H)       fluocinonide 0.05 % ointment    LIDEX    15 g    Apply sparingly to affected area twice daily as needed for mouth ulcer.    Aphthae, oral       hydrocortisone 1 % cream    CORTAID    30 g    Apply sparingly to affected area of lip corners two times daily for 1 week.    Angular cheilitis       insulin detemir 100 UNIT/ML injection    LEVEMIR FLEXPEN/FLEXTOUCH    15 mL    Inject 14 Units Subcutaneous daily Increase by 2 units every 3 days if sugars 2 hours after eating are above 180 (only if morning sugars above 110).     Type 2 diabetes mellitus without complication, with long-term current use of insulin (H)       insulin pen needle 32G X 4 MM    BD MARIIA U/F    90 each    Use one pen daily    Type 2 diabetes mellitus without complication, with long-term current use of insulin (H)       ketoconazole 2 % cream    NIZORAL    30 g    Apply topically 2 times daily To corners of lips for 2 weeks or as needed    Angular cheilitis       losartan 25 MG tablet    COZAAR    30 tablet    Take 1 tablet (25 mg) by mouth daily    Essential hypertension       metFORMIN 500 MG 24 hr tablet    GLUCOPHAGE-XR    180 tablet    TAKE ONE TABLET BY MOUTH TWICE DAILY WITH MEALS    Type 2 diabetes mellitus without complication, with long-term current use of insulin (H)       nitroGLYcerin 0.4 MG sublingual tablet    NITROSTAT    25 tablet    For chest pain place 1 tablet under the tongue every 5 minutes for 3 doses. If symptoms persist 5 minutes after 1st dose call 911.    Chest pressure       nystatin 631704 UNIT/GM Powd    MYCOSTATIN    60 g    Apply 1 g topically 3 times daily as needed    Other specified erythematous condition(114.02)       olopatadine 0.1 % ophthalmic solution    PATANOL    5 mL    Place 1 drop into both eyes 2 times daily    Allergic conjunctivitis, bilateral       order for DME      Equipment being ordered: CPAP Patient Theresa Bill received a Resmed Airsense 10  Auto. Pressures were set at Auto 10 - 15 cm H2O.        oxybutynin chloride 15 MG Tb24           pantoprazole 40 MG EC tablet    PROTONIX    90 tablet    Take 1 tablet (40 mg) by mouth daily    Gastroesophageal reflux disease, esophagitis presence not specified       PROBIOTIC & ACIDOPHILUS EX ST Caps           traMADol 50 MG tablet    ULTRAM    30 tablet    TAKE ONE TABLET BY MOUTH EVERY 6 HOURS AS NEEDED FOR  MODERATE  PAIN    Radicular leg pain

## 2018-06-13 ENCOUNTER — OFFICE VISIT (OUTPATIENT)
Dept: PSYCHOLOGY | Facility: CLINIC | Age: 73
End: 2018-06-13
Payer: COMMERCIAL

## 2018-06-13 DIAGNOSIS — F43.21 GRIEF: ICD-10-CM

## 2018-06-13 DIAGNOSIS — F32.0 MILD MAJOR DEPRESSION (H): Primary | ICD-10-CM

## 2018-06-13 PROCEDURE — 90834 PSYTX W PT 45 MINUTES: CPT | Performed by: PSYCHOLOGIST

## 2018-06-13 NOTE — MR AVS SNAPSHOT
MRN:[id number]                      After Visit Summary   6/13/2018    Theresa Bill    MRN: [id number]           Visit Information        Provider Department      6/13/2018 8:30 AM Addison Sam WATSON [facility]      Your next 10 appointments already scheduled     Jun 28, 2018  2:00 PM CDT   Return Visit with Sam Jaime   [facility] ([facility])    [address] [address]   [phone]            Jul 19, 2018  3:00 PM CDT   Return Visit with Sam Jaime   [facility] ([facility])    [address] [address]   [phone]              Care EveryWhere ID     This is your Care EveryWhere ID. This could be used by other organizations to access your [place] medical records  [id number]        Equal Access to Services     SABAS VUONG : Hadii lamonte hall Sodanielle, waaxda luqadaha, qaybta kaalmada adecris, ángel oconnor . So [facility] [phone].    ATENCIÓN: Si habla español, tiene a urban disposición servicios gratuitos de asistencia lingüística. Llame al [phone].    We comply with applicable federal civil rights laws and [place] laws. We do not discriminate on the basis of race, color, national origin, age, disability, sex, sexual orientation, or gender identity.

## 2018-06-15 ASSESSMENT — ANXIETY QUESTIONNAIRES
GAD7 TOTAL SCORE: 0
4. TROUBLE RELAXING: NOT AT ALL
3. WORRYING TOO MUCH ABOUT DIFFERENT THINGS: NOT AT ALL
7. FEELING AFRAID AS IF SOMETHING AWFUL MIGHT HAPPEN: NOT AT ALL
1. FEELING NERVOUS, ANXIOUS, OR ON EDGE: NOT AT ALL
2. NOT BEING ABLE TO STOP OR CONTROL WORRYING: NOT AT ALL
6. BECOMING EASILY ANNOYED OR IRRITABLE: NOT AT ALL
5. BEING SO RESTLESS THAT IT IS HARD TO SIT STILL: NOT AT ALL

## 2018-06-15 NOTE — PROGRESS NOTES
Progress Note  Disclaimer: This note consists of symbols derived from keyboarding, dictation and/or voice recognition software. As a result, there may be errors in the script that have gone undetected. Please consider this when interpreting information found in this chart.    Client Name: Theresa Bill  Date: 6/13/2018         Service Type: Individual      Session Start Time: 8:30 AM  Session End Time: 9:15 AM      Session Length: 45     Session #: 14     Attendees: Client attended alone    Treatment Plan Last Reviewed:  4/9/2018       DATA   Client reports she is still feeling a little tired from early awakening due to cough's.  Her balance has improved and she has not fallen in over 6 weeks.  Physical therapy has been discontinued.  Client reports she is sleeping in her bed now except for early awakening when she will sleep in a chair.  Overall reporting marked improvement and feeling much more happy and satisfied with her life and relationships with family.  She is still going to bingo every week as well as getting out with family members.     Progress Since Last Session (Related to Symptoms / Goals / Homework):   Symptoms: Stable    Homework: not applicable      Episode of Care Goals: Satisfactory progress - CONTEMPLATION (Considering change and yet undecided); Intervened by assessing the negative and positive thinking (ambivalence) about behavior change     Current / Ongoing Stressors and Concerns:   Recent bereavement, prior caregiver stress     Treatment Objective(s) Addressed in This Session:   increase understanding of steps in the grief process  Utilize family support, work on accepting herself as she is in the moment.     Intervention:   Interpersonal Therapy: facilitating appropriate expressions of emotion and grief. behavioral activation explored what self care strategies she is currently using and what she might be able to add. Went over the relapsing  remitting nature of grief and depression.    ASSESSMENT: Current Emotional / Mental Status (status of significant symptoms):   Risk status (Self / Other harm or suicidal ideation)   Client denies current fears or concerns for personal safety.   Client denies current or recent suicidal ideation or behaviors.   Client denies current or recent homicidal ideation or behaviors.   Client denies current or recent self injurious behavior or ideation.   Client denies other safety concerns.   A safety and risk management plan has not been developed at this time, however client was given the after-hours number / 911 should there be a change in any of these risk factors.     Appearance:   Appropriate    Eye Contact:   Good    Psychomotor Behavior: Normal    Attitude:   Cooperative    Orientation:   All   Speech    Rate / Production: Slow     Volume:  Soft    Mood:    Depressed    Affect:    Appropriate    Thought Content:  Clear    Thought Form:  Coherent  Logical  difficult to think of anything but the loss of her    Insight:    Fair      Medication Review:   No changes to current psychiatric medication(s)     Medication Compliance:   Yes     Changes in Health Issues:   None reported     Chemical Use Review:   Substance Use: Chemical use reviewed, no active concerns identified      Tobacco Use: No current tobacco use.       Collateral Reports Completed:   Not Applicable    PLAN: (Client Tasks / Therapist Tasks / Other)   Therapist encouraged client in the appropriate expressions of grief as well as of her love for her late .          Sam Jaime                                                         ________________________________________________________________________    Treatment Plan    Client's Name: Theresa Bill  YOB: 1945    Date: 4/9/2018    Referral / Collaboration:  Referral to another professional/service is not indicated at this time..    Anticipated number of session or this  episode of care: 25       DSM5 Diagnoses: (Sustained by DSM5 Criteria Listed Above)  Diagnoses:            296.21 (F32.0) Major Depressive Disorder, Single Episode, Mild _ grief  Psychosocial & Contextual Factors: Caregiver stress, recent death of her   WHODAS 2.0 (12 item)                          This questionnaire asks about difficulties due to health conditions. Health conditions                   include                        disease or illnesses, other health problems that may be short or long lasting,                    injuries, mental health or emotional problems, and problems with alcohol or drugs.                              Think back over the past 30 days and answer these questions, thinking about how much              difficulty you had doing the following activities. For each question, please [place] only                   one response.      S1 Standing for long periods such as 30 minutes? Moderate =   3   S2 Taking care of household responsibilities? None =         1   S3 Learning a new task, for example, learning how to get to a new place? Mild =           2   S4 How much of a problem do you have joining community activities (for example, festivals, [employer] or other activities) in the same way as anyone else can? None =         1   S5 How much have you been emotionally affected by your health problems? None =         1               In the past 30 days, how much difficulty did you have in:   S6 Concentrating on doing something for ten minutes? None =         1   S7 Walking a long distance such as a kilometer (or equivalent)? Moderate =   3   S8 Washing your whole body? None =         1   S9 Getting dressed? None =         1   S10 Dealing with people you do not know? None =         1   S11 Maintaining a friendship? None =         1   S12 Your day to day work? None =         1       H1 Overall, in the past 30 days, how many days were these difficulties present? Record number of days 1    H2  In the past 30 days, for how many days were you totally unable to carry out your usual activities or work because of any health condition? Record number of days  0    H3 In the past 30 days, not counting the days that you were totally unable, for how many days did you cut back or reduce your usual activities or work because of any health condition? Record number of days 0      Goals  1. Education- the stages of grief  a. Client will be able to describe the stages of grief; denial, bargaining, anger, depression, and acceptance and give examples of how each have felt for her.  2. Behavioral Activation  a. Client will learn to assess their emotional stateon a day to day basis  b. Client will Identify two forms of exercise/activity and engage in them 3 times per week  c. Client will Identify 3 things that make them laugh, and engage in these 5 times per week.  d. Client will Identify 1-2Creative activities or hobbies  and engage in them 2 times per week  e. Client will identify music, movies, books that make them feel good and use them 3-4 times per week  3. Self-care  a. Client will identify 5 things they can do just for themselves  b. Client will take time for quiet, reflection, meditation 5 times per week  c. Client will Learn to set boundaries when appropriate  d. Client will Identify 2 individuals they can call on for support, distraction  4. Assessment of progress  a. Client will engage in assessment of their progress on a regular basis      Client has reviewed and agreed to the above plan.      Sam Jaime  4/9/2018

## 2018-06-16 ASSESSMENT — ANXIETY QUESTIONNAIRES: GAD7 TOTAL SCORE: 0

## 2018-06-16 ASSESSMENT — PATIENT HEALTH QUESTIONNAIRE - PHQ9: SUM OF ALL RESPONSES TO PHQ QUESTIONS 1-9: 1

## 2018-06-21 DIAGNOSIS — K21.9 LPRD (LARYNGOPHARYNGEAL REFLUX DISEASE): ICD-10-CM

## 2018-06-21 RX ORDER — MECOBALAMIN 5000 MCG
TABLET,DISINTEGRATING ORAL
Qty: 60 CAPSULE | Refills: 3 | Status: SHIPPED | OUTPATIENT
Start: 2018-06-21 | End: 2018-06-27

## 2018-06-21 NOTE — TELEPHONE ENCOUNTER
"Requested Prescriptions   Pending Prescriptions Disp Refills     LANsoprazole (PREVACID) 15 MG CR capsule [Pharmacy Med Name: LANSOPRAZOLE DR 15MG CAP] 60 capsule 3     Sig: TAKE ONE CAPSULE BY MOUTH TWICE DAILY    PPI Protocol Passed    6/21/2018 11:37 AM       Passed - Not on Clopidogrel (unless Pantoprazole ordered)       Passed - No diagnosis of osteoporosis on record       Passed - Recent (12 mo) or future (30 days) visit within the authorizing provider's specialty    Patient had office visit in the last 12 months or has a visit in the next 30 days with authorizing provider or within the authorizing provider's specialty.  See \"Patient Info\" tab in inbasket, or \"Choose Columns\" in Meds & Orders section of the refill encounter.           Passed - Patient is age 18 or older       Passed - No active pregnacy on record       Passed - No positive pregnancy test in past 12 months        This medication LANsoprazole (PREVACID) 15 MG CR capsule (Discontinued) was discontinued on 10/4/17 ,  Reason by another provider. Patient  may want a new prescription.     Last visit date 6/11/17    "

## 2018-06-21 NOTE — TELEPHONE ENCOUNTER
Routing refill request to provider for review/approval because:  Drug not active on patient's medication list    Bianca Osorio RN

## 2018-06-27 ENCOUNTER — OFFICE VISIT (OUTPATIENT)
Dept: FAMILY MEDICINE | Facility: CLINIC | Age: 73
End: 2018-06-27
Payer: COMMERCIAL

## 2018-06-27 VITALS
DIASTOLIC BLOOD PRESSURE: 88 MMHG | WEIGHT: 170 LBS | RESPIRATION RATE: 16 BRPM | TEMPERATURE: 97.8 F | HEART RATE: 75 BPM | BODY MASS INDEX: 32.12 KG/M2 | OXYGEN SATURATION: 98 % | SYSTOLIC BLOOD PRESSURE: 139 MMHG

## 2018-06-27 DIAGNOSIS — K92.1 BLOODY STOOLS: ICD-10-CM

## 2018-06-27 DIAGNOSIS — I10 ESSENTIAL HYPERTENSION: Chronic | ICD-10-CM

## 2018-06-27 DIAGNOSIS — K21.9 LPRD (LARYNGOPHARYNGEAL REFLUX DISEASE): ICD-10-CM

## 2018-06-27 DIAGNOSIS — M79.671 RIGHT FOOT PAIN: ICD-10-CM

## 2018-06-27 DIAGNOSIS — F33.0 MILD RECURRENT MAJOR DEPRESSION (H): ICD-10-CM

## 2018-06-27 DIAGNOSIS — Z79.2 LONG TERM (CURRENT) USE OF ANTIBIOTICS: ICD-10-CM

## 2018-06-27 DIAGNOSIS — L84 CALLUS OF FOOT: ICD-10-CM

## 2018-06-27 DIAGNOSIS — N32.81 OAB (OVERACTIVE BLADDER): ICD-10-CM

## 2018-06-27 DIAGNOSIS — E11.40 TYPE 2 DIABETES MELLITUS WITH DIABETIC NEUROPATHY, WITH LONG-TERM CURRENT USE OF INSULIN (H): Primary | ICD-10-CM

## 2018-06-27 DIAGNOSIS — Z79.4 TYPE 2 DIABETES MELLITUS WITH DIABETIC NEUROPATHY, WITH LONG-TERM CURRENT USE OF INSULIN (H): Primary | ICD-10-CM

## 2018-06-27 DIAGNOSIS — Z87.440 PERSONAL HISTORY OF URINARY TRACT INFECTION: ICD-10-CM

## 2018-06-27 LAB
ALBUMIN UR-MCNC: NEGATIVE MG/DL
APPEARANCE UR: CLEAR
BACTERIA SPEC CULT: NORMAL
BACTERIA SPEC CULT: NORMAL
BILIRUB UR QL STRIP: NEGATIVE
COLOR UR AUTO: YELLOW
GLUCOSE UR STRIP-MCNC: NEGATIVE MG/DL
HBA1C MFR BLD: 7.1 % (ref 0–5.6)
HGB UR QL STRIP: NEGATIVE
KETONES UR STRIP-MCNC: NEGATIVE MG/DL
LEUKOCYTE ESTERASE UR QL STRIP: ABNORMAL
NITRATE UR QL: NEGATIVE
NON-SQ EPI CELLS #/AREA URNS LPF: NORMAL /LPF
PH UR STRIP: 7 PH (ref 5–7)
RBC #/AREA URNS AUTO: NORMAL /HPF
SOURCE: ABNORMAL
SP GR UR STRIP: 1.01 (ref 1–1.03)
SPECIMEN SOURCE: NORMAL
UROBILINOGEN UR STRIP-ACNC: 0.2 EU/DL (ref 0.2–1)
WBC #/AREA URNS AUTO: NORMAL /HPF

## 2018-06-27 PROCEDURE — 11055 PARING/CUTG B9 HYPRKER LES 1: CPT | Performed by: PHYSICIAN ASSISTANT

## 2018-06-27 PROCEDURE — 81001 URINALYSIS AUTO W/SCOPE: CPT | Performed by: PHYSICIAN ASSISTANT

## 2018-06-27 PROCEDURE — 83036 HEMOGLOBIN GLYCOSYLATED A1C: CPT | Performed by: PHYSICIAN ASSISTANT

## 2018-06-27 PROCEDURE — 99214 OFFICE O/P EST MOD 30 MIN: CPT | Mod: 25 | Performed by: PHYSICIAN ASSISTANT

## 2018-06-27 PROCEDURE — 36415 COLL VENOUS BLD VENIPUNCTURE: CPT | Performed by: PHYSICIAN ASSISTANT

## 2018-06-27 RX ORDER — MECOBALAMIN 5000 MCG
15 TABLET,DISINTEGRATING ORAL 2 TIMES DAILY
Qty: 180 CAPSULE | Refills: 3 | Status: SHIPPED | OUTPATIENT
Start: 2018-06-27 | End: 2018-10-26 | Stop reason: ALTCHOICE

## 2018-06-27 RX ORDER — BUPROPION HYDROCHLORIDE 150 MG/1
150 TABLET ORAL EVERY MORNING
Qty: 30 TABLET | Refills: 1 | Status: SHIPPED | OUTPATIENT
Start: 2018-06-27 | End: 2018-09-17 | Stop reason: ALTCHOICE

## 2018-06-27 RX ORDER — LOSARTAN POTASSIUM 25 MG/1
25 TABLET ORAL DAILY
Qty: 90 TABLET | Refills: 3 | Status: SHIPPED | OUTPATIENT
Start: 2018-06-27 | End: 2019-05-10

## 2018-06-27 ASSESSMENT — ANXIETY QUESTIONNAIRES
1. FEELING NERVOUS, ANXIOUS, OR ON EDGE: MORE THAN HALF THE DAYS
6. BECOMING EASILY ANNOYED OR IRRITABLE: MORE THAN HALF THE DAYS
5. BEING SO RESTLESS THAT IT IS HARD TO SIT STILL: NOT AT ALL
3. WORRYING TOO MUCH ABOUT DIFFERENT THINGS: NOT AT ALL
2. NOT BEING ABLE TO STOP OR CONTROL WORRYING: NOT AT ALL
IF YOU CHECKED OFF ANY PROBLEMS ON THIS QUESTIONNAIRE, HOW DIFFICULT HAVE THESE PROBLEMS MADE IT FOR YOU TO DO YOUR WORK, TAKE CARE OF THINGS AT HOME, OR GET ALONG WITH OTHER PEOPLE: SOMEWHAT DIFFICULT
GAD7 TOTAL SCORE: 4
7. FEELING AFRAID AS IF SOMETHING AWFUL MIGHT HAPPEN: NOT AT ALL

## 2018-06-27 ASSESSMENT — PATIENT HEALTH QUESTIONNAIRE - PHQ9: 5. POOR APPETITE OR OVEREATING: NOT AT ALL

## 2018-06-27 NOTE — PATIENT INSTRUCTIONS
No change to diabetes or BP meds  Recheck these 3-6 months    Add wellbutrin (bupropion) again for depression, keep appt with counselor, recheck 3-4 weeks  Can just call and report how you're doing if doing really well - can refill without seeing if doing great    Soak foot 2-3 x per day for a few days to see if foot issue resolves   See podiatry if continues to be issue    Do urine culture AFTER finish antibiotics - can be lab only    Bloody mucus stools -  Labs   Set up colonoscopy  To set up your colonoscopy or endoscopy, call [facility] [phone].  Within 1 month on colonoscopy, unless big increase in blood

## 2018-06-27 NOTE — MR AVS SNAPSHOT
After Visit Summary   6/27/2018    Theresa Bill    MRN: [id number]           Patient Information     Date Of Birth          1945        Visit Information        Provider Department      6/27/2018 11:00 AM Shae Pierre PA-C [facility]        Today's Diagnoses     Type 2 diabetes mellitus with diabetic neuropathy, with long-term current use of insulin (H)    -  1    Mild recurrent major depression (H)        Personal history of urinary tract infection        Bloody stools        frequent use of antibiotics for UTI        Right foot pain        OAB (overactive bladder)        Essential hypertension        LPRD (laryngopharyngeal reflux disease)          Care Instructions    No change to diabetes or BP meds  Recheck these 3-6 months    Add wellbutrin (bupropion) again for depression, keep appt with counselor, recheck 3-4 weeks  Can just call and report how you're doing if doing really well - can refill without seeing if doing great    Soak foot 2-3 x per day for a few days to see if foot issue resolves   See podiatry if continues to be issue    Do urine culture AFTER finish antibiotics - can be lab only    Bloody mucus stools -  Labs   Set up colonoscopy  To set up your colonoscopy or endoscopy, call [facility] [phone].  Within 1 month on colonoscopy, unless big increase in blood                  Follow-ups after your visit        Additional Services     GASTROENTEROLOGY ADULT REF PROCEDURE ONLY [facility] [phone]; No Provider Preference       Last Lab Result: Creatinine (mg/dL)       Date                     Value                 03/14/2018               0.81             ----------  Body mass index is 32.12 kg/(m^2).     Needed:  No  Language:  English    Patient will be contacted to schedule procedure.     Please be aware that coverage of these services is subject to the terms and limitations of your health insurance plan.  Call member  services at your health plan with any benefit or coverage questions.  Any procedures must be performed at a [facility] facility OR coordinated by your clinic's referral office.    Please bring the following with you to your appointment:    (1) Any X-Rays, CTs or MRIs which have been performed.  Contact the facility where they were done to arrange for  prior to your scheduled appointment.    (2) List of current medications   (3) This referral request   (4) Any documents/labs given to you for this referral            PODIATRY/FOOT & ANKLE SURGERY REFERRAL       Your provider has referred you to: DAVID: [facility] [phone]   [email or web address]    Please be aware that coverage of these services is subject to the terms and limitations of your health insurance plan.  Call member services at your health plan with any benefit or coverage questions.      Please bring the following to your appointment:  >>   Any x-rays, CTs or MRIs which have been performed.  Contact the facility where they were done to arrange for  prior to your scheduled appointment.    >>   List of current medications   >>   This referral request   >>   Any documents/labs given to you for this referral                  Your next 10 appointments already scheduled     Jun 28, 2018  2:00 PM CDT   Return Visit with [facility] ([facility])    [address] [address]   [phone]            Jul 19, 2018  3:00 PM CDT   Return Visit with [facility] ([facility])    [address] [address]   [phone]              Future tests that were ordered for you today     Open Future Orders        Priority Expected Expires Ordered    Urine Culture Aerobic Bacterial Routine  7/27/2018 6/27/2018    Clostridium difficile Toxin B PCR Routine  7/27/2018 6/27/2018     Enteric Bacteria and Virus Panel by LUZMA Stool Routine  6/27/2019 6/27/2018            Who to contact     If you have questions or need follow up information about today's clinic visit or your schedule please contact [facility] directly at [phone].  Normal or non-critical lab and imaging results will be communicated to you by MyChart, letter or phone within 4 business days after the clinic has received the results. If you do not hear from us within 7 days, please contact the clinic through MyChart or phone. If you have a critical or abnormal lab result, we will notify you by phone as soon as possible.  Submit refill requests through [facility] or call your pharmacy and they will forward the refill request to us. Please allow 3 business days for your refill to be completed.          Additional Information About Your Visit        Care EveryWhere ID     This is your Care EveryWhere ID. This could be used by other organizations to access your [facility] medical records  [id number]        Your Vitals Were     Pulse Temperature Respirations Pulse Oximetry BMI (Body Mass Index)       75 97.8  F (36.6  C) (Tympanic) 16 98% 32.12 kg/m2        Blood Pressure from Last 3 Encounters:   06/27/18 139/88   06/11/18 132/80   06/01/18 130/74    Weight from Last 3 Encounters:   06/27/18 170 lb (77.1 kg)   06/11/18 171 lb (77.6 kg)   06/01/18 171 lb (77.6 kg)              We Performed the Following     GASTROENTEROLOGY ADULT REF PROCEDURE ONLY [facility] [phone]; No Provider Preference     Hemoglobin A1c     PODIATRY/FOOT & ANKLE SURGERY REFERRAL     UA reflex to Microscopic and Culture     Urine Culture Aerobic Bacterial     Urine Microscopic          Today's Medication Changes          These changes are accurate as of 6/27/18 11:52 AM.  If you have any questions, ask your nurse or doctor.               Start taking these medicines.        Dose/Directions    buPROPion 150 MG 24 hr tablet   Commonly  known as:  WELLBUTRIN XL   Used for:  Mild recurrent major depression (H)   Started by:  Shae Pierre PA-C        Dose:  150 mg   Take 1 tablet (150 mg) by mouth every morning   Quantity:  30 tablet   Refills:  1         These medicines have changed or have updated prescriptions.        Dose/Directions    LANsoprazole 15 MG CR capsule   Commonly known as:  PREVACID   This may have changed:  See the new instructions.   Used for:  LPRD (laryngopharyngeal reflux disease)   Changed by:  Shae Pierre PA-C        Dose:  15 mg   Take 1 capsule (15 mg) by mouth 2 times daily   Quantity:  180 capsule   Refills:  3            Where to get your medicines      These medications were sent to [facility] [address] [place] [address] [address] [facility]  [address] [facility] [address]     Phone:  [phone]     buPROPion 150 MG 24 hr tablet    LANsoprazole 15 MG CR capsule    losartan 25 MG tablet                Primary Care Provider Office Phone # Fax #    Shae Pierre PA-C [phone] [address] [place] [address]        Equal Access to Services     [facility] Campus[facility] : Hadii lamonte starr hadasho Sodanielle, waaxda luqadaha, qaybta kaalmada adecris, ángel oconnor . So [facility] [phone].    ATENCIÓN: Si habla español, tiene a urban disposición servicios gratuitos de asistencia lingüística. Elaina al [phone].    We comply with applicable federal civil rights laws and [place] laws. We do not discriminate on the basis of race, color, national origin, age, disability, sex, sexual orientation, or gender identity.            Thank you!     Thank you for choosing [facility]  for your care. Our goal is always to provide you with excellent care. Hearing back from our patients is one way we can continue to improve our services. Please take a few minutes to complete the written survey that you may receive in the mail after your visit  with us. Thank you!             Your Updated Medication List - Protect others around you: Learn how to safely use, store and throw away your medicines at www.disposemymeds.org.          This list is accurate as of 6/27/18 11:52 AM.  Always use your most recent med list.                   Brand Name Dispense Instructions for use Diagnosis    ACE/ARB/ARNI NOT PRESCRIBED (INTENTIONAL)      ACE & ARB not prescribed due to Intolerance-cough    Type 2 diabetes, HbA1c goal < 7% (H)       aspirin 81 MG tablet     100    ONE DAILY    Type II or unspecified type diabetes mellitus without mention of complication, not stated as uncontrolled       atorvastatin 40 MG tablet    LIPITOR    90 tablet    Take 1 tablet (40 mg) by mouth At Bedtime    Dyslipidemia       blood glucose monitoring test strip    no brand specified    2 Box    1 strip by In Vitro route 3 times daily. Has a Freestyle    Type 2 diabetes, HbA1c goal < 7% (H)       buPROPion 150 MG 24 hr tablet    WELLBUTRIN XL    30 tablet    Take 1 tablet (150 mg) by mouth every morning    Mild recurrent major depression (H)       clonazePAM 0.5 MG tablet    klonoPIN     Taking 1 tab as needed.  As of 6/11, has taken 1 tab in past 2 weeks.  Prescribed by neurology for RLS - previously severe.    Restless legs syndrome (RLS)       DAILY MULTIVITAMIN PO      One tablet daily        DULoxetine 60 MG EC capsule    CYMBALTA    90 capsule    TAKE ONE CAPSULE BY MOUTH ONCE DAILY    Major depressive disorder, recurrent episode, mild (H)       fluocinonide 0.05 % ointment    LIDEX    15 g    Apply sparingly to affected area twice daily as needed for mouth ulcer.    Aphthae, oral       hydrocortisone 1 % cream    CORTAID    30 g    Apply sparingly to affected area of lip corners two times daily for 1 week.    Angular cheilitis       insulin detemir 100 UNIT/ML injection    LEVEMIR FLEXPEN/FLEXTOUCH    15 mL    Inject 14 Units Subcutaneous daily Increase by 2 units every 3 days if sugars  2 hours after eating are above 180 (only if morning sugars above 110).    Type 2 diabetes mellitus without complication, with long-term current use of insulin (H)       insulin pen needle 32G X 4 MM    BD MARIIA U/F    90 each    Use one pen daily    Type 2 diabetes mellitus without complication, with long-term current use of insulin (H)       ketoconazole 2 % cream    NIZORAL    30 g    Apply topically 2 times daily To corners of lips for 2 weeks or as needed    Angular cheilitis       LANsoprazole 15 MG CR capsule    PREVACID    180 capsule    Take 1 capsule (15 mg) by mouth 2 times daily    LPRD (laryngopharyngeal reflux disease)       losartan 25 MG tablet    COZAAR    90 tablet    Take 1 tablet (25 mg) by mouth daily    Essential hypertension       metFORMIN 500 MG 24 hr tablet    GLUCOPHAGE-XR    180 tablet    TAKE ONE TABLET BY MOUTH TWICE DAILY WITH MEALS    Type 2 diabetes mellitus without complication, with long-term current use of insulin (H)       nitroGLYcerin 0.4 MG sublingual tablet    NITROSTAT    25 tablet    For chest pain place 1 tablet under the tongue every 5 minutes for 3 doses. If symptoms persist 5 minutes after 1st dose call 911.    Chest pressure       nystatin 679806 UNIT/GM Powd    MYCOSTATIN    60 g    Apply 1 g topically 3 times daily as needed    Other specified erythematous condition(084.66)       olopatadine 0.1 % ophthalmic solution    PATANOL    5 mL    Place 1 drop into both eyes 2 times daily    Allergic conjunctivitis, bilateral       order for DME      Equipment being ordered: CPAP Patient Theresa Bill received a Resmed Airsense 10  Auto. Pressures were set at Auto 10 - 15 cm H2O.        oxybutynin chloride 15 MG Tb24           pantoprazole 40 MG EC tablet    PROTONIX    90 tablet    Take 1 tablet (40 mg) by mouth daily    Gastroesophageal reflux disease, esophagitis presence not specified       PROBIOTIC & ACIDOPHILUS EX ST Caps           traMADol 50 MG tablet    ULTRAM    30  tablet    TAKE ONE TABLET BY MOUTH EVERY 6 HOURS AS NEEDED FOR  MODERATE  PAIN    Radicular leg pain

## 2018-06-27 NOTE — NURSING NOTE
"Chief Complaint   Patient presents with     Diabetes     RECHECK     UTI     urologist wants f/u after meds       Initial /88  Pulse 75  Temp 97.8  F (36.6  C) (Tympanic)  Resp 16  Wt 170 lb (77.1 kg)  SpO2 98%  BMI 32.12 kg/m2 Estimated body mass index is 32.12 kg/(m^2) as calculated from the following:    Height as of 6/11/18: 5' 1\" (1.549 m).    Weight as of this encounter: 170 lb (77.1 kg).      Health Maintenance that is potentially due pending provider review:  NONE    n/a    Is there anyone who you would like to be able to receive your results? No  If yes have patient fill out RICH    "

## 2018-06-27 NOTE — PROGRESS NOTES
SUBJECTIVE:   Theresa Bill is a 72 year old female who presents to clinic today for the following health issues:      Diabetes Follow-up      Patient is checking blood sugars: once a day - around 150    Diabetic concerns: None     Symptoms of hypoglycemia (low blood sugar): none     Paresthesias (numbness or burning in feet) or sores: Yes tingling in toes     Date of last diabetic eye exam: 2018    Diabetes Management Resources    Hyperlipidemia Follow-Up      Rate your low fat/cholesterol diet?: good    Taking statin?  Yes, no muscle aches from statin    Other lipid medications/supplements?:  none    Hypertension Follow-up      Outpatient blood pressures are being checked at home.  Results are 123/70.    Low Salt Diet: low salt    BP Readings from Last 2 Encounters:   06/27/18 139/88   06/11/18 132/80     Hemoglobin A1C (%)   Date Value   06/27/2018 7.1 (H)   03/14/2018 7.1 (H)     LDL Cholesterol Calculated (mg/dL)   Date Value   03/14/2018 52   04/21/2017 69       Amount of exercise or physical activity: walks    Problems taking medications regularly: No    Medication side effects: none    Diet: diabetic    UTI follow up urine requested by urologist  OAB med not seeming to work at all.  Upcoming appt for adjustment of her bladder stimulator.    Depression - had been doing well despite dc wellbutrin on 5/21 (continue cymbalta 60), now in past 2 weeks having a lot of issues.  Finds herself very easily tearful over any little thing.  Sees counselor tomorrow.  No new stressors.  Living with sister and patient's son, newly home from residential - positive to have him home.      Lately BMs have a lot of mucus and some blood.  Very soft.  Tenesmus that then produces blood and mucus.  No belly pain.  Never diarrhea.  Remote history hemorrhoid.  No other history GI issues.    BPs - stable.    Stepped on glass recently, thinks piece still in foot, tender.    Problem list and histories reviewed & adjusted, as  indicated.  Additional history: as documented    BP Readings from Last 3 Encounters:   06/27/18 139/88   06/11/18 132/80   06/01/18 130/74    Wt Readings from Last 3 Encounters:   06/27/18 170 lb (77.1 kg)   06/11/18 171 lb (77.6 kg)   06/01/18 171 lb (77.6 kg)        Labs reviewed in EPIC    Reviewed and updated as needed this visit by clinical staff  Tobacco  Allergies  Meds  Problems  Med Hx  Surg Hx  Fam Hx  Soc Hx        Reviewed and updated as needed this visit by Provider  Allergies  Meds  Problems         ROS:  Constitutional, HEENT, cardiovascular, pulmonary, GI, , skin, endocrine and psych systems are negative, except as otherwise noted.    OBJECTIVE:     /88  Pulse 75  Temp 97.8  F (36.6  C) (Tympanic)  Resp 16  Wt 170 lb (77.1 kg)  SpO2 98%  BMI 32.12 kg/m2  Body mass index is 32.12 kg/(m^2).  GENERAL: healthy, alert and no distress  RESP: lungs clear to auscultation - no rales, rhonchi or wheezes  CV: regular rate and rhythm, normal S1 S2, no S3 or S4, no murmur, click or rub, no peripheral edema   PSYCH: mentation appears normal, affect at times tearful and appearing down today  R foot: small callous with central clearness, tenderness    a1c 7.1    ASSESSMENT/PLAN:       ICD-10-CM    1. Type 2 diabetes mellitus with diabetic neuropathy, with long-term current use of insulin (H) E11.40 Hemoglobin A1c    Z79.4    2. Mild recurrent major depression (H) F33.0 buPROPion (WELLBUTRIN XL) 150 MG 24 hr tablet   3. Personal history of urinary tract infection Z87.440 UA reflex to Microscopic and Culture     Urine Microscopic     Urine Culture Aerobic Bacterial     Urine Culture Aerobic Bacterial   4. Bloody stools K92.1 Clostridium difficile Toxin B PCR     Enteric Bacteria and Virus Panel by LUZMA Stool     GASTROENTEROLOGY ADULT REF PROCEDURE ONLY [facility] [phone]; No Provider Preference   5. frequent use of antibiotics for UTI Z79.2 Clostridium difficile Toxin B PCR   6.  Right foot pain M79.671 PODIATRY/FOOT & ANKLE SURGERY REFERRAL     TRIM HYPERKERATOTIC SKIN LESION, ONE   7. OAB (overactive bladder) N32.81    8. Essential hypertension I10 losartan (COZAAR) 25 MG tablet   9. LPRD (laryngopharyngeal reflux disease) K21.9 LANsoprazole (PREVACID) 15 MG CR capsule   10. Callus of foot L84 TRIM HYPERKERATOTIC SKIN LESION, ONE   callous cleansed with alcohol then pared down, no glass visible    Patient Instructions   No change to diabetes or BP meds  Recheck these 3-6 months    Add wellbutrin (bupropion) again for depression, keep appt with counselor, recheck 3-4 weeks  Can just call and report how you're doing if doing really well - can refill without seeing if doing great    Soak foot 2-3 x per day for a few days to see if foot issue resolves   See podiatry if continues to be issue    Do urine culture AFTER finish antibiotics - can be lab only    Bloody mucus stools -  Labs   Set up colonoscopy  To set up your colonoscopy or endoscopy, call [facility] [phone].  Within 1 month on colonoscopy, unless big increase in blood              Shae Pierre PA-C  [facility]

## 2018-06-28 ENCOUNTER — MEDICAL CORRESPONDENCE (OUTPATIENT)
Dept: HEALTH INFORMATION MANAGEMENT | Facility: CLINIC | Age: 73
End: 2018-06-28

## 2018-06-28 ENCOUNTER — OFFICE VISIT (OUTPATIENT)
Dept: PSYCHOLOGY | Facility: CLINIC | Age: 73
End: 2018-06-28
Payer: COMMERCIAL

## 2018-06-28 DIAGNOSIS — F32.0 MILD MAJOR DEPRESSION (H): Primary | ICD-10-CM

## 2018-06-28 DIAGNOSIS — F43.21 GRIEF: ICD-10-CM

## 2018-06-28 PROCEDURE — 90834 PSYTX W PT 45 MINUTES: CPT | Performed by: PSYCHOLOGIST

## 2018-06-28 ASSESSMENT — PATIENT HEALTH QUESTIONNAIRE - PHQ9: SUM OF ALL RESPONSES TO PHQ QUESTIONS 1-9: 8

## 2018-06-28 ASSESSMENT — ANXIETY QUESTIONNAIRES: GAD7 TOTAL SCORE: 4

## 2018-06-28 NOTE — MR AVS SNAPSHOT
MRN:[id number]                      After Visit Summary   6/28/2018    Theresa Bill    MRN: [id number]           Visit Information        Provider Department      6/28/2018 2:00 PM Sam Jaime [facility] Generic      Your next 10 appointments already scheduled     Jul 11, 2018  3:40 PM CDT   New Visit with Sam Peña DPM   [facility] ([facility])    [address] [place] [address]   [phone]            Jul 19, 2018  3:00 PM CDT   Return Visit with Sam SHIRLEY Addison   [facility] ([facility])    [address] [facility] [address]   [phone]            Aug 03, 2018   Procedure with Kelechi Rivera MD   [facility] ([facility])    [address] [facility] [address]   [phone]           The medical center is located at [address] [place] [address] ([facility] [address] [facility] [place] of [place]).            Aug 09, 2018  1:00 PM CDT   Return Visit with Sam Jaime   [facility] ([facility])    [address] [facility] [address]   [phone]              Care EveryWhere ID     This is your Care EveryWhere ID. This could be used by other organizations to access your [facility] medical records  [id number]        Equal Access to Services     SABAS VUONG AH: Hadii lamonte ku vonnieo Sodanielle, waaxda luqadaha, qaybta kaalmada adeegyada, ángel thomas adecyrus jauregui. So [facility] [phone].    ATENCIÓN: Si habla español, tiene a urban disposición servicios gratuitos de asistencia lingüística. Llame al [phone].    We comply with applicable federal civil rights laws and [place] laws. We do not discriminate on the basis of race, color, national origin, age, disability, sex, sexual orientation, or gender identity.

## 2018-06-29 ENCOUNTER — TELEPHONE (OUTPATIENT)
Dept: FAMILY MEDICINE | Facility: CLINIC | Age: 73
End: 2018-06-29

## 2018-06-29 NOTE — TELEPHONE ENCOUNTER
CHHAYA Miles called to let Shae Pierre PA-C that she scheduled her Colonoscopy for 8/3/18 Friday   [facility]

## 2018-07-02 NOTE — PROGRESS NOTES
Progress Note  Disclaimer: This note consists of symbols derived from keyboarding, dictation and/or voice recognition software. As a result, there may be errors in the script that have gone undetected. Please consider this when interpreting information found in this chart.    Client Name: Theresa Bill  Date: 6/28/2018         Service Type: Individual      Session Start Time: 2:00 PM  Session End Time: 2:45 PM      Session Length: 45     Session #: 15     Attendees: Client attended alone    Treatment Plan Last Reviewed:  4/9/2018       DATA   Client reports she has completed physical therapy, she no longer needs to walk with a cane.  She had a difficult time last week as this was the 9 month anniversary of her 's death.  She feels a little more balanced physically but was down quite a bit.  She is looking forward to a large family gathering coming up in August.  She also attended a family wedding over the weekend which filled with some hope.  She is going on a girls trip this weekend.  Overall she is coming to accept that she will have ups and downs but that the ups will hopefully predominate.     Progress Since Last Session (Related to Symptoms / Goals / Homework):   Symptoms: Stable    Homework: not applicable      Episode of Care Goals: Satisfactory progress - CONTEMPLATION (Considering change and yet undecided); Intervened by assessing the negative and positive thinking (ambivalence) about behavior change     Current / Ongoing Stressors and Concerns:   Recent bereavement, prior caregiver stress     Treatment Objective(s) Addressed in This Session:   increase understanding of steps in the grief process  Utilize family support, work on accepting herself as she is in the moment.     Intervention:   Interpersonal Therapy: facilitating appropriate expressions of emotion and grief. behavioral activation explored what self care strategies she is currently using and  what she might be able to add. Went over the relapsing remitting nature of grief and depression.    ASSESSMENT: Current Emotional / Mental Status (status of significant symptoms):   Risk status (Self / Other harm or suicidal ideation)   Client denies current fears or concerns for personal safety.   Client denies current or recent suicidal ideation or behaviors.   Client denies current or recent homicidal ideation or behaviors.   Client denies current or recent self injurious behavior or ideation.   Client denies other safety concerns.   A safety and risk management plan has not been developed at this time, however client was given the after-hours number / 911 should there be a change in any of these risk factors.     Appearance:   Appropriate    Eye Contact:   Good    Psychomotor Behavior: Normal    Attitude:   Cooperative    Orientation:   All   Speech    Rate / Production: Slow     Volume:  Soft    Mood:    Depressed    Affect:    Appropriate    Thought Content:  Clear    Thought Form:  Coherent  Logical  difficult to think of anything but the loss of her    Insight:    Fair      Medication Review:   No changes to current psychiatric medication(s)     Medication Compliance:   Yes     Changes in Health Issues:   None reported     Chemical Use Review:   Substance Use: Chemical use reviewed, no active concerns identified      Tobacco Use: No current tobacco use.       Collateral Reports Completed:   Not Applicable    PLAN: (Client Tasks / Therapist Tasks / Other)   Therapist encouraged client in the appropriate expressions of grief as well as of her love for her late .          Sam Jaime                                                         ________________________________________________________________________    Treatment Plan    Client's Name: Theresa Bill  YOB: 1945    Date: 4/9/2018    Referral / Collaboration:  Referral to another professional/service is not indicated at  this time..    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 Health conditions                   include                        disease or illnesses, other health problems that may be short or long lasting,                    injuries, mental health or emotional problems, and problems with alcohol or drugs.                              Think back over the past 30 days and answer these questions, thinking about how much              difficulty you had doing the following activities. For each question, please [place] only                   one response.      S1 Standing for long periods such as 30 minutes? Moderate =   3   S2 Taking care of household responsibilities? None =         1   S3 Learning a new task, for example, learning how to get to a new place? Mild =           2   S4 How much of a problem do you have joining community activities (for example, festivals, Sikh or other activities) in the same way as anyone else can? None =         1   S5 How much have you been emotionally affected by your health problems? None =         1               In the past 30 days, how much difficulty did you have in:   S6 Concentrating on doing something for ten minutes? None =         1   S7 Walking a long distance such as a kilometer (or equivalent)? Moderate =   3   S8 Washing your whole body? None =         1   S9 Getting dressed? None =         1   S10 Dealing with people you do not know? None =         1   S11 Maintaining a friendship? None =         1   S12 Your day to day work? None =         1       H1 Overall, in the past 30 days, how many days were these  difficulties present? Record number of days 1    H2 In the past 30 days, for how many days were you totally unable to carry out your usual activities or work because of any health condition? Record number of days  0    H3 In the past 30 days, not counting the days that you were totally unable, for how many days did you cut back or reduce your usual activities or work because of any health condition? Record number of days 0      Goals  1. Education- the stages of grief  a. Client will be able to describe the stages of grief; denial, bargaining, anger, depression, and acceptance and give examples of how each have felt for her.  2. Behavioral Activation  a. Client will learn to assess their emotional stateon a day to day basis  b. Client will Identify two forms of exercise/activity and engage in them 3 times per week  c. Client will Identify 3 things that make them laugh, and engage in these 5 times per week.  d. Client will Identify 1-2Creative activities or hobbies  and engage in them 2 times per week  e. Client will identify music, movies, books that make them feel good and use them 3-4 times per week  3. Self-care  a. Client will identify 5 things they can do just for themselves  b. Client will take time for quiet, reflection, meditation 5 times per week  c. Client will Learn to set boundaries when appropriate  d. Client will Identify 2 individuals they can call on for support, distraction  4. Assessment of progress  a. Client will engage in assessment of their progress on a regular basis      Client has reviewed and agreed to the above plan.      Sam Jaime  4/9/2018

## 2018-07-11 ENCOUNTER — OFFICE VISIT (OUTPATIENT)
Dept: PODIATRY | Facility: CLINIC | Age: 73
End: 2018-07-11
Payer: COMMERCIAL

## 2018-07-11 VITALS — HEART RATE: 75 BPM | WEIGHT: 170 LBS | HEIGHT: 61 IN | BODY MASS INDEX: 32.1 KG/M2

## 2018-07-11 DIAGNOSIS — E11.40 TYPE 2 DIABETES MELLITUS WITH DIABETIC NEUROPATHY, WITH LONG-TERM CURRENT USE OF INSULIN (H): Primary | ICD-10-CM

## 2018-07-11 DIAGNOSIS — Z79.4 TYPE 2 DIABETES MELLITUS WITH DIABETIC NEUROPATHY, WITH LONG-TERM CURRENT USE OF INSULIN (H): Primary | ICD-10-CM

## 2018-07-11 PROCEDURE — 99213 OFFICE O/P EST LOW 20 MIN: CPT | Performed by: PODIATRIST

## 2018-07-11 NOTE — LETTER
7/11/2018         RE: Theresa Bill  [address]  [place] [facility] [address]        Dear Colleague,    Thank you for referring your patient, Theresa Bill, to the [facility]. Please see a copy of my visit note below.    PATIENT HISTORY:  Theresa Bill is a 72 year old female who presents to clinic for a diabetic foot evaluation.  The patient relates pain with ambulation in shoe gear.  The patient relates that the nails are difficult to trim at home.  The patient relates blood sugars are within normal limits.  The patient denies any redness, swelling or open sores on both feet.         REVIEW OF SYSTEMS:  Constitutional, HEENT, cardiovascular, pulmonary, GI, , musculoskeletal, neuro, skin, endocrine and psych systems are negative, except as otherwise noted.     PAST MEDICAL HISTORY:   Past Medical History:   Diagnosis Date     Depression      Diabetes mellitus (H)     NIDDM     Dysphagia      GERD (gastroesophageal reflux disease)      Hypertension         PAST SURGICAL HISTORY:   Past Surgical History:   Procedure Laterality Date     BUNIONECTOMY CRISELDA  7/1/2011    Procedure:BUNIONECTOMY CRISELDA; weil osteotomy & Lapidtus Bunionectomy; Surgeon:HYACINTH SANTO; Location:[facility]     BUNIONECTOMY CHEVRON  4/6/2012    Procedure:BUNIONECTOMY CHEVRON; Lapidus bunionectomy, plantar plate repair second and third metatarsophalangeal joints,left foot-popliteal block left lower extremity; Surgeon:HYACINTH SANTO; Location:[facility] OR     C TOTAL ABDOM HYSTERECTOMY  1992    ovaries removed     COLONOSCOPY  9/11/2012    Procedure: COLONOSCOPY;  Colonoscopy;  Surgeon: Alyssa Foster MD;  Location: [facility]     NISSEN FUNDOPLICATION      2003     REPAIR HAMMER TOE  7/1/2011    Procedure:REPAIR HAMMER TOE; hammertoe correction right 2nd digit; Surgeon:HYACINTH SANTO; Location:[facility] [place]     SURGICAL HISTORY OF -   1979    Tubal     SURGICAL HISTORY OF -   1993    Stamey bladder surgery     SURGICAL  HISTORY OF -       Lipoma removed f/back     SURGICAL HISTORY OF -   1995    (L) Foot surgery     SURGICAL HISTORY OF -   05/09/2002    Colonoscopy     SURGICAL HISTORY OF -       knee arthroscopy left     SURGICAL HISTORY OF -   2007-two phases    interstim bladder implant     SURGICAL HISTORY OF -   Sept. 13, 2007    laparoscopic Nissen fundoplication        MEDICATIONS:   Current Outpatient Prescriptions:      ACE/ARB NOT PRESCRIBED, INTENTIONAL,, ACE & ARB not prescribed due to Intolerance-cough   , Disp: , Rfl:      ASPIRIN 81 MG OR TABS, ONE DAILY, Disp: 100, Rfl: 3     atorvastatin (LIPITOR) 40 MG tablet, Take 1 tablet (40 mg) by mouth At Bedtime, Disp: 90 tablet, Rfl: 3     buPROPion (WELLBUTRIN XL) 150 MG 24 hr tablet, Take 1 tablet (150 mg) by mouth every morning, Disp: 30 tablet, Rfl: 1     clonazePAM (KLONOPIN) 0.5 MG tablet, Taking 1 tab as needed.  As of 6/11, has taken 1 tab in past 2 weeks.  Prescribed by neurology for RLS - previously severe., Disp: , Rfl: 0     DULoxetine (CYMBALTA) 60 MG EC capsule, TAKE ONE CAPSULE BY MOUTH ONCE DAILY, Disp: 90 capsule, Rfl: 3     fluocinonide (LIDEX) 0.05 % ointment, Apply sparingly to affected area twice daily as needed for mouth ulcer., Disp: 15 g, Rfl: 0     glucose blood VI test strips strip, 1 strip by In Vitro route 3 times daily. Has a Freestyle, Disp: 2 Box, Rfl: 12     hydrocortisone 1 % cream, Apply sparingly to affected area of lip corners two times daily for 1 week., Disp: 30 g, Rfl: 0     insulin detemir (LEVEMIR FLEXPEN/FLEXTOUCH) 100 UNIT/ML injection, Inject 14 Units Subcutaneous daily Increase by 2 units every 3 days if sugars 2 hours after eating are above 180 (only if morning sugars above 110)., Disp: 15 mL, Rfl: 3     insulin pen needle (BD MARIIA U/F) 32G X 4 MM, Use one pen daily, Disp: 90 each, Rfl: 0     ketoconazole (NIZORAL) 2 % cream, Apply topically 2 times daily To corners of lips for 2 weeks or as needed, Disp: 30 g, Rfl: 1      LANsoprazole (PREVACID) 15 MG CR capsule, Take 1 capsule (15 mg) by mouth 2 times daily, Disp: 180 capsule, Rfl: 3     losartan (COZAAR) 25 MG tablet, Take 1 tablet (25 mg) by mouth daily, Disp: 90 tablet, Rfl: 3     metFORMIN (GLUCOPHAGE-XR) 500 MG 24 hr tablet, TAKE ONE TABLET BY MOUTH TWICE DAILY WITH MEALS, Disp: 180 tablet, Rfl: 3     Multiple Vitamin (DAILY MULTIVITAMIN PO), One tablet daily, Disp: , Rfl:      nitroGLYcerin (NITROSTAT) 0.4 MG sublingual tablet, For chest pain place 1 tablet under the tongue every 5 minutes for 3 doses. If symptoms persist 5 minutes after 1st dose call 911., Disp: 25 tablet, Rfl: 0     nystatin (MYCOSTATIN) 413860 UNIT/GM POWD, Apply 1 g topically 3 times daily as needed, Disp: 60 g, Rfl: 1     olopatadine (PATANOL) 0.1 % ophthalmic solution, Place 1 drop into both eyes 2 times daily, Disp: 5 mL, Rfl: 3     order for DME, Equipment being ordered: CPAP Patient Theresa Bill received a Incap Airsense 10  Auto. Pressures were set at Auto 10 - 15 cm H2O., Disp: , Rfl:      oxybutynin chloride 15 MG TB24, , Disp: , Rfl:      pantoprazole (PROTONIX) 40 MG EC tablet, Take 1 tablet (40 mg) by mouth daily, Disp: 90 tablet, Rfl: 3     Probiotic Product (PROBIOTIC & ACIDOPHILUS EX ST) CAPS, , Disp: , Rfl:      traMADol (ULTRAM) 50 MG tablet, TAKE ONE TABLET BY MOUTH EVERY 6 HOURS AS NEEDED FOR  MODERATE  PAIN, Disp: 30 tablet, Rfl: 5  No current facility-administered medications for this visit.     Facility-Administered Medications Ordered in Other Visits:      fentaNYL (SUBLIMAZE) injection, , , PRN, Gagan Baez, APRN CRNA, 50 mcg at 04/03/12 1225     lidocaine 2%-EPINEPHrine 1:200,000 injection, , , PRN, JoannetGagan archibald E, APRN CRNA, 5 mL at 04/03/12 1245     midazolam (VERSED) injection, , , PRN, Gagan Baez, APRN CRNA, 2 mg at 04/03/12 1225     ropivacaine (NAROPIN) injection, , , PRN, Gagan Baez, APRN CRNA, 20 mL at 04/03/12 1252     ALLERGIES:    Allergies   Allergen  "Reactions     Lisinopril Cough     Nkda [No Known Drug Allergies]         SOCIAL HISTORY:   Social History     Social History     Marital status:      Spouse name: N/A     Number of children: N/A     Years of education: N/A     Occupational History     Not on file.     Social History Main Topics     Smoking status: Never Smoker     Smokeless tobacco: Never Used     Alcohol use No     Drug use: No     Sexual activity: Yes     Partners: Male     Other Topics Concern     Parent/Sibling W/ Cabg, Mi Or Angioplasty Before 65f 55m? Yes     Social History Narrative        FAMILY HISTORY:   Family History   Problem Relation Age of Onset     Cancer Mother      brain cancer, ovarian cancer     Cancer Father      lung cancer     GASTROINTESTINAL DISEASE Father      colitis     Alcohol/Drug Brother      Alcohol/Drug Son      Alcohol/Drug Brother      GASTROINTESTINAL DISEASE Sister      Diabetes Sister      Cancer Sister      3 sisters .w ovarian cancer and one also with uterine cancer     Gynecology Son      kidney stones     HEART DISEASE Sister      MI age 64     GASTROINTESTINAL DISEASE Other      colitis/colitis/colitis/colitis        EXAM:Vitals: Pulse 75  Ht 5' 1\" (1.549 m)  Wt 170 lb (77.1 kg)  BMI 32.12 kg/m2  BMI= Body mass index is 32.12 kg/(m^2).    Weight management plan: Patient was referred to their PCP to discuss a diet and exercise plan.    General appearance: Patient is alert and fully cooperative with history & exam.  No sign of distress is noted during the visit.     Psychiatric: Affect is pleasant & appropriate.  Patient appears motivated to improve health.     Respiratory: Breathing is regular & unlabored while sitting.     HEENT: Hearing is intact to spoken word.  Speech is clear.  No gross evidence of visual impairment that would impact ambulation.     Dermatologic: Skin is intact to both lower extremities without significant lesions, rash or abrasion.  No paronychia or evidence of soft tissue " infection is noted.  The nails are hypertrophic and discolored.     Vascular: DP & PT pulses are intact & regular bilaterally.  No significant edema or varicosities noted.  CFT and skin temperature is normal to both lower extremities.  There are no varicosities, no edema and no trophic changes noted.      Neurologic: Lower extremity sensation is intact to light touch.  No evidence of weakness or contracture in the lower extremities.  Noted evidence of neuropathy with diminished sensation bilaterally.       Musculoskeletal: Patient is ambulatory without assistive device or brace.  No gross ankle deformity noted.  No foot or ankle joint effusion is noted.  One notes a hammertoe contracture of the second digit bilaterally.  One notes a bunion deformity on the right.    Assessment:  1.  Diabetes Mellitus and Peripheral Neuropathy.    2.  Hammertoe deformity.    3.  Hallux valgus on the right.      Plan:  I have explained to the patient the condition.  I have discussed with the patient the importance of diabetic foot care.  The patient was referred to [facility] for custom diabetic shoes with accommodative inserts that will aid in offloading the tension forces to the soft tissues and prevent further inflammation.           LUDY Jones.P.SATINDER., F.A.C.F.A.S.          Again, thank you for allowing me to participate in the care of your patient.        Sincerely,        Sam Peña DPM

## 2018-07-11 NOTE — PROGRESS NOTES
PATIENT HISTORY:  Theresa Bill is a 72 year old female who presents to clinic for a diabetic foot evaluation.  The patient relates pain with ambulation in shoe gear.  The patient relates that the nails are difficult to trim at home.  The patient relates blood sugars are within normal limits.  The patient denies any redness, swelling or open sores on both feet.         REVIEW OF SYSTEMS:  Constitutional, HEENT, cardiovascular, pulmonary, GI, , musculoskeletal, neuro, skin, endocrine and psych systems are negative, except as otherwise noted.     PAST MEDICAL HISTORY:   Past Medical History:   Diagnosis Date     Depression      Diabetes mellitus (H)     NIDDM     Dysphagia      GERD (gastroesophageal reflux disease)      Hypertension         PAST SURGICAL HISTORY:   Past Surgical History:   Procedure Laterality Date     BUNIONECTOMY CRISELDA  7/1/2011    Procedure:BUNIONECTOMY CRISELDA; weil osteotomy & Lapidtus Bunionectomy; Surgeon:HYACINTH SANTO; Location:[facility]     BUNIONECTOMY CHEVRON  4/6/2012    Procedure:BUNIONECTOMY CHEVRON; Lapidus bunionectomy, plantar plate repair second and third metatarsophalangeal joints,left foot-popliteal block left lower extremity; Surgeon:HYACINTH SANTO; Location:[facility]      TOTAL ABDOM HYSTERECTOMY  1992    ovaries removed     COLONOSCOPY  9/11/2012    Procedure: COLONOSCOPY;  Colonoscopy;  Surgeon: Alyssa Foster MD;  Location: [facility]     NISSEN FUNDOPLICATION      2003     REPAIR HAMMER TOE  7/1/2011    Procedure:REPAIR HAMMER TOE; hammertoe correction right 2nd digit; Surgeon:HYACINTH SANTO; Location:[facility]     SURGICAL HISTORY OF -   1979    Tubal     SURGICAL HISTORY OF -   1993    Stamey bladder surgery     SURGICAL HISTORY OF -       Lipoma removed f/back     SURGICAL HISTORY OF -   1995    (L) Foot surgery     SURGICAL HISTORY OF -   05/09/2002    Colonoscopy     SURGICAL HISTORY OF -       knee arthroscopy left     SURGICAL HISTORY OF -   2007-two  phases    interstim bladder implant     SURGICAL HISTORY OF -   Sept. 13, 2007    laparoscopic Nissen fundoplication        MEDICATIONS:   Current Outpatient Prescriptions:      ACE/ARB NOT PRESCRIBED, INTENTIONAL,, ACE & ARB not prescribed due to Intolerance-cough   , Disp: , Rfl:      ASPIRIN 81 MG OR TABS, ONE DAILY, Disp: 100, Rfl: 3     atorvastatin (LIPITOR) 40 MG tablet, Take 1 tablet (40 mg) by mouth At Bedtime, Disp: 90 tablet, Rfl: 3     buPROPion (WELLBUTRIN XL) 150 MG 24 hr tablet, Take 1 tablet (150 mg) by mouth every morning, Disp: 30 tablet, Rfl: 1     clonazePAM (KLONOPIN) 0.5 MG tablet, Taking 1 tab as needed.  As of 6/11, has taken 1 tab in past 2 weeks.  Prescribed by neurology for RLS - previously severe., Disp: , Rfl: 0     DULoxetine (CYMBALTA) 60 MG EC capsule, TAKE ONE CAPSULE BY MOUTH ONCE DAILY, Disp: 90 capsule, Rfl: 3     fluocinonide (LIDEX) 0.05 % ointment, Apply sparingly to affected area twice daily as needed for mouth ulcer., Disp: 15 g, Rfl: 0     glucose blood VI test strips strip, 1 strip by In Vitro route 3 times daily. Has a Freestyle, Disp: 2 Box, Rfl: 12     hydrocortisone 1 % cream, Apply sparingly to affected area of lip corners two times daily for 1 week., Disp: 30 g, Rfl: 0     insulin detemir (LEVEMIR FLEXPEN/FLEXTOUCH) 100 UNIT/ML injection, Inject 14 Units Subcutaneous daily Increase by 2 units every 3 days if sugars 2 hours after eating are above 180 (only if morning sugars above 110)., Disp: 15 mL, Rfl: 3     insulin pen needle (BD MARIIA U/F) 32G X 4 MM, Use one pen daily, Disp: 90 each, Rfl: 0     ketoconazole (NIZORAL) 2 % cream, Apply topically 2 times daily To corners of lips for 2 weeks or as needed, Disp: 30 g, Rfl: 1     LANsoprazole (PREVACID) 15 MG CR capsule, Take 1 capsule (15 mg) by mouth 2 times daily, Disp: 180 capsule, Rfl: 3     losartan (COZAAR) 25 MG tablet, Take 1 tablet (25 mg) by mouth daily, Disp: 90 tablet, Rfl: 3     metFORMIN (GLUCOPHAGE-XR)  500 MG 24 hr tablet, TAKE ONE TABLET BY MOUTH TWICE DAILY WITH MEALS, Disp: 180 tablet, Rfl: 3     Multiple Vitamin (DAILY MULTIVITAMIN PO), One tablet daily, Disp: , Rfl:      nitroGLYcerin (NITROSTAT) 0.4 MG sublingual tablet, For chest pain place 1 tablet under the tongue every 5 minutes for 3 doses. If symptoms persist 5 minutes after 1st dose call 911., Disp: 25 tablet, Rfl: 0     nystatin (MYCOSTATIN) 347190 UNIT/GM POWD, Apply 1 g topically 3 times daily as needed, Disp: 60 g, Rfl: 1     olopatadine (PATANOL) 0.1 % ophthalmic solution, Place 1 drop into both eyes 2 times daily, Disp: 5 mL, Rfl: 3     order for DME, Equipment being ordered: CPAP Patient Theresa Bill received a [employer] Airsense 10  Auto. Pressures were set at Auto 10 - 15 cm H2O., Disp: , Rfl:      oxybutynin chloride 15 MG TB24, , Disp: , Rfl:      pantoprazole (PROTONIX) 40 MG EC tablet, Take 1 tablet (40 mg) by mouth daily, Disp: 90 tablet, Rfl: 3     Probiotic Product (PROBIOTIC & ACIDOPHILUS EX ST) CAPS, , Disp: , Rfl:      traMADol (ULTRAM) 50 MG tablet, TAKE ONE TABLET BY MOUTH EVERY 6 HOURS AS NEEDED FOR  MODERATE  PAIN, Disp: 30 tablet, Rfl: 5  No current facility-administered medications for this visit.     Facility-Administered Medications Ordered in Other Visits:      fentaNYL (SUBLIMAZE) injection, , , PRN, Joannetdragan, Gagan E, APRN CRNA, 50 mcg at 04/03/12 1225     lidocaine 2%-EPINEPHrine 1:200,000 injection, , , PRN, Joannetner, Gagan E, APRN CRNA, 5 mL at 04/03/12 1245     midazolam (VERSED) injection, , , PRN, Joannetdragan, Gagan E, APRN CRNA, 2 mg at 04/03/12 1225     ropivacaine (NAROPIN) injection, , , PRN, Joannetdragan, Gagan E, APRN CRNA, 20 mL at 04/03/12 1252     ALLERGIES:    Allergies   Allergen Reactions     Lisinopril Cough     Nkda [No Known Drug Allergies]         SOCIAL HISTORY:   Social History     Social History     Marital status:      Spouse name: N/A     Number of children: N/A     Years of education: N/A     Occupational  "History     Not on file.     Social History Main Topics     Smoking status: Never Smoker     Smokeless tobacco: Never Used     Alcohol use No     Drug use: No     Sexual activity: Yes     Partners: Male     Other Topics Concern     Parent/Sibling W/ Cabg, Mi Or Angioplasty Before 65f 55m? Yes     Social History Narrative        FAMILY HISTORY:   Family History   Problem Relation Age of Onset     Cancer Mother      brain cancer, ovarian cancer     Cancer Father      lung cancer     GASTROINTESTINAL DISEASE Father      colitis     Alcohol/Drug Brother      Alcohol/Drug Son      Alcohol/Drug Brother      GASTROINTESTINAL DISEASE Sister      Diabetes Sister      Cancer Sister      3 sisters .w ovarian cancer and one also with uterine cancer     Gynecology Son      kidney stones     HEART DISEASE Sister      MI age 64     GASTROINTESTINAL DISEASE Other      colitis/colitis/colitis/colitis        EXAM:Vitals: Pulse 75  Ht 5' 1\" (1.549 m)  Wt 170 lb (77.1 kg)  BMI 32.12 kg/m2  BMI= Body mass index is 32.12 kg/(m^2).    Weight management plan: Patient was referred to their PCP to discuss a diet and exercise plan.    General appearance: Patient is alert and fully cooperative with history & exam.  No sign of distress is noted during the visit.     Psychiatric: Affect is pleasant & appropriate.  Patient appears motivated to improve health.     Respiratory: Breathing is regular & unlabored while sitting.     HEENT: Hearing is intact to spoken word.  Speech is clear.  No gross evidence of visual impairment that would impact ambulation.     Dermatologic: Skin is intact to both lower extremities without significant lesions, rash or abrasion.  No paronychia or evidence of soft tissue infection is noted.  The nails are hypertrophic and discolored.     Vascular: DP & PT pulses are intact & regular bilaterally.  No significant edema or varicosities noted.  CFT and skin temperature is normal to both lower extremities.  There are no " varicosities, no edema and no trophic changes noted.      Neurologic: Lower extremity sensation is intact to light touch.  No evidence of weakness or contracture in the lower extremities.  Noted evidence of neuropathy with diminished sensation bilaterally.       Musculoskeletal: Patient is ambulatory without assistive device or brace.  No gross ankle deformity noted.  No foot or ankle joint effusion is noted.  One notes a hammertoe contracture of the second digit bilaterally.  One notes a bunion deformity on the right.    Assessment:  1.  Diabetes Mellitus and Peripheral Neuropathy.    2.  Hammertoe deformity.    3.  Hallux valgus on the right.      Plan:  I have explained to the patient the condition.  I have discussed with the patient the importance of diabetic foot care.  The patient was referred to [facility] for custom diabetic shoes with accommodative inserts that will aid in offloading the tension forces to the soft tissues and prevent further inflammation.           ADWOA Peña D.P.M., ENE.FMABELS.

## 2018-07-11 NOTE — PATIENT INSTRUCTIONS
"DIABETES AND YOUR FEET  Diabetes can result in several problems in the feet including ulcers (open sores) and amputations. Two of the most important reasons why people develop foot problems when they have diabetes is : 1. Neuropathy (loss of feeling)  2. Vascular disease (loss or decrease of blood flow).    Neuropathy is a term used to describe a loss of nerve function.  Patients with diabetes are at risk of developing neuropathy if their sugars continue to run high and are above the normal value. One theory for neuropathy is that the \"extra\" sugar in the body enters the nerves and is broken down. These by-products build up in the nerve causing it to swell and impairing nerve function. Often times, this can be prevented by controlling your sugars, dieting and exercise.    When a person develops neuropathy, they usually begin to feel numbness or tingling in their feet and sometime in their legs.  Other symptoms may include painful burning or hot feet, tingling or feeling like insects or ants are crawling on your feet or legs.  If the diabetes is sever and the sugars run high for long periods of time, neuropathy can also occur in the hands.    Vascular disease  is a term used to describe a loss or decrease in circulation (blood flow). There is a problem in getting blood and oxygen to areas that need it. Similar to neuropathy, sugars can build up in the walls of the arteries (blood vessels) and cause them to become swollen, thickened and hardened. This decreases the amount of blood that can go to an area that needs it. Though this is common in the legs of diabetic patients, it can also affect other arteries (blood vessels) in the body such as in the heart and eyes.    In the legs, vascular disease usually results in cramping. Patients who develop leg cramps after walking the same distance every time (i.e. One block, half a mile, ect.) need to let their doctors know so that their circulation may be checked. Cramps " causing severe pain in the feet and/or legs while sleeping and the cramps go away when you stand or hang your legs off the side of the bed, may also be a sign of poor blood circulation.  Occasional cramping in cold weather or on rare occasions with activity may not be due to poor circulation, but you should inform your doctor.    PREVENTION OF THESE DISEASES  The key to prevention is good blood sugar control. Poor blood sugar control is a big reason many of these problems start. Physical activity (exercise) is a very good way to help decrease your blood sugars. Exercise can lower your blood sugar, blood pressure, and cholesterol. It also reduces your risk for heart disease and stroke, relieves stress, and strengthens your heart, muscles and bones.  In addition, regular activity helps insulin work better, improves your blood circulation, and keeps your joints flexible. If you're trying to lose weight, a combination of exercise and wise food choices can help you reach your target weight and maintain it.      PAIN MANAGEMENT  1.Blood Sugar Control - Most important  2. Medications such as:  Amytriptylline, duloxetine, gabapentin, lyrica, tramadol  3. Nutritional therapy:  Vitamin B6 (100mg daily), Vitamin B12 (75mcg daily), Vitamin D 2000 IU daily), Alpha-Lipoic Acid (600-1800mg daily), Acetyl-L-Carnitine (500-1000mg TID, L-methyl folate (1500mcg daily)    ** Metformin can block Vitamin B6 and B12 so it is important to supplement**    FOOT CARE RECOMMENDATIONS   1. Wash your feet with lukewarm water and a mild soap and then dry them thoroughly, especially between the toes.     2. Examine your feet daily looking for cuts, corns, blisters, cracks, ect, especially after wearing new shoes. Make sure to look between your toes. If you cannot see the bottom of your feet, set a mirror on the floor and hold your foot over it, or ask a spouse, friend or family member to examine your feet for you. Contact your doctor immediately  if new problems are noted or if sores are not healing.     3. Immediately apply moisturizer to the tops and bottoms of your feet, avoiding areas between the toes. Hand lotion (Intesive Care, Winifred, Eucerin, Neutrogena, Curel, ect) is sufficient unless your doctor prescribes a medicated lotion. Apply sunscreen to your feet when going swimming outside.     4. Use clean comfortable shoes, wear white socks (if you have any bleeding or drainage, you will see it on white socks). Socks should not have thick seams or cut off the circulation around the leg. Break in new shoes slowly and rotate with older shoes until broken in. Check the inside of your shoes with your hand to look for areas of irritation or objects that may have fallen into your shoes.       5. Keep slippers by the side of your bed for use during the night.     6.  Shoes should be fitted by a professional and should not cause areas of irritation.  Check your feet regularly when wearing a new pair of shoes and replace them as needed.     7.  Talk to your doctor about proper exercise. Exercise and stretching stimulate blood flow to your feet and maintain proper glucose levels.     8.  Monitor your blood glucose level as instructed by your doctor. Notify your doctor immediately if your blood sugar is abnormally high or low.    9. Cut your nails straight across, but then gently round any sharp edges with a cardboard nail file. If you have neuropathy, peripheral vascular disease or cannot see that well to trim your own toenails contact [facility] ([phone]) or [facility] ([phone]).      THINGS TO AVOID DOING   1.  Do not soak your feet if you have an open sore. Use only lukewarm water and always check the temperature with your hand as hot water can easily burn your feet.       2.  Never use a hot water bottle or heating pad on your feet. Also do not apply cold compresses to your feet. With decreased sensation, you could burn or freeze your feet.        3.  Do not apply any of these to your feet:    -  Over the counter medicine for corns or warts    -  Harsh chemicals like boric acid    -  Do not self-treat corns, cuts, blisters or infections. Always consult your doctor.       4.  Do not wear sandals, slippers or walk barefoot, especially on hot sand or concrete or other harsh surfaces.     5.  If you smoke, stop!!!

## 2018-07-11 NOTE — MR AVS SNAPSHOT
"              After Visit Summary   7/11/2018    Theresa Bill    MRN: [id number]           Patient Information     Date Of Birth          1945        Visit Information        Provider Department      7/11/2018 3:40 PM Sam Peña DPM [facility]        Today's Diagnoses     Type 2 diabetes mellitus with diabetic neuropathy, with long-term current use of insulin (H)    -  1      Care Instructions    DIABETES AND YOUR FEET  Diabetes can result in several problems in the feet including ulcers (open sores) and amputations. Two of the most important reasons why people develop foot problems when they have diabetes is : 1. Neuropathy (loss of feeling)  2. Vascular disease (loss or decrease of blood flow).    Neuropathy is a term used to describe a loss of nerve function.  Patients with diabetes are at risk of developing neuropathy if their sugars continue to run high and are above the normal value. One theory for neuropathy is that the \"extra\" sugar in the body enters the nerves and is broken down. These by-products build up in the nerve causing it to swell and impairing nerve function. Often times, this can be prevented by controlling your sugars, dieting and exercise.    When a person develops neuropathy, they usually begin to feel numbness or tingling in their feet and sometime in their legs.  Other symptoms may include painful burning or hot feet, tingling or feeling like insects or ants are crawling on your feet or legs.  If the diabetes is sever and the sugars run high for long periods of time, neuropathy can also occur in the hands.    Vascular disease  is a term used to describe a loss or decrease in circulation (blood flow). There is a problem in getting blood and oxygen to areas that need it. Similar to neuropathy, sugars can build up in the walls of the arteries (blood vessels) and cause them to become swollen, thickened and hardened. This decreases the amount of blood that can " go to an area that needs it. Though this is common in the legs of diabetic patients, it can also affect other arteries (blood vessels) in the body such as in the heart and eyes.    In the legs, vascular disease usually results in cramping. Patients who develop leg cramps after walking the same distance every time (i.e. One block, half a mile, ect.) need to let their doctors know so that their circulation may be checked. Cramps causing severe pain in the feet and/or legs while sleeping and the cramps go away when you stand or hang your legs off the side of the bed, may also be a sign of poor blood circulation.  Occasional cramping in cold weather or on rare occasions with activity may not be due to poor circulation, but you should inform your doctor.    PREVENTION OF THESE DISEASES  The key to prevention is good blood sugar control. Poor blood sugar control is a big reason many of these problems start. Physical activity (exercise) is a very good way to help decrease your blood sugars. Exercise can lower your blood sugar, blood pressure, and cholesterol. It also reduces your risk for heart disease and stroke, relieves stress, and strengthens your heart, muscles and bones.  In addition, regular activity helps insulin work better, improves your blood circulation, and keeps your joints flexible. If you're trying to lose weight, a combination of exercise and wise food choices can help you reach your target weight and maintain it.      PAIN MANAGEMENT  1.Blood Sugar Control - Most important  2. Medications such as:  Amytriptylline, duloxetine, gabapentin, lyrica, tramadol  3. Nutritional therapy:  Vitamin B6 (100mg daily), Vitamin B12 (75mcg daily), Vitamin D 2000 IU daily), Alpha-Lipoic Acid (600-1800mg daily), Acetyl-L-Carnitine (500-1000mg TID, L-methyl folate (1500mcg daily)    ** Metformin can block Vitamin B6 and B12 so it is important to supplement**    FOOT CARE RECOMMENDATIONS   1. Wash your feet with lukewarm  water and a mild soap and then dry them thoroughly, especially between the toes.     2. Examine your feet daily looking for cuts, corns, blisters, cracks, ect, especially after wearing new shoes. Make sure to look between your toes. If you cannot see the bottom of your feet, set a mirror on the floor and hold your foot over it, or ask a spouse, friend or family member to examine your feet for you. Contact your doctor immediately if new problems are noted or if sores are not healing.     3. Immediately apply moisturizer to the tops and bottoms of your feet, avoiding areas between the toes. Hand lotion (Intesive Care, Winifred, Eucerin, Neutrogena, Curel, ect) is sufficient unless your doctor prescribes a medicated lotion. Apply sunscreen to your feet when going swimming outside.     4. Use clean comfortable shoes, wear white socks (if you have any bleeding or drainage, you will see it on white socks). Socks should not have thick seams or cut off the circulation around the leg. Break in new shoes slowly and rotate with older shoes until broken in. Check the inside of your shoes with your hand to look for areas of irritation or objects that may have fallen into your shoes.       5. Keep slippers by the side of your bed for use during the night.     6.  Shoes should be fitted by a professional and should not cause areas of irritation.  Check your feet regularly when wearing a new pair of shoes and replace them as needed.     7.  Talk to your doctor about proper exercise. Exercise and stretching stimulate blood flow to your feet and maintain proper glucose levels.     8.  Monitor your blood glucose level as instructed by your doctor. Notify your doctor immediately if your blood sugar is abnormally high or low.    9. Cut your nails straight across, but then gently round any sharp edges with a cardboard nail file. If you have neuropathy, peripheral vascular disease or cannot see that well to trim your own toenails contact  [employer] ([phone]) or [employer] ([phone]).      THINGS TO AVOID DOING   1.  Do not soak your feet if you have an open sore. Use only lukewarm water and always check the temperature with your hand as hot water can easily burn your feet.       2.  Never use a hot water bottle or heating pad on your feet. Also do not apply cold compresses to your feet. With decreased sensation, you could burn or freeze your feet.       3.  Do not apply any of these to your feet:    -  Over the counter medicine for corns or warts    -  Harsh chemicals like boric acid    -  Do not self-treat corns, cuts, blisters or infections. Always consult your doctor.       4.  Do not wear sandals, slippers or walk barefoot, especially on hot sand or concrete or other harsh surfaces.     5.  If you smoke, stop!!!                  Follow-ups after your visit        Additional Services     ORTHOTICS REFERRAL       Please be aware that coverage of these services is subject to the terms and limitations of your health insurance plan.  Call member services at your health plan with any benefit or coverage questions.      Please bring the following to your appointment:    >>   Any x-rays, CTs or MRIs which have been performed.  Contact the facility where they were done to arrange for  prior to your scheduled appointment.  Any new CT, MRI or other procedures ordered by your specialist must be performed at a [facility] facility or coordinated by your clinic's referral office.    >>   List of current medications   >>   This referral request   >>   Any documents/labs given to you for this referral    ==This Referral PRINTS in the [facility] (ORTHOTICS & PROSTHETICS) Central scheduling office ==     The [facility] Central Scheduling staff will contact patient to arrange appointments. Central Scheduling Phone #:  [place]  [phone]     Orthotics: Bilateral Extradepth Diabetic Shoe/s with three pair of accommodative  "inserts                  Follow-up notes from your care team     Return in about 1 year (around 7/11/2019).      Your next 10 appointments already scheduled     Jul 19, 2018  3:00 PM CDT   Return Visit with Sam Jaime   [facility] ([facility])    [address] [address]   [phone]            Aug 03, 2018   Procedure with Kelechi Rivera MD   [facility] ([facility])    [address] [address] [phone]           The medical center is located at [address] (between [address] in [place], [place] north of [place]).            Aug 09, 2018  1:00 PM CDT   Return Visit with Sam WATSON Addison   [facility] ([facility])    [address] [address]   [phone]              Who to contact     If you have questions or need follow up information about today's clinic visit or your schedule please contact [facility] directly at [phone].  Normal or non-critical lab and imaging results will be communicated to you by MyChart, letter or phone within 4 business days after the clinic has received the results. If you do not hear from us within 7 days, please contact the clinic through MyChart or phone. If you have a critical or abnormal lab result, we will notify you by phone as soon as possible.  Submit refill requests through [employer] or call your pharmacy and they will forward the refill request to us. Please allow 3 business days for your refill to be completed.          Additional Information About Your Visit        Care EveryWhere ID     This is your Care EveryWhere ID. This could be used by other organizations to access your [facility] medical records  [id number]        Your Vitals Were     Pulse Height BMI (Body Mass Index)             75 5' 1\" (1.549 m) 32.12 kg/m2          Blood Pressure from Last 3 Encounters: "   06/27/18 139/88   06/11/18 132/80   06/01/18 130/74    Weight from Last 3 Encounters:   07/11/18 170 lb (77.1 kg)   06/27/18 170 lb (77.1 kg)   06/11/18 171 lb (77.6 kg)              We Performed the Following     ORTHOTICS REFERRAL        Primary Care Provider Office Phone # Fax #    Shae Pierre PA-C [phone] [address] [place] [id number]        Equal Access to Services     GILDA VUONG : Hadii aad ku hadasho Soomaali, waaxda luqadaha, qaybta kaalmada adeegyada, waxmatty oconnor . So [facility] [phone].    ATENCIÓN: Si habla español, tiene a urban disposición servicios gratuitos de asistencia lingüística. [facility] [phone].    We comply with applicable federal civil rights laws and [place] laws. We do not discriminate on the basis of race, color, national origin, age, disability, sex, sexual orientation, or gender identity.            Thank you!     Thank you for choosing [facility]  for your care. Our goal is always to provide you with excellent care. Hearing back from our patients is one way we can continue to improve our services. Please take a few minutes to complete the written survey that you may receive in the mail after your visit with us. Thank you!             Your Updated Medication List - Protect others around you: Learn how to safely use, store and throw away your medicines at www.disposemymeds.org.          This list is accurate as of 7/11/18  4:32 PM.  Always use your most recent med list.                   Brand Name Dispense Instructions for use Diagnosis    ACE/ARB/ARNI NOT PRESCRIBED (INTENTIONAL)      ACE & ARB not prescribed due to Intolerance-cough    Type 2 diabetes, HbA1c goal < 7% (H)       aspirin 81 MG tablet     100    ONE DAILY    Type II or unspecified type diabetes mellitus without mention of complication, not stated as uncontrolled       atorvastatin 40 MG tablet    LIPITOR    90 tablet    Take 1  tablet (40 mg) by mouth At Bedtime    Dyslipidemia       blood glucose monitoring test strip    no brand specified    2 Box    1 strip by In Vitro route 3 times daily. Has a Freestyle    Type 2 diabetes, HbA1c goal < 7% (H)       buPROPion 150 MG 24 hr tablet    WELLBUTRIN XL    30 tablet    Take 1 tablet (150 mg) by mouth every morning    Mild recurrent major depression (H)       clonazePAM 0.5 MG tablet    klonoPIN     Taking 1 tab as needed.  As of 6/11, has taken 1 tab in past 2 weeks.  Prescribed by neurology for RLS - previously severe.    Restless legs syndrome (RLS)       DAILY MULTIVITAMIN PO      One tablet daily        DULoxetine 60 MG EC capsule    CYMBALTA    90 capsule    TAKE ONE CAPSULE BY MOUTH ONCE DAILY    Major depressive disorder, recurrent episode, mild (H)       fluocinonide 0.05 % ointment    LIDEX    15 g    Apply sparingly to affected area twice daily as needed for mouth ulcer.    Aphthae, oral       hydrocortisone 1 % cream    CORTAID    30 g    Apply sparingly to affected area of lip corners two times daily for 1 week.    Angular cheilitis       insulin detemir 100 UNIT/ML injection    LEVEMIR FLEXPEN/FLEXTOUCH    15 mL    Inject 14 Units Subcutaneous daily Increase by 2 units every 3 days if sugars 2 hours after eating are above 180 (only if morning sugars above 110).    Type 2 diabetes mellitus without complication, with long-term current use of insulin (H)       insulin pen needle 32G X 4 MM    BD MARIIA U/F    90 each    Use one pen daily    Type 2 diabetes mellitus without complication, with long-term current use of insulin (H)       ketoconazole 2 % cream    NIZORAL    30 g    Apply topically 2 times daily To corners of lips for 2 weeks or as needed    Angular cheilitis       LANsoprazole 15 MG CR capsule    PREVACID    180 capsule    Take 1 capsule (15 mg) by mouth 2 times daily    LPRD (laryngopharyngeal reflux disease)       losartan 25 MG tablet    COZAAR    90 tablet    Take 1  tablet (25 mg) by mouth daily    Essential hypertension       metFORMIN 500 MG 24 hr tablet    GLUCOPHAGE-XR    180 tablet    TAKE ONE TABLET BY MOUTH TWICE DAILY WITH MEALS    Type 2 diabetes mellitus without complication, with long-term current use of insulin (H)       nitroGLYcerin 0.4 MG sublingual tablet    NITROSTAT    25 tablet    For chest pain place 1 tablet under the tongue every 5 minutes for 3 doses. If symptoms persist 5 minutes after 1st dose call 911.    Chest pressure       nystatin 255129 UNIT/GM Powd    MYCOSTATIN    60 g    Apply 1 g topically 3 times daily as needed    Other specified erythematous condition(465.89)       olopatadine 0.1 % ophthalmic solution    PATANOL    5 mL    Place 1 drop into both eyes 2 times daily    Allergic conjunctivitis, bilateral       order for DME      Equipment being ordered: CPAP Patient Theresa LUDY Bill received a [employer] Airsense 10  Auto. Pressures were set at Auto 10 - 15 cm H2O.        oxybutynin chloride 15 MG Tb24           pantoprazole 40 MG EC tablet    PROTONIX    90 tablet    Take 1 tablet (40 mg) by mouth daily    Gastroesophageal reflux disease, esophagitis presence not specified       PROBIOTIC & ACIDOPHILUS EX ST Caps           traMADol 50 MG tablet    ULTRAM    30 tablet    TAKE ONE TABLET BY MOUTH EVERY 6 HOURS AS NEEDED FOR  MODERATE  PAIN    Radicular leg pain

## 2018-07-31 RX ORDER — TRIMETHOPRIM 100 MG/1
TABLET ORAL
COMMUNITY
Start: 2018-06-12 | End: 2018-09-17

## 2018-08-02 ENCOUNTER — ANESTHESIA EVENT (OUTPATIENT)
Dept: GASTROENTEROLOGY | Facility: CLINIC | Age: 73
End: 2018-08-02
Payer: MEDICARE

## 2018-08-02 ASSESSMENT — COPD QUESTIONNAIRES: COPD: 1

## 2018-08-02 NOTE — ANESTHESIA PREPROCEDURE EVALUATION
Anesthesia Evaluation     . Pt has had prior anesthetic.            ROS/MED HX    ENT/Pulmonary:     (+)sleep apnea, COPD, , . .    Neurologic: Comment: RLS  Frequent falls    (+)neuropathy     Cardiovascular: Comment: CT Angiogram (10-24-17)  IMPRESSION:  1.  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   2.  CAD-RADS? (1 )see details bellow.   3.  Total Agatston score 43 placing the patient in the 54 percentile  when compared to age and gender matched control group.  4.  Please review Radiology report for incidental noncardiac findings  that will follow separately.    Holter (11-13-17) = PVCs and PACs    (+) Dyslipidemia, hypertension----. : . . . :. . Previous cardiac testing Echodate:6-13-06zvwhebl:Moderate left atrial enlargement, possible diastolic dysfunctionStress Testdate:10-6-17 results:Interpretation Summary  1. Reduced functional capacity  2. Negative Stress EKG for ischemia.  3. Negative Stress Echocardiogram for ischemia  4. Sensitivity of study is reduced as patient imaged below 85% MPHR.ECG reviewed date:9-23-17 results:Junctional Rhythm   P:QRS - 1:1, Superior P axis, Short Milagros, H Rate 74  BORDERLINE RHYTHM date: results:          METS/Exercise Tolerance:     Hematologic:     (+) Anemia, -      Musculoskeletal: Comment: Limb pain  lumbago  (+) arthritis, , , -       GI/Hepatic: Comment: GI bleed  Dysphagia  Bloody stools    (+) GERD hiatal hernia,       Renal/Genitourinary: Comment: Incontinence  microalbuminuria        Endo:     (+) type II DM Obesity, .      Psychiatric:     (+) psychiatric history depression      Infectious Disease:         Malignancy:         Other:    (+) H/O Chronic Pain,                   Physical Exam  Normal systems: cardiovascular, pulmonary and dental    Airway   Mallampati: I  TM distance: >3 FB  Neck ROM: full    Dental     Cardiovascular   Rhythm and rate: regular and normal      Pulmonary    breath sounds clear to auscultation                    Anesthesia  Plan      History & Physical Review  History and physical reviewed and following examination; no interval change.    ASA Status:  3 .    NPO Status:  > 6 hours    Plan for MAC Reason for MAC:  Deep or markedly invasive procedure (G8)         Postoperative Care      Consents                          .

## 2018-08-03 ENCOUNTER — SURGERY (OUTPATIENT)
Age: 73
End: 2018-08-03

## 2018-08-03 ENCOUNTER — ANESTHESIA (OUTPATIENT)
Dept: GASTROENTEROLOGY | Facility: CLINIC | Age: 73
End: 2018-08-03
Payer: MEDICARE

## 2018-08-03 ENCOUNTER — HOSPITAL ENCOUNTER (OUTPATIENT)
Facility: CLINIC | Age: 73
Discharge: HOME | End: 2018-08-03
Attending: SURGERY | Admitting: SURGERY
Payer: MEDICARE

## 2018-08-03 VITALS
RESPIRATION RATE: 16 BRPM | HEIGHT: 61 IN | DIASTOLIC BLOOD PRESSURE: 85 MMHG | WEIGHT: 170 LBS | OXYGEN SATURATION: 98 % | BODY MASS INDEX: 32.1 KG/M2 | TEMPERATURE: 98.2 F | SYSTOLIC BLOOD PRESSURE: 161 MMHG

## 2018-08-03 LAB
COLONOSCOPY: NORMAL
GLUCOSE BLDC GLUCOMTR-MCNC: 141 MG/DL (ref 70–99)

## 2018-08-03 PROCEDURE — 45378 DIAGNOSTIC COLONOSCOPY: CPT | Performed by: SURGERY

## 2018-08-03 PROCEDURE — 25000128 H RX IP 250 OP 636: Performed by: NURSE ANESTHETIST, CERTIFIED REGISTERED

## 2018-08-03 PROCEDURE — 82962 GLUCOSE BLOOD TEST: CPT

## 2018-08-03 PROCEDURE — 37000008 ZZH ANESTHESIA TECHNICAL FEE, 1ST 30 MIN: Performed by: SURGERY

## 2018-08-03 RX ORDER — ONDANSETRON 2 MG/ML
4 INJECTION INTRAMUSCULAR; INTRAVENOUS
Status: DISCONTINUED | OUTPATIENT
Start: 2018-08-03 | End: 2018-08-03 | Stop reason: HOSPADM

## 2018-08-03 RX ORDER — PROPOFOL 10 MG/ML
INJECTION, EMULSION INTRAVENOUS PRN
Status: DISCONTINUED | OUTPATIENT
Start: 2018-08-03 | End: 2018-08-03

## 2018-08-03 RX ORDER — SODIUM CHLORIDE, SODIUM LACTATE, POTASSIUM CHLORIDE, CALCIUM CHLORIDE 600; 310; 30; 20 MG/100ML; MG/100ML; MG/100ML; MG/100ML
INJECTION, SOLUTION INTRAVENOUS CONTINUOUS
Status: DISCONTINUED | OUTPATIENT
Start: 2018-08-03 | End: 2018-08-03 | Stop reason: HOSPADM

## 2018-08-03 RX ORDER — LIDOCAINE 40 MG/G
CREAM TOPICAL
Status: DISCONTINUED | OUTPATIENT
Start: 2018-08-03 | End: 2018-08-03 | Stop reason: HOSPADM

## 2018-08-03 RX ADMIN — PROPOFOL 100 MG: 10 INJECTION, EMULSION INTRAVENOUS at 08:03

## 2018-08-03 RX ADMIN — PROPOFOL 100 MG: 10 INJECTION, EMULSION INTRAVENOUS at 08:10

## 2018-08-03 RX ADMIN — PROPOFOL 100 MG: 10 INJECTION, EMULSION INTRAVENOUS at 08:06

## 2018-08-03 NOTE — ANESTHESIA POSTPROCEDURE EVALUATION
Patient: Theresa Bill    Procedure(s):  colonoscopy - Wound Class: II-Clean Contaminated    Diagnosis:bloody stools    diagnostic  Diagnosis Additional Information: No value filed.    Anesthesia Type:  MAC    Note:  Anesthesia Post Evaluation    Patient location during evaluation: Bedside  Patient participation: Able to fully participate in evaluation  Level of consciousness: awake and alert  Pain management: adequate  Airway patency: patent  Cardiovascular status: acceptable  Respiratory status: acceptable  Hydration status: acceptable  PONV: none     Anesthetic complications: None          Last vitals:  Vitals:    08/03/18 0654   BP: 143/81   Resp: 16   Temp: 36.8  C (98.2  F)   SpO2: 99%         Electronically Signed By: NICHELLE Guerrero CRNA  August 3, 2018  8:22 AM

## 2018-08-03 NOTE — H&P
GI Pre-Procedure History & Physical    Theresa Bill MRN# [id number]   Age: 72 year old YOB: 1945      Date of Surgery: 8/3/2018  Location [facility]      Date of Exam 8/3/2018 Facility ([facility])            Active problem list:   Patient Active Problem List   Diagnosis     Lumbago     Pain in limb     Mild major depression (H)     Essential hypertension     Type 2 diabetes mellitus with diabetic neuropathy (H)     Osteoarthritis     Mixed incontinence urge and stress (male)(female)     Restless legs     Microalbuminuria     Obesity     Esophageal reflux     GI bleed     Dyslipidemia     Iron deficiency anemia     Falls frequently     ACP (advance care planning)     SHANTI (obstructive sleep apnea)     Other chronic pain     LPRD (laryngopharyngeal reflux disease)     Personal history of fall            Medications (include herbals and vitamins):   Any Plavix use in the last 7 days? No. Baby ASA 1 week ago     Current Facility-Administered Medications   Medication     lactated ringers infusion     lactated ringers infusion     lidocaine (LMX4) kit     lidocaine (LMX4) kit     lidocaine 1 % 1 mL     lidocaine 1 % 1 mL     ondansetron (ZOFRAN) injection 4 mg     sodium chloride (PF) 0.9% PF flush 3 mL     sodium chloride (PF) 0.9% PF flush 3 mL     sodium chloride (PF) 0.9% PF flush 3 mL     sodium chloride (PF) 0.9% PF flush 3 mL     Facility-Administered Medications Ordered in Other Encounters   Medication     fentaNYL (SUBLIMAZE) injection     lidocaine 2%-EPINEPHrine 1:200,000 injection     midazolam (VERSED) injection     ropivacaine (NAROPIN) injection             Allergies:      Allergies   Allergen Reactions     Lisinopril Cough     Nkda [No Known Drug Allergies]              Social History:     Social History   Substance Use Topics     Smoking status: Never Smoker     Smokeless tobacco: Never Used     Alcohol use No            Physical Exam:   All vitals have been reviewed   Patient  "Vitals for the past 8 hrs:   BP Temp Temp src Heart Rate Resp SpO2 Height Weight   08/03/18 0654 143/81 98.2  F (36.8  C) Oral 66 16 99 % 1.549 m (5' 1\") 77.1 kg (170 lb)       Airway assessment:   Patient is able to open mouth wide  Patient is able to stick out tongue   Constitutional: Awake, alert, cooperative, no apparent distress, and appears stated age.  Eyes: Pupils equal, round and reactive to light.  ENT: Normocephalic, sinuses nontender, external ears without lesions, oral pharynx with moist mucus membranes.  Neck: Supple, symmetrical, trachea midline, thyroid symmetric and non-tender.  Lungs: No increased work of breathing, good air exchange, clear to auscultation bilaterally, no crackles or wheezing.  Cardiovascular: Regular rate and rhythm, normal heart sound, and no murmur noted.  Abdomen: Normal bowel sounds, soft, non-distended, non-tender, no masses palpated.  Musculoskeletal: No cyanosis or edema.  Neurologic: Awake, alert, oriented to name, place and time.    Skin: No rashes, erythema, pallor, petechia or purpura.          Review of Systems:       E: NEGATIVE for vision changes or irritation  E/M: NEGATIVE for ear, mouth and throat problems  R: NEGATIVE for significant cough or SOB  CV: NEGATIVE for chest pain, palpitations or peripheral edema  GI: NEGATIVE for nausea, abdominal pain, heartburn, or change in bowel habits  : NEGATIVE for frequency, dysuria, or hematuria  M: NEGATIVE for significant arthralgias or myalgia  N: NEGATIVE for weakness, dizziness or paresthesias  H: NEGATIVE for bleeding problems           Lab / Radiology Results:   All laboratory data reviewed          Assessment:   Appropriately NPO  Chief complaint or anatomic assessment of involved area: colonoscopy for rectal bleeding. Family history of polyps. Pt history of diverticulitis         Plan:   MAC Anesthesia     Risks, benefits, alternatives to procedure explained and consent obtained  P3 (patient with severe systemic " disease)  Discharge from Phase 1 and / or Phase 2 recovery when patient meets criteria    I have reviewed the history and physical, lab finding(s), diagnostic data, medications, and the plan for sedation.  I have determined this patient to be an appropriate candidate for the planned sedation / procedure and have reassessed the patient immediately prior to sedation / procedure.      Kelechi Rivera MD

## 2018-08-16 ENCOUNTER — OFFICE VISIT (OUTPATIENT)
Dept: PSYCHOLOGY | Facility: CLINIC | Age: 73
End: 2018-08-16
Payer: COMMERCIAL

## 2018-08-16 DIAGNOSIS — F32.0 MILD MAJOR DEPRESSION (H): Primary | ICD-10-CM

## 2018-08-16 PROCEDURE — 90834 PSYTX W PT 45 MINUTES: CPT | Performed by: PSYCHOLOGIST

## 2018-08-17 ASSESSMENT — ANXIETY QUESTIONNAIRES
2. NOT BEING ABLE TO STOP OR CONTROL WORRYING: NEARLY EVERY DAY
3. WORRYING TOO MUCH ABOUT DIFFERENT THINGS: NOT AT ALL
6. BECOMING EASILY ANNOYED OR IRRITABLE: NOT AT ALL
1. FEELING NERVOUS, ANXIOUS, OR ON EDGE: NEARLY EVERY DAY
4. TROUBLE RELAXING: NEARLY EVERY DAY
7. FEELING AFRAID AS IF SOMETHING AWFUL MIGHT HAPPEN: NEARLY EVERY DAY
5. BEING SO RESTLESS THAT IT IS HARD TO SIT STILL: NOT AT ALL
GAD7 TOTAL SCORE: 12

## 2018-08-17 NOTE — PROGRESS NOTES
Progress Note  Disclaimer: This note consists of symbols derived from keyboarding, dictation and/or voice recognition software. As a result, there may be errors in the script that have gone undetected. Please consider this when interpreting information found in this chart.    Client Name: Theresa Bill  Date: 8/16/2018         Service Type: Individual      Session Start Time: 8:30 AM  Session End Time: 9:15 AM      Session Length: 45     Session #: 16     Attendees: Client attended alone    Treatment Plan Last Reviewed:  4/9/2018       DATA   Client reports she has had a very difficult few weeks.  Her youngest son has a serious drug problems and has gone missing since a family get together at the beginning of the month.  He was briefly hospitalized for mental health issues and it was recommended the family take out a restraining order against him due to terroristic threats.  Client reports she is scared all the time and is now keeping her doors and windows locked.  She did enjoy taking a trip to [place] with her granddaughter and son's family for a cheerleading championship.  She continues to enjoy bingo, crocheting and other craft activities.  Encouraged client that her benevolent disengagement towards her son is the only productive thing that really can be done.     Progress Since Last Session (Related to Symptoms / Goals / Homework):   Symptoms: Stable    Homework: not applicable      Episode of Care Goals: Satisfactory progress - CONTEMPLATION (Considering change and yet undecided); Intervened by assessing the negative and positive thinking (ambivalence) about behavior change     Current / Ongoing Stressors and Concerns:   Recent bereavement, prior caregiver stress     Treatment Objective(s) Addressed in This Session:   increase understanding of steps in the grief process  Utilize family support, work on accepting herself as she is in the  moment.     Intervention:   Interpersonal Therapy: facilitating appropriate expressions of emotion and grief.  12 step facilitation for family member  ASSESSMENT: Current Emotional / Mental Status (status of significant symptoms):   Risk status (Self / Other harm or suicidal ideation)   Client denies current fears or concerns for personal safety.   Client denies current or recent suicidal ideation or behaviors.   Client denies current or recent homicidal ideation or behaviors.   Client denies current or recent self injurious behavior or ideation.   Client denies other safety concerns.   A safety and risk management plan has not been developed at this time, however client was given the after-hours number / 911 should there be a change in any of these risk factors.     Appearance:   Appropriate    Eye Contact:   Good    Psychomotor Behavior: Normal    Attitude:   Cooperative    Orientation:   All   Speech    Rate / Production: Slow     Volume:  Soft    Mood:    Depressed    Affect:    Appropriate    Thought Content:  Clear    Thought Form:  Coherent  Logical    Insight:    Fair      Medication Review:   No changes to current psychiatric medication(s)     Medication Compliance:   Yes     Changes in Health Issues:   None reported     Chemical Use Review:   Substance Use: Chemical use reviewed, no active concerns identified      Tobacco Use: No current tobacco use.       Collateral Reports Completed:   Not Applicable    PLAN: (Client Tasks / Therapist Tasks / Other)  Client to continue with self-care, keeping herself safe, and not enabling her son's drug use.    Sam Jaime                                                         ________________________________________________________________________    Treatment Plan    Client's Name: Theresa Bill  YOB: 1945    Date: 4/9/2018    Referral / Collaboration:  Referral to another professional/service is not indicated at this time..    Anticipated number of  session or this episode of care: 25       DSM5 Diagnoses: (Sustained by DSM5 Criteria Listed Above)  Diagnoses:            296.21 (F32.0) Major Depressive Disorder, Single Episode, Mild _ grief  Psychosocial & Contextual Factors: Caregiver stress, recent death of her   WHODAS 2.0 (12 item)                          This questionnaire asks about difficulties due to health conditions. Health conditions                   include                        disease or illnesses, other health problems that may be short or long lasting,                    injuries, mental health or emotional problems, and problems with alcohol or drugs.                              Think back over the past 30 days and answer these questions, thinking about how much              difficulty you had doing the following activities. For each question, please [place] only                   one response.      S1 Standing for long periods such as 30 minutes? Moderate =   3   S2 Taking care of household responsibilities? None =         1   S3 Learning a new task, for example, learning how to get to a new place? Mild =           2   S4 How much of a problem do you have joining community activities (for example, festivals, Bahai or other activities) in the same way as anyone else can? None =         1   S5 How much have you been emotionally affected by your health problems? None =         1               In the past 30 days, how much difficulty did you have in:   S6 Concentrating on doing something for ten minutes? None =         1   S7 Walking a long distance such as a kilometer (or equivalent)? Moderate =   3   S8 Washing your whole body? None =         1   S9 Getting dressed? None =         1   S10 Dealing with people you do not know? None =         1   S11 Maintaining a friendship? None =         1   S12 Your day to day work? None =         1       H1 Overall, in the past 30 days, how many days were these difficulties present? Record number  of days 1    H2 In the past 30 days, for how many days were you totally unable to carry out your usual activities or work because of any health condition? Record number of days  0    H3 In the past 30 days, not counting the days that you were totally unable, for how many days did you cut back or reduce your usual activities or work because of any health condition? Record number of days 0      Goals  1. Education- the stages of grief  a. Client will be able to describe the stages of grief; denial, bargaining, anger, depression, and acceptance and give examples of how each have felt for her.  2. Behavioral Activation  a. Client will learn to assess their emotional stateon a day to day basis  b. Client will Identify two forms of exercise/activity and engage in them 3 times per week  c. Client will Identify 3 things that make them laugh, and engage in these 5 times per week.  d. Client will Identify 1-2Creative activities or hobbies  and engage in them 2 times per week  e. Client will identify music, movies, books that make them feel good and use them 3-4 times per week  3. Self-care  a. Client will identify 5 things they can do just for themselves  b. Client will take time for quiet, reflection, meditation 5 times per week  c. Client will Learn to set boundaries when appropriate  d. Client will Identify 2 individuals they can call on for support, distraction  4. Assessment of progress  a. Client will engage in assessment of their progress on a regular basis      Client has reviewed and agreed to the above plan.      Sam Jaime  4/9/2018

## 2018-08-18 ASSESSMENT — PATIENT HEALTH QUESTIONNAIRE - PHQ9: SUM OF ALL RESPONSES TO PHQ QUESTIONS 1-9: 6

## 2018-08-18 ASSESSMENT — ANXIETY QUESTIONNAIRES: GAD7 TOTAL SCORE: 12

## 2018-08-28 ENCOUNTER — TRANSFERRED RECORDS (OUTPATIENT)
Dept: HEALTH INFORMATION MANAGEMENT | Facility: CLINIC | Age: 73
End: 2018-08-28

## 2018-08-30 ENCOUNTER — MEDICAL CORRESPONDENCE (OUTPATIENT)
Dept: HEALTH INFORMATION MANAGEMENT | Facility: CLINIC | Age: 73
End: 2018-08-30

## 2018-09-05 ENCOUNTER — MEDICAL CORRESPONDENCE (OUTPATIENT)
Dept: HEALTH INFORMATION MANAGEMENT | Facility: CLINIC | Age: 73
End: 2018-09-05

## 2018-09-06 ENCOUNTER — TRANSFERRED RECORDS (OUTPATIENT)
Dept: HEALTH INFORMATION MANAGEMENT | Facility: CLINIC | Age: 73
End: 2018-09-06

## 2018-09-10 ENCOUNTER — OFFICE VISIT (OUTPATIENT)
Dept: PSYCHOLOGY | Facility: CLINIC | Age: 73
End: 2018-09-10
Payer: COMMERCIAL

## 2018-09-10 DIAGNOSIS — F32.0 MILD MAJOR DEPRESSION (H): Primary | ICD-10-CM

## 2018-09-10 PROCEDURE — 90834 PSYTX W PT 45 MINUTES: CPT | Performed by: PSYCHOLOGIST

## 2018-09-10 NOTE — MR AVS SNAPSHOT
"                  MRN:[id number]                      After Visit Summary   9/10/2018    Theresa Bill    MRN: [id number]           Visit Information        Provider Department      9/10/2018 2:00 PM Sam Jaime [facility]      Your next 10 appointments already scheduled     Sep 17, 2018  8:40 AM CDT   SHORT with NICHELLE Soler CNP   [facility]    [address] [place] [address]   [phone]            Sep 24, 2018  9:30 AM CDT   Return Visit with Sam Jaime   [facility] ([facility])    [address] [address]   [phone]            Oct 08, 2018  4:00 PM CDT   Return Visit with Sam Jaime   [facility] ([facility])    [address] [address]   [phone]              MyChart Information     [employer] lets you send messages to your doctor, view your test results, renew your prescriptions, schedule appointments and more. To sign up, go to [email or web address] . Click on \"Log in\" on the left side of the screen, which will take you to the Welcome page. Then click on \"Sign up Now\" on the right side of the page.     You will be asked to enter the access code listed below, as well as some personal information. Please follow the directions to create your username and password.     Your access code is: [email or web address]  Expires: 2018  9:20 AM     Your access code will  in 90 days. If you need help or a new code, please call your [facility] clinic or [phone].        Care EveryWhere ID     This is your Care EveryWhere ID. This could be used by other organizations to access your [facility] medical records  [id number]        Equal Access to Services     SABAS VUONG : Annalisa Agrawal, waaxda luqadaha, qaybta kaalmaestrellita ingram, ángel oconnor . So [facility] " [phone].    ATENCIÓN: Si habla español, tiene a urban disposición servicios gratuitos de asistencia lingüística. Llame al [phone].    We comply with applicable federal civil rights laws and [place] laws. We do not discriminate on the basis of race, color, national origin, age, disability, sex, sexual orientation, or gender identity.

## 2018-09-14 ASSESSMENT — ANXIETY QUESTIONNAIRES
GAD7 TOTAL SCORE: 17
2. NOT BEING ABLE TO STOP OR CONTROL WORRYING: NEARLY EVERY DAY
1. FEELING NERVOUS, ANXIOUS, OR ON EDGE: NEARLY EVERY DAY
4. TROUBLE RELAXING: NEARLY EVERY DAY
7. FEELING AFRAID AS IF SOMETHING AWFUL MIGHT HAPPEN: NEARLY EVERY DAY
6. BECOMING EASILY ANNOYED OR IRRITABLE: NOT AT ALL
5. BEING SO RESTLESS THAT IT IS HARD TO SIT STILL: MORE THAN HALF THE DAYS
3. WORRYING TOO MUCH ABOUT DIFFERENT THINGS: NEARLY EVERY DAY

## 2018-09-14 NOTE — PROGRESS NOTES
Progress Note  Disclaimer: This note consists of symbols derived from keyboarding, dictation and/or voice recognition software. As a result, there may be errors in the script that have gone undetected. Please consider this when interpreting information found in this chart.    Client Name: Theresa Bill  Date: 9/10/2018         Service Type: Individual      Session Start Time: 2:00 PM  session End Time: 2:45 PM      Session Length: 45     Session #: 17     Attendees: Client attended alone    Treatment Plan Last Reviewed:  4/9/2018       DATA   Client reports she is still very worried about her son with a drug problem.  She is concerned that he will try to break into her house and either steal things or harm her and her sister.  She is having nightmares regarding this, feeling on edge during the day and crying a lot.  She has started bupropion from her PCP.  She is also experiencing multiple medication side effect problems.     Progress Since Last Session (Related to Symptoms / Goals / Homework):   Symptoms: Stable    Homework: not applicable      Episode of Care Goals: Satisfactory progress - ACTION (Actively working towards change); Intervened by reinforcing change plan / affirming steps taken     Current / Ongoing Stressors and Concerns:   Recent bereavement, prior caregiver stress     Treatment Objective(s) Addressed in This Session:   increase understanding of steps in the grief process  Utilize family support, client has order for protection, be sure to communicate with other family members every day     Intervention:   Interpersonal Therapy: facilitating appropriate expressions of emotion and grief.  12 step facilitation for family member  ASSESSMENT: Current Emotional / Mental Status (status of significant symptoms):   Risk status (Self / Other harm or suicidal ideation)   Client denies current fears or concerns for personal safety.   Client denies current or recent  suicidal ideation or behaviors.   Client denies current or recent homicidal ideation or behaviors.   Client denies current or recent self injurious behavior or ideation.   Client denies other safety concerns.   A safety and risk management plan has not been developed at this time, however client was given the after-hours number / 911 should there be a change in any of these risk factors.     Appearance:   Appropriate    Eye Contact:   Good    Psychomotor Behavior: Normal    Attitude:   Cooperative    Orientation:   All   Speech    Rate / Production: Slow     Volume:  Soft    Mood:    Depressed    Affect:    Appropriate    Thought Content:  Clear    Thought Form:  Coherent  Logical    Insight:    Fair      Medication Review:   No changes to current psychiatric medication(s)     Medication Compliance:   Yes     Changes in Health Issues:   None reported     Chemical Use Review:   Substance Use: Chemical use reviewed, no active concerns identified      Tobacco Use: No current tobacco use.       Collateral Reports Completed:   Not Applicable    PLAN: (Client Tasks / Therapist Tasks / Other)  Client to continue with self-care, keeping herself safe, and not enabling her son's drug use.    Sam Jaime                                                         ________________________________________________________________________    Treatment Plan    Client's Name: Theresa Bill  YOB: 1945    Date: 4/9/2018    Referral / Collaboration:  Referral to another professional/service is not indicated at this time..    Anticipated number of session or this episode of care: 25       DSM5 Diagnoses: (Sustained by DSM5 Criteria Listed Above)  Diagnoses:            296.21 (F32.0) Major Depressive Disorder, Single Episode, Mild _ grief  Psychosocial & Contextual Factors: Caregiver stress, recent death of her   WHODAS 2.0 (12 item)                          This questionnaire asks about difficulties due to health  conditions. Health conditions                   include                        disease or illnesses, other health problems that may be short or long lasting,                    injuries, mental health or emotional problems, and problems with alcohol or drugs.                              Think back over the past 30 days and answer these questions, thinking about how much              difficulty you had doing the following activities. For each question, please [place] only                   one response.      S1 Standing for long periods such as 30 minutes? Moderate =   3   S2 Taking care of household responsibilities? None =         1   S3 Learning a new task, for example, learning how to get to a new place? Mild =           2   S4 How much of a problem do you have joining community activities (for example, festivals, Catholic or other activities) in the same way as anyone else can? None =         1   S5 How much have you been emotionally affected by your health problems? None =         1               In the past 30 days, how much difficulty did you have in:   S6 Concentrating on doing something for ten minutes? None =         1   S7 Walking a long distance such as a kilometer (or equivalent)? Moderate =   3   S8 Washing your whole body? None =         1   S9 Getting dressed? None =         1   S10 Dealing with people you do not know? None =         1   S11 Maintaining a friendship? None =         1   S12 Your day to day work? None =         1       H1 Overall, in the past 30 days, how many days were these difficulties present? Record number of days 1    H2 In the past 30 days, for how many days were you totally unable to carry out your usual activities or work because of any health condition? Record number of days  0    H3 In the past 30 days, not counting the days that you were totally unable, for how many days did you cut back or reduce your usual activities or work because of any health condition? Record number  of days 0      Goals  1. Education- the stages of grief  a. Client will be able to describe the stages of grief; denial, bargaining, anger, depression, and acceptance and give examples of how each have felt for her.  2. Behavioral Activation  a. Client will learn to assess their emotional stateon a day to day basis  b. Client will Identify two forms of exercise/activity and engage in them 3 times per week  c. Client will Identify 3 things that make them laugh, and engage in these 5 times per week.  d. Client will Identify 1-2Creative activities or hobbies  and engage in them 2 times per week  e. Client will identify music, movies, books that make them feel good and use them 3-4 times per week  3. Self-care  a. Client will identify 5 things they can do just for themselves  b. Client will take time for quiet, reflection, meditation 5 times per week  c. Client will Learn to set boundaries when appropriate  d. Client will Identify 2 individuals they can call on for support, distraction  4. Assessment of progress  a. Client will engage in assessment of their progress on a regular basis      Client has reviewed and agreed to the above plan.      Sam Jaime  4/9/2018

## 2018-09-15 ASSESSMENT — ANXIETY QUESTIONNAIRES: GAD7 TOTAL SCORE: 17

## 2018-09-15 ASSESSMENT — PATIENT HEALTH QUESTIONNAIRE - PHQ9: SUM OF ALL RESPONSES TO PHQ QUESTIONS 1-9: 12

## 2018-09-17 ENCOUNTER — OFFICE VISIT (OUTPATIENT)
Dept: FAMILY MEDICINE | Facility: CLINIC | Age: 73
End: 2018-09-17
Payer: COMMERCIAL

## 2018-09-17 ENCOUNTER — PATIENT OUTREACH (OUTPATIENT)
Dept: CARE COORDINATION | Facility: CLINIC | Age: 73
End: 2018-09-17

## 2018-09-17 VITALS
TEMPERATURE: 97.4 F | HEART RATE: 72 BPM | RESPIRATION RATE: 16 BRPM | WEIGHT: 171 LBS | DIASTOLIC BLOOD PRESSURE: 72 MMHG | BODY MASS INDEX: 32.28 KG/M2 | HEIGHT: 61 IN | SYSTOLIC BLOOD PRESSURE: 124 MMHG

## 2018-09-17 DIAGNOSIS — Z79.4 TYPE 2 DIABETES MELLITUS WITHOUT COMPLICATION, WITH LONG-TERM CURRENT USE OF INSULIN (H): ICD-10-CM

## 2018-09-17 DIAGNOSIS — Z23 NEED FOR PROPHYLACTIC VACCINATION AND INOCULATION AGAINST INFLUENZA: Primary | ICD-10-CM

## 2018-09-17 DIAGNOSIS — E11.9 TYPE 2 DIABETES MELLITUS WITHOUT COMPLICATION, WITH LONG-TERM CURRENT USE OF INSULIN (H): ICD-10-CM

## 2018-09-17 DIAGNOSIS — F41.9 ANXIETY: ICD-10-CM

## 2018-09-17 DIAGNOSIS — F33.0 MILD RECURRENT MAJOR DEPRESSION (H): ICD-10-CM

## 2018-09-17 LAB — HBA1C MFR BLD: 7.1 % (ref 0–5.6)

## 2018-09-17 PROCEDURE — G0008 ADMIN INFLUENZA VIRUS VAC: HCPCS | Performed by: NURSE PRACTITIONER

## 2018-09-17 PROCEDURE — 99207 C FOOT EXAM  NO CHARGE: CPT | Performed by: NURSE PRACTITIONER

## 2018-09-17 PROCEDURE — 36415 COLL VENOUS BLD VENIPUNCTURE: CPT | Performed by: NURSE PRACTITIONER

## 2018-09-17 PROCEDURE — 90662 IIV NO PRSV INCREASED AG IM: CPT | Performed by: NURSE PRACTITIONER

## 2018-09-17 PROCEDURE — 83036 HEMOGLOBIN GLYCOSYLATED A1C: CPT | Performed by: NURSE PRACTITIONER

## 2018-09-17 PROCEDURE — 99214 OFFICE O/P EST MOD 30 MIN: CPT | Mod: 25 | Performed by: NURSE PRACTITIONER

## 2018-09-17 RX ORDER — BUPROPION HYDROCHLORIDE 300 MG/1
300 TABLET ORAL EVERY MORNING
Qty: 90 TABLET | Refills: 3 | Status: SHIPPED | OUTPATIENT
Start: 2018-09-17 | End: 2019-09-04

## 2018-09-17 RX ORDER — BUPROPION HYDROCHLORIDE 100 MG/1
100 TABLET, EXTENDED RELEASE ORAL 2 TIMES DAILY
Qty: 60 TABLET | Refills: 0 | Status: SHIPPED | OUTPATIENT
Start: 2018-09-17 | End: 2018-09-17 | Stop reason: ALTCHOICE

## 2018-09-17 NOTE — LETTER
[facility] CARE COORDINATION [place] [id number] [place] [address]  [phone]    September 17, 2018    Theresa Bill  [address] [place] [address]      Dear Theresa,    I am a clinic care coordinator who works with Shae Pierre PA-C at [facility]. I have been trying to reach you recently to introduce Clinic Care Coordination and to see if there was anything I could assist you with.  I wanted to introduce myself and provide you with my contact information so that you can call me with questions or concerns about your health care. Below is a description of clinic care coordination and how I can further assist you.     The clinic care coordinator is a registered nurse and/or  who understand the health care system. The goal of clinic care coordination is to help you manage your health and improve access to the [facility] system in the most efficient manner. The registered nurse can assist you in meeting your health care goals by providing education, coordinating services, and strengthening the communication among your providers. The  can assist you with financial, behavioral, psychosocial, chemical dependency, counseling, and/or psychiatric resources.    Please feel free to contact me at [phone], with any questions or concerns. We at [facility] are focused on providing you with the highest-quality healthcare experience possible and that all starts with you.     Sincerely,     Sara Martínez    Enclosed: I have enclosed a copy of a 24 Hour Access Plan. This has helpful phone numbers for you to call when needed. Please keep this in an easy to access place to use as needed.

## 2018-09-17 NOTE — PATIENT INSTRUCTIONS
Will review with Diabetic educator regarding the arm patch for blood monitoring blood sugar.     Follow-up in 1 month regarding change in medication    Indications for emergent return to emergency department discussed with patient, who verbalized good understanding and agreement.  Patient understands the limitations of today's evaluation.         Depression  Depression is one of the most common mental health problems today. It is not just a state of unhappiness or sadness. It is a true disease. The cause seems to be related to a decrease in chemicals that transmit signals in the brain. Having a family history of depression, alcoholism, or suicide increases the risk. Chronic illness, chronic pain, migraine headaches, and high emotional stress also increase the risk.  Depression is something we tend to recognize in others, but may have a hard time seeing in ourselves. It can show in many physical and emotional ways:    Loss of appetite    Overeating    Not being able to sleep    Sleeping too much    Tiredness not related to physical exertion    Restlessness or irritability    Slowness of movement or speech    Feeling depressed or withdrawn    Loss of interest in things you once enjoyed    Trouble concentrating, poor memory, trouble making decisions    Thoughts of harming or killing oneself, or thoughts that life is not worth living    Low self-esteem  The treatment for depression may include both medicine and psychotherapy. Antidepressants can reduce suffering and can improve the ability to function during the depressed period. Therapy can offer emotional support and help you understand emotional factors that may be causing the depression.  Home care    Ongoing care and support help people manage this disease. Find a healthcare provider and therapist who meet your needs. Seek help when you feel like you may be getting ill.    Be kind to yourself. Make it a point to do things that you enjoy (gardening, walking in  nature, going to a 500px). Reward yourself for small successes.    Take care of your physical body. Eat a balanced diet (low in saturated fat and high in fruits and vegetables). Exercise at least 3 times a week for 30 minutes. Even mild-moderate exercise (like brisk walking) can make you feel better.    Don't drink alcohol, which can make depression worse.    Take medicine as prescribed.    Tell each of your healthcare providers about all of the prescription and over-the-counter medicines, vitamins, and supplements you take. Certain supplements interact with medicines and can result in dangerous side effects. Ask your pharmacist when you have questions about medicine interactions.    Talk with your family and trusted friends about your feelings and thoughts. Ask them to help you recognize behavior changes early so you can get help and, if needed, medicine can be adjusted.  Follow-up care  Follow up with your healthcare provider, or as advised.  Call 911  Call 911 if you:    Have suicidal thoughts, a suicide plan, and the means to carry out the plan; or serious thoughts of hurting someone else     Have trouble breathing    Are very confused    Feel very drowsy or have trouble awakening    Faint or lose consciousness    Have new chest pain that becomes more severe, lasts longer, or spreads into your shoulder, arm, neck, jaw, or back  When to seek medical advice  Call your healthcare provider right away if any of these happen:    Feeling extreme depression, fear, anxiety, or anger toward yourself or others    Feeling out of control    Feeling that you may try to harm yourself or another    Hearing voices that others do not hear    Seeing things that others do not see    Can t sleep or eat for 3 days in a row    Friends or family express concern over your behavior and ask you to seek help  Date Last Reviewed: 10/1/2017    [id number] [employer]. [address] [facility], [place] [address]. All rights  reserved. This information is not intended as a substitute for professional medical care. Always follow your healthcare professional's instructions.        Counseling for Depression  For some people, counseling, also called talk therapy, has been found to be as effective as medicine for mild to moderate depression. When done by a trained professional, this treatment is a powerful way to better understand your thoughts and feelings. Like medicine, it may take time before you notice how much counseling is helping.    Kinds of talk therapy  Different counselors use different methods for talk therapy. But all therapy aims to help change how you think about your problem. Most therapy for depression is often done one-on-one. But it may also be done in a group setting. You and your healthcare provider can discuss the type of therapy you think would work best for you. You can also discuss who the best person is to provide the therapy.  How therapy helps  Talking about your problems can help them seem less overwhelming. It can help work through problems you have with your life and your relationships. It can also help you understand how depression is clouding your thinking, not letting you see the world the way it really is. Therapy can give you:    Insight about your emotions    New tools for dealing with your problems    Emotional support for making progress  Getting better takes time  Talk therapy can help you feel better. But change doesn t happen right away. Depression takes away your energy and motivation. So it can be hard to feel like going to therapy and sticking with it. But therapy has been proven to be very valuable in the treatment of depression. Therapy for depression is often done for a set number of sessions. In other cases, you and your therapist decide together at what point you no longer need therapy.  Additional sources of help  In addition to a professional counselor, it may help to talk to other people  in your life. You may find support and insight from:    A close friend or family member    A  trained in counseling    A local support group or community group    A 12-step program (such as Alcoholics Anonymous) for dealing with problems that can contribute to depression, such as alcohol or drug addiction  Date Last Reviewed: 1/1/2017 2000-2017 [employer]. [address] [place] [address]. All rights reserved. This information is not intended as a substitute for professional medical care. Always follow your healthcare professional's instructions.
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
## 2018-09-17 NOTE — LETTER
[place] [id number] [address] [address]  [phone]      9/18/2018      Theresa Bill  [address] [place] [place] [address]      Dear  Theresa,    It was a pleasure to speak with you.  I would like to provide you with the enclosed information for your records.  As part of care coordination, we are developing care plans to assist in accomplishing your health care goals.  When we speak next, please feel free to let me know if you want to add or change any information on your care plans.    As always, feel free to contact me if you have any questions or concerns.  I look forward to working with you in the effort to achieve your health care and wellness goals .    The resources we talked about are:    [place]  Adult grief group series  [facility] sponsors a support group series for grieving adults.  When: Series runs four times a year, meeting Wednesdays, 5:30-7:30 p.m.  Where: [facility], Dining [address], [address] [place] [address]  Cost: Call to confirm.  Contact: Call Mary Liu at [phone]      Grief share group  When: for nine weeks, every Tuesday 1:30-2:30pm - call to confirm when a new class is starting  Where: [facility]    Contact:  Phone for [facility] is [phone]      [facility] [place], [place], sponsors opportunities for adults to deal with grief in group and one-on-one settings.  When: Tuesdays, 4-5:30 p.m..  Where: [facility] [place], [address] [place] [address] (second house behind [facility] parking lot)  Cost: Call to confirm.  Contact: Call Arely Duenas at [phone].    Sincerely,      Sara Martínez, [facility]  Clinic Care Coordination  [phone]

## 2018-09-17 NOTE — LETTER
Health Care Home - Access Care Plan    About Me  Patient Name:  Theresa Crow    YOB: 1945  Age:                             72 year old   Yahaira MRN:            [id number] Telephone Information:     Home Phone [phone]   Mobile [phone]       Address:    [address] [place] [address] Email address:  [email or web address]      Emergency Contact(s)  Name Relationship Lgl Grd Work Phone Home Phone Mobile Phone   1. KATERINE CROW Son  none [phone] none   2. MICHAEL MARES Sister   [phone]              Health Maintenance:      My Access Plan  Medical Emergency 911   Questions or concerns during clinic hours Primary Clinic Line, I will call the clinic directly: [facility] - [phone]   24 Hour Appointment Line [phone] or  [phone]) (toll free)   24 Hour Nurse Line [phone] (toll free)   Questions or concerns outside clinic hours 24 Hour Appointment Line, I will call the after-hours on-call line:   [facility] [phone] or [phone]) (toll-free)   Preferred Urgent Care     Preferred Hospital     Preferred Pharmacy [facility] [address] AT [facility] [address] [facility] [address]     Behavioral Health Crisis Line The National Suicide Prevention Lifeline at 1-829.639.2979 or 911     My Care Team Members  Patient Care Team       Relationship Specialty Notifications Start End    Shae Pierre PA-C PCP - General Physician Assistant  10/6/14     Phone: [phone] Fax: [phone]         [address] [place] [address]    Rony Persaud MD MD Family Practice  1/2/17     Phone: [phone] Fax: [phone]         [address] [place] [address]    Jeramy Cedeño Medical Student   1/2/17     Sara Martínez LSW Clinic Care Coordinator Primary Care - [facility] Admissions 9/17/18     Phone: [phone] Fax: [phone]               My Medical and Care  Information  Problem List   Patient Active Problem List   Diagnosis     Lumbago     Pain in limb     Mild major depression (H)     Essential hypertension     Type 2 diabetes mellitus with diabetic neuropathy (H)     Osteoarthritis     Mixed incontinence urge and stress (male)(female)     Restless legs     Microalbuminuria     Obesity     Esophageal reflux     GI bleed     Dyslipidemia     Iron deficiency anemia     Falls frequently     ACP (advance care planning)     SHANTI (obstructive sleep apnea)     Other chronic pain     LPRD (laryngopharyngeal reflux disease)     Personal history of fall      Current Medications and Allergies:  See printed Medication Report

## 2018-09-17 NOTE — PROGRESS NOTES
SUBJECTIVE:   Theresa Bill is a 72 year old female who presents to clinic today for the following health issues:    Diabetes Follow-up    Patient is checking blood sugars: once daily.  Results are as follows:         am - 120-189    Diabetic concerns: None     Symptoms of hypoglycemia (low blood sugar): none     Paresthesias (numbness or burning in feet) or sores: Yes patient has neuropathy     Date of last diabetic eye exam: one year ago    BP Readings from Last 2 Encounters:   09/17/18 124/72   08/03/18 161/85     Hemoglobin A1C (%)   Date Value   06/27/2018 7.1 (H)   03/14/2018 7.1 (H)     LDL Cholesterol Calculated (mg/dL)   Date Value   03/14/2018 52   04/21/2017 69       Diabetes Management Resources    Amount of exercise or physical activity: walking everyday    Problems taking medications regularly: Yes,  problems remembering to take    Medication side effects: none    Diet: regular (no restrictions)      Depression   GINNY-7 SCORE 6/27/2018 8/17/2018 9/14/2018   Total Score - - -   Total Score 4 12 17       PHQ-9 SCORE 6/27/2018 8/17/2018 9/14/2018   Total Score - - -   Total Score 8 6 12       Problem list and histories reviewed & adjusted, as indicated.  Additional history: as documented    Patient Active Problem List   Diagnosis     Lumbago     Pain in limb     Mild major depression (H)     Essential hypertension     Type 2 diabetes mellitus with diabetic neuropathy (H)     Osteoarthritis     Mixed incontinence urge and stress (male)(female)     Restless legs     Microalbuminuria     Obesity     Esophageal reflux     GI bleed     Dyslipidemia     Iron deficiency anemia     Falls frequently     ACP (advance care planning)     SHANTI (obstructive sleep apnea)     Other chronic pain     LPRD (laryngopharyngeal reflux disease)     Personal history of fall     Past Surgical History:   Procedure Laterality Date     BUNIONECTOMY CRISELDA  7/1/2011    Procedure:BUNIONECTOMY CRISELDA; weil osteotomy & Lapidtus  Bunionectomy; Surgeon:HYACINTH SANTO; Location:[facility]     BUNIONECTOMY CHEVRON  4/6/2012    Procedure:BUNIONECTOMY CHEVRON; Lapidus bunionectomy, plantar plate repair second and third metatarsophalangeal joints,left foot-popliteal block left lower extremity; Surgeon:HYACINTH SANTO; Location:[facility]     C TOTAL ABDOM HYSTERECTOMY  1992    ovaries removed     COLONOSCOPY  9/11/2012    Procedure: COLONOSCOPY;  Colonoscopy;  Surgeon: Alyssa Foster MD;  Location: [facility]     COLONOSCOPY N/A 8/3/2018    Procedure: COLONOSCOPY;  colonoscopy;  Surgeon: Kelechi Rivera MD;  Location: [facility]     NISSEN FUNDOPLICATION      2003     REPAIR HAMMER TOE  7/1/2011    Procedure:REPAIR HAMMER TOE; hammertoe correction right 2nd digit; Surgeon:HYACINTH SANTO; Location:[facility]     SURGICAL HISTORY OF -   1979    Tubal     SURGICAL HISTORY OF -   1993    Stamey bladder surgery     SURGICAL HISTORY OF -       Lipoma removed f/back     SURGICAL HISTORY OF -   1995    (L) Foot surgery     SURGICAL HISTORY OF -   05/09/2002    Colonoscopy     SURGICAL HISTORY OF -       knee arthroscopy left     SURGICAL HISTORY OF -   2007-two phases    interstim bladder implant     SURGICAL HISTORY OF -   Sept. 13, 2007    laparoscopic Nissen fundoplication       Social History   Substance Use Topics     Smoking status: Never Smoker     Smokeless tobacco: Never Used     Alcohol use No     Family History   Problem Relation Age of Onset     Cancer Mother      brain cancer, ovarian cancer     Cancer Father      lung cancer     GASTROINTESTINAL DISEASE Father      colitis     Alcohol/Drug Brother      Alcohol/Drug Son      Alcohol/Drug Brother      GASTROINTESTINAL DISEASE Sister      Diabetes Sister      Cancer Sister      3 sisters .w ovarian cancer and one also with uterine cancer     Gynecology Son      kidney stones     HEART DISEASE Sister      MI age 64     GASTROINTESTINAL DISEASE Other       colitis/colitis/colitis/colitis         Current Outpatient Prescriptions   Medication Sig Dispense Refill     ACE/ARB NOT PRESCRIBED, INTENTIONAL, ACE & ARB not prescribed due to Intolerance-cough             ACETAMINOPHEN PO        ASPIRIN 81 MG OR TABS ONE DAILY 100 3     atorvastatin (LIPITOR) 40 MG tablet Take 1 tablet (40 mg) by mouth At Bedtime 90 tablet 3     buPROPion (WELLBUTRIN XL) 150 MG 24 hr tablet Take 1 tablet (150 mg) by mouth every morning 30 tablet 1     clonazePAM (KLONOPIN) 0.5 MG tablet Taking 1 tab as needed.  As of 6/11, has taken 1 tab in past 2 weeks.  Prescribed by neurology for RLS - previously severe.  0     DULoxetine (CYMBALTA) 60 MG EC capsule TAKE ONE CAPSULE BY MOUTH ONCE DAILY 90 capsule 3     fluocinonide (LIDEX) 0.05 % ointment Apply sparingly to affected area twice daily as needed for mouth ulcer. 15 g 0     glucose blood VI test strips strip 1 strip by In Vitro route 3 times daily. Has a Freestyle 2 Box 12     insulin detemir (LEVEMIR FLEXPEN/FLEXTOUCH) 100 UNIT/ML injection Inject 14 Units Subcutaneous daily Increase by 2 units every 3 days if sugars 2 hours after eating are above 180 (only if morning sugars above 110). 15 mL 3     insulin pen needle (BD MARIIA U/F) 32G X 4 MM Use one pen daily 90 each 0     ketoconazole (NIZORAL) 2 % cream Apply topically 2 times daily To corners of lips for 2 weeks or as needed 30 g 1     LANsoprazole (PREVACID) 15 MG CR capsule Take 1 capsule (15 mg) by mouth 2 times daily 180 capsule 3     losartan (COZAAR) 25 MG tablet Take 1 tablet (25 mg) by mouth daily 90 tablet 3     metFORMIN (GLUCOPHAGE-XR) 500 MG 24 hr tablet TAKE ONE TABLET BY MOUTH TWICE DAILY WITH MEALS 180 tablet 3     Multiple Vitamin (DAILY MULTIVITAMIN PO) One tablet daily       nitroGLYcerin (NITROSTAT) 0.4 MG sublingual tablet For chest pain place 1 tablet under the tongue every 5 minutes for 3 doses. If symptoms persist 5 minutes after 1st dose call 911. 25 tablet 0      "order for DME Equipment being ordered: CPAP Patient Theresa Bill received a Resmed Airsense 10  Auto. Pressures were set at Auto 10 - 15 cm H2O.       pantoprazole (PROTONIX) 40 MG EC tablet Take 1 tablet (40 mg) by mouth daily 90 tablet 3     Probiotic Product (PROBIOTIC & ACIDOPHILUS EX ST) CAPS        traMADol (ULTRAM) 50 MG tablet TAKE ONE TABLET BY MOUTH EVERY 6 HOURS AS NEEDED FOR  MODERATE  PAIN 30 tablet 5     oxybutynin chloride 15 MG TB24        Allergies   Allergen Reactions     Lisinopril Cough     Nkda [No Known Drug Allergies]        Reviewed and updated as needed this visit by clinical staff       Reviewed and updated as needed this visit by Provider         ROS:  Constitutional, HEENT, cardiovascular, pulmonary, gi and gu systems are negative, except as otherwise noted.    OBJECTIVE:     /72 (BP Location: Right arm, Cuff Size: Adult Large)  Pulse 72  Temp 97.4  F (36.3  C) (Tympanic)  Resp 16  Ht 5' 1\" (1.549 m)  Wt 171 lb (77.6 kg)  BMI 32.31 kg/m2  Body mass index is 32.31 kg/(m^2).  GENERAL: healthy, alert and no distress  EYES: Eyes grossly normal to inspection, PERRL and conjunctivae and sclerae normal  NECK: no adenopathy, no asymmetry, masses, or scars and thyroid normal to palpation  RESP: lungs clear to auscultation - no rales, rhonchi or wheezes  CV: regular rate and rhythm, normal S1 S2, no S3 or S4, no murmur, click or rub, no peripheral edema and peripheral pulses strong  ABDOMEN: soft, nontender, no hepatosplenomegaly, no masses and bowel sounds normal  MS: no gross musculoskeletal defects noted, no edema  SKIN: no suspicious lesions or rashes  NEURO: Normal strength and tone, mentation intact and speech normal  PSYCH: mentation appears normal, affect normal/bright  Diabetic foot exam: normal DP and PT pulses, no trophic changes or ulcerative lesions, normal sensory exam and normal monofilament exam, except for findings noted on diabetic foot graphical image. Decreased " sensation in both the left and the right foot and spots 1 and 2.            Results for orders placed or performed in visit on 09/17/18   Hemoglobin A1c   Result Value Ref Range    Hemoglobin A1C 7.1 (H) 0 - 5.6 %         ASSESSMENT/PLAN:   (Z23) Need for prophylactic vaccination and inoculation against influenza  (primary encounter diagnosis)  Comment: Flu vaccination provided today   Plan: FLU VACCINE, INCREASED ANTIGEN, PRESV FREE, AGE        65+ [99924], Vaccine Administration, Initial         [91422]            (E11.9,  Z79.4) Type 2 diabetes mellitus without complication, with long-term current use of insulin (H)  Comment: Diabetes controlled hemoglobin 7.1 current medications no medication adjustments at this time.   patient requested to have a BuzzElement scan device for her diabetes did review with the diabetic educator at this point would not qualify due to cost would not be an option for this patient did review with the patient   Plan: OPHTHALMOLOGY ADULT REFERRAL, FOOT EXAM, blood         glucose monitoring (NO BRAND SPECIFIED) test         strip, Hemoglobin A1c, buPROPion (WELLBUTRIN         SR) 100 MG 12 hr tablet        (F33.0) Mild recurrent major depression (H)  Comment: Patient states anxiety has been greatly increased due to general life stressors GINNY score elevated today along with her PHQ score elevated patient has a history of depression as well presently on Wellbutrin 150 mg extended release will increase dosage.  Plan: buPROPion (WELLBUTRIN XL) 300 MG 24 hr tablet           (F41.9) Anxiety  Comment: Patient's anxiety is related to her see current situation with her son and limited resources did put in referral for clinical care coordination review any possible resources they may have.  Plan: CARE COORDINATION REFERRAL         NICHELLE Soler [facility]    Injectable Influenza Immunization Documentation    1.  Is the person to be vaccinated sick today?   No    2. Does  the person to be vaccinated have an allergy to a component   of the vaccine?   No  Egg Allergy Algorithm Link    3. Has the person to be vaccinated ever had a serious reaction   to influenza vaccine in the past?   No    4. Has the person to be vaccinated ever had Guillain-Barré syndrome?   No    Form completed by Fabiola Holley MA

## 2018-09-17 NOTE — LETTER
[facility]  Complex Care Plan  About Me  Patient Name:  Theresa Crow    YOB: 1945  Age:     72 year old   Yahaira MRN:   [id number] Telephone Information:    Home Phone [phone]   Mobile [phone]       Address:    [address] [place] [address] Email address:  [email or web address]      Emergency Contact(s)  Name Relationship Lgl Grd Work Phone Home Phone Mobile Phone   1. KATERINE CROW Son  none [phone] none   2. MICHAEL MARES Sister   [phone]            Primary language:  English     needed? No   [employer]:  [phone] op. 1  Other communication barriers: Glasses, Hearing aides, Other (word finding difficulties, family supports)  Preferred Method of Communication:  Mail  Current living arrangement: I live in a private home with family  Mobility Status/ Medical Equipment: Independent    Health Maintenance  Health Maintenance Reviewed: Due/Overdue Pt to talk with provider about what is needed or can continue to wait.      Health Maintenance Due   Topic Date Due     COPD ACTION PLAN Q1 YR  [birth year]03/1946     ASTHMA CONTROL TEST Q6 MOS  12/03/1949     URINE DRUG SCREEN Q1 YR  12/03/1959     EYE EXAM Q1 YEAR  05/23/2017         My Access Plan  Medical Emergency 911   Primary Clinic Line [facility] - [phone]   24 Hour Appointment Line [phone] or  [phone]) (toll-free)   24 Hour Nurse Line [phone] (toll-free)   Preferred Urgent Care [facility], [phone]   Preferred Hospital [place], [place]  [phone]   Preferred Pharmacy [id number] - [place], [place] - [address] [facility] [address]     Behavioral Health Crisis Line The National Suicide Prevention Lifeline at 1-939.503.1949 or 911     My Care Team Members    Patient Care Team       Relationship Specialty Notifications Start End    Shae Pierre,  NORBERT PCP - General Physician Assistant  10/6/14     Phone: [phone] Fax: [phone]         [address] [facility] [address]    Rony Persaud MD MD Family Practice  1/2/17     Phone: [phone] Fax: [phone]         [address] [facility] [address]    Jeramy Cedeño Medical Student   1/2/17     Sara Martínez LSW Clinic Care Coordinator Primary Care - CC Admissions 9/17/18     Phone: [phone] Fax: [phone]                My Care Plans  Self Management and Treatment Plan  Goals and (Comments)  Goals        General    #1 Mental Health Management (pt-stated)     Notes - Note created  9/18/2018 10:46 AM by Sara Martínez LSW    Goal Statement: I will review resources and choose what support group sounds best in the next month.  Measure of Success: I will have a group to check out.  Supportive Steps to Achieve: resources being sent by   Barriers: talking at times to others is a struggle  Strengths: understand the benefits that a support group will give  Date to Achieve By: 12-31-18  Patient expressed understanding of goal: yes             Lifestyle    #2 Patient achieves sleep pattern objective     Notes - Note created  9/18/2018 10:50 AM by Sara Martínez LSW    Goal Statement: I will look into alternative options for noise in the bedroom instead of the TV.  Measure of Success: better sleep.   Supportive Steps to Achieve: looking into options  Barriers: cost   Strengths: understands the concept good sleep hygiene   Date to Achieve By: 12-31-18  Patient expressed understanding of goal: yes                 Action Plans on File:            Depression          Advance Care Plans/Directives Type:   Type Advanced Care Plans/Directives:  (said she has one and will bring in a copy)    My Medical and Care Information  Problem List   Patient Active Problem List   Diagnosis     Lumbago     Pain in limb     Mild major depression (H)     Essential hypertension     Type 2 diabetes mellitus  with diabetic neuropathy (H)     Osteoarthritis     Mixed incontinence urge and stress (male)(female)     Restless legs     Microalbuminuria     Obesity     Esophageal reflux     GI bleed     Dyslipidemia     Iron deficiency anemia     Falls frequently     ACP (advance care planning)     SHANTI (obstructive sleep apnea)     Other chronic pain     LPRD (laryngopharyngeal reflux disease)     Personal history of fall      Current Medications and Allergies:  Report sent yesterday 9-17-18    Care Coordination Start Date: No linked episodes   Frequency of Care Coordination:     Form Last Updated: 09/18/2018

## 2018-09-17 NOTE — PROGRESS NOTES
Clinic Care Coordination Contact  [facility]/Voicemail       Clinical Data: Care Coordinator Outreach  Outreach attempted x 1, no answer and unable to leave a message.    Plan: Care Coordinator will mail out care coordination introduction letter with care coordinator contact information and explanation of care coordination services. Care Coordinator will try to reach patient again in 1-2 business days.  LEYDA Mojica, Clinic Care Coordinator 9/17/2018   3:26 PM  [phone]

## 2018-09-17 NOTE — TELEPHONE ENCOUNTER
Freestyle test strips were sent in today under [facility]. Please send in under another provider that is currently enrolled with Medicare PECOS program. San[facility] is currently not enrolled.

## 2018-09-18 DIAGNOSIS — E11.9 TYPE 2 DIABETES MELLITUS WITHOUT COMPLICATION, WITH LONG-TERM CURRENT USE OF INSULIN (H): ICD-10-CM

## 2018-09-18 DIAGNOSIS — Z79.4 TYPE 2 DIABETES MELLITUS WITHOUT COMPLICATION, WITH LONG-TERM CURRENT USE OF INSULIN (H): ICD-10-CM

## 2018-09-18 NOTE — TELEPHONE ENCOUNTER
Pharmacy says Freestyle test strips were sent in today and yesterday under Bozen[facility] Kilgore. Please send in under another provider that is currently enrolled with Medicare PECOS program. [facility] is currently not enrolled.  DO NOT RE-SEND UNDER [facility]ZE[facility] KILGORE.
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
## 2018-09-20 ENCOUNTER — OFFICE VISIT (OUTPATIENT)
Dept: FAMILY MEDICINE | Facility: CLINIC | Age: 73
End: 2018-09-20
Payer: COMMERCIAL

## 2018-09-20 VITALS
HEIGHT: 61 IN | RESPIRATION RATE: 18 BRPM | HEART RATE: 68 BPM | BODY MASS INDEX: 32.28 KG/M2 | TEMPERATURE: 99.2 F | WEIGHT: 171 LBS | DIASTOLIC BLOOD PRESSURE: 74 MMHG | SYSTOLIC BLOOD PRESSURE: 116 MMHG

## 2018-09-20 DIAGNOSIS — Z01.818 PREOP GENERAL PHYSICAL EXAM: Primary | ICD-10-CM

## 2018-09-20 LAB
ANION GAP SERPL CALCULATED.3IONS-SCNC: 6 MMOL/L (ref 3–14)
BUN SERPL-MCNC: 11 MG/DL (ref 7–30)
CALCIUM SERPL-MCNC: 8.6 MG/DL (ref 8.5–10.1)
CHLORIDE SERPL-SCNC: 107 MMOL/L (ref 94–109)
CO2 SERPL-SCNC: 27 MMOL/L (ref 20–32)
CREAT SERPL-MCNC: 0.71 MG/DL (ref 0.52–1.04)
ERYTHROCYTE [DISTWIDTH] IN BLOOD BY AUTOMATED COUNT: 13 % (ref 10–15)
GFR SERPL CREATININE-BSD FRML MDRD: 80 ML/MIN/1.7M2
GLUCOSE SERPL-MCNC: 156 MG/DL (ref 70–99)
HCT VFR BLD AUTO: 40.9 % (ref 35–47)
HGB BLD-MCNC: 13.5 G/DL (ref 11.7–15.7)
MCH RBC QN AUTO: 30.7 PG (ref 26.5–33)
MCHC RBC AUTO-ENTMCNC: 33 G/DL (ref 31.5–36.5)
MCV RBC AUTO: 93 FL (ref 78–100)
PLATELET # BLD AUTO: 220 10E9/L (ref 150–450)
POTASSIUM SERPL-SCNC: 4.2 MMOL/L (ref 3.4–5.3)
RBC # BLD AUTO: 4.4 10E12/L (ref 3.8–5.2)
SODIUM SERPL-SCNC: 140 MMOL/L (ref 133–144)
WBC # BLD AUTO: 5 10E9/L (ref 4–11)

## 2018-09-20 PROCEDURE — 93000 ELECTROCARDIOGRAM COMPLETE: CPT | Performed by: FAMILY MEDICINE

## 2018-09-20 PROCEDURE — 36415 COLL VENOUS BLD VENIPUNCTURE: CPT | Performed by: FAMILY MEDICINE

## 2018-09-20 PROCEDURE — 85027 COMPLETE CBC AUTOMATED: CPT | Performed by: FAMILY MEDICINE

## 2018-09-20 PROCEDURE — 99214 OFFICE O/P EST MOD 30 MIN: CPT | Performed by: FAMILY MEDICINE

## 2018-09-20 PROCEDURE — 80048 BASIC METABOLIC PNL TOTAL CA: CPT | Performed by: FAMILY MEDICINE

## 2018-09-20 NOTE — PROGRESS NOTES
[facility]  [address] [place] [address]  [phone]  Dept: [phone]    PRE-OP EVALUATION:  Today's date: 2018    Theresa Bill (: 1945) presents for pre-operative evaluation assessment as requested by Dr. Benavides.  She requires evaluation and anesthesia risk assessment prior to undergoing surgery/procedure for treatment of Bladder .    Fax number for surgical facility: [phone]  Primary Physician: Shae Pierre  Type of Anesthesia Anticipated: General    Patient has a Health Care Directive or Living Will:  YES     Preop Questions 2018   Who is doing your surgery? Pari Benavides    What are you having done? Interstem revision   Date of Surgery/Procedure:    Facility or Hospital where procedure/surgery will be performed: [facility]   1.  Do you have a history of Heart attack, stroke, stent, coronary bypass surgery, or other heart surgery? No   2.  Do you ever have any pain or discomfort in your chest? No   3.  Do you have a history of  Heart Failure? No   4.   Are you troubled by shortness of breath when:  walking on a level surface, or up a slight hill, or at night? No   5.  Do you currently have a cold, bronchitis or other respiratory infection? No   6.  Do you have a cough, shortness of breath, or wheezing? No   7.  Do you sometimes get pains in the calves of your legs when you walk? YES -    8. Do you or anyone in your family have previous history of blood clots? YES -    9.  Do you or does anyone in your family have a serious bleeding problem such as prolonged bleeding following surgeries or cuts? No   10. Have you ever had problems with anemia or been told to take iron pills? YES -    11. Have you had any abnormal blood loss such as black, tarry or bloody stools, or abnormal vaginal bleeding? YES -    12. Have you ever had a blood transfusion? YES -    13. Have you or any of your relatives ever had problems with anesthesia? No    14. Do you have sleep apnea, excessive snoring or daytime drowsiness? YES -    15. Do you have any prosthetic heart valves? No   16. Do you have prosthetic joints? No   17. Is there any chance that you may be pregnant? No         HPI:     HPI related to upcoming procedure: scheduled for interstim for bladder control.      See problem list for active medical problems.  Problems all longstanding and stable, except as noted/documented.  See ROS for pertinent symptoms related to these conditions.                                                                                                                                                          .    MEDICAL HISTORY:     Patient Active Problem List    Diagnosis Date Noted     Personal history of fall 06/11/2018     Priority: Medium     6/11/18 - Notable history orthostasis with up to 20 point systolic drops.         LPRD (laryngopharyngeal reflux disease) 11/27/2017     Priority: Medium     Other chronic pain 11/10/2015     Priority: Medium     Left sciatic like pain that originates from where bladder stimulator was placed.  Pain unchanged.  Uses tramadol prn - I am ok with refilling #30 per month and seeing her every 6 mo.       SHANTI (obstructive sleep apnea) 06/24/2015     Priority: Medium     Severe SHANTI: Study date 6/11/2015(, , Lowest O2 saturation 87%)       ACP (advance care planning) 06/16/2015     Priority: Medium     Advance Care Planning 6/16/2015: Receipt of ACP document:  Received: invalid HCD document dated 5/6/06.  Document previously scanned on 4/4/12.  Request made to delete invalid medical order document. Notification sent to Scarlett Zuluaga for followup.  Code Status needs to be updated to reflect choices in most recent ACP document.  Confirmed/documented designated decision maker(s).  Added by Kushal acuña 05/21/2015     Priority: Medium     Spring 2015.  Referred to Physical Therapy.  Sees neurology for  "RLS.       Iron deficiency anemia 09/20/2013     Priority: Medium     Problem list name updated by automated process. Provider to review       Dyslipidemia 10/31/2010     Priority: Medium     GI bleed 03/17/2010     Priority: Medium     3/17/2010:hospitalized 2/2010 in [place] for GI bleed.  She had a couple irritated spots in stomach antrum and a \"colitis\" picture on colonoscopy.  Unclear exact cause.  She had been on Advil PM daily and ASA 81mg.  She was treated with prednisone and omeprazole.   See clinic notes from today.  Scanned scope reports.        Esophageal reflux 10/20/2006     Priority: Medium     2007 she had a laparoscopic Miguel fundoplication.        Obesity 07/27/2006     Priority: Medium     Microalbuminuria 05/01/2006     Priority: Medium     6/4/2012:Positive MAC 4/2006-once, has been normal since       Restless legs 03/15/2006     Priority: Medium     12/4/14 -- sees Dr Arteaga (neurology) in [place].  Lyrica seems to be helping.         Lumbago 04/21/2005     Priority: Medium     Chronic low back pain  7/22/2010:She had a CT myelogram in April 2010 which showed very mild spinal stenosis at L4-5 and some degenerative disc disease.  There was no foraminal stenosis.        Pain in limb 04/21/2005     Priority: Medium     Chronic (R) foot pain       Mild major depression (H) 04/21/2005     Priority: Medium     Essential hypertension 04/21/2005     Priority: Medium     Type 2 diabetes mellitus with diabetic neuropathy (H) 04/21/2005     Priority: Medium     12/18/17 - neurologist treating for diab neuropathy  Foot exam:4/14/2010, normal   She had cough on lisinopril-see notes 3/7/12.  She also seemed to have cough on losartan-see notes from 5/25/12.  Both meds ended up being stopped.        Osteoarthritis 04/21/2005     Priority: Medium     Problem list name updated by automated process. Provider to review       Mixed incontinence urge and stress (male)(female) 04/21/2005     Priority: Medium     " Interstim placed 2007        Past Medical History:   Diagnosis Date     COPD (chronic obstructive pulmonary disease) (H)     Pt reports chronic bronchitis     Depression      Diabetes mellitus (H)     NIDDM     Dysphagia      GERD (gastroesophageal reflux disease)      Heart disease      Hypertension      Sleep apnea     CPAP     Past Surgical History:   Procedure Laterality Date     BUNIONECTOMY CRISELDA  7/1/2011    Procedure:BUNIONECTOMY CRISELDA; weil osteotomy & Lapidtus Bunionectomy; Surgeon:HYACINTH SANTO; Location:[facility]     BUNIONECTOMY CHEVRON  4/6/2012    Procedure:BUNIONECTOMY CHEVRON; Lapidus bunionectomy, plantar plate repair second and third metatarsophalangeal joints,left foot-popliteal block left lower extremity; Surgeon:HYACINTH SANTO; Location:[facility]      TOTAL ABDOM HYSTERECTOMY  1992    ovaries removed     COLONOSCOPY  9/11/2012    Procedure: COLONOSCOPY;  Colonoscopy;  Surgeon: Alyssa Foster MD;  Location: [facility]     COLONOSCOPY N/A 8/3/2018    Procedure: COLONOSCOPY;  colonoscopy;  Surgeon: Kelechi Rivera MD;  Location: [facility]     NISSEN FUNDOPLICATION      2003     REPAIR HAMMER TOE  7/1/2011    Procedure:REPAIR HAMMER TOE; hammertoe correction right 2nd digit; Surgeon:HYACINTH SANTO; Location:[facility]     SURGICAL HISTORY OF -   1979    Tubal     SURGICAL HISTORY OF -   1993    Stamey bladder surgery     SURGICAL HISTORY OF -       Lipoma removed f/back     SURGICAL HISTORY OF -   1995    (L) Foot surgery     SURGICAL HISTORY OF -   05/09/2002    Colonoscopy     SURGICAL HISTORY OF -       knee arthroscopy left     SURGICAL HISTORY OF -   2007-two phases    interstim bladder implant     SURGICAL HISTORY OF -   Sept. 13, 2007    laparoscopic Nissen fundoplication     Current Outpatient Prescriptions   Medication Sig Dispense Refill     ACETAMINOPHEN PO        ASPIRIN 81 MG OR TABS ONE DAILY 100 3     atorvastatin (LIPITOR) 40 MG tablet Take 1 tablet (40 mg) by  mouth At Bedtime 90 tablet 3     blood glucose monitoring (NO BRAND SPECIFIED) test strip 1 strip by In Vitro route 3 times daily Has a Freestyle 100 each 12     buPROPion (WELLBUTRIN XL) 300 MG 24 hr tablet Take 1 tablet (300 mg) by mouth every morning 90 tablet 3     clonazePAM (KLONOPIN) 0.5 MG tablet Taking 1 tab as needed.  As of 6/11, has taken 1 tab in past 2 weeks.  Prescribed by neurology for RLS - previously severe.  0     DULoxetine (CYMBALTA) 60 MG EC capsule TAKE ONE CAPSULE BY MOUTH ONCE DAILY 90 capsule 3     fluocinonide (LIDEX) 0.05 % ointment Apply sparingly to affected area twice daily as needed for mouth ulcer. 15 g 0     insulin detemir (LEVEMIR FLEXPEN/FLEXTOUCH) 100 UNIT/ML injection Inject 14 Units Subcutaneous daily Increase by 2 units every 3 days if sugars 2 hours after eating are above 180 (only if morning sugars above 110). 15 mL 3     insulin pen needle (BD MARIIA U/F) 32G X 4 MM Use one pen daily 90 each 0     ketoconazole (NIZORAL) 2 % cream Apply topically 2 times daily To corners of lips for 2 weeks or as needed 30 g 1     LANsoprazole (PREVACID) 15 MG CR capsule Take 1 capsule (15 mg) by mouth 2 times daily 180 capsule 3     losartan (COZAAR) 25 MG tablet Take 1 tablet (25 mg) by mouth daily 90 tablet 3     metFORMIN (GLUCOPHAGE-XR) 500 MG 24 hr tablet TAKE ONE TABLET BY MOUTH TWICE DAILY WITH MEALS 180 tablet 3     Multiple Vitamin (DAILY MULTIVITAMIN PO) One tablet daily       oxybutynin chloride 15 MG TB24        pantoprazole (PROTONIX) 40 MG EC tablet Take 1 tablet (40 mg) by mouth daily 90 tablet 3     Probiotic Product (PROBIOTIC & ACIDOPHILUS EX ST) CAPS        traMADol (ULTRAM) 50 MG tablet TAKE ONE TABLET BY MOUTH EVERY 6 HOURS AS NEEDED FOR  MODERATE  PAIN 30 tablet 5     ACE/ARB NOT PRESCRIBED, INTENTIONAL, ACE & ARB not prescribed due to Intolerance-cough             nitroGLYcerin (NITROSTAT) 0.4 MG sublingual tablet For chest pain place 1 tablet under the tongue every 5  "minutes for 3 doses. If symptoms persist 5 minutes after 1st dose call 911. (Patient not taking: Reported on 9/20/2018) 25 tablet 0     order for DME Equipment being ordered: CPAP Patient Theresa Bill received a Resmed Airsense 10  Auto. Pressures were set at Auto 10 - 15 cm H2O.       OTC products: None, except as noted above    Allergies   Allergen Reactions     Lisinopril Cough     Nkda [No Known Drug Allergies]       Latex Allergy: NO    Social History   Substance Use Topics     Smoking status: Never Smoker     Smokeless tobacco: Never Used     Alcohol use No     History   Drug Use No       REVIEW OF SYSTEMS:   CONSTITUTIONAL: NEGATIVE for fever, chills, change in weight  ENT/MOUTH: NEGATIVE for ear, mouth and throat problems  RESP: NEGATIVE for significant cough or SOB  CV: NEGATIVE for chest pain, palpitations or peripheral edema    EXAM:   /74 (Cuff Size: Adult Regular)  Pulse 68  Temp 99.2  F (37.3  C) (Tympanic)  Resp 18  Ht 5' 1\" (1.549 m)  Wt 171 lb (77.6 kg)  Breastfeeding? No  BMI 32.31 kg/m2  GENERAL APPEARANCE: healthy, alert and no distress  HENT: ear canals and TM's normal and nose and mouth without ulcers or lesions  RESP: lungs clear to auscultation - no rales, rhonchi or wheezes  CV: regular rate and rhythm, normal S1 S2, no S3 or S4 and no murmur, click or rub   ABDOMEN: soft, nontender, no HSM or masses and bowel sounds normal  NEURO: Normal strength and tone, sensory exam grossly normal, mentation intact and speech normal    DIAGNOSTICS:   EKG:  She had a cardiac work up within the last year.  She does have some CAD and has ntg bit does not take it.   Ekg today is normal.  Labs are pending.     Recent Labs   Lab Test  09/17/18   0919  06/27/18   1110  03/14/18   1052  11/27/17   1052   09/23/17   1100  04/21/17   1020   HGB   --    --    --    --    --   13.9  13.1   PLT   --    --    --    --    --   307  234   NA   --    --   138  143   --   140  139   POTASSIUM   --    --   4.4 "  3.6   --   3.7  4.2   CR   --    --   0.81  0.80   --   0.80  0.94   A1C  7.1*  7.1*  7.1*  7.5*   < >   --   6.3*    < > = values in this interval not displayed.    has diabetes and is under good control.  Has had labs within 6 months and all normal.     IMPRESSION:   Diagnosis/reason for consult: bladder dysfunction     The proposed surgical procedure is considered LOW risk.    REVISED CARDIAC RISK INDEX  The patient has the following serious cardiovascular risks for perioperative complications such as (MI, PE, VFib and 3  AV Block):  No serious cardiac risks  INTERPRETATION: 0 risks: Class I (very low risk - 0.4% complication rate)    The patient has the following additional risks for perioperative complications:  No identified additional risks      ICD-10-CM    1. Preop general physical exam Z01.818      CBC IS NORMAL AND PROFILE PENDING   RECOMMENDATIONS:         --Patient is to take all scheduled medications on the day of surgery EXCEPT for modifications listed below.    APPROVAL GIVEN to proceed with proposed procedure, without further diagnostic evaluation       Signed Electronically by: Jh Anguiano MD    Copy of this evaluation report is provided to requesting physician.    [facility] Preop Guidelines    Revised Cardiac Risk Index

## 2018-09-20 NOTE — NURSING NOTE
"Chief Complaint   Patient presents with     Pre-Op Exam       Initial /74 (Cuff Size: Adult Regular)  Pulse 68  Temp 99.2  F (37.3  C) (Tympanic)  Resp 18  Ht 5' 1\" (1.549 m)  Wt 171 lb (77.6 kg)  Breastfeeding? No  BMI 32.31 kg/m2 Estimated body mass index is 32.31 kg/(m^2) as calculated from the following:    Height as of this encounter: 5' 1\" (1.549 m).    Weight as of this encounter: 171 lb (77.6 kg).    Patient presents to the clinic using No DME    Health Maintenance that is potentially due pending provider review:  ACT    Possibly completing today per provider review.    Is there anyone who you would like to be able to receive your results? Not Applicable  If yes have patient fill out RICH    "

## 2018-09-20 NOTE — MR AVS SNAPSHOT
After Visit Summary   9/20/2018    Theresa Bill    MRN: [id number]           Patient Information     Date Of Birth          1945        Visit Information        Provider Department      9/20/2018 9:00 AM Jh Anguiano MD [facility]        Today's Diagnoses     Preop general physical exam    -  1      Care Instructions      Before Your Surgery      Call your surgeon if there is any change in your health. This includes signs of a cold or flu (such as a sore throat, runny nose, cough, rash or fever).    Do not smoke, drink alcohol or take over the counter medicine (unless your surgeon or primary care doctor tells you to) for the 24 hours before and after surgery.    If you take prescribed drugs: Follow your doctor s orders about which medicines to take and which to stop until after surgery.    Eating and drinking prior to surgery: follow the instructions from your surgeon    Take a shower or bath the night before surgery. Use the soap your surgeon gave you to gently clean your skin. If you do not have soap from your surgeon, use your regular soap. Do not shave or scrub the surgery site.  Wear clean pajamas and have clean sheets on your bed.           Follow-ups after your visit        Your next 10 appointments already scheduled     Sep 24, 2018  9:30 AM CDT   Return Visit with Sam Jaime   [facility]    [address] [facility] [address]   [phone]            Oct 08, 2018  4:00 PM CDT   Return Visit with Sam Sebastianifton   [facility]    [address] [address]   [phone]            Oct 15, 2018  8:00 AM CDT   [facility] with NICHELLE Soler CNP   [facility]    [address] [place] [id number] [phone]              Who to contact     If you have questions or need follow up  "information about today's clinic visit or your schedule please contact [facility] directly at [phone].  Normal or non-critical lab and imaging results will be communicated to you by [employer], letter or phone within 4 business days after the clinic has received the results. If you do not hear from us within 7 days, please contact the clinic through [employer] or phone. If you have a critical or abnormal lab result, we will notify you by phone as soon as possible.  Submit refill requests through [employer] or call your pharmacy and they will forward the refill request to us. Please allow 3 business days for your refill to be completed.          Additional Information About Your Visit        [employer] Information     [employer] lets you send messages to your doctor, view your test results, renew your prescriptions, schedule appointments and more. To sign up, go to [email or web address] . Click on \"Log in\" on the left side of the screen, which will take you to the Welcome page. Then click on \"Sign up Now\" on the right side of the page.     You will be asked to enter the access code listed below, as well as some personal information. Please follow the directions to create your username and password.     Your access code is: [email or web address]  Expires: 2018  9:20 AM     Your access code will  in 90 days. If you need help or a new code, please call your [facility] clinic or [phone].        Care EveryWhere ID     This is your Care EveryWhere ID. This could be used by other organizations to access your [facility] medical records  [id number]        Your Vitals Were     Pulse Temperature Respirations Height Breastfeeding? BMI (Body Mass Index)    68 99.2  F (37.3  C) (Tympanic) 18 5' 1\" (1.549 m) No 32.31 kg/m2       Blood Pressure from Last 3 Encounters:   18 116/74   18 124/72   18 161/85    Weight from Last 3 Encounters:   18 171 lb (77.6 kg)   18 171 lb (77.6 kg) "   08/03/18 170 lb (77.1 kg)              We Performed the Following     Basic metabolic panel     CBC with platelets     EKG 12-lead complete w/read - Clinics        Primary Care Provider Office Phone # Fax #    Shae Pierre PA-C [phone] [address] [address]        Goals        General    #1 Mental Health Management (pt-stated)     Notes - Note created  9/18/2018 10:46 AM by Sara Martínez LSW    Goal Statement: I will review resources and choose what support group sounds best in the next month.  Measure of Success: I will have a group to check out.  Supportive Steps to Achieve: resources being sent by   Barriers: talking at times to others is a struggle  Strengths: understand the benefits that a support group will give  Date to Achieve By: 12-31-18  Patient expressed understanding of goal: yes             Lifestyle    #2 Patient achieves sleep pattern objective     Notes - Note created  9/18/2018 10:50 AM by Sara Martínez LSW    Goal Statement: I will look into alternative options for noise in the bedroom instead of the TV.  Measure of Success: better sleep.   Supportive Steps to Achieve: looking into options  Barriers: cost   Strengths: understands the concept good sleep hygiene   Date to Achieve By: 12-31-18  Patient expressed understanding of goal: yes            Equal Access to Services     SABAS VUONG AH: Hadii lamonte starr hadasho Soelielali, waaxda luqadaha, qaybta kaalmada adeegyada, waxay malcolm jauregui. So [facility] [phone].    ATENCIÓN: Si habla español, tiene a urban disposición servicios gratuitos de asistencia lingüística. Llame al [phone].    We comply with applicable federal civil rights laws and [place] laws. We do not discriminate on the basis of race, color, national origin, age, disability, sex, sexual orientation, or gender identity.            Thank you!     Thank you for choosing [facility]  for your  care. Our goal is always to provide you with excellent care. Hearing back from our patients is one way we can continue to improve our services. Please take a few minutes to complete the written survey that you may receive in the mail after your visit with us. Thank you!             Your Updated Medication List - Protect others around you: Learn how to safely use, store and throw away your medicines at www.disposemymeds.org.          This list is accurate as of 9/20/18  9:55 AM.  Always use your most recent med list.                   Brand Name Dispense Instructions for use Diagnosis    ACE/ARB/ARNI NOT PRESCRIBED (INTENTIONAL)      ACE & ARB not prescribed due to Intolerance-cough    Type 2 diabetes, HbA1c goal < 7% (H)       ACETAMINOPHEN PO           aspirin 81 MG tablet     100    ONE DAILY    Type II or unspecified type diabetes mellitus without mention of complication, not stated as uncontrolled       atorvastatin 40 MG tablet    LIPITOR    90 tablet    Take 1 tablet (40 mg) by mouth At Bedtime    Dyslipidemia       blood glucose monitoring test strip    no brand specified    100 each    1 strip by In Vitro route 3 times daily Has a Freestyle    Type 2 diabetes mellitus without complication, with long-term current use of insulin (H)       buPROPion 300 MG 24 hr tablet    WELLBUTRIN XL    90 tablet    Take 1 tablet (300 mg) by mouth every morning    Mild recurrent major depression (H)       clonazePAM 0.5 MG tablet    klonoPIN     Taking 1 tab as needed.  As of 6/11, has taken 1 tab in past 2 weeks.  Prescribed by neurology for RLS - previously severe.    Restless legs syndrome (RLS)       DAILY MULTIVITAMIN PO      One tablet daily        DULoxetine 60 MG EC capsule    CYMBALTA    90 capsule    TAKE ONE CAPSULE BY MOUTH ONCE DAILY    Major depressive disorder, recurrent episode, mild (H)       fluocinonide 0.05 % ointment    LIDEX    15 g    Apply sparingly to affected area twice daily as needed for mouth  ulcer.    Aphthae, oral       insulin detemir 100 UNIT/ML injection    LEVEMIR FLEXPEN/FLEXTOUCH    15 mL    Inject 14 Units Subcutaneous daily Increase by 2 units every 3 days if sugars 2 hours after eating are above 180 (only if morning sugars above 110).    Type 2 diabetes mellitus without complication, with long-term current use of insulin (H)       insulin pen needle 32G X 4 MM    BD MARIIA U/F    90 each    Use one pen daily    Type 2 diabetes mellitus without complication, with long-term current use of insulin (H)       ketoconazole 2 % cream    NIZORAL    30 g    Apply topically 2 times daily To corners of lips for 2 weeks or as needed    Angular cheilitis       LANsoprazole 15 MG CR capsule    PREVACID    180 capsule    Take 1 capsule (15 mg) by mouth 2 times daily    LPRD (laryngopharyngeal reflux disease)       losartan 25 MG tablet    COZAAR    90 tablet    Take 1 tablet (25 mg) by mouth daily    Essential hypertension       metFORMIN 500 MG 24 hr tablet    GLUCOPHAGE-XR    180 tablet    TAKE ONE TABLET BY MOUTH TWICE DAILY WITH MEALS    Type 2 diabetes mellitus without complication, with long-term current use of insulin (H)       nitroGLYcerin 0.4 MG sublingual tablet    NITROSTAT    25 tablet    For chest pain place 1 tablet under the tongue every 5 minutes for 3 doses. If symptoms persist 5 minutes after 1st dose call 911.    Chest pressure       order for DME      Equipment being ordered: CPAP Patient Theresa Bill received a [employer] Airsense 10  Auto. Pressures were set at Auto 10 - 15 cm H2O.        oxybutynin chloride 15 MG Tb24           pantoprazole 40 MG EC tablet    PROTONIX    90 tablet    Take 1 tablet (40 mg) by mouth daily    Gastroesophageal reflux disease, esophagitis presence not specified       PROBIOTIC & ACIDOPHILUS EX ST Caps           traMADol 50 MG tablet    ULTRAM    30 tablet    TAKE ONE TABLET BY MOUTH EVERY 6 HOURS AS NEEDED FOR  MODERATE  PAIN    Radicular leg pain

## 2018-09-21 ASSESSMENT — ASTHMA QUESTIONNAIRES: ACT_TOTALSCORE: 25

## 2018-09-24 ENCOUNTER — OFFICE VISIT (OUTPATIENT)
Dept: PSYCHOLOGY | Facility: CLINIC | Age: 73
End: 2018-09-24
Payer: COMMERCIAL

## 2018-09-24 DIAGNOSIS — F32.0 MILD MAJOR DEPRESSION (H): Primary | ICD-10-CM

## 2018-09-24 DIAGNOSIS — F43.21 GRIEF: ICD-10-CM

## 2018-09-24 PROBLEM — F41.9 ANXIETY: Status: ACTIVE | Noted: 2018-09-24

## 2018-09-24 PROCEDURE — 90834 PSYTX W PT 45 MINUTES: CPT | Performed by: PSYCHOLOGIST

## 2018-09-24 ASSESSMENT — ANXIETY QUESTIONNAIRES
4. TROUBLE RELAXING: NEARLY EVERY DAY
GAD7 TOTAL SCORE: 15
2. NOT BEING ABLE TO STOP OR CONTROL WORRYING: NEARLY EVERY DAY
5. BEING SO RESTLESS THAT IT IS HARD TO SIT STILL: NOT AT ALL
1. FEELING NERVOUS, ANXIOUS, OR ON EDGE: NEARLY EVERY DAY
6. BECOMING EASILY ANNOYED OR IRRITABLE: NOT AT ALL
7. FEELING AFRAID AS IF SOMETHING AWFUL MIGHT HAPPEN: NEARLY EVERY DAY
3. WORRYING TOO MUCH ABOUT DIFFERENT THINGS: NEARLY EVERY DAY

## 2018-09-24 NOTE — PROGRESS NOTES
Progress Note  Disclaimer: This note consists of symbols derived from keyboarding, dictation and/or voice recognition software. As a result, there may be errors in the script that have gone undetected. Please consider this when interpreting information found in this chart.    Client Name: Theresa Bill  Date: 9/24/2018         Service Type: Individual      Session Start Time: 8:30 AM  session End Time: 2:45 9:15 AM      Session Length: 45     Session #: 18     Attendees: Client attended alone    Treatment Plan Last Reviewed:  4/9/2018       DATA   Client reports she is still very worried about her son with a drug problem.  Reports recurring nightmares of Him calling her on the phone and shooting himself.  Also has had more frequent dreams of her late .  This Friday will be the first anniversary of his death.  Encouraged client to be gentle with herself and to not  herself for having these thoughts and dreams as they are more likely to occur at times like this.   Progress Since Last Session (Related to Symptoms / Goals / Homework):   Symptoms: Stable    Homework: not applicable      Episode of Care Goals: Satisfactory progress - ACTION (Actively working towards change); Intervened by reinforcing change plan / affirming steps taken     Current / Ongoing Stressors and Concerns:   Recent bereavement, prior caregiver stress, son with serious drug addiction.     Treatment Objective(s) Addressed in This Session:   increase understanding of steps in the grief process  Utilize family support, client has order for protection, be sure to communicate with other family members every day     Intervention:   Interpersonal Therapy: facilitating appropriate expressions of emotion and grief.  12 step facilitation for family member  ASSESSMENT: Current Emotional / Mental Status (status of significant symptoms):   Risk status (Self / Other harm or suicidal ideation)   Client  denies current fears or concerns for personal safety.   Client denies current or recent suicidal ideation or behaviors.   Client denies current or recent homicidal ideation or behaviors.   Client denies current or recent self injurious behavior or ideation.   Client denies other safety concerns.   A safety and risk management plan has not been developed at this time, however client was given the after-hours number / 911 should there be a change in any of these risk factors.     Appearance:   Appropriate    Eye Contact:   Good    Psychomotor Behavior: Normal    Attitude:   Cooperative    Orientation:   All   Speech    Rate / Production: Slow     Volume:  Soft    Mood:    Depressed    Affect:    Appropriate    Thought Content:  Clear    Thought Form:  Coherent  Logical    Insight:    Fair      Medication Review:   No changes to current psychiatric medication(s)     Medication Compliance:   Yes     Changes in Health Issues:   None reported     Chemical Use Review:   Substance Use: Chemical use reviewed, no active concerns identified      Tobacco Use: No current tobacco use.       Collateral Reports Completed:   Not Applicable    PLAN: (Client Tasks / Therapist Tasks / Other)  Client to continue with self-care, keeping herself safe, and not enabling her son's drug use.    Sam Jaime                                                         ________________________________________________________________________    Treatment Plan    Client's Name: Theresa Bill  YOB: 1945    Date: 4/9/2018    Referral / Collaboration:  Referral to another professional/service is not indicated at this time..    Anticipated number of session or this episode of care: 25       DSM5 Diagnoses: (Sustained by DSM5 Criteria Listed Above)  Diagnoses:            296.21 (F32.0) Major Depressive Disorder, Single Episode, Mild _ grief  Psychosocial & Contextual Factors: Caregiver stress, recent death of her   WHODAS 2.0 (12  item)                          This questionnaire asks about difficulties due to health conditions. Health conditions                   include                        disease or illnesses, other health problems that may be short or long lasting,                    injuries, mental health or emotional problems, and problems with alcohol or drugs.                              Think back over the past 30 days and answer these questions, thinking about how much              difficulty you had doing the following activities. For each question, please [place] only                   one response.      S1 Standing for long periods such as 30 minutes? Moderate =   3   S2 Taking care of household responsibilities? None =         1   S3 Learning a new task, for example, learning how to get to a new place? Mild =           2   S4 How much of a problem do you have joining community activities (for example, festivals, Judaism or other activities) in the same way as anyone else can? None =         1   S5 How much have you been emotionally affected by your health problems? None =         1               In the past 30 days, how much difficulty did you have in:   S6 Concentrating on doing something for ten minutes? None =         1   S7 Walking a long distance such as a kilometer (or equivalent)? Moderate =   3   S8 Washing your whole body? None =         1   S9 Getting dressed? None =         1   S10 Dealing with people you do not know? None =         1   S11 Maintaining a friendship? None =         1   S12 Your day to day work? None =         1       H1 Overall, in the past 30 days, how many days were these difficulties present? Record number of days 1    H2 In the past 30 days, for how many days were you totally unable to carry out your usual activities or work because of any health condition? Record number of days  0    H3 In the past 30 days, not counting the days that you were totally unable, for how many days did you cut  back or reduce your usual activities or work because of any health condition? Record number of days 0      Goals  1. Education- the stages of grief  a. Client will be able to describe the stages of grief; denial, bargaining, anger, depression, and acceptance and give examples of how each have felt for her.  2. Behavioral Activation  a. Client will learn to assess their emotional stateon a day to day basis  b. Client will Identify two forms of exercise/activity and engage in them 3 times per week  c. Client will Identify 3 things that make them laugh, and engage in these 5 times per week.  d. Client will Identify 1-2Creative activities or hobbies  and engage in them 2 times per week  e. Client will identify music, movies, books that make them feel good and use them 3-4 times per week  3. Self-care  a. Client will identify 5 things they can do just for themselves  b. Client will take time for quiet, reflection, meditation 5 times per week  c. Client will Learn to set boundaries when appropriate  d. Client will Identify 2 individuals they can call on for support, distraction  4. Assessment of progress  a. Client will engage in assessment of their progress on a regular basis      Client has reviewed and agreed to the above plan.      Sam Jaime  4/9/2018

## 2018-09-24 NOTE — MR AVS SNAPSHOT
"                  MRN:[id number]                      After Visit Summary   2018    Theresa Bill    MRN: [id number]           Visit Information        Provider Department      2018 9:30 AM Addison Sam WATSON [facility] Generic      Your next 10 appointments already scheduled     Oct 08, 2018  4:00 PM CDT   Return Visit with Sam Jaime   [facility] ([facility])    [address] [facility] [address]   [phone]            Oct 15, 2018  8:00 AM CDT   SHORT with NICHELLE Soler CNP   [facility] ([facility])    [address] [place] [address]   [phone]            Oct 22, 2018 10:30 AM CDT   Return Visit with Sam Jaime   [facility] ([facility])    [address] [facility] [address]   [phone]              MyChart Information     [employer] lets you send messages to your doctor, view your test results, renew your prescriptions, schedule appointments and more. To sign up, go to [email or web address] . Click on \"Log in\" on the left side of the screen, which will take you to the Welcome page. Then click on \"Sign up Now\" on the right side of the page.     You will be asked to enter the access code listed below, as well as some personal information. Please follow the directions to create your username and password.     Your access code is: [email or web address]  Expires: 2018  9:20 AM     Your access code will  in 90 days. If you need help or a new code, please call your [facility] clinic or [phone].        Care EveryWhere ID     This is your Care EveryWhere ID. This could be used by other organizations to access your [facility] medical records  [id number]        Equal Access to Services     SABAS VUONG : Annalisa Agrawal, waaxda luqadaha, qaybta kaalmaestrellita ingram, ángel oconnor . So [facility] " [phone].    ATENCIÓN: Si habla español, tiene a urban disposición servicios gratuitos de asistencia lingüística. Llame al [phone].    We comply with applicable federal civil rights laws and [place] laws. We do not discriminate on the basis of race, color, national origin, age, disability, sex, sexual orientation, or gender identity.

## 2018-09-25 ASSESSMENT — PATIENT HEALTH QUESTIONNAIRE - PHQ9: SUM OF ALL RESPONSES TO PHQ QUESTIONS 1-9: 14

## 2018-09-25 ASSESSMENT — ANXIETY QUESTIONNAIRES: GAD7 TOTAL SCORE: 15

## 2018-10-02 ENCOUNTER — PATIENT OUTREACH (OUTPATIENT)
Dept: CARE COORDINATION | Facility: CLINIC | Age: 73
End: 2018-10-02

## 2018-10-02 NOTE — LETTER
[place] [id number] [place] [address]  [phone]      10/5/2018      Theresa Bill  [address] [place] [address]      Dear  Theresa,      I have been attempting to reach you since our last contact. I would like to continue to work with you on the goals from our last conversation and provide the support you may need. I would appreciate if you would give me a call at [phone] and let me know if you would like to continue working together. I know that there are many things that can affect our ability to communicate and hope we can plan better moving forward.     All of us at [facility] are invested in your health and are here to assist you in meeting your goals.     Sincerely,        Sara Martínez, [facility]  Clinic Care Coordination  [phone]

## 2018-10-02 NOTE — PROGRESS NOTES
Clinic Care Coordination Contact  [facility]       Clinical Data: Care Coordinator Outreach  Outreach attempted x 1.  Busy, not able to leave a message  Plan:  Care Coordinator will try to reach patient again in 3-5 business days.  LEYDA Mojica, Clinic Care Coordinator 10/2/2018   10:43 AM  [phone]

## 2018-10-08 ENCOUNTER — OFFICE VISIT (OUTPATIENT)
Dept: PSYCHOLOGY | Facility: CLINIC | Age: 73
End: 2018-10-08
Payer: COMMERCIAL

## 2018-10-08 DIAGNOSIS — F32.0 MILD MAJOR DEPRESSION (H): Primary | ICD-10-CM

## 2018-10-08 DIAGNOSIS — F43.21 GRIEF: ICD-10-CM

## 2018-10-08 PROCEDURE — 90834 PSYTX W PT 45 MINUTES: CPT | Performed by: PSYCHOLOGIST

## 2018-10-08 NOTE — MR AVS SNAPSHOT
"                  MRN:[id number]                      After Visit Summary   10/8/2018    Theresa Bill    MRN: [id number]           Visit Information        Provider Department      10/8/2018 4:00 PM Addison Sam WATSON [facility] Generic      Your next 10 appointments already scheduled     Oct 15, 2018  8:00 AM CDT   SHORT with NICHELLE Soler CNP   [facility] ([facility])    [address] [place] [address]   [phone]            Oct 22, 2018 10:30 AM CDT   Return Visit with Sam Jaime   [facility] ([facility])    [address] [facility] [address]   [phone]            2018  8:30 AM CST   Return Visit with Sam Jaime   [facility] ([facility])    [address] [facility] [address]   [phone]              MyChart Information     [employer] lets you send messages to your doctor, view your test results, renew your prescriptions, schedule appointments and more. To sign up, go to [email or web address] . Click on \"Log in\" on the left side of the screen, which will take you to the Welcome page. Then click on \"Sign up Now\" on the right side of the page.     You will be asked to enter the access code listed below, as well as some personal information. Please follow the directions to create your username and password.     Your access code is: [email or web address]  Expires: 2018  9:20 AM     Your access code will  in 90 days. If you need help or a new code, please call your [facility] clinic or [phone].        Care EveryWhere ID     This is your Care EveryWhere ID. This could be used by other organizations to access your [facility] medical records  [id number]        Equal Access to Services     SABAS VUONG : Annalisa Agrawal, waaxda luqadaha, qaybta kaalmaestrellita ingram, ángel oconnor . So [facility] " [phone].    ATENCIÓN: Si habla español, tiene a urban disposición servicios gratuitos de asistencia lingüística. Llame al [phone].    We comply with applicable federal civil rights laws and [place] laws. We do not discriminate on the basis of race, color, national origin, age, disability, sex, sexual orientation, or gender identity.

## 2018-10-11 ASSESSMENT — ANXIETY QUESTIONNAIRES
4. TROUBLE RELAXING: MORE THAN HALF THE DAYS
3. WORRYING TOO MUCH ABOUT DIFFERENT THINGS: NEARLY EVERY DAY
GAD7 TOTAL SCORE: 13
5. BEING SO RESTLESS THAT IT IS HARD TO SIT STILL: NOT AT ALL
1. FEELING NERVOUS, ANXIOUS, OR ON EDGE: MORE THAN HALF THE DAYS
2. NOT BEING ABLE TO STOP OR CONTROL WORRYING: NEARLY EVERY DAY
6. BECOMING EASILY ANNOYED OR IRRITABLE: NOT AT ALL
7. FEELING AFRAID AS IF SOMETHING AWFUL MIGHT HAPPEN: NEARLY EVERY DAY

## 2018-10-12 ASSESSMENT — PATIENT HEALTH QUESTIONNAIRE - PHQ9: SUM OF ALL RESPONSES TO PHQ QUESTIONS 1-9: 11

## 2018-10-12 ASSESSMENT — ANXIETY QUESTIONNAIRES: GAD7 TOTAL SCORE: 13

## 2018-10-12 NOTE — PROGRESS NOTES
Progress Note  Disclaimer: This note consists of symbols derived from keyboarding, dictation and/or voice recognition software. As a result, there may be errors in the script that have gone undetected. Please consider this when interpreting information found in this chart.    Client Name: Theresa Bill  Date: 10/8/2018         Service Type: Individual      Session Start Time: 4:00 PM  session End Time: 4:45 PM      Session Length: 45     Session #: 19     Attendees: Client attended alone    Treatment Plan Last Reviewed:  4/9/2018       DATA   Client reports she recently celebrated the first anniversary of her 's death.  She and several of her children planted a tree on their favorite hunting land with his ashes underneath.  She then spent most of the week up there with her children.  Currently she is complaining of a lack of energy she is doing some cooking.  She is had more trouble sleeping in bed last 3 nights.  She is attempting to maintain her self-care by going to [facility] on Tuesdays even if she does not feel like it.  She is planning a trip to [place] next month to visit her sister.  And she is dealing with chemical dependency and 2 of her children.   Progress Since Last Session (Related to Symptoms / Goals / Homework):   Symptoms: Stable    Homework: not applicable      Episode of Care Goals: Satisfactory progress - ACTION (Actively working towards change); Intervened by reinforcing change plan / affirming steps taken     Current / Ongoing Stressors and Concerns:   Recent bereavement, prior caregiver stress, son with serious drug addiction.     Treatment Objective(s) Addressed in This Session:   increase understanding of steps in the grief process  Utilize family support, client has order for protection, be sure to communicate with other family members every day     Intervention:   Interpersonal Therapy: facilitating appropriate expressions of emotion and  grief.  12 step facilitation for family member  ASSESSMENT: Current Emotional / Mental Status (status of significant symptoms):   Risk status (Self / Other harm or suicidal ideation)   Client denies current fears or concerns for personal safety.   Client denies current or recent suicidal ideation or behaviors.   Client denies current or recent homicidal ideation or behaviors.   Client denies current or recent self injurious behavior or ideation.   Client denies other safety concerns.   A safety and risk management plan has not been developed at this time, however client was given the after-hours number / 911 should there be a change in any of these risk factors.     Appearance:   Appropriate    Eye Contact:   Good    Psychomotor Behavior: Normal    Attitude:   Cooperative    Orientation:   All   Speech    Rate / Production: Slow     Volume:  Soft    Mood:    Depressed    Affect:    Appropriate    Thought Content:  Clear    Thought Form:  Coherent  Logical    Insight:    Fair      Medication Review:   No changes to current psychiatric medication(s)     Medication Compliance:   Yes     Changes in Health Issues:   None reported     Chemical Use Review:   Substance Use: Chemical use reviewed, no active concerns identified      Tobacco Use: No current tobacco use.       Collateral Reports Completed:   Not Applicable    PLAN: (Client Tasks / Therapist Tasks / Other)  Client to continue with self-care, keeping herself safe, and not enabling her son's drug use.    Sam Jaime                                                         ________________________________________________________________________    Treatment Plan    Client's Name: Theresa Bill  YOB: 1945    Date: 4/9/2018    Referral / Collaboration:  Referral to another professional/service is not indicated at this time..    Anticipated number of session or this episode of care: 25       DSM5 Diagnoses: (Sustained by DSM5 Criteria Listed  Above)  Diagnoses:            296.21 (F32.0) Major Depressive Disorder, Single Episode, Mild _ grief  Psychosocial & Contextual Factors: Caregiver stress, recent death of her   WHODAS 2.0 (12 item)                          This questionnaire asks about difficulties due to health conditions. Health conditions                   include                        disease or illnesses, other health problems that may be short or long lasting,                    injuries, mental health or emotional problems, and problems with alcohol or drugs.                              Think back over the past 30 days and answer these questions, thinking about how much              difficulty you had doing the following activities. For each question, please [place] only                   one response.      S1 Standing for long periods such as 30 minutes? Moderate =   3   S2 Taking care of household responsibilities? None =         1   S3 Learning a new task, for example, learning how to get to a new place? Mild =           2   S4 How much of a problem do you have joining community activities (for example, festivals, Voodoo or other activities) in the same way as anyone else can? None =         1   S5 How much have you been emotionally affected by your health problems? None =         1               In the past 30 days, how much difficulty did you have in:   S6 Concentrating on doing something for ten minutes? None =         1   S7 Walking a long distance such as a kilometer (or equivalent)? Moderate =   3   S8 Washing your whole body? None =         1   S9 Getting dressed? None =         1   S10 Dealing with people you do not know? None =         1   S11 Maintaining a friendship? None =         1   S12 Your day to day work? None =         1       H1 Overall, in the past 30 days, how many days were these difficulties present? Record number of days 1    H2 In the past 30 days, for how many days were you totally unable to carry out  your usual activities or work because of any health condition? Record number of days  0    H3 In the past 30 days, not counting the days that you were totally unable, for how many days did you cut back or reduce your usual activities or work because of any health condition? Record number of days 0      Goals  1. Education- the stages of grief  a. Client will be able to describe the stages of grief; denial, bargaining, anger, depression, and acceptance and give examples of how each have felt for her.  2. Behavioral Activation  a. Client will learn to assess their emotional stateon a day to day basis  b. Client will Identify two forms of exercise/activity and engage in them 3 times per week  c. Client will Identify 3 things that make them laugh, and engage in these 5 times per week.  d. Client will Identify 1-2Creative activities or hobbies  and engage in them 2 times per week  e. Client will identify music, movies, books that make them feel good and use them 3-4 times per week  3. Self-care  a. Client will identify 5 things they can do just for themselves  b. Client will take time for quiet, reflection, meditation 5 times per week  c. Client will Learn to set boundaries when appropriate  d. Client will Identify 2 individuals they can call on for support, distraction  4. Assessment of progress  a. Client will engage in assessment of their progress on a regular basis      Client has reviewed and agreed to the above plan.      Sam Jaime  4/9/2018

## 2018-10-19 NOTE — PROGRESS NOTES
Clinic Care Coordination Contact    Clinic Care Coordination Contact  OUTREACH    Referral Information:  Referral Source: PCP    Primary Diagnosis: Psychosocial    Chief Complaint   Patient presents with     Clinic Care Coordination - Follow-up             Universal Utilization: missed an appointment Monday and will reschedule  Clinic Utilization  Difficulty keeping appointments:: Yes (forgets to attend even when on calendar)  Compliance Concerns: No  No-Show Concerns: Yes  No PCP office visit in Past Year: No  Utilization    Last refreshed: 10/16/2018  1:35 AM:  No Show Count (past year) 6       Last refreshed: 10/16/2018  1:35 AM:  ED visits 0       Last refreshed: 10/16/2018  1:35 AM:  Hospital admissions 0          Current as of: 10/16/2018  1:35 AM             Clinical Concerns:  Current Medical Concerns:  Pt said she had the flu and was getting over that.  She had no other concerns at this time.    Current Behavioral Concerns: pt said she is doing better with her depression.  There have been a few changes that help her out.     Education Provided to patient: her son who had threatened her/family has moved out of state and called her to say he is trying to get his life back on track.  She is sleeping a little better with the music/sound from her phone vs tv.  She is seeing counseling and is going to reschedule with PCP/FP for medication check.  She has not decided on what kind of support group she would liek to attend.  She feels she is getting good support through her counselor and family.   Pain  Pain (GOAL):: Yes  Type: Chronic (>3mo)  Location of chronic pain:: legs  Progression: Improving  Chronic pain severity:: 1  Limitation of routine activities due to chronic pain:: No  Alleviating Factors: Exercise, Stretching  Aggravating Factors: Positioning, Inactivity  Health Maintenance Reviewed: Due/Overdue   Health Maintenance Due   Topic Date Due     URINE DRUG SCREEN Q1 YR  12/03/1960     EYE EXAM Q1 YEAR   05/23/2017       Clinical Pathway: None    Medication Management:  No concerns.  Her medication prescribed by neurologist for restless legs helps but not enough.  Encouraged her to [place] back with the provider with this feedback.                                                                Functional Status:  Dependent ADLs:: Independent  Dependent IADLs::  (sister does help with cooking and shopping)  Bed or wheelchair confined:: No  Mobility Status: Independent  Fallen 2 or more times in the past year?: No  Any fall with injury in the past year?: No    Living Situation:  Current living arrangement:: I live in a private home with family  Type of residence:: Private home - stairs    Diet/Exercise/Sleep:  Diet:: Diabetic diet  Inadequate nutrition (GOAL):: No  Food Insecurity: No  Tube Feeding: No  Exercise:: Currently not exercising  Inadequate activity/exercise (GOAL):: Yes  Significant changes in sleep pattern (GOAL): Yes    Transportation:  Transportation concerns (GOAL):: No  Transportation means:: Regular car, Family     Psychosocial:  Episcopalian or spiritual beliefs that impact treatment:: No  Mental health DX:: Yes  Mental health DX how managed:: Medication, Outpatient Counseling  Mental health management concern (GOAL):: Yes  Informal Support system:: Family     Financial/Insurance:   Financial/Insurance concerns (GOAL):: No  Pt reporting that she is doing good, other then having the flu.  She has no concerns at this time.      Resources and Interventions:  Current Resources:   n/a     Supplies used at home:: Diabetic Supplies, Incontinence Supplies, Hearing Aid Batteries  Equipment Currently Used at Home: other (see comments) (walk in bath tub)    Advance Care Plan/Directive  Advanced Care Plans/Directives on file:: No  Type Advanced Care Plans/Directives:  (said she has one and will bring in a copy)          Goals:   Goals        General    #1 Mental Health Management (pt-stated)     Notes - Note created   9/18/2018 10:46 AM by Sara Mratínez LSW    Goal Statement: I will review resources and choose what support group sounds best in the next month.  Measure of Success: I will have a group to check out.  Supportive Steps to Achieve: resources being sent by [facility]  Barriers: talking at times to others is a struggle  Strengths: understand the benefits that a support group will give  Date to Achieve By: 12-31-18  Patient expressed understanding of goal: yes             Lifestyle    #2 Patient achieves sleep pattern objective     Notes - Note created  9/18/2018 10:50 AM by Sara Martínez LSW    Goal Statement: I will look into alternative options for noise in the bedroom instead of the TV.  Measure of Success: better sleep.   Supportive Steps to Achieve: looking into options  Barriers: cost   Strengths: understands the concept good sleep hygiene   Date to Achieve By: 12-31-18  Patient expressed understanding of goal: yes                  Patient/Caregiver understanding: pt to continue to see what Support group she would like to join when needed.        Future Appointments              In 3 days [place] [facility]    In 2 weeks [place] [facility]          Plan: Pt to continue to work on her sleep.  Pt to talk with provider about her restless legs. SW to call in about one month.     LEYDA Mojica, Clinic Care Coordinator 10/19/2018   3:12 PM  [phone]

## 2018-10-22 ENCOUNTER — OFFICE VISIT (OUTPATIENT)
Dept: PSYCHOLOGY | Facility: CLINIC | Age: 73
End: 2018-10-22
Payer: COMMERCIAL

## 2018-10-22 DIAGNOSIS — F32.0 MILD MAJOR DEPRESSION (H): Primary | ICD-10-CM

## 2018-10-22 DIAGNOSIS — F43.21 GRIEF: ICD-10-CM

## 2018-10-22 PROCEDURE — 90834 PSYTX W PT 45 MINUTES: CPT | Performed by: PSYCHOLOGIST

## 2018-10-22 NOTE — Clinical Note
KENYA.  She is seeing some shadows moving, which is atypical for her.  She did note a couple of episodes of visual hallucination many years ago around the birth of her children.  Do not know if this is related to recent stresses, drug interactions, or possibly REM deprivation.

## 2018-10-22 NOTE — MR AVS SNAPSHOT
MRN:[id number]                      After Visit Summary   10/22/2018    Theresa Bill    MRN: [id number]           Visit Information        Provider Department      10/22/2018 10:30 AM Sam Jaime [facility] Generic      Your next 10 appointments already scheduled     Oct 24, 2018  9:40 AM CDT   SHORT with NICHELLE Soler CNP   [facility]    [address] [place] [id number]   [phone]            Nov 07, 2018  8:30 AM CST   Return Visit with Sam Jaime   [facility] ([facility])    [address] [facility] [address]   [phone]            Nov 26, 2018  9:30 AM CST   Return Visit with Sam Jaime   [facility] ([facility])    [address] [address]   [phone]              Care EveryWhere ID     This is your Care EveryWhere ID. This could be used by other organizations to access your [facility] medical records  [id number]        Equal Access to Services     SABAS VUONG : Hadii lamonte hall Sodanielle, waaxda luqadaha, qaybta kaalmada adecris, ángel oconnor . So [facility] [phone].    ATENCIÓN: Si habla español, tiene a urban disposición servicios gratuitos de asistencia lingüística. [facility] [phone].    We comply with applicable federal civil rights laws and [place] laws. We do not discriminate on the basis of race, color, national origin, age, disability, sex, sexual orientation, or gender identity.

## 2018-10-23 ASSESSMENT — ANXIETY QUESTIONNAIRES
7. FEELING AFRAID AS IF SOMETHING AWFUL MIGHT HAPPEN: NEARLY EVERY DAY
4. TROUBLE RELAXING: NOT AT ALL
2. NOT BEING ABLE TO STOP OR CONTROL WORRYING: NEARLY EVERY DAY
5. BEING SO RESTLESS THAT IT IS HARD TO SIT STILL: NOT AT ALL
GAD7 TOTAL SCORE: 7
6. BECOMING EASILY ANNOYED OR IRRITABLE: NOT AT ALL
3. WORRYING TOO MUCH ABOUT DIFFERENT THINGS: NOT AT ALL
1. FEELING NERVOUS, ANXIOUS, OR ON EDGE: SEVERAL DAYS

## 2018-10-23 NOTE — PROGRESS NOTES
Progress Note  Disclaimer: This note consists of symbols derived from keyboarding, dictation and/or voice recognition software. As a result, there may be errors in the script that have gone undetected. Please consider this when interpreting information found in this chart.    Client Name: Theresa Bill  Date: 10/22/2018         Service Type: Individual      Session Start Time: 10:30 AM  session End Time: 11:15 AM      Session Length: 45     Session #: 20     Attendees: Client attended alone    Treatment Plan Last Reviewed:  4/9/2018       DATA   Client reports she found out that her youngest son, the one she was worried about doing drugs and stealing around her place is currently living in [place] and doing construction.  Client stated she was still worried about his drug use but glad that he is a little more on his feet.  She is wondering if he has violated probation as a result of leaving the state.  Client reported she was by herself for a while Friday night and felt okay, it did not bother her.  Client reported she has been seeing shadows around the house moving.  Moving in ways that shadows typically do not.  When queried about her history further, client noted that she had had some similar experiences in the past.  After she had her twins many years ago she felt as if someone was at the foot of her bed while she had her eyes closed.  She also reported that on one occasion many years ago she saw multiple spiders coming out of the ceiling.  She does not appear to be responding to any internal stimuli in our sessions.  Note to primary physician.   Progress Since Last Session (Related to Symptoms / Goals / Homework):   Symptoms: Stable    Homework: not applicable      Episode of Care Goals: Satisfactory progress - ACTION (Actively working towards change); Intervened by reinforcing change plan / affirming steps taken     Current / Ongoing Stressors and  Concerns:   Recent bereavement, prior caregiver stress, son with serious drug addiction.     Treatment Objective(s) Addressed in This Session:   increase understanding of steps in the grief process  Utilize family support, client has order for protection, be sure to communicate with other family members every day     Intervention:   Interpersonal Therapy: facilitating appropriate expressions of emotion and grief.  12 step facilitation for family member  ASSESSMENT: Current Emotional / Mental Status (status of significant symptoms):   Risk status (Self / Other harm or suicidal ideation)   Client denies current fears or concerns for personal safety.   Client denies current or recent suicidal ideation or behaviors.   Client denies current or recent homicidal ideation or behaviors.   Client denies current or recent self injurious behavior or ideation.   Client denies other safety concerns.   A safety and risk management plan has not been developed at this time, however client was given the after-hours number / 911 should there be a change in any of these risk factors.     Appearance:   Appropriate    Eye Contact:   Good    Psychomotor Behavior: Normal    Attitude:   Cooperative    Orientation:   All   Speech    Rate / Production: Slow     Volume:  Soft    Mood:    Depressed    Affect:    Appropriate    Thought Content:  Clear  Possible hallucinations    Thought Form:  Coherent  Logical    Insight:    Fair      Medication Review:   No changes to current psychiatric medication(s)     Medication Compliance:   Yes     Changes in Health Issues:   None reported     Chemical Use Review:   Substance Use: Chemical use reviewed, no active concerns identified      Tobacco Use: No current tobacco use.       Collateral Reports Completed:   Not Applicable    PLAN: (Client Tasks / Therapist Tasks / Other)  Client to continue with self-care, keeping herself safe, and not enabling her son's drug use.  Therapist to forward note to  primary physician.    Sam Jaime                                                         ________________________________________________________________________    Treatment Plan    Client's Name: Theresa Bill  YOB: 1945    Date: 4/9/2018    Referral / Collaboration:  Referral to another professional/service is not indicated at this time..    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 Health conditions                   include                        disease or illnesses, other health problems that may be short or long lasting,                    injuries, mental health or emotional problems, and problems with alcohol or drugs.                              Think back over the past 30 days and answer these questions, thinking about how much              difficulty you had doing the following activities. For each question, please [place] only                   one response.      S1 Standing for long periods such as 30 minutes? Moderate =   3   S2 Taking care of household responsibilities? None =         1   S3 Learning a new task, for example, learning how to get to a new place? Mild =           2   S4 How much of a problem do you have joining community activities (for example, festivals, Yarsanism or other activities) in the same way as anyone else can? None =         1   S5 How much have you been emotionally affected by your health problems? None =         1               In the past 30 days, how much difficulty did you have in:   S6 Concentrating on doing something for ten minutes? None =         1   S7 Walking a long distance such  as a kilometer (or equivalent)? Moderate =   3   S8 Washing your whole body? None =         1   S9 Getting dressed? None =         1   S10 Dealing with people you do not know? None =         1   S11 Maintaining a friendship? None =         1   S12 Your day to day work? None =         1       H1 Overall, in the past 30 days, how many days were these difficulties present? Record number of days 1    H2 In the past 30 days, for how many days were you totally unable to carry out your usual activities or work because of any health condition? Record number of days  0    H3 In the past 30 days, not counting the days that you were totally unable, for how many days did you cut back or reduce your usual activities or work because of any health condition? Record number of days 0      Goals  1. Education- the stages of grief  a. Client will be able to describe the stages of grief; denial, bargaining, anger, depression, and acceptance and give examples of how each have felt for her.  2. Behavioral Activation  a. Client will learn to assess their emotional stateon a day to day basis  b. Client will Identify two forms of exercise/activity and engage in them 3 times per week  c. Client will Identify 3 things that make them laugh, and engage in these 5 times per week.  d. Client will Identify 1-2Creative activities or hobbies  and engage in them 2 times per week  e. Client will identify music, movies, books that make them feel good and use them 3-4 times per week  3. Self-care  a. Client will identify 5 things they can do just for themselves  b. Client will take time for quiet, reflection, meditation 5 times per week  c. Client will Learn to set boundaries when appropriate  d. Client will Identify 2 individuals they can call on for support, distraction  4. Assessment of progress  a. Client will engage in assessment of their progress on a regular basis      Client has reviewed and agreed to the above plan.      Sam MEDINA  [place]  4/9/2018

## 2018-10-24 ENCOUNTER — OFFICE VISIT (OUTPATIENT)
Dept: FAMILY MEDICINE | Facility: CLINIC | Age: 73
End: 2018-10-24
Payer: COMMERCIAL

## 2018-10-24 VITALS
BODY MASS INDEX: 32.47 KG/M2 | TEMPERATURE: 97.8 F | SYSTOLIC BLOOD PRESSURE: 126 MMHG | HEIGHT: 61 IN | RESPIRATION RATE: 18 BRPM | DIASTOLIC BLOOD PRESSURE: 80 MMHG | WEIGHT: 172 LBS | HEART RATE: 92 BPM

## 2018-10-24 DIAGNOSIS — F41.9 ANXIETY: ICD-10-CM

## 2018-10-24 DIAGNOSIS — F33.0 MILD RECURRENT MAJOR DEPRESSION (H): Primary | ICD-10-CM

## 2018-10-24 PROCEDURE — 99214 OFFICE O/P EST MOD 30 MIN: CPT | Performed by: NURSE PRACTITIONER

## 2018-10-24 ASSESSMENT — ANXIETY QUESTIONNAIRES
3. WORRYING TOO MUCH ABOUT DIFFERENT THINGS: NOT AT ALL
7. FEELING AFRAID AS IF SOMETHING AWFUL MIGHT HAPPEN: NEARLY EVERY DAY
1. FEELING NERVOUS, ANXIOUS, OR ON EDGE: SEVERAL DAYS
GAD7 TOTAL SCORE: 6
2. NOT BEING ABLE TO STOP OR CONTROL WORRYING: SEVERAL DAYS
GAD7 TOTAL SCORE: 7
GAD7 TOTAL SCORE: 6
4. TROUBLE RELAXING: NOT AT ALL
5. BEING SO RESTLESS THAT IT IS HARD TO SIT STILL: SEVERAL DAYS
6. BECOMING EASILY ANNOYED OR IRRITABLE: NOT AT ALL
7. FEELING AFRAID AS IF SOMETHING AWFUL MIGHT HAPPEN: NEARLY EVERY DAY
GAD7 TOTAL SCORE: 6

## 2018-10-24 ASSESSMENT — PATIENT HEALTH QUESTIONNAIRE - PHQ9
SUM OF ALL RESPONSES TO PHQ QUESTIONS 1-9: 4
10. IF YOU CHECKED OFF ANY PROBLEMS, HOW DIFFICULT HAVE THESE PROBLEMS MADE IT FOR YOU TO DO YOUR WORK, TAKE CARE OF THINGS AT HOME, OR GET ALONG WITH OTHER PEOPLE: NOT DIFFICULT AT ALL

## 2018-10-24 NOTE — MR AVS SNAPSHOT
After Visit Summary   10/24/2018    Theresa Bill    MRN: [id number]           Patient Information     Date Of Birth          1945        Visit Information        Provider Department      10/24/2018 9:40 AM Sanam Ansari APRN [facility]        Today's Diagnoses     Mild recurrent major depression (H)    -  1    Anxiety          Care Instructions    Cymbalta take 1/2 tablet for 2 weeks then discontinue   Continue your Wellbutrin at 300 mg daily then follow-up in 1 month for recheck and evaluation to decrease dose.       Anxiety Reaction  Anxiety is the feeling we all get when we think something bad might happen. It is a normal response to stress and usually causes only a mild reaction. When anxiety becomes more severe, it can interfere with daily life. In some cases, you may not even be aware of what it is you re anxious about. There may also be a genetic link or it may be a learned behavior in the home.  Both psychological and physical triggers cause stress reaction. It's often a response to fear or emotional stress, real or imagined. This stress may come from home, family, work, or social relationships.  During an anxiety reaction, you may feel:    Helpless    Nervous    Depressed    Irritable  Your body may show signs of anxiety in many ways. You may experience:    Dry mouth    Shakiness    Dizziness    Weakness    Trouble breathing    Breathing fast (hyperventilating)    Chest pressure    Sweating    Headache    Nausea    Diarrhea    Tiredness    Inability to sleep    Sexual problems  Home care    Try to locate the sources of stress in your life. They may not be obvious. These may include:  ? Daily hassles of life (such as traffic jams, missed appointments, or car troubles)  ? Major life changes, both good (new baby or job promotion) and bad (loss of job or loss of loved one)  ? Overload: feeling that you have too many responsibilities and can't take care of all of  them at once  ? Feeling helpless or feeling that your problems are beyond what you re able to solve    Notice how your body reacts to stress. Learn to listen to your body signals. This will help you take action before the stress becomes severe.    When you can, do something about the source of your stress. (Avoid hassles, limit the amount of change that happens in your life at one time and take a break when you feel overloaded).    Unfortunately, many stressful situations can't be avoided. It is necessary to learn how to better manage stress. There are many proven methods that will reduce your anxiety. These include simple things like exercise, good nutrition, and adequate rest. Also, there are certain techniques that are helpful:  ? Relaxation  ? Breathing exercises  ? Visualization  ? Biofeedback  ? Meditation  For more information about this, consult your healthcare provider or go to a local bookstore and review the many books and tapes available on this subject.  Follow-up care  If you feel that your anxiety is not responding to self-help measures, contact your healthcare provider or make an appointment with a counselor. You may need short-term psychological counseling and temporary medicine to help you manage stress.  Call 911  Call 911 if any of these happen:    Trouble breathing    Confusion    Drowsiness or trouble wakening    Fainting or loss of consciousness    Rapid heart rate    Seizure    New chest pain that becomes more severe, lasts longer, or spreads into your shoulder, arm, neck, jaw, or back  When to seek medical advice  Call your healthcare provider right away if any of these happen:    Your symptoms get worse    Severe headache not relieved by rest and mild pain reliever  Date Last Reviewed: 10/1/2017    3672-8799 [employer]. [address] [place] [address], [place] [address]. All rights reserved. This information is not intended as a substitute for professional medical care. Always follow  your healthcare professional's instructions.        Depression  Depression is one of the most common mental health problems today. It is not just a state of unhappiness or sadness. It is a true disease. The cause seems to be related to a decrease in chemicals that transmit signals in the brain. Having a family history of depression, alcoholism, or suicide increases the risk. Chronic illness, chronic pain, migraine headaches, and high emotional stress also increase the risk.  Depression is something we tend to recognize in others, but may have a hard time seeing in ourselves. It can show in many physical and emotional ways:    Loss of appetite    Overeating    Not being able to sleep    Sleeping too much    Tiredness not related to physical exertion    Restlessness or irritability    Slowness of movement or speech    Feeling depressed or withdrawn    Loss of interest in things you once enjoyed    Trouble concentrating, poor memory, trouble making decisions    Thoughts of harming or killing oneself, or thoughts that life is not worth living    Low self-esteem  The treatment for depression may include both medicine and psychotherapy. Antidepressants can reduce suffering and can improve the ability to function during the depressed period. Therapy can offer emotional support and help you understand emotional factors that may be causing the depression.  Home care    Ongoing care and support help people manage this disease. Find a healthcare provider and therapist who meet your needs. Seek help when you feel like you may be getting ill.    Be kind to yourself. Make it a point to do things that you enjoy (gardening, walking in nature, going to a movie). Reward yourself for small successes.    Take care of your physical body. Eat a balanced diet (low in saturated fat and high in fruits and vegetables). Exercise at least 3 times a week for 30 minutes. Even mild-moderate exercise (like brisk walking) can make you feel  better.    Don't drink alcohol, which can make depression worse.    Take medicine as prescribed.    Tell each of your healthcare providers about all of the prescription and over-the-counter medicines, vitamins, and supplements you take. Certain supplements interact with medicines and can result in dangerous side effects. Ask your pharmacist when you have questions about medicine interactions.    Talk with your family and trusted friends about your feelings and thoughts. Ask them to help you recognize behavior changes early so you can get help and, if needed, medicine can be adjusted.  Follow-up care  Follow up with your healthcare provider, or as advised.  Call 911  Call 911 if you:    Have suicidal thoughts, a suicide plan, and the means to carry out the plan; or serious thoughts of hurting someone else     Have trouble breathing    Are very confused    Feel very drowsy or have trouble awakening    Faint or lose consciousness    Have new chest pain that becomes more severe, lasts longer, or spreads into your shoulder, arm, neck, jaw, or back  When to seek medical advice  Call your healthcare provider right away if any of these happen:    Feeling extreme depression, fear, anxiety, or anger toward yourself or others    Feeling out of control    Feeling that you may try to harm yourself or another    Hearing voices that others do not hear    Seeing things that others do not see    Can t sleep or eat for 3 days in a row    Friends or family express concern over your behavior and ask you to seek help  Date Last Reviewed: 10/1/2017    7374-5993 [employer]. [address] [place] [address], [place] [address]. All rights reserved. This information is not intended as a substitute for professional medical care. Always follow your healthcare professional's instructions.                Follow-ups after your visit        Your next 10 appointments already scheduled     Nov 07, 2018  8:30 AM CST   Return Visit with Sam WATSON  "Addison   [facility] ([facility])    [address]  [facility] [address]   [phone]            Nov 26, 2018  9:30 AM CST   Return Visit with Sam Jaime   [facility] ([facility])    [address] [facility] [address]   [phone]              Who to contact     If you have questions or need follow up information about today's clinic visit or your schedule please contact [facility] directly at [phone].  Normal or non-critical lab and imaging results will be communicated to you by MyChart, letter or phone within 4 business days after the clinic has received the results. If you do not hear from us within 7 days, please contact the clinic through MyChart or phone. If you have a critical or abnormal lab result, we will notify you by phone as soon as possible.  Submit refill requests through [employer] or call your pharmacy and they will forward the refill request to us. Please allow 3 business days for your refill to be completed.          Additional Information About Your Visit        Care EveryWhere ID     This is your Care EveryWhere ID. This could be used by other organizations to access your [place] medical records  [id number]        Your Vitals Were     Pulse Temperature Respirations Height BMI (Body Mass Index)       92 97.8  F (36.6  C) (Tympanic) 18 5' 1\" (1.549 m) 32.5 kg/m2        Blood Pressure from Last 3 Encounters:   10/24/18 126/80   09/20/18 116/74   09/17/18 124/72    Weight from Last 3 Encounters:   10/24/18 172 lb (78 kg)   09/20/18 171 lb (77.6 kg)   09/17/18 171 lb (77.6 kg)              Today, you had the following     No orders found for display         Today's Medication Changes          These changes are accurate as of 10/24/18 10:47 AM.  If you have any questions, ask your nurse or doctor.               These medicines have changed or have updated prescriptions.        Dose/Directions "    blood glucose monitoring test strip   Commonly known as:  no brand specified   This may have changed:  additional instructions   Used for:  Type 2 diabetes mellitus without complication, with long-term current use of insulin (H)        Dose:  1 strip   1 strip by In Vitro route 3 times daily Has a Freestyle   Quantity:  100 each   Refills:  12         Stop taking these medicines if you haven't already. Please contact your care team if you have questions.     oxybutynin chloride 15 MG Tb24   Stopped by:  Sanam Ansari APRN CNP                    Primary Care Provider Office Phone # Fax #    Shae Pierre PA-C [phone] [address] [place] [address]        Goals        General    #1 Mental Health Management (pt-stated)     Notes - Note created  9/18/2018 10:46 AM by Sara Martínez LSW    Goal Statement: I will review resources and choose what support group sounds best in the next month.  Measure of Success: I will have a group to check out.  Supportive Steps to Achieve: resources being sent by   Barriers: talking at times to others is a struggle  Strengths: understand the benefits that a support group will give  Date to Achieve By: 12-31-18  Patient expressed understanding of goal: yes             Lifestyle    #2 Patient achieves sleep pattern objective     Notes - Note created  9/18/2018 10:50 AM by Sara Martínez LSW    Goal Statement: I will look into alternative options for noise in the bedroom instead of the TV.  Measure of Success: better sleep.   Supportive Steps to Achieve: looking into options  Barriers: cost   Strengths: understands the concept good sleep hygiene   Date to Achieve By: 12-31-18  Patient expressed understanding of goal: yes            Equal Access to Services     [facility] YEVGENIY : Hadii lamonte hall Sodanielle, waaxda luqadaha, qaybta kaalmaángel brito. So [facility] [phone].    ATENCIÓN: Christiano llanos  español, tiene a urban disposición servicios gratuitos de asistencia lingüística. Elaina jain [phone].    We comply with applicable federal civil rights laws and [place] laws. We do not discriminate on the basis of race, color, national origin, age, disability, sex, sexual orientation, or gender identity.            Thank you!     Thank you for choosing [facility]  for your care. Our goal is always to provide you with excellent care. Hearing back from our patients is one way we can continue to improve our services. Please take a few minutes to complete the written survey that you may receive in the mail after your visit with us. Thank you!             Your Updated Medication List - Protect others around you: Learn how to safely use, store and throw away your medicines at www.disposemymeds.org.          This list is accurate as of 10/24/18 10:47 AM.  Always use your most recent med list.                   Brand Name Dispense Instructions for use Diagnosis    ACE/ARB/ARNI NOT PRESCRIBED (INTENTIONAL)      ACE & ARB not prescribed due to Intolerance-cough    Type 2 diabetes, HbA1c goal < 7% (H)       ACETAMINOPHEN PO           aspirin 81 MG tablet     100    ONE DAILY    Type II or unspecified type diabetes mellitus without mention of complication, not stated as uncontrolled       atorvastatin 40 MG tablet    LIPITOR    90 tablet    Take 1 tablet (40 mg) by mouth At Bedtime    Dyslipidemia       blood glucose monitoring test strip    no brand specified    100 each    1 strip by In Vitro route 3 times daily Has a Freestyle    Type 2 diabetes mellitus without complication, with long-term current use of insulin (H)       buPROPion 300 MG 24 hr tablet    WELLBUTRIN XL    90 tablet    Take 1 tablet (300 mg) by mouth every morning    Mild recurrent major depression (H)       clonazePAM 0.5 MG tablet    klonoPIN     Taking 1 tab as needed.  As of 6/11, has taken 1 tab in past 2 weeks.  Prescribed by  neurology for RLS - previously severe.    Restless legs syndrome (RLS)       DAILY MULTIVITAMIN PO      One tablet daily        DULoxetine 60 MG EC capsule    CYMBALTA    90 capsule    TAKE ONE CAPSULE BY MOUTH ONCE DAILY    Major depressive disorder, recurrent episode, mild (H)       fluocinonide 0.05 % ointment    LIDEX    15 g    Apply sparingly to affected area twice daily as needed for mouth ulcer.    Aphthae, oral       insulin detemir 100 UNIT/ML injection    LEVEMIR FLEXPEN/FLEXTOUCH    15 mL    Inject 14 Units Subcutaneous daily Increase by 2 units every 3 days if sugars 2 hours after eating are above 180 (only if morning sugars above 110).    Type 2 diabetes mellitus without complication, with long-term current use of insulin (H)       insulin pen needle 32G X 4 MM    BD MARIIA U/F    90 each    Use one pen daily    Type 2 diabetes mellitus without complication, with long-term current use of insulin (H)       ketoconazole 2 % cream    NIZORAL    30 g    Apply topically 2 times daily To corners of lips for 2 weeks or as needed    Angular cheilitis       LANsoprazole 15 MG CR capsule    PREVACID    180 capsule    Take 1 capsule (15 mg) by mouth 2 times daily    LPRD (laryngopharyngeal reflux disease)       losartan 25 MG tablet    COZAAR    90 tablet    Take 1 tablet (25 mg) by mouth daily    Essential hypertension       metFORMIN 500 MG 24 hr tablet    GLUCOPHAGE-XR    180 tablet    TAKE ONE TABLET BY MOUTH TWICE DAILY WITH MEALS    Type 2 diabetes mellitus without complication, with long-term current use of insulin (H)       nitroGLYcerin 0.4 MG sublingual tablet    NITROSTAT    25 tablet    For chest pain place 1 tablet under the tongue every 5 minutes for 3 doses. If symptoms persist 5 minutes after 1st dose call 911.    Chest pressure       order for DME      Equipment being ordered: CPAP Patient Theresa BOSTON Fly received a Coub Airsense 10  Auto. Pressures were set at Auto 10 - 15 cm H2O.         pantoprazole 40 MG EC tablet    PROTONIX    90 tablet    Take 1 tablet (40 mg) by mouth daily    Gastroesophageal reflux disease, esophagitis presence not specified       PROBIOTIC & ACIDOPHILUS EX ST Caps           traMADol 50 MG tablet    ULTRAM    30 tablet    TAKE ONE TABLET BY MOUTH EVERY 6 HOURS AS NEEDED FOR  MODERATE  PAIN    Radicular leg pain

## 2018-10-24 NOTE — PATIENT INSTRUCTIONS
Cymbalta take 1/2 tablet for 2 weeks then discontinue   Continue your Wellbutrin at 300 mg daily then follow-up in 1 month for recheck and evaluation to decrease dose.       Anxiety Reaction  Anxiety is the feeling we all get when we think something bad might happen. It is a normal response to stress and usually causes only a mild reaction. When anxiety becomes more severe, it can interfere with daily life. In some cases, you may not even be aware of what it is you re anxious about. There may also be a genetic link or it may be a learned behavior in the home.  Both psychological and physical triggers cause stress reaction. It's often a response to fear or emotional stress, real or imagined. This stress may come from home, family, work, or social relationships.  During an anxiety reaction, you may feel:    Helpless    Nervous    Depressed    Irritable  Your body may show signs of anxiety in many ways. You may experience:    Dry mouth    Shakiness    Dizziness    Weakness    Trouble breathing    Breathing fast (hyperventilating)    Chest pressure    Sweating    Headache    Nausea    Diarrhea    Tiredness    Inability to sleep    Sexual problems  Home care    Try to locate the sources of stress in your life. They may not be obvious. These may include:  ? Daily hassles of life (such as traffic jams, missed appointments, or car troubles)  ? Major life changes, both good (new baby or job promotion) and bad (loss of job or loss of loved one)  ? Overload: feeling that you have too many responsibilities and can't take care of all of them at once  ? Feeling helpless or feeling that your problems are beyond what you re able to solve    Notice how your body reacts to stress. Learn to listen to your body signals. This will help you take action before the stress becomes severe.    When you can, do something about the source of your stress. (Avoid hassles, limit the amount of change that happens in your life at one time and take a  break when you feel overloaded).    Unfortunately, many stressful situations can't be avoided. It is necessary to learn how to better manage stress. There are many proven methods that will reduce your anxiety. These include simple things like exercise, good nutrition, and adequate rest. Also, there are certain techniques that are helpful:  ? Relaxation  ? Breathing exercises  ? Visualization  ? Biofeedback  ? Meditation  For more information about this, consult your healthcare provider or go to a local bookstore and review the many books and tapes available on this subject.  Follow-up care  If you feel that your anxiety is not responding to self-help measures, contact your healthcare provider or make an appointment with a counselor. You may need short-term psychological counseling and temporary medicine to help you manage stress.  Call 911  Call 911 if any of these happen:    Trouble breathing    Confusion    Drowsiness or trouble wakening    Fainting or loss of consciousness    Rapid heart rate    Seizure    New chest pain that becomes more severe, lasts longer, or spreads into your shoulder, arm, neck, jaw, or back  When to seek medical advice  Call your healthcare provider right away if any of these happen:    Your symptoms get worse    Severe headache not relieved by rest and mild pain reliever  Date Last Reviewed: 10/1/2017    5274-8040 [employer]. [address] [place] [address], [place] [address]. All rights reserved. This information is not intended as a substitute for professional medical care. Always follow your healthcare professional's instructions.        Depression  Depression is one of the most common mental health problems today. It is not just a state of unhappiness or sadness. It is a true disease. The cause seems to be related to a decrease in chemicals that transmit signals in the brain. Having a family history of depression, alcoholism, or suicide increases the risk. Chronic illness,  chronic pain, migraine headaches, and high emotional stress also increase the risk.  Depression is something we tend to recognize in others, but may have a hard time seeing in ourselves. It can show in many physical and emotional ways:    Loss of appetite    Overeating    Not being able to sleep    Sleeping too much    Tiredness not related to physical exertion    Restlessness or irritability    Slowness of movement or speech    Feeling depressed or withdrawn    Loss of interest in things you once enjoyed    Trouble concentrating, poor memory, trouble making decisions    Thoughts of harming or killing oneself, or thoughts that life is not worth living    Low self-esteem  The treatment for depression may include both medicine and psychotherapy. Antidepressants can reduce suffering and can improve the ability to function during the depressed period. Therapy can offer emotional support and help you understand emotional factors that may be causing the depression.  Home care    Ongoing care and support help people manage this disease. Find a healthcare provider and therapist who meet your needs. Seek help when you feel like you may be getting ill.    Be kind to yourself. Make it a point to do things that you enjoy (gardening, walking in nature, going to a movie). Reward yourself for small successes.    Take care of your physical body. Eat a balanced diet (low in saturated fat and high in fruits and vegetables). Exercise at least 3 times a week for 30 minutes. Even mild-moderate exercise (like brisk walking) can make you feel better.    Don't drink alcohol, which can make depression worse.    Take medicine as prescribed.    Tell each of your healthcare providers about all of the prescription and over-the-counter medicines, vitamins, and supplements you take. Certain supplements interact with medicines and can result in dangerous side effects. Ask your pharmacist when you have questions about medicine interactions.    Talk  with your family and trusted friends about your feelings and thoughts. Ask them to help you recognize behavior changes early so you can get help and, if needed, medicine can be adjusted.  Follow-up care  Follow up with your healthcare provider, or as advised.  Call 911  Call 911 if you:    Have suicidal thoughts, a suicide plan, and the means to carry out the plan; or serious thoughts of hurting someone else     Have trouble breathing    Are very confused    Feel very drowsy or have trouble awakening    Faint or lose consciousness    Have new chest pain that becomes more severe, lasts longer, or spreads into your shoulder, arm, neck, jaw, or back  When to seek medical advice  Call your healthcare provider right away if any of these happen:    Feeling extreme depression, fear, anxiety, or anger toward yourself or others    Feeling out of control    Feeling that you may try to harm yourself or another    Hearing voices that others do not hear    Seeing things that others do not see    Can t sleep or eat for 3 days in a row    Friends or family express concern over your behavior and ask you to seek help  Date Last Reviewed: 10/1/2017    7174-9981 [employer]. [address] [place] [address]. All rights reserved. This information is not intended as a substitute for professional medical care. Always follow your healthcare professional's instructions.

## 2018-10-24 NOTE — PROGRESS NOTES
SUBJECTIVE:   Theresa Bill is a 72 year old female who presents to clinic today for the following health issues:    Depression and Anxiety Follow-Up    Status since last visit: Worsened    Other associated symptoms:None    Complicating factors:     Significant life event: Yes-  Issues with youngest son's mental heatlh     Current substance abuse: None    PHQ 10/11/2018 10/23/2018 10/24/2018   PHQ-9 Total Score 11 4 4   Q9: Suicide Ideation Not at all Not at all Not at all     GINNY-7 SCORE 10/11/2018 10/23/2018 10/24/2018   Total Score - - -   Total Score - - 6 (mild anxiety)   Total Score 13 7 6     In the past two weeks have you had thoughts of suicide or self-harm?  No.    Do you have concerns about your personal safety or the safety of others?   No  PHQ-9  English  PHQ-9   Any Language  GINNY-7  Suicide Assessment Five-step Evaluation and Treatment (SAFE-T)    Amount of exercise or physical activity: None    Problems taking medications regularly: No    Medication side effects: none    Diet: regular (no restrictions)            Problem list and histories reviewed & adjusted, as indicated.  Additional history: as documented    Patient Active Problem List   Diagnosis     Lumbago     Pain in limb     Mild major depression (H)     Essential hypertension     Type 2 diabetes mellitus with diabetic neuropathy (H)     Osteoarthritis     Mixed incontinence urge and stress (male)(female)     Restless legs     Microalbuminuria     Obesity     Esophageal reflux     GI bleed     Dyslipidemia     Iron deficiency anemia     Falls frequently     ACP (advance care planning)     SHANTI (obstructive sleep apnea)     Other chronic pain     LPRD (laryngopharyngeal reflux disease)     Personal history of fall     Anxiety     Past Surgical History:   Procedure Laterality Date     BUNIONECTOMY CRISELDA  7/1/2011    Procedure:BUNIONECTOMY CRISELDA; weil osteotomy & Lapidtus Bunionectomy; Surgeon:HYACINTH SANTO; Location:[facility]     BUNIONECTOMY  CHEVRON  4/6/2012    Procedure:BUNIONECTOMY CHEVRON; Lapidus bunionectomy, plantar plate repair second and third metatarsophalangeal joints,left foot-popliteal block left lower extremity; Surgeon:HYACINTH SANTO; Location:[facility]     C TOTAL ABDOM HYSTERECTOMY  1992    ovaries removed     COLONOSCOPY  9/11/2012    Procedure: COLONOSCOPY;  Colonoscopy;  Surgeon: Alyssa Foster MD;  Location: [facility]     COLONOSCOPY N/A 8/3/2018    Procedure: COLONOSCOPY;  colonoscopy;  Surgeon: Kelechi Rivera MD;  Location: [facility]     NISSEN FUNDOPLICATION      2003     REPAIR HAMMER TOE  7/1/2011    Procedure:REPAIR HAMMER TOE; hammertoe correction right 2nd digit; Surgeon:HYACINTH SANTO; Location:[facility]     SURGICAL HISTORY OF -   1979    Tubal     SURGICAL HISTORY OF -   1993    Stamey bladder surgery     SURGICAL HISTORY OF -       Lipoma removed f/back     SURGICAL HISTORY OF -   1995    (L) Foot surgery     SURGICAL HISTORY OF -   05/09/2002    Colonoscopy     SURGICAL HISTORY OF -       knee arthroscopy left     SURGICAL HISTORY OF -   2007-two phases    interstim bladder implant     SURGICAL HISTORY OF -   Sept. 13, 2007    laparoscopic Nissen fundoplication       Social History   Substance Use Topics     Smoking status: Never Smoker     Smokeless tobacco: Never Used     Alcohol use No     Family History   Problem Relation Age of Onset     Cancer Mother      brain cancer, ovarian cancer     Cancer Father      lung cancer     GASTROINTESTINAL DISEASE Father      colitis     Alcohol/Drug Brother      Alcohol/Drug Son      Alcohol/Drug Brother      GASTROINTESTINAL DISEASE Sister      Diabetes Sister      Cancer Sister      3 sisters .w ovarian cancer and one also with uterine cancer     Gynecology Son      kidney stones     HEART DISEASE Sister      MI age 64     GASTROINTESTINAL DISEASE Other      colitis/colitis/colitis/colitis         Current Outpatient Prescriptions   Medication Sig Dispense Refill      ACE/ARB NOT PRESCRIBED, INTENTIONAL, ACE & ARB not prescribed due to Intolerance-cough             ACETAMINOPHEN PO        ASPIRIN 81 MG OR TABS ONE DAILY 100 3     atorvastatin (LIPITOR) 40 MG tablet Take 1 tablet (40 mg) by mouth At Bedtime 90 tablet 3     blood glucose monitoring (NO BRAND SPECIFIED) test strip 1 strip by In Vitro route 3 times daily Has a Freestyle (Patient taking differently: 1 strip by In Vitro route 3 times daily Has a One Touch Ultra) 100 each 12     buPROPion (WELLBUTRIN XL) 300 MG 24 hr tablet Take 1 tablet (300 mg) by mouth every morning 90 tablet 3     clonazePAM (KLONOPIN) 0.5 MG tablet Taking 1 tab as needed.  As of 6/11, has taken 1 tab in past 2 weeks.  Prescribed by neurology for RLS - previously severe.  0     DULoxetine (CYMBALTA) 60 MG EC capsule TAKE ONE CAPSULE BY MOUTH ONCE DAILY 90 capsule 3     fluocinonide (LIDEX) 0.05 % ointment Apply sparingly to affected area twice daily as needed for mouth ulcer. 15 g 0     insulin detemir (LEVEMIR FLEXPEN/FLEXTOUCH) 100 UNIT/ML injection Inject 14 Units Subcutaneous daily Increase by 2 units every 3 days if sugars 2 hours after eating are above 180 (only if morning sugars above 110). 15 mL 3     insulin pen needle (BD MARIIA U/F) 32G X 4 MM Use one pen daily 90 each 0     ketoconazole (NIZORAL) 2 % cream Apply topically 2 times daily To corners of lips for 2 weeks or as needed 30 g 1     LANsoprazole (PREVACID) 15 MG CR capsule Take 1 capsule (15 mg) by mouth 2 times daily 180 capsule 3     losartan (COZAAR) 25 MG tablet Take 1 tablet (25 mg) by mouth daily 90 tablet 3     metFORMIN (GLUCOPHAGE-XR) 500 MG 24 hr tablet TAKE ONE TABLET BY MOUTH TWICE DAILY WITH MEALS 180 tablet 3     Multiple Vitamin (DAILY MULTIVITAMIN PO) One tablet daily       nitroGLYcerin (NITROSTAT) 0.4 MG sublingual tablet For chest pain place 1 tablet under the tongue every 5 minutes for 3 doses. If symptoms persist 5 minutes after 1st dose call 911. 25 tablet  0     order for DME Equipment being ordered: CPAP Patient Theresa Bill received a Resmed Airsense 10  Auto. Pressures were set at Auto 10 - 15 cm H2O.       pantoprazole (PROTONIX) 40 MG EC tablet Take 1 tablet (40 mg) by mouth daily 90 tablet 3     Probiotic Product (PROBIOTIC & ACIDOPHILUS EX ST) CAPS        traMADol (ULTRAM) 50 MG tablet TAKE ONE TABLET BY MOUTH EVERY 6 HOURS AS NEEDED FOR  MODERATE  PAIN 30 tablet 5     Allergies   Allergen Reactions     Lisinopril Cough     Nkda [No Known Drug Allergies]      Recent Labs   Lab Test  09/20/18   0951  09/17/18   0919  06/27/18   1110  03/14/18   1052   04/21/17   1020  06/29/16   1045  03/04/16   1153   02/17/16   1259   05/19/14   1027   A1C   --   7.1*  7.1*  7.1*   < >  6.3*  6.5*   --    < >   --    < >  6.6*   LDL   --    --    --   52   --   69  103*   --    --    --    < >   --    HDL   --    --    --   45*   --   59  47*   --    --    --    < >   --    TRIG   --    --    --   169*   --   113  174*   --    --    --    < >   --    ALT   --    --    --    --    --   21   --    --    --   31   --   30   CR  0.71   --    --   0.81   < >  0.94   --    --    --   0.85   < >  0.72   GFRESTIMATED  80   --    --   69   < >  58*   --    --    --   66   < >  80   GFRESTBLACK  >90   --    --   84   < >  71   --    --    --   80   < >  >90   POTASSIUM  4.2   --    --   4.4   < >  4.2   --    --    --   3.6   < >  3.9   TSH   --    --    --    --    --   3.65   --   4.08*   --    --    < >  3.58    < > = values in this interval not displayed.      BP Readings from Last 3 Encounters:   10/24/18 126/80   09/20/18 116/74   09/17/18 124/72    Wt Readings from Last 3 Encounters:   10/24/18 172 lb (78 kg)   09/20/18 171 lb (77.6 kg)   09/17/18 171 lb (77.6 kg)                    Reviewed and updated as needed this visit by clinical staff       Reviewed and updated as needed this visit by Provider         ROS:  Constitutional, HEENT, cardiovascular, pulmonary, gi and gu  "systems are negative, except as otherwise noted.    OBJECTIVE:     /80 (BP Location: Right arm, Cuff Size: Adult Regular)  Pulse 92  Temp 97.8  F (36.6  C) (Tympanic)  Resp 18  Ht 5' 1\" (1.549 m)  Wt 172 lb (78 kg)  BMI 32.5 kg/m2  Body mass index is 32.5 kg/(m^2).  GENERAL: healthy, alert and no distress  EYES: Eyes grossly normal to inspection, PERRL and conjunctivae and sclerae normal  HENT: ear canals and TM's normal, nose and mouth without ulcers or lesions  NECK: no adenopathy, no asymmetry, masses, or scars and thyroid normal to palpation  RESP: lungs clear to auscultation - no rales, rhonchi or wheezes  CV: regular rate and rhythm, normal S1 S2, no S3 or S4, no murmur, click or rub, no peripheral edema and peripheral pulses strong  ABDOMEN: soft, nontender, no hepatosplenomegaly, no masses and bowel sounds normal  MS: no gross musculoskeletal defects noted, no edema  SKIN: no suspicious lesions or rashes  NEURO: Normal strength and tone, mentation intact and speech normal  PSYCH: mentation appears normal, affect normal/bright        ASSESSMENT/PLAN:   (F33.0) Mild recurrent major depression (H)  (primary encounter diagnosis)  Comment: Patient has history of depression and anxiety is improving with current medications and patient would like to be weaned off her Cymbalta.    Plan: To take Cymbalta 30 mg for the next 2 weeks and then discontinue    (F41.9) Anxiety  Comment: Anxiety has decreased since her son has moved out of state last report he states he has been doing well  Plan: Continue with current medications and follow-up with psychiatry    Patient has noted she is seeing shadows to the psychiatrist and states that she sees them every now and then is not been taking the tramadol did not find the patient to be delusional.  At this point would recommend patient having an eye exam could be floaters that she is seeing and have her follow-up if not improving after eye exam    Sanam Ansari, " APRN CNP  [facility]

## 2018-10-25 DIAGNOSIS — F32.0 MILD MAJOR DEPRESSION (H): Chronic | ICD-10-CM

## 2018-10-25 DIAGNOSIS — K21.9 GASTROESOPHAGEAL REFLUX DISEASE, ESOPHAGITIS PRESENCE NOT SPECIFIED: ICD-10-CM

## 2018-10-25 DIAGNOSIS — F41.9 ANXIETY: Primary | ICD-10-CM

## 2018-10-25 RX ORDER — DULOXETIN HYDROCHLORIDE 30 MG/1
30 CAPSULE, DELAYED RELEASE ORAL DAILY
Qty: 14 CAPSULE | Refills: 0 | Status: SHIPPED | OUTPATIENT
Start: 2018-10-25 | End: 2018-12-05

## 2018-10-25 ASSESSMENT — PATIENT HEALTH QUESTIONNAIRE - PHQ9: SUM OF ALL RESPONSES TO PHQ QUESTIONS 1-9: 4

## 2018-10-25 ASSESSMENT — ANXIETY QUESTIONNAIRES: GAD7 TOTAL SCORE: 6

## 2018-10-25 NOTE — TELEPHONE ENCOUNTER
Pt called states she is taking prevacid qd and Protonix bid, Should she be on both? Needs refill of jcarlos nix if she is suppose to stay on it. Also pt was suppose to take 1/2 tablet of Duloxetine (30 mg) daily for two weeks and then stop it but the form she has at home is capsules so they can not be split. Please advise. Scarlett Zuluaga RN

## 2018-10-25 NOTE — TELEPHONE ENCOUNTER
Lm to [email or web address] Received [facility] clarification form. Pt has been prescribed Protonix and Prevacid. Is she taking both or one? Scarlett Zuluaga RN

## 2018-10-26 RX ORDER — PANTOPRAZOLE SODIUM 40 MG/1
40 TABLET, DELAYED RELEASE ORAL DAILY
Qty: 90 TABLET | Refills: 3 | Status: SHIPPED | OUTPATIENT
Start: 2018-10-26 | End: 2019-06-20

## 2018-10-26 RX ORDER — DULOXETIN HYDROCHLORIDE 30 MG/1
30 CAPSULE, DELAYED RELEASE ORAL DAILY
Qty: 14 CAPSULE | Refills: 0 | Status: SHIPPED | OUTPATIENT
Start: 2018-10-26 | End: 2019-03-11

## 2018-10-26 NOTE — TELEPHONE ENCOUNTER
Prescription sent for Cymbalta 30 mg capsules for 2 weeks and patient to discontinue.    Prescription sent over for Protonix will discontinue the Prevacid.    Sanam Ansari CNP

## 2018-11-07 ENCOUNTER — OFFICE VISIT (OUTPATIENT)
Dept: PSYCHOLOGY | Facility: CLINIC | Age: 73
End: 2018-11-07
Payer: COMMERCIAL

## 2018-11-07 DIAGNOSIS — F32.0 MILD MAJOR DEPRESSION (H): Primary | ICD-10-CM

## 2018-11-07 DIAGNOSIS — F43.21 GRIEF: ICD-10-CM

## 2018-11-07 PROCEDURE — 90834 PSYTX W PT 45 MINUTES: CPT | Performed by: PSYCHOLOGIST

## 2018-11-07 NOTE — MR AVS SNAPSHOT
MRN:[id number]                      After Visit Summary   11/7/2018    Theresa Bill    MRN: [id number]           Visit Information        Provider Department      11/7/2018 8:30 AM Sam Jaime [facility] Generic      Your next 10 appointments already scheduled     Nov 21, 2018  9:40 AM CST   [facility] with NICHELLE Soler CNP   [facility]    [address] [place] [address]   [phone]            Dec 12, 2018  8:30 AM CST   Return Visit with Sam Jaime   [facility]    [address] [facility] [address]   [phone]            Dec 26, 2018  8:30 AM CST   Return Visit with Sam Sebastianifton   [facility] ([facility]    [address] [facility] [address]   [phone]              Care EveryWhere ID     This is your Care EveryWhere ID. This could be used by other organizations to access your [place] medical records  [id number]        Equal Access to Services     SABAS VUONG : Hadii lamonte hall Sodanielle, waaxda luqadaha, qaybta kaalmada juan jose, ángel jauregui. So [facility] [phone].    ATENCIÓN: Si habla español, tiene a urban disposición servicios gratuitos de asistencia lingüística. [facility] [phone].    We comply with applicable federal civil rights laws and [place] laws. We do not discriminate on the basis of race, color, national origin, age, disability, sex, sexual orientation, or gender identity.

## 2018-11-08 ENCOUNTER — TRANSFERRED RECORDS (OUTPATIENT)
Dept: HEALTH INFORMATION MANAGEMENT | Facility: CLINIC | Age: 73
End: 2018-11-08

## 2018-11-08 ASSESSMENT — ANXIETY QUESTIONNAIRES
3. WORRYING TOO MUCH ABOUT DIFFERENT THINGS: NOT AT ALL
1. FEELING NERVOUS, ANXIOUS, OR ON EDGE: SEVERAL DAYS
4. TROUBLE RELAXING: NOT AT ALL
5. BEING SO RESTLESS THAT IT IS HARD TO SIT STILL: NOT AT ALL
6. BECOMING EASILY ANNOYED OR IRRITABLE: NOT AT ALL
2. NOT BEING ABLE TO STOP OR CONTROL WORRYING: SEVERAL DAYS
GAD7 TOTAL SCORE: 3
7. FEELING AFRAID AS IF SOMETHING AWFUL MIGHT HAPPEN: SEVERAL DAYS

## 2018-11-08 ASSESSMENT — PATIENT HEALTH QUESTIONNAIRE - PHQ9: SUM OF ALL RESPONSES TO PHQ QUESTIONS 1-9: 2

## 2018-11-08 NOTE — PROGRESS NOTES
Progress Note  Disclaimer: This note consists of symbols derived from keyboarding, dictation and/or voice recognition software. As a result, there may be errors in the script that have gone undetected. Please consider this when interpreting information found in this chart.    Client Name: Theresa Bill  Date: 11/7/2018         Service Type: Individual      Session Start Time: 8:30 AM  session End Time: 9:15 AM      Session Length: 45     Session #: 21     Attendees: Client attended alone    Treatment Plan Last Reviewed:  11/7/2018       DATA   Client reports she found out indirectly her youngest son was stealing in [place] and was asked to leave. He is now moving to [place]. Still using drugs. She has a trip coming up to visit her sister in [place] over thanksgiving. Psych meds are being tapered in an effort to reduce her visual hallucinations.   Progress Since Last Session (Related to Symptoms / Goals / Homework):   Symptoms: Stable    Homework: not applicable      Episode of Care Goals: Satisfactory progress - ACTION (Actively working towards change); Intervened by reinforcing change plan / affirming steps taken     Current / Ongoing Stressors and Concerns:   Recent bereavement, prior caregiver stress, son with serious drug addiction.     Treatment Objective(s) Addressed in This Session:   increase understanding of steps in the grief process  Utilize family support, client has order for protection, be sure to communicate with other family members every day     Intervention:   Interpersonal Therapy: facilitating appropriate expressions of emotion and grief.  12 step facilitation for family member  ASSESSMENT: Current Emotional / Mental Status (status of significant symptoms):   Risk status (Self / Other harm or suicidal ideation)   Client denies current fears or concerns for personal safety.   Client denies current or recent suicidal ideation or behaviors.   Client denies current or  recent homicidal ideation or behaviors.   Client denies current or recent self injurious behavior or ideation.   Client denies other safety concerns.   A safety and risk management plan has not been developed at this time, however client was given the after-hours number / 911 should there be a change in any of these risk factors.     Appearance:   Appropriate    Eye Contact:   Good    Psychomotor Behavior: Normal    Attitude:   Cooperative    Orientation:   All   Speech    Rate / Production: Slow     Volume:  Soft    Mood:    Depressed    Affect:    Appropriate    Thought Content:  Clear  Possible hallucinations    Thought Form:  Coherent  Logical    Insight:    Fair      Medication Review:   No changes to current psychiatric medication(s)     Medication Compliance:   Yes     Changes in Health Issues:   None reported     Chemical Use Review:   Substance Use: Chemical use reviewed, no active concerns identified      Tobacco Use: No current tobacco use.       Collateral Reports Completed:   Not Applicable    PLAN: (Client Tasks / Therapist Tasks / Other)  Client to continue with self-care, keeping herself safe, and not enabling her son's drug use.      Sam Jaime                                                         ________________________________________________________________________    Treatment Plan    Client's Name: Theresa Bill  YOB: 1945    Date: 4/9/2018    Referral / Collaboration:  Referral to another professional/service is not indicated at this time..    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 Health conditions                    include                        disease or illnesses, other health problems that may be short or long lasting,                    injuries, mental health or emotional problems, and problems with alcohol or drugs.                              Think back over the past 30 days and answer these questions, thinking about how much              difficulty you had doing the following activities. For each question, please [place] only                   one response.      S1 Standing for long periods such as 30 minutes? Moderate =   3   S2 Taking care of household responsibilities? None =         1   S3 Learning a new task, for example, learning how to get to a new place? Mild =           2   S4 How much of a problem do you have joining community activities (for example, festivals, [employer] or other activities) in the same way as anyone else can? None =         1   S5 How much have you been emotionally affected by your health problems? None =         1               In the past 30 days, how much difficulty did you have in:   S6 Concentrating on doing something for ten minutes? None =         1   S7 Walking a long distance such as a kilometer (or equivalent)? Moderate =   3   S8 Washing your whole body? None =         1   S9 Getting dressed? None =         1   S10 Dealing with people you do not know? None =         1   S11 Maintaining a friendship? None =         1   S12 Your day to day work? None =         1       H1 Overall, in the past 30 days, how many days were these difficulties present? Record number of days 1    H2 In the past 30 days, for how many days were you totally unable to carry out your usual activities or work because of any health condition? Record number of days  0    H3 In the past 30 days, not counting the days that you were totally unable, for how many days did you cut back or reduce your usual activities or work because of any health condition? Record number of days 0       Goals  1. Education- the stages of grief  a. Client will be able to describe the stages of grief; denial, bargaining, anger, depression, and acceptance and give examples of how each have felt for her.  2. Behavioral Activation  a. Client will learn to assess their emotional stateon a day to day basis  b. Client will Identify two forms of exercise/activity and engage in them 3 times per week  c. Client will Identify 3 things that make them laugh, and engage in these 5 times per week.  d. Client will Identify 1-2Creative activities or hobbies  and engage in them 2 times per week  e. Client will identify music, movies, books that make them feel good and use them 3-4 times per week  3. Self-care  a. Client will identify 5 things they can do just for themselves  b. Client will take time for quiet, reflection, meditation 5 times per week  c. Client will Learn to set boundaries when appropriate  d. Client will Identify 2 individuals they can call on for support, distraction  4. Assessment of progress  a. Client will engage in assessment of their progress on a regular basis      Client has reviewed and agreed to the above plan.      Sam Jaime  4/9/2018

## 2018-11-09 ASSESSMENT — ANXIETY QUESTIONNAIRES: GAD7 TOTAL SCORE: 3

## 2018-11-19 NOTE — PROGRESS NOTES
Clinic Care Coordination Contact    Situation: Patient chart reviewed by care coordinator.    Background: pt active with care coordination     Assessment: upon chart review noted in counseling notes that pt was going to be out of town the week of thanksgiving to [place].     Plan/Recommendations: Sw will call in 6-10 business days.     LEYDA Mojica, Clinic Care Coordinator 11/19/2018   9:33 AM  [phone]

## 2018-11-28 ENCOUNTER — PATIENT OUTREACH (OUTPATIENT)
Dept: CARE COORDINATION | Facility: CLINIC | Age: 73
End: 2018-11-28

## 2018-11-28 NOTE — PROGRESS NOTES
Clinic Care Coordination Contact  [facility]/Voicemail    Referral Source: PCP  Clinical Data: Care Coordinator Outreach  Outreach attempted x 1.  Left message on voicemail with call back information and requested return call.  Plan:  Care Coordinator will try to reach patient again in 3-5 business days.  LEYDA Mojica, Clinic Care Coordinator 11/28/2018   3:19 PM  [phone]

## 2018-11-28 NOTE — LETTER
[place] [id number] [place] [address]  [phone]      12/3/2018      Theresa Bill  [address] [place] [address]      Dear  Theresa,      I have been attempting to reach you since our last contact. I would like to continue to work with you on the goals from our last conversation and provide the support you may need. I would appreciate if you would give me a call at [phone] and let me know if you would like to continue working together. I know that there are many things that can affect our ability to communicate and hope we can plan better moving forward.     All of us at [facility] are invested in your health and are here to assist you in meeting your goals.     Sincerely,        Sara Martínez, [facility]  Clinic Care Coordination  [phone]

## 2018-12-03 NOTE — PROGRESS NOTES
Clinic Care Coordination Contact  [facility]/Voicemail    Referral Source: PCP  Clinical Data: Care Coordinator Outreach  Outreach attempted x 2.  Left message on voicemail with call back information and requested return call.  Plan: Care Coordinator will send unable to contact letter. Care Coordinator will try to reach patient again in about 2 weeks.  LEYDA Mojica, Clinic Care Coordinator 12/3/2018   8:58 AM  [phone]

## 2018-12-05 ENCOUNTER — OFFICE VISIT (OUTPATIENT)
Dept: FAMILY MEDICINE | Facility: CLINIC | Age: 73
End: 2018-12-05
Payer: COMMERCIAL

## 2018-12-05 VITALS
OXYGEN SATURATION: 96 % | RESPIRATION RATE: 20 BRPM | HEART RATE: 71 BPM | WEIGHT: 169 LBS | BODY MASS INDEX: 31.93 KG/M2 | TEMPERATURE: 97.1 F | DIASTOLIC BLOOD PRESSURE: 78 MMHG | SYSTOLIC BLOOD PRESSURE: 122 MMHG

## 2018-12-05 DIAGNOSIS — F32.0 MILD MAJOR DEPRESSION (H): Chronic | ICD-10-CM

## 2018-12-05 DIAGNOSIS — K21.00 GASTROESOPHAGEAL REFLUX DISEASE WITH ESOPHAGITIS: ICD-10-CM

## 2018-12-05 DIAGNOSIS — K22.4 ESOPHAGEAL DYSMOTILITY: ICD-10-CM

## 2018-12-05 DIAGNOSIS — F41.9 ANXIETY: ICD-10-CM

## 2018-12-05 DIAGNOSIS — R05.9 COUGH: Primary | ICD-10-CM

## 2018-12-05 DIAGNOSIS — R05.3 CHRONIC COUGH: ICD-10-CM

## 2018-12-05 PROCEDURE — 99214 OFFICE O/P EST MOD 30 MIN: CPT | Performed by: PHYSICIAN ASSISTANT

## 2018-12-05 ASSESSMENT — ANXIETY QUESTIONNAIRES
4. TROUBLE RELAXING: NOT AT ALL
GAD7 TOTAL SCORE: 0
GAD7 TOTAL SCORE: 0
7. FEELING AFRAID AS IF SOMETHING AWFUL MIGHT HAPPEN: NOT AT ALL
2. NOT BEING ABLE TO STOP OR CONTROL WORRYING: NOT AT ALL
5. BEING SO RESTLESS THAT IT IS HARD TO SIT STILL: NOT AT ALL
GAD7 TOTAL SCORE: 0
3. WORRYING TOO MUCH ABOUT DIFFERENT THINGS: NOT AT ALL
7. FEELING AFRAID AS IF SOMETHING AWFUL MIGHT HAPPEN: NOT AT ALL
6. BECOMING EASILY ANNOYED OR IRRITABLE: NOT AT ALL
1. FEELING NERVOUS, ANXIOUS, OR ON EDGE: NOT AT ALL

## 2018-12-05 ASSESSMENT — PATIENT HEALTH QUESTIONNAIRE - PHQ9
SUM OF ALL RESPONSES TO PHQ QUESTIONS 1-9: 0
SUM OF ALL RESPONSES TO PHQ QUESTIONS 1-9: 0

## 2018-12-05 NOTE — MR AVS SNAPSHOT
After Visit Summary   12/5/2018    Theresa Bill    MRN: [id number]           Patient Information     Date Of Birth          1945        Visit Information        Provider Department      12/5/2018 9:00 AM Shae Pierre PA-C [facility]        Today's Diagnoses     Cough    -  1    Gastroesophageal reflux disease with esophagitis        Anxiety        Mild major depression (H)        Esophageal dysmotility        Chronic cough          Care Instructions    Stop cymbalta (duloxetine) per your request.  I am worried depression will return, so please monitor closely.      Restart ranitidine to see if helps heartburn and cough.  Think cough is probably from heartburn.  See me in 2 wks if cough not resolving after this med change but I think need to see GI doctor again.  Call or be seen if worsening.    Otherwise recheck 2 months for mood.                  Follow-ups after your visit        Follow-up notes from your care team     Return in about 2 months (around 2/5/2019) for 1-2 mo for depression.      Your next 10 appointments already scheduled     Dec 12, 2018  8:30 AM CST   Return Visit with Sam SHIRLEY [facility] ([facility])    [address] [address]   [phone]            Dec 26, 2018  8:30 AM CST   Return Visit with [facility]    [address] [address]   [phone]              Who to contact     If you have questions or need follow up information about today's clinic visit or your schedule please contact [facility] directly at [phone].  Normal or non-critical lab and imaging results will be communicated to you by MyChart, letter or phone within 4 business days after the clinic has received the results. If you do not hear from us within 7 days, please contact the clinic through MyChart or phone. If you have  a critical or abnormal lab result, we will notify you by phone as soon as possible.  Submit refill requests through [employer] or call your pharmacy and they will forward the refill request to us. Please allow 3 business days for your refill to be completed.          Additional Information About Your Visit        Care EveryWhere ID     This is your Care EveryWhere ID. This could be used by other organizations to access your [place] medical records  [id number]        Your Vitals Were     Pulse Temperature Respirations Pulse Oximetry BMI (Body Mass Index)       71 97.1  F (36.2  C) (Tympanic) 20 96% 31.93 kg/m2        Blood Pressure from Last 3 Encounters:   12/05/18 122/78   10/24/18 126/80   09/20/18 116/74    Weight from Last 3 Encounters:   12/05/18 169 lb (76.7 kg)   10/24/18 172 lb (78 kg)   09/20/18 171 lb (77.6 kg)              Today, you had the following     No orders found for display         Today's Medication Changes          These changes are accurate as of 12/5/18  9:49 AM.  If you have any questions, ask your nurse or doctor.               Start taking these medicines.        Dose/Directions    ranitidine 150 MG tablet   Commonly known as:  ZANTAC   Used for:  Gastroesophageal reflux disease with esophagitis, Esophageal dysmotility, Chronic cough   Started by:  Shae Pierre PA-C        Dose:  150 mg   Take 1 tablet (150 mg) by mouth 2 times daily   Quantity:  60 tablet   Refills:  3         These medicines have changed or have updated prescriptions.        Dose/Directions    blood glucose monitoring test strip   Commonly known as:  NO BRAND SPECIFIED   This may have changed:  additional instructions   Used for:  Type 2 diabetes mellitus without complication, with long-term current use of insulin (H)        Dose:  1 strip   1 strip by In Vitro route 3 times daily Has a Freestyle   Quantity:  100 each   Refills:  12         Stop taking these medicines if you haven't already. Please contact your  care team if you have questions.     DULoxetine 60 MG capsule   Commonly known as:  CYMBALTA   Stopped by:  Shae Pierre PA-C                Where to get your medicines      These medications were sent to [facility] [address] [place], [place] - [address]  [address] [facility] [address]     Phone:  [phone]     ranitidine 150 MG tablet                Primary Care Provider Office Phone # Fax #    Shae Pierre PA-C [phone] [address] [place] [id number]        Goals        General    #1 Mental Health Management (pt-stated)     Notes - Note created  9/18/2018 10:46 AM by Sara Martínez LSW    Goal Statement: I will review resources and choose what support group sounds best in the next month.  Measure of Success: I will have a group to check out.  Supportive Steps to Achieve: resources being sent by   Barriers: talking at times to others is a struggle  Strengths: understand the benefits that a support group will give  Date to Achieve By: 12-31-18  Patient expressed understanding of goal: yes             Lifestyle    #2 Patient achieves sleep pattern objective     Notes - Note created  9/18/2018 10:50 AM by Sara Martínez LSW    Goal Statement: I will look into alternative options for noise in the bedroom instead of the TV.  Measure of Success: better sleep.   Supportive Steps to Achieve: looking into options  Barriers: cost   Strengths: understands the concept good sleep hygiene   Date to Achieve By: 12-31-18  Patient expressed understanding of goal: yes            Equal Access to Services     [facility] AH: Hadii lamonte starr hadasho Soomaali, waaxda luqadaha, qaybta kaalmada adeegyada, ángel thomas adecyrus oconnor . So [facility] [phone].    ATENCIÓN: Si habla español, tiene a urban disposición servicios gratuitos de asistencia lingüística. Llame al [phone].    We comply with applicable federal civil rights laws and [place]  laws. We do not discriminate on the basis of race, color, national origin, age, disability, sex, sexual orientation, or gender identity.            Thank you!     Thank you for choosing [facility]  for your care. Our goal is always to provide you with excellent care. Hearing back from our patients is one way we can continue to improve our services. Please take a few minutes to complete the written survey that you may receive in the mail after your visit with us. Thank you!             Your Updated Medication List - Protect others around you: Learn how to safely use, store and throw away your medicines at www.disposemymeds.org.          This list is accurate as of 12/5/18  9:49 AM.  Always use your most recent med list.                   Brand Name Dispense Instructions for use Diagnosis    ACE/ARB/ARNI NOT PRESCRIBED    INTENTIONAL     ACE & ARB not prescribed due to Intolerance-cough    Type 2 diabetes, HbA1c goal < 7% (H)       ACETAMINOPHEN PO           aspirin 81 MG tablet    ASA    100    ONE DAILY    Type II or unspecified type diabetes mellitus without mention of complication, not stated as uncontrolled       atorvastatin 40 MG tablet    LIPITOR    90 tablet    Take 1 tablet (40 mg) by mouth At Bedtime    Dyslipidemia       blood glucose monitoring test strip    NO BRAND SPECIFIED    100 each    1 strip by In Vitro route 3 times daily Has a Freestyle    Type 2 diabetes mellitus without complication, with long-term current use of insulin (H)       buPROPion 300 MG 24 hr tablet    WELLBUTRIN XL    90 tablet    Take 1 tablet (300 mg) by mouth every morning    Mild recurrent major depression (H)       clonazePAM 0.5 MG tablet    klonoPIN     Taking 1 tab as needed.  As of 6/11, has taken 1 tab in past 2 weeks.  Prescribed by neurology for RLS - previously severe.    Restless legs syndrome (RLS)       DAILY MULTIVITAMIN PO      One tablet daily        fluocinonide 0.05 % external ointment     LIDEX    15 g    Apply sparingly to affected area twice daily as needed for mouth ulcer.    Aphthae, oral       insulin detemir 100 UNIT/ML pen    LEVEMIR FLEXPEN/FLEXTOUCH    15 mL    Inject 14 Units Subcutaneous daily Increase by 2 units every 3 days if sugars 2 hours after eating are above 180 (only if morning sugars above 110).    Type 2 diabetes mellitus without complication, with long-term current use of insulin (H)       insulin pen needle 32G X 4 MM miscellaneous    BD MARIIA U/F    90 each    Use one pen daily    Type 2 diabetes mellitus without complication, with long-term current use of insulin (H)       ketoconazole 2 % external cream    NIZORAL    30 g    Apply topically 2 times daily To corners of lips for 2 weeks or as needed    Angular cheilitis       losartan 25 MG tablet    COZAAR    90 tablet    Take 1 tablet (25 mg) by mouth daily    Essential hypertension       metFORMIN 500 MG 24 hr tablet    GLUCOPHAGE-XR    180 tablet    TAKE ONE TABLET BY MOUTH TWICE DAILY WITH MEALS    Type 2 diabetes mellitus without complication, with long-term current use of insulin (H)       nitroGLYcerin 0.4 MG sublingual tablet    NITROSTAT    25 tablet    For chest pain place 1 tablet under the tongue every 5 minutes for 3 doses. If symptoms persist 5 minutes after 1st dose call 911.    Chest pressure       order for DME      Equipment being ordered: CPAP Patient Theresa BOSTON Bill received a [employer] Airsense 10  Auto. Pressures were set at Auto 10 - 15 cm H2O.        pantoprazole 40 MG EC tablet    PROTONIX    90 tablet    Take 1 tablet (40 mg) by mouth daily    Gastroesophageal reflux disease, esophagitis presence not specified       PROBIOTIC & ACIDOPHILUS EX ST Caps           ranitidine 150 MG tablet    ZANTAC    60 tablet    Take 1 tablet (150 mg) by mouth 2 times daily    Gastroesophageal reflux disease with esophagitis, Esophageal dysmotility, Chronic cough       traMADol 50 MG tablet    ULTRAM    30 tablet    TAKE ONE  TABLET BY MOUTH EVERY 6 HOURS AS NEEDED FOR  MODERATE  PAIN    Radicular leg pain

## 2018-12-05 NOTE — PROGRESS NOTES
SUBJECTIVE:   Theresa Bill is a 73 year old female who presents to clinic today for the following health issues:      Depression Followup    Status since last visit: Improved     See PHQ-9 for current symptoms.  Other associated symptoms: None    Complicating factors:   Significant life event:  No   Current substance abuse:  None  Anxiety or Panic symptoms:  No    PHQ 10/24/2018 11/8/2018 12/5/2018   PHQ-9 Total Score 4 2 0   Q9: Suicide Ideation Not at all Not at all Not at all   Answers for HPI/ROS submitted by the patient on 12/5/2018   PHQ9 TOTAL SCORE: 0  GNINY 7 TOTAL SCORE: 0    PHQ-9  English  PHQ-9   Any Language  Suicide Assessment Five-step Evaluation and Treatment (SAFE-T)    Amount of exercise or physical activity:3 x at the gym    Problems taking medications regularly: No    Medication side effects: none    Diet: regular (no restrictions)    Mood doing well.  Just returned from 2.5 wks in [place].  Wants to try to get off cymbalta despite failing trial off meds last yr.  Saw other provider in Oct who decreased her cymbalta to 30 mg.  Upcoming counseling appt.    RESPIRATORY SYMPTOMS      Duration: 2 weeks    Description  rhinorrhea, sore throat, cough and hoarse voice    Severity: moderate    Accompanying signs and symptoms: None    History (predisposing factors):  none    Precipitating or alleviating factors: None    Therapies tried and outcome:  rest and fluids NyQuil    ST x 4 days.  Runny nose x few days.  Dry cough x 2 wks, not worsening and no day-night pattern.  No SOB, fever, fatigue.    Taken off some of reflux meds recently.  Heartburn returning and some days very bad.    Balance generally doing well but recently did fall - tripped over shoes on the floor.    Diabetes doing well.      Notes neurology placed her back on clonazepam.  Still doesn't feel helps her intractible RLS.    Problem list and histories reviewed & adjusted, as indicated.  Additional history: as documented    BP Readings from  Last 3 Encounters:   12/05/18 122/78   10/24/18 126/80   09/20/18 116/74    Wt Readings from Last 3 Encounters:   12/05/18 169 lb (76.7 kg)   10/24/18 172 lb (78 kg)   09/20/18 171 lb (77.6 kg)         Labs reviewed in EPIC    Reviewed and updated as needed this visit by clinical staff  Tobacco  Allergies  Meds  Problems  Med Hx  Surg Hx  Fam Hx  Soc Hx        Reviewed and updated as needed this visit by Provider  Tobacco  Allergies  Meds  Problems  Med Hx  Surg Hx  Fam Hx  Soc Hx          ROS:  Constitutional, HEENT, cardiovascular, pulmonary, GI, , musculoskeletal, neuro, skin, endocrine and psych systems are negative, except as otherwise noted.    OBJECTIVE:     /78  Pulse 71  Temp 97.1  F (36.2  C) (Tympanic)  Resp 20  Wt 169 lb (76.7 kg)  SpO2 96%  BMI 31.93 kg/m2  Body mass index is 31.93 kg/(m^2).  GENERAL: healthy, alert and no distress  EYES: Eyes grossly normal to inspection, PERRL and conjunctivae and sclerae normal  HENT: ear canals and TM's normal, nose and mouth without ulcers or lesions  NECK: no adenopathy, no asymmetry, masses, or scars   RESP: lungs clear to auscultation - no rales, rhonchi or wheezes  CV: regular rate and rhythm, normal S1 S2, no S3 or S4, no murmur, click or rub,  PSYCH: mentation appears normal, affect normal/bright    ASSESSMENT/PLAN:       ICD-10-CM    1. Cough R05    2. Gastroesophageal reflux disease with esophagitis K21.0 ranitidine (ZANTAC) 150 MG tablet   3. Anxiety F41.9    4. Mild major depression (H) F32.0    5. Esophageal dysmotility K22.4 ranitidine (ZANTAC) 150 MG tablet   6. Chronic cough R05 ranitidine (ZANTAC) 150 MG tablet       Patient Instructions   Stop cymbalta (duloxetine) per your request.  I am worried depression will return, so please monitor closely.      Restart ranitidine to see if helps heartburn and cough.  Think cough is probably from heartburn.  See me in 2 wks if cough not resolving after this med change but I think  need to see GI doctor again.  Call or be seen if worsening.    Otherwise recheck 2 months for mood.              Shae Pierre PA-C  [facility]

## 2018-12-05 NOTE — NURSING NOTE
"Chief Complaint   Patient presents with     Depression     recheck     URI     2 weeks       Initial /78  Pulse 71  Temp 97.1  F (36.2  C) (Tympanic)  Resp 20  Wt 169 lb (76.7 kg)  SpO2 96%  BMI 31.93 kg/m2 Estimated body mass index is 31.93 kg/(m^2) as calculated from the following:    Height as of 10/24/18: 5' 1\" (1.549 m).    Weight as of this encounter: 169 lb (76.7 kg).    Patient presents to the clinic using No DME    Health Maintenance that is potentially due pending provider review:  NONE    n/a    Is there anyone who you would like to be able to receive your results? No  If yes have patient fill out RICH    "

## 2018-12-05 NOTE — PATIENT INSTRUCTIONS
Stop cymbalta (duloxetine) per your request.  I am worried depression will return, so please monitor closely.      Restart ranitidine to see if helps heartburn and cough.  Think cough is probably from heartburn.  See me in 2 wks if cough not resolving after this med change but I think need to see GI doctor again.  Call or be seen if worsening.    Otherwise recheck 2 months for mood.

## 2018-12-07 ASSESSMENT — ANXIETY QUESTIONNAIRES: GAD7 TOTAL SCORE: 0

## 2018-12-07 ASSESSMENT — PATIENT HEALTH QUESTIONNAIRE - PHQ9: SUM OF ALL RESPONSES TO PHQ QUESTIONS 1-9: 0

## 2018-12-11 DIAGNOSIS — M54.10 RADICULAR LEG PAIN: ICD-10-CM

## 2018-12-12 RX ORDER — TRAMADOL HYDROCHLORIDE 50 MG/1
TABLET ORAL
Qty: 30 TABLET | Refills: 5 | Status: SHIPPED | OUTPATIENT
Start: 2018-12-12 | End: 2019-06-20

## 2018-12-17 NOTE — PROGRESS NOTES
Clinic Care Coordination Contact  [facility]/Voicemail    Referral Source: PCP  Clinical Data: Care Coordinator Outreach  Outreach attempted x 3.  Left message on voicemail with call back information and requested return call.  Plan: Care Coordinator mailed out care coordination introduction letter on 12-3-18. Care Coordinator will do no further outreaches at this time.  LEYDA Mojica, Clinic Care Coordinator 12/17/2018   11:29 AM  [phone]

## 2019-01-21 ENCOUNTER — OFFICE VISIT (OUTPATIENT)
Dept: PSYCHOLOGY | Facility: CLINIC | Age: 74
End: 2019-01-21
Payer: MEDICARE

## 2019-01-21 DIAGNOSIS — F32.0 MILD MAJOR DEPRESSION (H): Primary | ICD-10-CM

## 2019-01-21 DIAGNOSIS — F43.21 GRIEF: ICD-10-CM

## 2019-01-21 PROCEDURE — 90834 PSYTX W PT 45 MINUTES: CPT | Performed by: PSYCHOLOGIST

## 2019-01-21 ASSESSMENT — ANXIETY QUESTIONNAIRES
1. FEELING NERVOUS, ANXIOUS, OR ON EDGE: NOT AT ALL
GAD7 TOTAL SCORE: 4
6. BECOMING EASILY ANNOYED OR IRRITABLE: NOT AT ALL
7. FEELING AFRAID AS IF SOMETHING AWFUL MIGHT HAPPEN: NEARLY EVERY DAY
2. NOT BEING ABLE TO STOP OR CONTROL WORRYING: SEVERAL DAYS
5. BEING SO RESTLESS THAT IT IS HARD TO SIT STILL: NOT AT ALL
4. TROUBLE RELAXING: NOT AT ALL
3. WORRYING TOO MUCH ABOUT DIFFERENT THINGS: NOT AT ALL

## 2019-01-21 ASSESSMENT — PATIENT HEALTH QUESTIONNAIRE - PHQ9: SUM OF ALL RESPONSES TO PHQ QUESTIONS 1-9: 4

## 2019-01-21 NOTE — PROGRESS NOTES
Progress Note  Disclaimer: This note consists of symbols derived from keyboarding, dictation and/or voice recognition software. As a result, there may be errors in the script that have gone undetected. Please consider this when interpreting information found in this chart.    Client Name: Theresa Bill  Date: 2019         Service Type: Individual      Session Start Time: 9:30 AM  session End Time: 10:15 AM      Session Length: 45     Session #: 22     Attendees: Client attended alone    Treatment Plan Last Reviewed:  2018       DATA   Client reports she had a great 21-day trip to [place].  She reports getting a lot of exercise.  It was a good trip despite losing her dentures.  She is experiencing some per day long crying jags currently particularly around [place].  Her   in the  of  and this is the first [place] she has really had time to think about the loss.   Progress Since Last Session (Related to Symptoms / Goals / Homework):   Symptoms: Stable    Homework: not applicable      Episode of Care Goals: Satisfactory progress - ACTION (Actively working towards change); Intervened by reinforcing change plan / affirming steps taken     Current / Ongoing Stressors and Concerns:   Recent bereavement, prior caregiver stress, son with serious drug addiction.     Treatment Objective(s) Addressed in This Session:   increase understanding of steps in the grief process  Utilize family support, client has order for protection, be sure to communicate with other family members every day     Intervention:   Interpersonal Therapy: facilitating appropriate expressions of emotion and grief.  12 step facilitation for family member  ASSESSMENT: Current Emotional / Mental Status (status of significant symptoms):   Risk status (Self / Other harm or suicidal ideation)   Client denies current fears or concerns for personal safety.   Client denies current or  recent suicidal ideation or behaviors.   Client denies current or recent homicidal ideation or behaviors.   Client denies current or recent self injurious behavior or ideation.   Client denies other safety concerns.   A safety and risk management plan has not been developed at this time, however client was given the after-hours number / 911 should there be a change in any of these risk factors.     Appearance:   Appropriate    Eye Contact:   Good    Psychomotor Behavior: Normal    Attitude:   Cooperative    Orientation:   All   Speech    Rate / Production: Slow     Volume:  Soft    Mood:    Depressed    Affect:    Appropriate    Thought Content:  Clear  Possible hallucinations    Thought Form:  Coherent  Logical    Insight:    Fair      Medication Review:   No changes to current psychiatric medication(s)     Medication Compliance:   Yes     Changes in Health Issues:   None reported     Chemical Use Review:   Substance Use: Chemical use reviewed, no active concerns identified      Tobacco Use: No current tobacco use.       Collateral Reports Completed:   Not Applicable    PLAN: (Client Tasks / Therapist Tasks / Other)  Client to continue with self-care, keeping herself safe, and not enabling her son's drug use.      Sam Jaime                                                         ________________________________________________________________________    Treatment Plan    Client's Name: Theresa Bill  YOB: 1945    Date: 4/9/2018    Referral / Collaboration:  Referral to another professional/service is not indicated at this time..    Anticipated number of session or this episode of care: 25       DSM5 Diagnoses: (Sustained by DSM5 Criteria Listed Above)  Diagnoses:            296.21 (F32.0) Major Depressive Disorder, Single Episode, Mild _ grief  Psychosocial & Contextual Factors: Caregiver stress, recent death of her   WHODAS 2.0 (12 item)                          This questionnaire asks  about difficulties due to health conditions. Health conditions                   include                        disease or illnesses, other health problems that may be short or long lasting,                    injuries, mental health or emotional problems, and problems with alcohol or drugs.                              Think back over the past 30 days and answer these questions, thinking about how much              difficulty you had doing the following activities. For each question, please [place] only                   one response.      S1 Standing for long periods such as 30 minutes? Moderate =   3   S2 Taking care of household responsibilities? None =         1   S3 Learning a new task, for example, learning how to get to a new place? Mild =           2   S4 How much of a problem do you have joining community activities (for example, festivals, Alevism or other activities) in the same way as anyone else can? None =         1   S5 How much have you been emotionally affected by your health problems? None =         1               In the past 30 days, how much difficulty did you have in:   S6 Concentrating on doing something for ten minutes? None =         1   S7 Walking a long distance such as a kilometer (or equivalent)? Moderate =   3   S8 Washing your whole body? None =         1   S9 Getting dressed? None =         1   S10 Dealing with people you do not know? None =         1   S11 Maintaining a friendship? None =         1   S12 Your day to day work? None =         1       H1 Overall, in the past 30 days, how many days were these difficulties present? Record number of days 1    H2 In the past 30 days, for how many days were you totally unable to carry out your usual activities or work because of any health condition? Record number of days  0    H3 In the past 30 days, not counting the days that you were totally unable, for how many days did you cut back or reduce your usual activities or work because of  any health condition? Record number of days 0      Goals  1. Education- the stages of grief  a. Client will be able to describe the stages of grief; denial, bargaining, anger, depression, and acceptance and give examples of how each have felt for her.  2. Behavioral Activation  a. Client will learn to assess their emotional stateon a day to day basis  b. Client will Identify two forms of exercise/activity and engage in them 3 times per week  c. Client will Identify 3 things that make them laugh, and engage in these 5 times per week.  d. Client will Identify 1-2Creative activities or hobbies  and engage in them 2 times per week  e. Client will identify music, movies, books that make them feel good and use them 3-4 times per week  3. Self-care  a. Client will identify 5 things they can do just for themselves  b. Client will take time for quiet, reflection, meditation 5 times per week  c. Client will Learn to set boundaries when appropriate  d. Client will Identify 2 individuals they can call on for support, distraction  4. Assessment of progress  a. Client will engage in assessment of their progress on a regular basis      Client has reviewed and agreed to the above plan.      Sam Jaime  4/9/2018

## 2019-01-22 ASSESSMENT — ANXIETY QUESTIONNAIRES: GAD7 TOTAL SCORE: 4

## 2019-02-12 DIAGNOSIS — E11.9 TYPE 2 DIABETES MELLITUS WITHOUT COMPLICATION, WITH LONG-TERM CURRENT USE OF INSULIN (H): Chronic | ICD-10-CM

## 2019-02-12 DIAGNOSIS — Z79.4 TYPE 2 DIABETES MELLITUS WITHOUT COMPLICATION, WITH LONG-TERM CURRENT USE OF INSULIN (H): Chronic | ICD-10-CM

## 2019-02-12 NOTE — TELEPHONE ENCOUNTER
"Requested Prescriptions   Pending Prescriptions Disp Refills     insulin pen needle (BD MARIIA U/F) 32G X 4 MM miscellaneous [Pharmacy Med Name: BD PEN NEEDLE/MARIIA [id number] MIS] 100 each 0     Sig: USE ONE  ONCE DAILY    Diabetic Supplies Protocol Passed - 2/12/2019 10:51 AM       Passed - Medication is active on med list       Passed - Patient is 18 years of age or older       Passed - Recent (6 mo) or future (30 days) visit within the authorizing provider's specialty    Patient had office visit in the last 6 months or has a visit in the next 30 days with authorizing provider.  See \"Patient Info\" tab in inbasket, or \"Choose Columns\" in Meds & Orders section of the refill encounter.      Last Written Prescription Date:  5/12/17  Last Fill Quantity: 90,  # refills: 0   Last office visit: 12/5/2018 with prescribing provider:     Future Office Visit:   Next 5 appointments (look out 90 days)    Feb 18, 2019  9:30 AM CST  Return Visit with Sam SHIRLEY [facility] [address] [address]  [phone]   Mar 04, 2019  9:30 AM CST  Return Visit with [facility] [address] [address]  [phone]                   "

## 2019-03-11 ENCOUNTER — OFFICE VISIT (OUTPATIENT)
Dept: FAMILY MEDICINE | Facility: CLINIC | Age: 74
End: 2019-03-11
Payer: MEDICARE

## 2019-03-11 VITALS
SYSTOLIC BLOOD PRESSURE: 131 MMHG | OXYGEN SATURATION: 97 % | DIASTOLIC BLOOD PRESSURE: 67 MMHG | BODY MASS INDEX: 32.47 KG/M2 | WEIGHT: 172 LBS | HEART RATE: 76 BPM | RESPIRATION RATE: 16 BRPM | TEMPERATURE: 98 F | HEIGHT: 61 IN

## 2019-03-11 DIAGNOSIS — K21.9 LPRD (LARYNGOPHARYNGEAL REFLUX DISEASE): ICD-10-CM

## 2019-03-11 DIAGNOSIS — R05.3 CHRONIC COUGH: Primary | ICD-10-CM

## 2019-03-11 DIAGNOSIS — E11.9 TYPE 2 DIABETES MELLITUS WITHOUT COMPLICATION, WITH LONG-TERM CURRENT USE OF INSULIN (H): Chronic | ICD-10-CM

## 2019-03-11 DIAGNOSIS — Z79.4 TYPE 2 DIABETES MELLITUS WITHOUT COMPLICATION, WITH LONG-TERM CURRENT USE OF INSULIN (H): Chronic | ICD-10-CM

## 2019-03-11 DIAGNOSIS — E78.5 DYSLIPIDEMIA: ICD-10-CM

## 2019-03-11 DIAGNOSIS — Z98.890 HISTORY OF NISSEN FUNDOPLICATION: ICD-10-CM

## 2019-03-11 DIAGNOSIS — G25.81 RESTLESS LEGS SYNDROME (RLS): ICD-10-CM

## 2019-03-11 LAB
CHOLEST SERPL-MCNC: 165 MG/DL
HBA1C MFR BLD: 6.7 % (ref 0–5.6)
HDLC SERPL-MCNC: 47 MG/DL
LDLC SERPL CALC-MCNC: 84 MG/DL
NONHDLC SERPL-MCNC: 118 MG/DL
TRIGL SERPL-MCNC: 171 MG/DL

## 2019-03-11 PROCEDURE — 36415 COLL VENOUS BLD VENIPUNCTURE: CPT | Performed by: PHYSICIAN ASSISTANT

## 2019-03-11 PROCEDURE — 83036 HEMOGLOBIN GLYCOSYLATED A1C: CPT | Performed by: PHYSICIAN ASSISTANT

## 2019-03-11 PROCEDURE — 80061 LIPID PANEL: CPT | Performed by: PHYSICIAN ASSISTANT

## 2019-03-11 PROCEDURE — 99214 OFFICE O/P EST MOD 30 MIN: CPT | Performed by: PHYSICIAN ASSISTANT

## 2019-03-11 RX ORDER — ATORVASTATIN CALCIUM 40 MG/1
40 TABLET, FILM COATED ORAL AT BEDTIME
Qty: 90 TABLET | Refills: 3 | Status: SHIPPED | OUTPATIENT
Start: 2019-03-11 | End: 2019-08-26

## 2019-03-11 RX ORDER — CLONAZEPAM 0.5 MG/1
TABLET ORAL
Refills: 0 | COMMUNITY
Start: 2019-03-11 | End: 2019-08-26

## 2019-03-11 ASSESSMENT — ANXIETY QUESTIONNAIRES
1. FEELING NERVOUS, ANXIOUS, OR ON EDGE: NEARLY EVERY DAY
6. BECOMING EASILY ANNOYED OR IRRITABLE: NOT AT ALL
GAD7 TOTAL SCORE: 10
3. WORRYING TOO MUCH ABOUT DIFFERENT THINGS: SEVERAL DAYS
5. BEING SO RESTLESS THAT IT IS HARD TO SIT STILL: NOT AT ALL
7. FEELING AFRAID AS IF SOMETHING AWFUL MIGHT HAPPEN: NEARLY EVERY DAY
2. NOT BEING ABLE TO STOP OR CONTROL WORRYING: NEARLY EVERY DAY

## 2019-03-11 ASSESSMENT — PATIENT HEALTH QUESTIONNAIRE - PHQ9
5. POOR APPETITE OR OVEREATING: NOT AT ALL
SUM OF ALL RESPONSES TO PHQ QUESTIONS 1-9: 3

## 2019-03-11 ASSESSMENT — MIFFLIN-ST. JEOR: SCORE: 1217.57

## 2019-03-11 NOTE — PROGRESS NOTES
SUBJECTIVE:   Theresa Bill is a 73 year old female who presents to clinic today for the following health issues:      Depression Followup    Status since last visit: Stable, has been better than what it has been     See PHQ-9 for current symptoms.  Other associated symptoms: None    Complicating factors:   Significant life event:  No   Current substance abuse:  None  Anxiety or Panic symptoms:  No    PHQ 12/5/2018 1/21/2019 3/11/2019   PHQ-9 Total Score 0 4 3   Q9: Suicide Ideation Not at all Not at all Not at all     PHQ-9  English  PHQ-9   Any Language  Suicide Assessment Five-step Evaluation and Treatment (SAFE-T)        Amount of exercise or physical activity: None    Problems taking medications regularly: No    Medication side effects: none    Diet: regular (no restrictions)    Saw GI doctor.  Was taken off of Metformin for 2 weeks to see how her acid reflux is.   Feels reflux is 50% better with being off metformin.  Sugars running around 150 first thing in morning.  Not checking other times of day.     * Surgical consult and ultrasound on 3/13/19.    Scope shows slip of her nissen  Will be having abdominal US and seeing surgeon    Mood doing well    Problem list and histories reviewed & adjusted, as indicated.  Additional history: as documented    BP Readings from Last 3 Encounters:   03/11/19 131/67   12/05/18 122/78   10/24/18 126/80    Wt Readings from Last 3 Encounters:   03/11/19 78 kg (172 lb)   12/05/18 76.7 kg (169 lb)   10/24/18 78 kg (172 lb)        Labs reviewed in EPIC    Reviewed and updated as needed this visit by clinical staff  Tobacco  Allergies  Meds  Problems  Med Hx  Surg Hx  Fam Hx  Soc Hx        Reviewed and updated as needed this visit by Provider  Tobacco  Allergies  Meds  Problems  Med Hx  Surg Hx  Fam Hx  Soc Hx          ROS:  Constitutional, HEENT, cardiovascular, pulmonary, endocrine and psych systems are negative, except as otherwise noted.    OBJECTIVE:     BP  "131/67 (BP Location: Right arm, Patient Position: Chair, Cuff Size: Adult Large)   Pulse 76   Temp 98  F (36.7  C) (Tympanic)   Resp 16   Ht 1.549 m (5' 1\")   Wt 78 kg (172 lb)   SpO2 97%   BMI 32.50 kg/m    Body mass index is 32.5 kg/m .  GENERAL: healthy, alert and no distress  RESP: lungs clear to auscultation - no rales, rhonchi or wheezes  CV: regular rate and rhythm, normal S1 S2, no S3 or S4, no murmur, click or rub  PSYCH: mentation appears normal, affect normal/bright    ASSESSMENT/PLAN:       ICD-10-CM    1. Chronic cough R05    2. Type 2 diabetes mellitus without complication, with long-term current use of insulin (H) E11.9 insulin detemir (LEVEMIR FLEXPEN/FLEXTOUCH) 100 UNIT/ML pen    Z79.4 atorvastatin (LIPITOR) 40 MG tablet     insulin pen needle (BD MARIIA U/F) 32G X 4 MM miscellaneous     Hemoglobin A1c     Lipid panel reflex to direct LDL Non-fasting     Albumin Random Urine Quantitative with Creat Ratio     CANCELED: Albumin Random Urine Quantitative with Creat Ratio   3. Dyslipidemia E78.5 atorvastatin (LIPITOR) 40 MG tablet   4. Restless legs syndrome (RLS) G25.81 clonazePAM (KLONOPIN) 0.5 MG tablet   5. LPRD (laryngopharyngeal reflux disease) K21.9    6. History of Nissen fundoplication Z98.890    other conditions stable  Patient Instructions   Stay off metformin since stomach symptoms worse with the metformin  Might also try off the multivitamin as sometimes upsets stomach  Try off probiotic since doesn't seem to be helping anyway     Continue with GI doctor and the ultrasound    Increase insulin as directed  Planning to see Sena soon; she can adjust further if needed    Recheck with me in 3 months      Shae Pierre PA-C  [facility]    "

## 2019-03-11 NOTE — NURSING NOTE
"Chief Complaint   Patient presents with     Depression     RECHECK       Initial /67 (BP Location: Right arm, Patient Position: Chair, Cuff Size: Adult Large)   Pulse 76   Temp 98  F (36.7  C) (Tympanic)   Resp 16   Ht 1.549 m (5' 1\")   Wt 78 kg (172 lb)   BMI 32.50 kg/m   Estimated body mass index is 32.5 kg/m  as calculated from the following:    Height as of this encounter: 1.549 m (5' 1\").    Weight as of this encounter: 78 kg (172 lb).    Patient presents to the clinic using No DME    Health Maintenance that is potentially due pending provider review:  NONE        Is there anyone who you would like to be able to receive your results? No  If yes have patient fill out RICH      "

## 2019-03-12 ENCOUNTER — OFFICE VISIT (OUTPATIENT)
Dept: PSYCHOLOGY | Facility: CLINIC | Age: 74
End: 2019-03-12
Payer: MEDICARE

## 2019-03-12 DIAGNOSIS — F32.0 MILD MAJOR DEPRESSION (H): Primary | ICD-10-CM

## 2019-03-12 PROCEDURE — 90834 PSYTX W PT 45 MINUTES: CPT | Performed by: PSYCHOLOGIST

## 2019-03-12 ASSESSMENT — ANXIETY QUESTIONNAIRES: GAD7 TOTAL SCORE: 10

## 2019-03-14 NOTE — PROGRESS NOTES
Progress Note  Disclaimer: This note consists of symbols derived from keyboarding, dictation and/or voice recognition software. As a result, there may be errors in the script that have gone undetected. Please consider this when interpreting information found in this chart.    Client Name: Theresa Bill  Date: 3/12/2019         Service Type: Individual      Session Start Time: 1:00 PM  session End Time: 1:45 PM      Session Length: 45     Session #: 23     Attendees: Client attended alone    Treatment Plan Last Reviewed:  11/7/2018       DATA   Client reports she has had a number of visits to the hospital for gastrointestinal issues, heartburn and acid reflux are worsening.  Feels as if she is experiencing one medical crisis after another.  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  Her sleep is good overall.  She still goes out with friends, goes to bingo.  She is sleeping in her own bed and sleeping better.  Stated she is no longer seeing shadows.   Progress Since Last Session (Related to Symptoms / Goals / Homework):   Symptoms: Stable    Homework: not applicable      Episode of Care Goals: Satisfactory progress - ACTION (Actively working towards change); Intervened by reinforcing change plan / affirming steps taken     Current / Ongoing Stressors and Concerns:   Recent bereavement, prior caregiver stress, son with serious drug addiction.     Treatment Objective(s) Addressed in This Session:   increase understanding of steps in the grief process  Utilize family support, client has order for protection, be sure to communicate with other family members every day     Intervention:   Interpersonal Therapy: facilitating appropriate expressions of emotion and grief.  12 step facilitation for family member  ASSESSMENT: Current Emotional / Mental Status (status of significant symptoms):   Risk status (Self / Other harm or suicidal ideation)   Client  denies current fears or concerns for personal safety.   Client denies current or recent suicidal ideation or behaviors.   Client denies current or recent homicidal ideation or behaviors.   Client denies current or recent self injurious behavior or ideation.   Client denies other safety concerns.   A safety and risk management plan has not been developed at this time, however client was given the after-hours number / 911 should there be a change in any of these risk factors.     Appearance:   Appropriate    Eye Contact:   Good    Psychomotor Behavior: Normal    Attitude:   Cooperative    Orientation:   All   Speech    Rate / Production: Slow     Volume:  Soft    Mood:    Depressed    Affect:    Appropriate    Thought Content:  Clear  Possible hallucinations    Thought Form:  Coherent  Logical    Insight:    Fair      Medication Review:   No changes to current psychiatric medication(s)     Medication Compliance:   Yes     Changes in Health Issues:   None reported     Chemical Use Review:   Substance Use: Chemical use reviewed, no active concerns identified      Tobacco Use: No current tobacco use.       Collateral Reports Completed:   Not Applicable    PLAN: (Client Tasks / Therapist Tasks / Other)  Client to continue with self-care, keeping herself safe, and not enabling her son's drug use.      Sam Jaime                                                         ________________________________________________________________________    Treatment Plan    Client's Name: Theresa Bill  YOB: 1945    Date: 4/9/2018    Referral / Collaboration:  Referral to another professional/service is not indicated at this time..    Anticipated number of session or this episode of care: 25       DSM5 Diagnoses: (Sustained by DSM5 Criteria Listed Above)  Diagnoses:            296.21 (F32.0) Major Depressive Disorder, Single Episode, Mild _ grief  Psychosocial & Contextual Factors: Caregiver stress, recent death of her    WHODAS 2.0 (12 item)                          This questionnaire asks about difficulties due to health conditions. Health conditions                   include                        disease or illnesses, other health problems that may be short or long lasting,                    injuries, mental health or emotional problems, and problems with alcohol or drugs.                              Think back over the past 30 days and answer these questions, thinking about how much              difficulty you had doing the following activities. For each question, please [place] only                   one response.      S1 Standing for long periods such as 30 minutes? Moderate =   3   S2 Taking care of household responsibilities? None =         1   S3 Learning a new task, for example, learning how to get to a new place? Mild =           2   S4 How much of a problem do you have joining community activities (for example, festivals, Alevism or other activities) in the same way as anyone else can? None =         1   S5 How much have you been emotionally affected by your health problems? None =         1               In the past 30 days, how much difficulty did you have in:   S6 Concentrating on doing something for ten minutes? None =         1   S7 Walking a long distance such as a kilometer (or equivalent)? Moderate =   3   S8 Washing your whole body? None =         1   S9 Getting dressed? None =         1   S10 Dealing with people you do not know? None =         1   S11 Maintaining a friendship? None =         1   S12 Your day to day work? None =         1       H1 Overall, in the past 30 days, how many days were these difficulties present? Record number of days 1    H2 In the past 30 days, for how many days were you totally unable to carry out your usual activities or work because of any health condition? Record number of days  0    H3 In the past 30 days, not counting the days that you were totally unable, for how  many days did you cut back or reduce your usual activities or work because of any health condition? Record number of days 0      Goals  1. Education- the stages of grief  a. Client will be able to describe the stages of grief; denial, bargaining, anger, depression, and acceptance and give examples of how each have felt for her.  2. Behavioral Activation  a. Client will learn to assess their emotional stateon a day to day basis  b. Client will Identify two forms of exercise/activity and engage in them 3 times per week  c. Client will Identify 3 things that make them laugh, and engage in these 5 times per week.  d. Client will Identify 1-2Creative activities or hobbies  and engage in them 2 times per week  e. Client will identify music, movies, books that make them feel good and use them 3-4 times per week  3. Self-care  a. Client will identify 5 things they can do just for themselves  b. Client will take time for quiet, reflection, meditation 5 times per week  c. Client will Learn to set boundaries when appropriate  d. Client will Identify 2 individuals they can call on for support, distraction  4. Assessment of progress  a. Client will engage in assessment of their progress on a regular basis      Client has reviewed and agreed to the above plan.      Sam Jaime  4/9/2018

## 2019-03-15 ENCOUNTER — TELEPHONE (OUTPATIENT)
Dept: FAMILY MEDICINE | Facility: CLINIC | Age: 74
End: 2019-03-15

## 2019-03-15 ENCOUNTER — APPOINTMENT (OUTPATIENT)
Dept: LAB | Facility: CLINIC | Age: 74
End: 2019-03-15
Payer: MEDICARE

## 2019-03-30 ENCOUNTER — OFFICE VISIT (OUTPATIENT)
Dept: URGENT CARE | Facility: URGENT CARE | Age: 74
End: 2019-03-30
Payer: MEDICARE

## 2019-03-30 ENCOUNTER — NURSE TRIAGE (OUTPATIENT)
Dept: NURSING | Facility: CLINIC | Age: 74
End: 2019-03-30

## 2019-03-30 VITALS
DIASTOLIC BLOOD PRESSURE: 76 MMHG | TEMPERATURE: 98 F | RESPIRATION RATE: 18 BRPM | SYSTOLIC BLOOD PRESSURE: 118 MMHG | WEIGHT: 166.4 LBS | HEART RATE: 73 BPM | OXYGEN SATURATION: 96 % | BODY MASS INDEX: 31.44 KG/M2

## 2019-03-30 DIAGNOSIS — R73.9 ELEVATED BLOOD SUGAR: Primary | ICD-10-CM

## 2019-03-30 DIAGNOSIS — E11.40 TYPE 2 DIABETES MELLITUS WITH DIABETIC NEUROPATHY, WITH LONG-TERM CURRENT USE OF INSULIN (H): ICD-10-CM

## 2019-03-30 DIAGNOSIS — Z79.4 TYPE 2 DIABETES MELLITUS WITH DIABETIC NEUROPATHY, WITH LONG-TERM CURRENT USE OF INSULIN (H): ICD-10-CM

## 2019-03-30 PROCEDURE — 99214 OFFICE O/P EST MOD 30 MIN: CPT | Performed by: PHYSICIAN ASSISTANT

## 2019-03-30 ASSESSMENT — ENCOUNTER SYMPTOMS
LIGHT-HEADEDNESS: 0
HEADACHES: 0
ALLERGIC/IMMUNOLOGIC NEGATIVE: 1
DIARRHEA: 0
CHILLS: 0
WEAKNESS: 0
DIZZINESS: 0
COUGH: 0
ENDOCRINE NEGATIVE: 1
EYES NEGATIVE: 1
MYALGIAS: 0
SHORTNESS OF BREATH: 0
FEVER: 0
NAUSEA: 0
RESPIRATORY NEGATIVE: 1
ARTHRALGIAS: 0
BACK PAIN: 0
HEMATOLOGIC/LYMPHATIC NEGATIVE: 1
CARDIOVASCULAR NEGATIVE: 1
PALPITATIONS: 0
MUSCULOSKELETAL NEGATIVE: 1
VOMITING: 0
WOUND: 0
NECK PAIN: 0
BRUISES/BLEEDS EASILY: 0
RHINORRHEA: 0
JOINT SWELLING: 0
NECK STIFFNESS: 0
SORE THROAT: 0

## 2019-03-30 NOTE — PROGRESS NOTES
"Chief Complaint:    Chief Complaint   Patient presents with     Diabetes     299 this morning.  295 now.  Had a GI dye done on Wednesday and has been elevated since.  Yesterday was over 300.       HPI: Theresa Bill is an 73 year old female who presents for evaluation and treatment of high blood sugars.  Patient had dye for a procedure 3 days ago and her sugars have been elevated since this time.  She is currently asymptomatic.  No other concerns at this time.    Patient has a complicated medical Hx \"see problem list below\"    ROS:      Review of Systems   Constitutional: Negative for chills and fever.   HENT: Negative for congestion, ear pain, rhinorrhea and sore throat.    Eyes: Negative.    Respiratory: Negative.  Negative for cough and shortness of breath.    Cardiovascular: Negative.  Negative for chest pain and palpitations.   Gastrointestinal: Negative for diarrhea, nausea and vomiting.   Endocrine: Negative.    Genitourinary: Negative.    Musculoskeletal: Negative.  Negative for arthralgias, back pain, joint swelling, myalgias, neck pain and neck stiffness.   Skin: Negative.  Negative for rash and wound.   Allergic/Immunologic: Negative.  Negative for immunocompromised state.   Neurological: Negative for dizziness, weakness, light-headedness and headaches.   Hematological: Negative.  Does not bruise/bleed easily.        Family History   Family History   Problem Relation Age of Onset     Cancer Mother         brain cancer, ovarian cancer     Cancer Father         lung cancer     Gastrointestinal Disease Father         colitis     Alcohol/Drug Brother      Alcohol/Drug Son      Alcohol/Drug Brother      Gastrointestinal Disease Sister      Diabetes Sister      Cancer Sister         3 sisters .w ovarian cancer and one also with uterine cancer     Gynecology Son         kidney stones     Heart Disease Sister         MI age 64     Gastrointestinal Disease Other         colitis/colitis/colitis/colitis       Social " History  Social History     Socioeconomic History     Marital status:      Spouse name: Not on file     Number of children: Not on file     Years of education: Not on file     Highest education level: Not on file   Occupational History     Not on file   Social Needs     Financial resource strain: Not on file     Food insecurity:     Worry: Not on file     Inability: Not on file     Transportation needs:     Medical: Not on file     Non-medical: Not on file   Tobacco Use     Smoking status: Never Smoker     Smokeless tobacco: Never Used   Substance and Sexual Activity     Alcohol use: No     Alcohol/week: 0.0 oz     Drug use: No     Sexual activity: Yes     Partners: Male   Lifestyle     Physical activity:     Days per week: Not on file     Minutes per session: Not on file     Stress: Not on file   Relationships     Social connections:     Talks on phone: Not on file     Gets together: Not on file     Attends Scientologist service: Not on file     Active member of club or organization: Not on file     Attends meetings of clubs or organizations: Not on file     Relationship status: Not on file     Intimate partner violence:     Fear of current or ex partner: Not on file     Emotionally abused: Not on file     Physically abused: Not on file     Forced sexual activity: Not on file   Other Topics Concern     Parent/sibling w/ CABG, MI or angioplasty before 65F 55M? Yes   Social History Narrative     Not on file        Surgical History:  Past Surgical History:   Procedure Laterality Date     BUNIONECTOMY CRISELDA  7/1/2011    Procedure:BUNIONECTOMY CRISELDA; weil osteotomy & Lapidtus Bunionectomy; Surgeon:HYACINTH SANTO; Location:[facility]     BUNIONECTOMY CHEOSCARON  4/6/2012    Procedure:BUNIONECTOMY CHEVRON; Lapidus bunionectomy, plantar plate repair second and third metatarsophalangeal joints,left foot-popliteal block left lower extremity; Surgeon:HYACINTH SANTO; Location:[facility]     C TOTAL ABDOM HYSTERECTOMY  1992     ovaries removed     COLONOSCOPY  9/11/2012    Procedure: COLONOSCOPY;  Colonoscopy;  Surgeon: Alyssa Foster MD;  Location: [facility]     COLONOSCOPY N/A 8/3/2018    Procedure: COLONOSCOPY;  colonoscopy;  Surgeon: Kelechi Rivera MD;  Location: [facility]     NISSEN FUNDOPLICATION      2003     REPAIR HAMMER TOE  7/1/2011    Procedure:REPAIR HAMMER TOE; hammertoe correction right 2nd digit; Surgeon:HYACINTH SANTO; Location:[facility]     SURGICAL HISTORY OF -   1979    Tubal     SURGICAL HISTORY OF -   1993    Stamey bladder surgery     SURGICAL HISTORY OF -       Lipoma removed f/back     SURGICAL HISTORY OF -   1995    (L) Foot surgery     SURGICAL HISTORY OF -   05/09/2002    Colonoscopy     SURGICAL HISTORY OF -       knee arthroscopy left     SURGICAL HISTORY OF -   2007-two phases    interstim bladder implant     SURGICAL HISTORY OF -   Sept. 13, 2007    laparoscopic Nissen fundoplication        Problem List:  Patient Active Problem List   Diagnosis     Lumbago     Pain in limb     Mild major depression (H)     Essential hypertension     Type 2 diabetes mellitus with diabetic neuropathy (H)     Osteoarthritis     Mixed incontinence urge and stress (male)(female)     Restless legs     Microalbuminuria     Obesity     Esophageal reflux     GI bleed     Dyslipidemia     Iron deficiency anemia     Falls frequently     ACP (advance care planning)     SHANTI (obstructive sleep apnea)     Other chronic pain     LPRD (laryngopharyngeal reflux disease)     Personal history of fall     Anxiety        Allergies:  Allergies   Allergen Reactions     Lisinopril Cough     Metformin GI Disturbance     reflux sx 50% better off med     Nkda [No Known Drug Allergies]         Current Meds:    Current Outpatient Medications:      ACETAMINOPHEN PO, , Disp: , Rfl:      atorvastatin (LIPITOR) 40 MG tablet, Take 1 tablet (40 mg) by mouth At Bedtime, Disp: 90 tablet, Rfl: 3     blood glucose monitoring (NO BRAND SPECIFIED)  test strip, 1 strip by In Vitro route 3 times daily Has a Freestyle (Patient taking differently: 1 strip by In Vitro route 3 times daily Has a One Touch Ultra), Disp: 100 each, Rfl: 12     buPROPion (WELLBUTRIN XL) 300 MG 24 hr tablet, Take 1 tablet (300 mg) by mouth every morning, Disp: 90 tablet, Rfl: 3     clonazePAM (KLONOPIN) 0.5 MG tablet, 2 tabs daily  Prescribed by neurology for RLS - previously severe., Disp: , Rfl: 0     insulin detemir (LEVEMIR FLEXPEN/FLEXTOUCH) 100 UNIT/ML pen, Inject 16 Units Subcutaneous daily Increase by 2 units every 3 days if fasting sugars are consistently above 120.  Max 20 units/day., Disp: 15 mL, Rfl: 3     insulin pen needle (BD MARIIA U/F) 32G X 4 MM miscellaneous, USE ONE  ONCE DAILY, Disp: 100 each, Rfl: 3     losartan (COZAAR) 25 MG tablet, Take 1 tablet (25 mg) by mouth daily, Disp: 90 tablet, Rfl: 3     pantoprazole (PROTONIX) 40 MG EC tablet, Take 1 tablet (40 mg) by mouth daily, Disp: 90 tablet, Rfl: 3     traMADol (ULTRAM) 50 MG tablet, TAKE 1 TABLET BY MOUTH EVERY 6 HOURS AS NEEDED FOR  MODERATE  PAIN, Disp: 30 tablet, Rfl: 5     ASPIRIN 81 MG OR TABS, ONE DAILY (Patient not taking: No sig reported), Disp: 100, Rfl: 3     Multiple Vitamin (DAILY MULTIVITAMIN PO), One tablet daily, Disp: , Rfl:      order for DME, Equipment being ordered: CPAP Patient Theresa Bill received a Resmed Airsense 10  Auto. Pressures were set at Auto 10 - 15 cm H2O., Disp: , Rfl:      PHYSICAL EXAM:     Vital signs noted and reviewed by Jose Little  /76 (BP Location: Right arm, Patient Position: Chair, Cuff Size: Adult Regular)   Pulse 73   Temp 98  F (36.7  C) (Tympanic)   Resp 18   Wt 75.5 kg (166 lb 6.4 oz)   SpO2 96%   BMI 31.44 kg/m       PEFR:    Physical Exam   Constitutional: She is oriented to person, place, and time. She appears well-developed and well-nourished. She is cooperative.  Non-toxic appearance. She does not have a sickly appearance. She does not appear ill.  No distress.   HENT:   Head: Normocephalic and atraumatic.   Right Ear: Tympanic membrane and external ear normal. Tympanic membrane is not perforated, not erythematous, not retracted and not bulging.   Left Ear: Tympanic membrane and external ear normal. Tympanic membrane is not perforated, not erythematous, not retracted and not bulging.   Nose: No mucosal edema or rhinorrhea.   Mouth/Throat: Oropharynx is clear and moist. No oropharyngeal exudate, posterior oropharyngeal edema, posterior oropharyngeal erythema or tonsillar abscesses.   Eyes: EOM are normal. Pupils are equal, round, and reactive to light.   Neck: Trachea normal, normal range of motion and full passive range of motion without pain. Neck supple.   Cardiovascular: Normal rate, regular rhythm, S1 normal, S2 normal, normal heart sounds and intact distal pulses. Exam reveals no gallop and no friction rub.   No murmur heard.  Pulmonary/Chest: Effort normal and breath sounds normal. No respiratory distress. She has no decreased breath sounds. She has no wheezes. She has no rhonchi. She has no rales.   Abdominal: Soft. Bowel sounds are normal. She exhibits no distension and no mass. There is no hepatosplenomegaly. There is no tenderness. There is no rigidity, no rebound, no guarding and no CVA tenderness.   Lymphadenopathy:     She has no cervical adenopathy.   Neurological: She is alert and oriented to person, place, and time. She has normal reflexes. No cranial nerve deficit.   Skin: Skin is warm and dry. She is not diaphoretic.   Psychiatric: She has a normal mood and affect. Her behavior is normal. Judgment and thought content normal.   Nursing note and vitals reviewed.        ASSESSMENT:     1. Elevated blood sugar    2. Type 2 diabetes mellitus with diabetic neuropathy, with long-term current use of insulin (H)           PLAN:     Patient presents for elevated blood sugars.  Patient is currently asymptomatic.  She has new blood sugar monitor that is  on demand and does not require finger poke.  She has been monitoring this hourly.  Blood sugars have been in the high 200s to low 300s.  With no symptoms, she will continue to  Monitor this and follow up with her PCP this week.  Worrisome symptoms discussed with instructions to go to the ED.  Patient verbalized understanding and agreed with this plan.     Jose Little  3/30/2019, 10:26 AM

## 2019-03-30 NOTE — TELEPHONE ENCOUNTER
"Patient calling as her blood sugar has been higher this past week or more since her GI issues have kicked in. Her stomach is \"higher\" than her esophagus right now. Needs surgery. Has some chest pain and heaviness, which she said is the same since the whole GI stuff started a few weeks ago. I advised she go in to see a provider today to get her medication and BS under control.     Marly Lima RN, [facility]      Reason for Disposition    [1] Blood glucose > 300 mg/dl (16.5 mmol/l) AND [2] two or more times in a row    Additional Information    Negative: Unconscious or difficult to awaken    Negative: Acting confused (e.g., disoriented, slurred speech)    Negative: Very weak (e.g., can't stand)    Negative: Sounds like a life-threatening emergency to the triager    Negative: [1] Vomiting AND [2] signs of dehydration (e.g., very dry mouth, lightheaded, etc.)    Negative: [1] Blood glucose > 240 mg/dl (13 mmol/l) AND [2] rapid breathing    Negative: Blood glucose > 500 mg/dl (27.5 mmol/l)    Negative: [1] Blood glucose > 240 mg/dl (13 mmol/l) AND [2] urine ketones moderate-large (or more than 1+)    Negative: [1] Blood glucose > 240 mg/dl (13 mmol/l) AND [2] blood ketones > 1.5 mmol/l    Negative: [1] Blood glucose > 240 mg/dl (13 mmol/l) AND [2] vomiting AND [3] unable to check for ketones (in blood or urine)    Negative: [1] New onset Diabetes suspected (e.g., frequent urination, weak, weight loss) AND [2] vomiting or rapid breathing    Negative: Vomiting lasts > 4 hours    Negative: Patient sounds very sick or weak to the triager    Negative: Fever > 100.5 F (38.1 C)    Negative: Blood glucose > 400 mg/dl (22 mmol/l)    Protocols used: DIABETES - HIGH BLOOD SUGAR-ADULT-AH      "

## 2019-04-01 ENCOUNTER — OFFICE VISIT (OUTPATIENT)
Dept: PSYCHOLOGY | Facility: CLINIC | Age: 74
End: 2019-04-01
Payer: MEDICARE

## 2019-04-01 DIAGNOSIS — F32.0 MILD MAJOR DEPRESSION (H): Primary | ICD-10-CM

## 2019-04-01 DIAGNOSIS — F43.21 GRIEF: ICD-10-CM

## 2019-04-01 PROCEDURE — 90834 PSYTX W PT 45 MINUTES: CPT | Performed by: PSYCHOLOGIST

## 2019-04-03 ENCOUNTER — ALLIED HEALTH/NURSE VISIT (OUTPATIENT)
Dept: EDUCATION SERVICES | Facility: CLINIC | Age: 74
End: 2019-04-03
Payer: MEDICARE

## 2019-04-03 DIAGNOSIS — Z79.4 TYPE 2 DIABETES MELLITUS WITHOUT COMPLICATION, WITH LONG-TERM CURRENT USE OF INSULIN (H): Chronic | ICD-10-CM

## 2019-04-03 DIAGNOSIS — E11.9 TYPE 2 DIABETES MELLITUS WITHOUT COMPLICATION, WITH LONG-TERM CURRENT USE OF INSULIN (H): Chronic | ICD-10-CM

## 2019-04-03 PROCEDURE — G0108 DIAB MANAGE TRN  PER INDIV: HCPCS | Performed by: DIETITIAN, REGISTERED

## 2019-04-03 NOTE — PATIENT INSTRUCTIONS
Increase the Levemir to 18 units at bedtime.  I will call you in one week for your blood sugars.      Wand your sensor to get blood sugars.  Before breakfast, before lunch, before supper and before bedtime.    Stop the regular pop

## 2019-04-03 NOTE — PROGRESS NOTES
"Diabetes Self-Management Education & Support    Diabetes Education Self Management & Training    SUBJECTIVE/OBJECTIVE:  Presents for: Individual review  Accompanied by: Sister  Diabetes education in the past 24mo: No  Focus of Visit: Being Active, Healthy Eating, Healthy Coping, Taking Medication  Diabetes type: Type 2  Transportation concerns: No  Other concerns:: None  Cultural Influences/Ethnic Background:  American      Diabetes Symptoms & Complications  Blurred vision: (P) No  Fatigue: (P) Yes  Foot ulcerations: (P) No  Polydipsia: (P) No  Polyphagia: (P) No  Polyuria: (P) Yes  Visual change: (P) No  Weakness: (P) No  Weight loss: (P) No  Slow healing wounds: (P) No  Weight trend: (P) Fluctuating minimally  CVA: (P) No  Heart disease: (P) No  Nephropathy: (P) No  Peripheral neuropathy: (P) No  Peripheral Vascular Disease: (P) No  Retinopathy: (P) No  Sexual dysfunction: (P) No    Patient Problem List and Family Medical History reviewed for relevant medical history, current medical status, and diabetes risk factors.    Vitals:  There were no vitals taken for this visit.  Estimated body mass index is 31.44 kg/m  as calculated from the following:    Height as of 3/11/19: 1.549 m (5' 1\").    Weight as of 3/30/19: 75.5 kg (166 lb 6.4 oz).   Last 3 BP:   BP Readings from Last 3 Encounters:   03/30/19 118/76   03/11/19 131/67   12/05/18 122/78       History   Smoking Status     Never Smoker   Smokeless Tobacco     Never Used       Labs:  Lab Results   Component Value Date    A1C 6.7 03/11/2019     Lab Results   Component Value Date     09/20/2018     Lab Results   Component Value Date    LDL 84 03/11/2019     HDL Cholesterol   Date Value Ref Range Status   03/11/2019 47 (L) >49 mg/dL Final   ]  GFR Estimate   Date Value Ref Range Status   09/20/2018 80 >60 mL/min/1.7m2 Final     Comment:     Non  GFR Calc     GFR Estimate If Black   Date Value Ref Range Status   09/20/2018 >90 >60 mL/min/1.7m2 " Final     Comment:      GFR Calc     Lab Results   Component Value Date    CR 0.71 09/20/2018     No results found for: MICROALBUMIN    Healthy Eating  Cultural/Latter day diet restrictions?: (P) No  Meal planning: (P) Carbohydrate counting, Smaller portions, Plate planning method  Meals include: (P) Breakfast, Lunch, Dinner  Breakfast: banana and yogurt or eggs and toast or hot cereal or bagel and cream cheese  Lunch: usually does not eat lunch or sandwich  Dinner: deer and beer steak, sweet potatoes, asparagus or turkey, mashed potatoes and gravy, cranberries and olives  Snacks: Swedish chocolate cake she only has a small pc, yogurt or banana or chips  Beverages: (P) Water, Coffee, Soda  Has patient met with a dietitian in the past?: (P) Yes    Being Active  Being Active Assessed Today: No  Exercise:: (doing what she can going up and stairs, walking to the mailbox)  Barrier to exercise: (P) None    Monitoring  Monitoring Assessed Today: Yes  Did patient bring glucose meter to appointment? : Yes  Blood Glucose Meter: (P) One Touch  Home Glucose (Sugar) Monitoring: (P) 1-2 times per day  Blood glucose trend: (P) Increasing steadily  Low Glucose Range (mg/dL): (P) 140-180  High Glucose Range (mg/dL): (P) >200  Overall Range (mg/dL): (P) 140-180            Taking Medications  Diabetes Medication(s)     Insulin       insulin detemir (LEVEMIR FLEXPEN/FLEXTOUCH) 100 UNIT/ML pen    Inject 16 Units Subcutaneous daily Increase by 2 units every 3 days if fasting sugars are consistently above 120.  Max 20 units/day.          Current Treatments: (P) Diet, Insulin Injections    Problem Solving  Hypoglycemia Frequency: (P) Rarely  Hypoglycemia Treatment: (P) Juice  Patient carries a carbohydrate source: (P) No  Medical alert: (P) No  Severe weather/disaster plan for diabetes management?: (P) No  DKA prevention plan?: (P) No  Sick day plan for diabetes management?: (P) No    Hypoglycemia symptoms  Confusion: (P)  No  Dizziness or Light-Headedness: (P) No  Headaches: (P) Yes  Hunger: (P) No  Mood changes: (P) Yes  Nervousness/Anxiety: (P) Yes  Sleepiness: (P) No  Speech difficulty: (P) No  Sweats: (P) No  Tremors: (P) Yes    Hypoglycemia Complications  Blackouts: (P) No  Hospitalization: (P) No  Nocturnal hypoglycemia: (P) No  Required assistance: (P) No  Required glucagon injection: (P) No  Seizures: (P) No    Reducing Risks  CAD Risks: (P) Family history, Stress  Has dilated eye exam at least once a year?: (P) Yes  Sees dentist every 6 months?: (P) No  Sees podiatrist (foot doctor)?: (P) No    Healthy Coping  Informal Support system:: (P) Family, Friends  Difficulty affording diabetes management supplies?: (P) Yes  Patient Activation Measure Survey Score:  JS Score (Last Two) 4/8/2011   JS Raw Score 49   Activation Score 82.8   JS Level 4       ASSESSMENT:  Blood sugars running higher, metformin was discontinued due to acid reflux.  Will increase her levemir until am numbers look better.  May need to add in some glipizide again.  She is trying to be as active as able.  Is in a study for richard freestyle sensors/diabetes.        Patient's most recent   Lab Results   Component Value Date    A1C 6.7 03/11/2019    is meeting goal of <7.0    INTERVENTION:   Diabetes knowledge and skills assessment:     Patient is knowledgeable in diabetes management concepts related to: Healthy Eating, Being Active, Monitoring and Taking Medication    Patient needs further education on the following diabetes management concepts: Healthy Eating, Being Active, Monitoring, Taking Medication, Problem Solving, Reducing Risks and Healthy Coping    Based on learning assessment above, most appropriate setting for further diabetes education would be: Individual setting.    Education provided today on:  AADE Self-Care Behaviors:  Healthy Eating: consistency in amount, composition, and timing of food intake, portion control and cutting out the pop  Being  Active: relationship to blood glucose and describe appropriate activity program  Monitoring: purpose and importance of scanning the monitor every 8 hours so it logs data  Taking Medication: increase levemir to 18 units at bedtime    Opportunities for ongoing education and support in diabetes-self management were discussed.    Pt verbalized understanding of concepts discussed and recommendations provided today.       Education Materials Provided:  No new materials provided today  Log sheet  PLAN:  See Patient Instructions for co-developed, patient-stated behavior change goals.  AVS printed and provided to patient today. See Follow-Up section for recommended follow-up.      Time Spent: 40 minutes  Encounter Type: Individual    Any diabetes medication dose changes were made via the CDE Protocol and Collaborative Practice Agreement with the patient's primary care provider. A copy of this encounter was shared with the provider.

## 2019-04-04 ENCOUNTER — TELEPHONE (OUTPATIENT)
Dept: FAMILY MEDICINE | Facility: CLINIC | Age: 74
End: 2019-04-04

## 2019-04-04 DIAGNOSIS — E11.40 TYPE 2 DIABETES MELLITUS WITH DIABETIC NEUROPATHY, WITH LONG-TERM CURRENT USE OF INSULIN (H): Primary | ICD-10-CM

## 2019-04-04 DIAGNOSIS — Z79.4 TYPE 2 DIABETES MELLITUS WITH DIABETIC NEUROPATHY, WITH LONG-TERM CURRENT USE OF INSULIN (H): Primary | ICD-10-CM

## 2019-04-04 ASSESSMENT — ANXIETY QUESTIONNAIRES
GAD7 TOTAL SCORE: 11
4. TROUBLE RELAXING: NEARLY EVERY DAY
5. BEING SO RESTLESS THAT IT IS HARD TO SIT STILL: NOT AT ALL
6. BECOMING EASILY ANNOYED OR IRRITABLE: NOT AT ALL
3. WORRYING TOO MUCH ABOUT DIFFERENT THINGS: NOT AT ALL
1. FEELING NERVOUS, ANXIOUS, OR ON EDGE: MORE THAN HALF THE DAYS
2. NOT BEING ABLE TO STOP OR CONTROL WORRYING: NEARLY EVERY DAY
7. FEELING AFRAID AS IF SOMETHING AWFUL MIGHT HAPPEN: NEARLY EVERY DAY

## 2019-04-04 ASSESSMENT — PATIENT HEALTH QUESTIONNAIRE - PHQ9: SUM OF ALL RESPONSES TO PHQ QUESTIONS 1-9: 8

## 2019-04-04 NOTE — TELEPHONE ENCOUNTER
----- Message from Sena Scherer RD sent at 4/3/2019  9:10 AM CDT -----  Can you add in a referral for diabetes education for Theresa Bill, needed for medicare payment.  Thanks! Sena Shcerer RD, CDE

## 2019-04-04 NOTE — PROGRESS NOTES
Progress Note  Disclaimer: This note consists of symbols derived from keyboarding, dictation and/or voice recognition software. As a result, there may be errors in the script that have gone undetected. Please consider this when interpreting information found in this chart.    Client Name: Theresa Bill  Date: 4/1/2019         Service Type: Individual      Session Start Time: 9:30 AM  session End Time: 10:15 AM      Session Length: 45     Session #: 24     Attendees: Client attended alone    Treatment Plan Last Reviewed:  11/7/2018       DATA   Client reports feeling more anxious and worried today.  She has had an operation for acid reflux in May which will also involve a hiatal hernia repair.  The operation is complicated and she is somewhat frightened.  Also her son is coming back from [place] to go to drug treatment and she is concerned that that may cause problems.  She has taken out a restraining order on him in the past.  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   Progress Since Last Session (Related to Symptoms / Goals / Homework):   Symptoms: Stable    Homework: not applicable      Episode of Care Goals: Satisfactory progress - ACTION (Actively working towards change); Intervened by reinforcing change plan / affirming steps taken     Current / Ongoing Stressors and Concerns:   Recent bereavement, prior caregiver stress, son with serious drug addiction.  Multiple medical problems     Treatment Objective(s) Addressed in This Session:   increase understanding of steps in the grief process  Utilize family support, client has order for protection, be sure to communicate with other family members every day     Intervention:   Interpersonal Therapy: facilitating appropriate expressions of emotion and grief.  12 step facilitation for family member.  Processed concerns for upcoming  surgery  ASSESSMENT: Current Emotional / Mental Status (status of significant symptoms):   Risk status (Self / Other harm or suicidal ideation)   Client denies current fears or concerns for personal safety.   Client denies current or recent suicidal ideation or behaviors.   Client denies current or recent homicidal ideation or behaviors.   Client denies current or recent self injurious behavior or ideation.   Client denies other safety concerns.   A safety and risk management plan has not been developed at this time, however client was given the after-hours number / 911 should there be a change in any of these risk factors.     Appearance:   Appropriate    Eye Contact:   Good    Psychomotor Behavior: Normal    Attitude:   Cooperative    Orientation:   All   Speech    Rate / Production: Slow     Volume:  Soft    Mood:    Anxious  Sad    Affect:    Appropriate    Thought Content:  Clear    Thought Form:  Coherent  Logical    Insight:    Fair      Medication Review:   No changes to current psychiatric medication(s)     Medication Compliance:   Yes     Changes in Health Issues:   None reported     Chemical Use Review:   Substance Use: Chemical use reviewed, no active concerns identified      Tobacco Use: No current tobacco use.       Collateral Reports Completed:   Not Applicable    PLAN: (Client Tasks / Therapist Tasks / Other)  Client to continue with self-care, keeping herself safe, and not enabling her son's drug use.      Sam Jaime                                                         ________________________________________________________________________    Treatment Plan    Client's Name: Theresa Bill  YOB: 1945    Date: 4/9/2018    Referral / Collaboration:  Referral to another professional/service is not indicated at this time..    Anticipated number of session or this episode of care: 25       DSM5 Diagnoses: (Sustained by DSM5 Criteria Listed Above)  Diagnoses:            296.21  (F32.0) Major Depressive Disorder, Single Episode, Mild _ grief  Psychosocial & Contextual Factors: Caregiver stress, recent death of her   WHODAS 2.0 (12 item)                          This questionnaire asks about difficulties due to health conditions. Health conditions                   include                        disease or illnesses, other health problems that may be short or long lasting,                    injuries, mental health or emotional problems, and problems with alcohol or drugs.                              Think back over the past 30 days and answer these questions, thinking about how much              difficulty you had doing the following activities. For each question, please [place] only                   one response.      S1 Standing for long periods such as 30 minutes? Moderate =   3   S2 Taking care of household responsibilities? None =         1   S3 Learning a new task, for example, learning how to get to a new place? Mild =           2   S4 How much of a problem do you have joining community activities (for example, festivals, Voodoo or other activities) in the same way as anyone else can? None =         1   S5 How much have you been emotionally affected by your health problems? None =         1               In the past 30 days, how much difficulty did you have in:   S6 Concentrating on doing something for ten minutes? None =         1   S7 Walking a long distance such as a kilometer (or equivalent)? Moderate =   3   S8 Washing your whole body? None =         1   S9 Getting dressed? None =         1   S10 Dealing with people you do not know? None =         1   S11 Maintaining a friendship? None =         1   S12 Your day to day work? None =         1       H1 Overall, in the past 30 days, how many days were these difficulties present? Record number of days 1    H2 In the past 30 days, for how many days were you totally unable to carry out your usual activities or work because  of any health condition? Record number of days  0    H3 In the past 30 days, not counting the days that you were totally unable, for how many days did you cut back or reduce your usual activities or work because of any health condition? Record number of days 0      Goals  1. Education- the stages of grief  a. Client will be able to describe the stages of grief; denial, bargaining, anger, depression, and acceptance and give examples of how each have felt for her.  2. Behavioral Activation  a. Client will learn to assess their emotional stateon a day to day basis  b. Client will Identify two forms of exercise/activity and engage in them 3 times per week  c. Client will Identify 3 things that make them laugh, and engage in these 5 times per week.  d. Client will Identify 1-2Creative activities or hobbies  and engage in them 2 times per week  e. Client will identify music, movies, books that make them feel good and use them 3-4 times per week  3. Self-care  a. Client will identify 5 things they can do just for themselves  b. Client will take time for quiet, reflection, meditation 5 times per week  c. Client will Learn to set boundaries when appropriate  d. Client will Identify 2 individuals they can call on for support, distraction  4. Assessment of progress  a. Client will engage in assessment of their progress on a regular basis      Client has reviewed and agreed to the above plan.      Sam Jaime  4/9/2018

## 2019-04-05 ASSESSMENT — ANXIETY QUESTIONNAIRES: GAD7 TOTAL SCORE: 11

## 2019-04-09 ENCOUNTER — TELEPHONE (OUTPATIENT)
Dept: FAMILY MEDICINE | Facility: CLINIC | Age: 74
End: 2019-04-09

## 2019-04-09 NOTE — TELEPHONE ENCOUNTER
[employer]- Prescriber Response Form-Pantoprazole  Form placed on Provider Shae Pierre PA-C desk for [employer].  Dianna [place] Sec

## 2019-04-10 ENCOUNTER — PATIENT OUTREACH (OUTPATIENT)
Dept: EDUCATION SERVICES | Facility: CLINIC | Age: 74
End: 2019-04-10
Payer: MEDICARE

## 2019-04-10 ENCOUNTER — OFFICE VISIT (OUTPATIENT)
Dept: FAMILY MEDICINE | Facility: CLINIC | Age: 74
End: 2019-04-10
Payer: MEDICARE

## 2019-04-10 VITALS
RESPIRATION RATE: 18 BRPM | WEIGHT: 170 LBS | TEMPERATURE: 97.9 F | BODY MASS INDEX: 32.1 KG/M2 | HEART RATE: 88 BPM | SYSTOLIC BLOOD PRESSURE: 124 MMHG | HEIGHT: 61 IN | DIASTOLIC BLOOD PRESSURE: 70 MMHG

## 2019-04-10 DIAGNOSIS — K44.9 HIATAL HERNIA: ICD-10-CM
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
## 2019-04-10 DIAGNOSIS — Z01.818 PREOP GENERAL PHYSICAL EXAM: Primary | ICD-10-CM

## 2019-04-10 LAB
ANION GAP SERPL CALCULATED.3IONS-SCNC: 2 MMOL/L (ref 3–14)
BASOPHILS # BLD AUTO: 0.1 10E9/L (ref 0–0.2)
BASOPHILS NFR BLD AUTO: 0.5 %
BUN SERPL-MCNC: 9 MG/DL (ref 7–30)
CALCIUM SERPL-MCNC: 8.9 MG/DL (ref 8.5–10.1)
CHLORIDE SERPL-SCNC: 107 MMOL/L (ref 94–109)
CO2 SERPL-SCNC: 32 MMOL/L (ref 20–32)
CREAT SERPL-MCNC: 0.8 MG/DL (ref 0.52–1.04)
DIFFERENTIAL METHOD BLD: NORMAL
EOSINOPHIL # BLD AUTO: 0.2 10E9/L (ref 0–0.7)
EOSINOPHIL NFR BLD AUTO: 2.6 %
ERYTHROCYTE [DISTWIDTH] IN BLOOD BY AUTOMATED COUNT: 13.6 % (ref 10–15)
GFR SERPL CREATININE-BSD FRML MDRD: 73 ML/MIN/{1.73_M2}
GLUCOSE SERPL-MCNC: 132 MG/DL (ref 70–99)
HCT VFR BLD AUTO: 41.4 % (ref 35–47)
HGB BLD-MCNC: 13.6 G/DL (ref 11.7–15.7)
LYMPHOCYTES # BLD AUTO: 2.4 10E9/L (ref 0.8–5.3)
LYMPHOCYTES NFR BLD AUTO: 26.7 %
MCH RBC QN AUTO: 30.6 PG (ref 26.5–33)
MCHC RBC AUTO-ENTMCNC: 32.9 G/DL (ref 31.5–36.5)
MCV RBC AUTO: 93 FL (ref 78–100)
MONOCYTES # BLD AUTO: 0.8 10E9/L (ref 0–1.3)
MONOCYTES NFR BLD AUTO: 9.2 %
NEUTROPHILS # BLD AUTO: 5.6 10E9/L (ref 1.6–8.3)
NEUTROPHILS NFR BLD AUTO: 61 %
PLATELET # BLD AUTO: 225 10E9/L (ref 150–450)
POTASSIUM SERPL-SCNC: 4.1 MMOL/L (ref 3.4–5.3)
RBC # BLD AUTO: 4.45 10E12/L (ref 3.8–5.2)
SODIUM SERPL-SCNC: 141 MMOL/L (ref 133–144)
WBC # BLD AUTO: 9.1 10E9/L (ref 4–11)

## 2019-04-10 PROCEDURE — 80048 BASIC METABOLIC PNL TOTAL CA: CPT | Performed by: NURSE PRACTITIONER

## 2019-04-10 PROCEDURE — 36415 COLL VENOUS BLD VENIPUNCTURE: CPT | Performed by: NURSE PRACTITIONER

## 2019-04-10 PROCEDURE — 85025 COMPLETE CBC W/AUTO DIFF WBC: CPT | Performed by: NURSE PRACTITIONER

## 2019-04-10 PROCEDURE — 93000 ELECTROCARDIOGRAM COMPLETE: CPT | Performed by: NURSE PRACTITIONER

## 2019-04-10 PROCEDURE — 99215 OFFICE O/P EST HI 40 MIN: CPT | Performed by: NURSE PRACTITIONER

## 2019-04-10 RX ORDER — DOXYCYCLINE HYCLATE 100 MG
200 TABLET ORAL DAILY
Qty: 2 TABLET | Refills: 0 | Status: SHIPPED | OUTPATIENT
Start: 2019-04-10 | End: 2019-04-15

## 2019-04-10 ASSESSMENT — MIFFLIN-ST. JEOR: SCORE: 1208.49

## 2019-04-10 NOTE — PATIENT INSTRUCTIONS
-----Hold usual oral and non-insulin diabetic meds (e.g. Metformin, Actos, Glipizide) while NPO.   -----Take 80% of long acting insulin (e.g. Lantus, NPH) while NPO (fasting) Take 14 units of your Levemir   -----Hold mixed insulins (e.g. Insulin 70/30) while NPO (fasting)  -----Hold short acting insulin (e.g. Novolog, Humalog) while NPO (fasting)    Before Your Surgery      Call your surgeon if there is any change in your health. This includes signs of a cold or flu (such as a sore throat, runny nose, cough, rash or fever).    Do not smoke, drink alcohol or take over the counter medicine (unless your surgeon or primary care doctor tells you to) for the 24 hours before and after surgery.    If you take prescribed drugs: Follow your doctor s orders about which medicines to take and which to stop until after surgery.    Eating and drinking prior to surgery: follow the instructions from your surgeon    Take a shower or bath the night before surgery. Use the soap your surgeon gave you to gently clean your skin. If you do not have soap from your surgeon, use your regular soap. Do not shave or scrub the surgery site.  Wear clean pajamas and have clean sheets on your bed.

## 2019-04-10 NOTE — PROGRESS NOTES
Diabetes Education contact:    Pt driving home, I will call her tomorrow.    Sena Scherer RD on 4/10/2019 at 3:34 PM      Diabetes education contact:    PT did not have numbers recorded for last week, unable to figure out how to get off meter.    Was 114 this am.  Most numbers between 120-180 she stated.  She will record numbers on a paper and I will call again in a week.  Sena Scherer RD on 4/11/2019 at 2:31 PM

## 2019-04-10 NOTE — RESULT ENCOUNTER NOTE
Please Notify Theresa  of test results normal lab results from today  preop is completed    Sanam Ansari CNP

## 2019-04-10 NOTE — PROGRESS NOTES
[facility]  [address] [place] [id number]  [phone]  Dept: [phone]    PRE-OP EVALUATION:  Today's date: 4/10/2019    Theresa Bill (: 1945) presents for pre-operative evaluation assessment as requested by Dr. Parson.  She requires evaluation and anesthesia risk assessment prior to undergoing surgery/procedure for treatment of hiatal hernia .    Fax number for surgical facility:   Primary Physician: Shae Pierre  Type of Anesthesia Anticipated: to be determined    Patient has a Health Care Directive or Living Will:  NO    Preop Questions 4/10/2019   Who is doing your surgery? Dr. Parson   What are you having done? Hiatal hernia   Date of Surgery/Procedure: [facility]   Facility or Hospital where procedure/surgery will be performed[place] [facility] [place]   1.  Do you have a history of Heart attack, stroke, stent, coronary bypass surgery, or other heart surgery? No   2.  Do you ever have any pain or discomfort in your chest? YES from the Hernia   3.  Do you have a history of  Heart Failure? No   4.   Are you troubled by shortness of breath when:  walking on a level surface, or up a slight hill, or at night? No   5.  Do you currently have a cold, bronchitis or other respiratory infection? No   6.  Do you have a cough, shortness of breath, or wheezing? No   7.  Do you sometimes get pains in the calves of your legs when you walk? YES    8. Do you or anyone in your family have previous history of blood clots? YES - sisters have had blood clots   9.  Do you or does anyone in your family have a serious bleeding problem such as prolonged bleeding following surgeries or cuts? No   10. Have you ever had problems with anemia or been told to take iron pills? YES- a few years ago   11. Have you had any abnormal blood loss such as black, tarry or bloody stools, or abnormal vaginal bleeding? No   12. Have you ever had a blood transfusion? YES- about 8 years ago   13. Have you or any of your  relatives ever had problems with anesthesia? No   14. Do you have sleep apnea, excessive snoring or daytime drowsiness? YES does not use her machine    15. Do you have any prosthetic heart valves? No   16. Do you have prosthetic joints? No   17. Is there any chance that you may be pregnant? No         HPI:     HPI related to upcoming procedure: Hiatal hernia      DIABETES - Patient has a longstanding history of DiabetesType Type II . Patient is being treated with insulin injections and denies significant side effects. Control has been good. Complicating factors include but are not limited to: hypertension and hyperlipidemia.                                                                                                                            .  HYPERLIPIDEMIA - Patient has a long history of significant Hyperlipidemia requiring medication for treatment with recent good control. Patient reports no problems or side effects with the medication.                                                                                                                                                       .  HYPERTENSION - Patient has longstanding history of HTN , currently denies any symptoms referable to elevated blood pressure. Specifically denies chest pain, palpitations, dyspnea, orthopnea, PND or peripheral edema. Blood pressure readings have been in normal range. Current medication regimen is as listed below. Patient denies any side effects of medication.                                                                                                                                                                                          .    MEDICAL HISTORY:     Patient Active Problem List    Diagnosis Date Noted     Anxiety 09/24/2018     Priority: Medium     Personal history of fall 06/11/2018     Priority: Medium     6/11/18 - Notable history orthostasis with up to 20 point systolic drops.         LPRD  "(laryngopharyngeal reflux disease) 11/27/2017     Priority: Medium     Other chronic pain 11/10/2015     Priority: Medium     Left sciatic like pain that originates from where bladder stimulator was placed.  Pain unchanged.  Uses tramadol prn - I am ok with refilling #30 per month and seeing her every 6 mo.       SHANTI (obstructive sleep apnea) 06/24/2015     Priority: Medium     Severe SHANTI: Study date 6/11/2015(, , Lowest O2 saturation 87%)       ACP (advance care planning) 06/16/2015     Priority: Medium     Advance Care Planning 6/16/2015: Receipt of ACP document:  Received: invalid HCD document dated 5/6/06.  Document previously scanned on 4/4/12.  Request made to delete invalid medical order document. Notification sent to Scarlett Zuluaga for followup.  Code Status needs to be updated to reflect choices in most recent ACP document.  Confirmed/documented designated decision maker(s).  Added by Kushal Vallejo frequently 05/21/2015     Priority: Medium     Spring 2015.  Referred to Physical Therapy.  Sees neurology for RLS.       Iron deficiency anemia 09/20/2013     Priority: Medium     Problem list name updated by automated process. Provider to review       Dyslipidemia 10/31/2010     Priority: Medium     GI bleed 03/17/2010     Priority: Medium     3/17/2010:hospitalized 2/2010 in [place] for GI bleed.  She had a couple irritated spots in stomach antrum and a \"colitis\" picture on colonoscopy.  Unclear exact cause.  She had been on Advil PM daily and ASA 81mg.  She was treated with prednisone and omeprazole.   See clinic notes from today.  Scanned scope reports.        Esophageal reflux 10/20/2006     Priority: Medium     2007 she had a laparoscopic Miguel fundoplication.        Obesity 07/27/2006     Priority: Medium     Microalbuminuria 05/01/2006     Priority: Medium     6/4/2012:Positive MAC 4/2006-once, has been normal since       Restless legs 03/15/2006     Priority: Medium     " 12/4/14 -- sees Dr Arteaga (neurology) in [place].  Lyrica seems to be helping.         Lumbago 04/21/2005     Priority: Medium     Chronic low back pain  7/22/2010:She had a CT myelogram in April 2010 which showed very mild spinal stenosis at L4-5 and some degenerative disc disease.  There was no foraminal stenosis.        Pain in limb 04/21/2005     Priority: Medium     Chronic (R) foot pain       Mild major depression (H) 04/21/2005     Priority: Medium     Essential hypertension 04/21/2005     Priority: Medium     Type 2 diabetes mellitus with diabetic neuropathy (H) 04/21/2005     Priority: Medium     12/18/17 - neurologist treating for diab neuropathy  Foot exam:4/14/2010, normal   She had cough on lisinopril-see notes 3/7/12.  She also seemed to have cough on losartan-see notes from 5/25/12.  Both meds ended up being stopped.        Osteoarthritis 04/21/2005     Priority: Medium     Problem list name updated by automated process. Provider to review       Mixed incontinence urge and stress (male)(female) 04/21/2005     Priority: Medium     Interstim placed 2007        Past Medical History:   Diagnosis Date     COPD (chronic obstructive pulmonary disease) (H)     Pt reports chronic bronchitis     Depression      Diabetes mellitus (H)     NIDDM     Dysphagia      GERD (gastroesophageal reflux disease)      Heart disease      Hypertension      Sleep apnea     CPAP     Past Surgical History:   Procedure Laterality Date     BUNIONECTOMY CRISELDA  7/1/2011    Procedure:BUNIONECTOMY CRISELDA; weil osteotomy & Lapidtus Bunionectomy; Surgeon:HYACINTH SANTO; Location:[facility]     BUNIONECTOMY CARL  4/6/2012    Procedure:BUNIONECTOMY CHEVRON; Lapidus bunionectomy, plantar plate repair second and third metatarsophalangeal joints,left foot-popliteal block left lower extremity; Surgeon:HYACINTH SANTO; Location:[facility]     C TOTAL ABDOM HYSTERECTOMY  1992    ovaries removed     COLONOSCOPY  9/11/2012    Procedure:  COLONOSCOPY;  Colonoscopy;  Surgeon: Alyssa Foster MD;  Location: [facility]     COLONOSCOPY N/A 8/3/2018    Procedure: COLONOSCOPY;  colonoscopy;  Surgeon: Kelechi Rivera MD;  Location: [facility]     NISSEN FUNDOPLICATION      2003     REPAIR HAMMER TOE  7/1/2011    Procedure:REPAIR HAMMER TOE; hammertoe correction right 2nd digit; Surgeon:HYACINTH SANTO; Location:[facility]     SURGICAL HISTORY OF -   1979    Tubal     SURGICAL HISTORY OF -   1993    Stamey bladder surgery     SURGICAL HISTORY OF -       Lipoma removed f/back     SURGICAL HISTORY OF -   1995    (L) Foot surgery     SURGICAL HISTORY OF -   05/09/2002    Colonoscopy     SURGICAL HISTORY OF -       knee arthroscopy left     SURGICAL HISTORY OF -   2007-two phases    interstim bladder implant     SURGICAL HISTORY OF -   Sept. 13, 2007    laparoscopic Nissen fundoplication     Current Outpatient Medications   Medication Sig Dispense Refill     ACETAMINOPHEN PO        ASPIRIN 81 MG OR TABS ONE DAILY (Patient not taking: No sig reported) 100 3     atorvastatin (LIPITOR) 40 MG tablet Take 1 tablet (40 mg) by mouth At Bedtime 90 tablet 3     blood glucose monitoring (NO BRAND SPECIFIED) test strip 1 strip by In Vitro route 3 times daily Has a Freestyle (Patient taking differently: 1 strip by In Vitro route 3 times daily Has a One Touch Ultra) 100 each 12     buPROPion (WELLBUTRIN XL) 300 MG 24 hr tablet Take 1 tablet (300 mg) by mouth every morning 90 tablet 3     clonazePAM (KLONOPIN) 0.5 MG tablet 2 tabs daily  Prescribed by neurology for RLS - previously severe.  0     insulin detemir (LEVEMIR FLEXPEN/FLEXTOUCH) 100 UNIT/ML pen Inject 18 Units Subcutaneous daily Increase by 2 units every 3 days if fasting sugars are consistently above 120.  Max 20 units/day. 15 mL 3     insulin pen needle (BD MARIIA U/F) 32G X 4 MM miscellaneous USE ONE  ONCE DAILY 100 each 3     losartan (COZAAR) 25 MG tablet Take 1 tablet (25 mg) by mouth daily 90 tablet 3      Multiple Vitamin (DAILY MULTIVITAMIN PO) One tablet daily       order for DME Equipment being ordered: CPAP Patient Theresa Bill received a Resmed Airsense 10  Auto. Pressures were set at Auto 10 - 15 cm H2O.       pantoprazole (PROTONIX) 40 MG EC tablet Take 1 tablet (40 mg) by mouth daily 90 tablet 3     traMADol (ULTRAM) 50 MG tablet TAKE 1 TABLET BY MOUTH EVERY 6 HOURS AS NEEDED FOR  MODERATE  PAIN 30 tablet 5     OTC products: None, except as noted above    Allergies   Allergen Reactions     Lisinopril Cough     Metformin GI Disturbance     reflux sx 50% better off med     Nkda [No Known Drug Allergies]       Latex Allergy: NO    Social History     Tobacco Use     Smoking status: Never Smoker     Smokeless tobacco: Never Used   Substance Use Topics     Alcohol use: No     Alcohol/week: 0.0 oz     History   Drug Use No       REVIEW OF SYSTEMS:   Constitutional, neuro, ENT, endocrine, pulmonary, cardiac, gastrointestinal, genitourinary, musculoskeletal, integument and psychiatric systems are negative, except as otherwise noted.    EXAM:   There were no vitals taken for this visit.    GENERAL APPEARANCE: healthy, alert and no distress     EYES: EOMI, PERRL     HENT: ear canals and TM's normal and nose and mouth without ulcers or lesions     NECK: no adenopathy, no asymmetry, masses, or scars and thyroid normal to palpation     RESP: lungs clear to auscultation - no rales, rhonchi or wheezes     CV: regular rates and rhythm, normal S1 S2, no S3 or S4 and no murmur, click or rub     ABDOMEN:  soft, nontender, no HSM or masses and bowel sounds normal     MS: extremities normal- no gross deformities noted, no evidence of inflammation in joints, FROM in all extremities.     SKIN: no suspicious lesions or rashes tick bite to lower left thigh, no signs of infection no bull eyes lesion noted.     NEURO: Normal strength and tone, sensory exam grossly normal, mentation intact and speech normal     PSYCH: mentation  appears normal. and affect normal/bright     LYMPHATICS: No cervical adenopathy    DIAGNOSTICS:     Results for orders placed or performed in visit on 04/10/19   Basic metabolic panel   Result Value Ref Range    Sodium 141 133 - 144 mmol/L    Potassium 4.1 3.4 - 5.3 mmol/L    Chloride 107 94 - 109 mmol/L    Carbon Dioxide 32 20 - 32 mmol/L    Anion Gap 2 (L) 3 - 14 mmol/L    Glucose 132 (H) 70 - 99 mg/dL    Urea Nitrogen 9 7 - 30 mg/dL    Creatinine 0.80 0.52 - 1.04 mg/dL    GFR Estimate 73 >60 mL/min/[1.73_m2]    GFR Estimate If Black 84 >60 mL/min/[1.73_m2]    Calcium 8.9 8.5 - 10.1 mg/dL   CBC with platelets differential   Result Value Ref Range    WBC 9.1 4.0 - 11.0 10e9/L    RBC Count 4.45 3.8 - 5.2 10e12/L    Hemoglobin 13.6 11.7 - 15.7 g/dL    Hematocrit 41.4 35.0 - 47.0 %    MCV 93 78 - 100 fl    MCH 30.6 26.5 - 33.0 pg    MCHC 32.9 31.5 - 36.5 g/dL    RDW 13.6 10.0 - 15.0 %    Platelet Count 225 150 - 450 10e9/L    % Neutrophils 61.0 %    % Lymphocytes 26.7 %    % Monocytes 9.2 %    % Eosinophils 2.6 %    % Basophils 0.5 %    Absolute Neutrophil 5.6 1.6 - 8.3 10e9/L    Absolute Lymphocytes 2.4 0.8 - 5.3 10e9/L    Absolute Monocytes 0.8 0.0 - 1.3 10e9/L    Absolute Eosinophils 0.2 0.0 - 0.7 10e9/L    Absolute Basophils 0.1 0.0 - 0.2 10e9/L    Diff Method Automated Method          Recent Labs   Lab Test 03/11/19  1344 09/20/18  0951 09/17/18  0919  03/14/18  1052  09/23/17  1100   HGB  --  13.5  --   --   --   --  13.9   PLT  --  220  --   --   --   --  307   NA  --  140  --   --  138   < > 140   POTASSIUM  --  4.2  --   --  4.4   < > 3.7   CR  --  0.71  --   --  0.81   < > 0.80   A1C 6.7*  --  7.1*   < > 7.1*   < >  --     < > = values in this interval not displayed.      IMPRESSION:   Reason for surgery/procedure: Hernia Repair     The proposed surgical procedure is considered LOW risk.    REVISED CARDIAC RISK INDEX  The patient has the following serious cardiovascular risks for perioperative complications  such as (MI, PE, VFib and 3  AV Block):  No serious cardiac risks  INTERPRETATION: 0 risks: Class I (very low risk - 0.4% complication rate)    The patient has the following additional risks for perioperative complications:  No identified additional risks      ICD-10-CM    1. Preop general physical exam Z01.818        RECOMMENDATIONS:     --Consult hospital rounder / IM to assist post-op medical management    --Patient is to take all scheduled medications on the day of surgery EXCEPT for modifications listed below.    Diabetes Medication Use    -----Hold usual oral and non-insulin diabetic meds (e.g. Metformin, Actos, Glipizide) while NPO.   -----Take 80% of long acting insulin (e.g. Lantus, NPH) while NPO (fasting)  -----Hold mixed insulins (e.g. Insulin 70/30) while NPO (fasting)  -----Hold short acting insulin (e.g. Novolog, Humalog) while NPO (fasting)      APPROVAL GIVEN to proceed with proposed procedure, without further diagnostic evaluation       Signed Electronically by: NICHELLE Soler CNP    Copy of this evaluation report is provided to requesting physician.    [facility] Preop Guidelines    Revised Cardiac Risk Index

## 2019-04-15 ENCOUNTER — OFFICE VISIT (OUTPATIENT)
Dept: FAMILY MEDICINE | Facility: CLINIC | Age: 74
End: 2019-04-15
Payer: MEDICARE

## 2019-04-15 VITALS
RESPIRATION RATE: 16 BRPM | SYSTOLIC BLOOD PRESSURE: 130 MMHG | TEMPERATURE: 98 F | OXYGEN SATURATION: 99 % | BODY MASS INDEX: 32.1 KG/M2 | WEIGHT: 170 LBS | HEIGHT: 61 IN | DIASTOLIC BLOOD PRESSURE: 84 MMHG | HEART RATE: 63 BPM

## 2019-04-15 DIAGNOSIS — K62.5 RECTAL BLEEDING: ICD-10-CM

## 2019-04-15 DIAGNOSIS — E11.40 TYPE 2 DIABETES MELLITUS WITH DIABETIC NEUROPATHY, WITHOUT LONG-TERM CURRENT USE OF INSULIN (H): Primary | ICD-10-CM

## 2019-04-15 DIAGNOSIS — M21.969 ACQUIRED DEFORMITY OF FOOT, UNSPECIFIED LATERALITY: ICD-10-CM

## 2019-04-15 LAB — HGB BLD-MCNC: 13.7 G/DL (ref 11.7–15.7)

## 2019-04-15 PROCEDURE — 85018 HEMOGLOBIN: CPT | Performed by: PHYSICIAN ASSISTANT

## 2019-04-15 PROCEDURE — 99207 C FOOT EXAM  NO CHARGE: CPT | Performed by: PHYSICIAN ASSISTANT

## 2019-04-15 PROCEDURE — 99214 OFFICE O/P EST MOD 30 MIN: CPT | Performed by: PHYSICIAN ASSISTANT

## 2019-04-15 PROCEDURE — 36415 COLL VENOUS BLD VENIPUNCTURE: CPT | Performed by: PHYSICIAN ASSISTANT

## 2019-04-15 ASSESSMENT — MIFFLIN-ST. JEOR: SCORE: 1208.49

## 2019-04-15 NOTE — PROGRESS NOTES
"  SUBJECTIVE:   Theresa Bill is a 73 year old female who presents to clinic today for the following   health issues:    * Forms- Needs a diabetic foot exam for diabetic shoes.    Sugars - upper 120s morning, 120s-160s without clear correlation to time of day or eating.  Upcoming hiatal hernia surgery - will be on liquid diet for 2 wks prior, and 2-4 wks after.    * Stool Problem-  States she just had a bowel movement about 5 minutes ago and there was a streak of blood on her stool.  Unsure if there was any blood on the toilet paper.  Painless.  Normal stool form, but notes part of stool was darker.  Very remote history hemorrhoids/pregnancy.  She quit asa recently.    Additional history: as documented    Reviewed  and updated as needed this visit by clinical staff  Tobacco  Allergies  Meds  Problems  Med Hx  Surg Hx  Fam Hx  Soc Hx          Reviewed and updated as needed this visit by Provider  Tobacco  Allergies  Meds  Problems  Med Hx  Surg Hx  Fam Hx  Soc Hx          BP Readings from Last 3 Encounters:   04/15/19 130/84   04/10/19 124/70   03/30/19 118/76    Wt Readings from Last 3 Encounters:   04/15/19 77.1 kg (170 lb)   04/10/19 77.1 kg (170 lb)   03/30/19 75.5 kg (166 lb 6.4 oz)         Labs reviewed in EPIC    ROS:  Constitutional, GI, musculoskeletal, neuro, skin, endocrine and psych systems are negative, except as otherwise noted.    OBJECTIVE:     /84 (BP Location: Right arm, Patient Position: Chair, Cuff Size: Adult Large)   Pulse 63   Temp 98  F (36.7  C) (Tympanic)   Resp 16   Ht 1.549 m (5' 1\")   Wt 77.1 kg (170 lb)   SpO2 99%   BMI 32.12 kg/m    Body mass index is 32.12 kg/m .  GENERAL: healthy, alert and no distress  Diabetic foot exam: normal DP and PT pulses, normal monofilament exam, has bunions of 1st toe bilaterally and 2nd R toe hammertoe deformity, toes 2-5 L with lateral deviation, onychomycosis, and callous formation under L forefoot    ASSESSMENT/PLAN:       " ICD-10-CM    1. Type 2 diabetes mellitus with diabetic neuropathy, without long-term current use of insulin (H) E11.40 FOOT EXAM   2. Acquired deformity of foot, unspecified laterality M21.969 Paperwork for diabetic shoes   3. Rectal bleeding K62.5 Hemoglobin   she declines rectal exam today    Patient Instructions   Diabetes -  Form for shoe will be signed  For liquid diet time - unless directed otherwise by hiatal hernia team or Sena, I would try decrease of insulin from 18 units to 14 units, and update us on sugars if running high (morning above 140, 2 hrs after meals above 180)    Rectal bleeding -  Decided to check hemoglobin and monitor  If continues to happen, let me know   If worsening, or lightheaded, weak, short of breath - to emergency   Stay off baby aspirin      Shae Pierre PA-C  [facility]

## 2019-04-15 NOTE — NURSING NOTE
"Chief Complaint   Patient presents with     Forms     Diabetic shoes     Rectal Problem       Initial /84 (BP Location: Right arm, Patient Position: Chair, Cuff Size: Adult Large)   Pulse 63   Temp 98  F (36.7  C) (Tympanic)   Resp 16   Ht 1.549 m (5' 1\")   Wt 77.1 kg (170 lb)   SpO2 99%   BMI 32.12 kg/m   Estimated body mass index is 32.12 kg/m  as calculated from the following:    Height as of this encounter: 1.549 m (5' 1\").    Weight as of this encounter: 77.1 kg (170 lb).    Patient presents to the clinic using No DME    Health Maintenance that is potentially due pending provider review:  NONE        Is there anyone who you would like to be able to receive your results? No  If yes have patient fill out RICH      "

## 2019-04-15 NOTE — PATIENT INSTRUCTIONS
Diabetes -  Form for shoe will be signed  For liquid diet time - unless directed otherwise by hiatal hernia team or Sena, I would try decrease of insulin from 18 units to 14 units, and update us on sugars if running high (morning above 140, 2 hrs after meals above 180)    Rectal bleeding -  Decided to check hemoglobin and monitor  If continues to happen, let me know   If worsening, or lightheaded, weak, short of breath - to emergency   Stay off baby aspirin

## 2019-04-16 ENCOUNTER — MEDICAL CORRESPONDENCE (OUTPATIENT)
Dept: HEALTH INFORMATION MANAGEMENT | Facility: CLINIC | Age: 74
End: 2019-04-16

## 2019-04-17 ENCOUNTER — MEDICAL CORRESPONDENCE (OUTPATIENT)
Dept: HEALTH INFORMATION MANAGEMENT | Facility: CLINIC | Age: 74
End: 2019-04-17

## 2019-04-18 ENCOUNTER — PATIENT OUTREACH (OUTPATIENT)
Dept: EDUCATION SERVICES | Facility: CLINIC | Age: 74
End: 2019-04-18
Payer: MEDICARE

## 2019-04-18 NOTE — PROGRESS NOTES
Diabetes education contact:    Left message for pt to call me back.    Sena Scherer RD on 4/18/2019 at 11:40 AM

## 2019-04-19 DIAGNOSIS — E11.9 TYPE 2 DIABETES MELLITUS WITHOUT COMPLICATION, WITH LONG-TERM CURRENT USE OF INSULIN (H): Chronic | ICD-10-CM
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
## 2019-04-19 DIAGNOSIS — Z79.4 TYPE 2 DIABETES MELLITUS WITHOUT COMPLICATION, WITH LONG-TERM CURRENT USE OF INSULIN (H): Chronic | ICD-10-CM

## 2019-04-19 LAB
CREAT UR-MCNC: 66 MG/DL
MICROALBUMIN UR-MCNC: 14 MG/L
MICROALBUMIN/CREAT UR: 21.53 MG/G CR (ref 0–25)

## 2019-04-19 PROCEDURE — 36415 COLL VENOUS BLD VENIPUNCTURE: CPT | Performed by: NURSE PRACTITIONER

## 2019-04-19 PROCEDURE — 86618 LYME DISEASE ANTIBODY: CPT | Performed by: NURSE PRACTITIONER

## 2019-04-19 PROCEDURE — 82043 UR ALBUMIN QUANTITATIVE: CPT | Performed by: PHYSICIAN ASSISTANT

## 2019-04-19 NOTE — LETTER
April 22, 2019      Theresa Bill  [address] [place] [address]        Dear ,    We are writing to inform you of your test results.        Resulted Orders   Lyme Disease Ashely with reflex to WB Serum   Result Value Ref Range    Lyme Disease Antibodies Serum 0.01 0.00 - 0.89      Comment:      Negative, Absence of detectable Borrelia burdorferi antibodies. A negative   result does not exclude the possibility of Borrelia burgdorferi infection. If   early Lyme disease is suspected, a second sample should be collected and   tested 2 to 4 weeks later.     Albumin Random Urine Quantitative with Creat Ratio   Result Value Ref Range    Creatinine Urine 66 mg/dL    Albumin Urine mg/L 14 mg/L    Albumin Urine mg/g Cr 21.53 0 - 25 mg/g Cr     Urine protein test normal and lyme disease test was normal also.    If you have any questions or concerns, please call the clinic at the number listed above.       Sincerely,        NORBERT Oswald

## 2019-04-19 NOTE — LETTER
April 22, 2019      Theresa Bill  [address] [place] [address]        Dear ,    We are writing to inform you of your test results.    Your test results fall within the expected range(s) or remain unchanged from previous results.  Please continue with current treatment plan.    Resulted Orders   Lyme Disease Ashely with reflex to WB Serum   Result Value Ref Range    Lyme Disease Antibodies Serum 0.01 0.00 - 0.89      Comment:      Negative, Absence of detectable Borrelia burdorferi antibodies. A negative   result does not exclude the possibility of Borrelia burgdorferi infection. If   early Lyme disease is suspected, a second sample should be collected and   tested 2 to 4 weeks later.         If you have any questions or concerns, please call the clinic at the number listed above.       Sincerely,        Sanam Ansari, CARTER/kaf

## 2019-04-22 ENCOUNTER — OFFICE VISIT (OUTPATIENT)
Dept: PSYCHOLOGY | Facility: CLINIC | Age: 74
End: 2019-04-22
Payer: MEDICARE

## 2019-04-22 DIAGNOSIS — F32.0 MILD MAJOR DEPRESSION (H): Primary | ICD-10-CM

## 2019-04-22 DIAGNOSIS — F43.21 GRIEF: ICD-10-CM

## 2019-04-22 LAB — B BURGDOR IGG+IGM SER QL: 0.01 (ref 0–0.89)

## 2019-04-22 PROCEDURE — 90834 PSYTX W PT 45 MINUTES: CPT | Performed by: PSYCHOLOGIST

## 2019-04-24 ENCOUNTER — TRANSFERRED RECORDS (OUTPATIENT)
Dept: HEALTH INFORMATION MANAGEMENT | Facility: CLINIC | Age: 74
End: 2019-04-24

## 2019-04-24 NOTE — PROGRESS NOTES
PT dropped off blood sugars:     Breakfast pp Lunch pp Supper pp HS   12-Apr 147  126   163     168          128  169   174     150  174        134  199   201     129  210   199     193  184   199    19-Apr 137          #DIV/0! 177 #DIV/0! #DIV/0! 187 #DIV/0!     PT is having surgery and will be on liquid diet for a while so do want to make any adjustments at this time.      Plan is to drop the Levemir once liquids start, if BG running higher then will increase back up.    Sena Scherer, THOMAS on 4/24/2019 at 10:59 AM

## 2019-04-25 ASSESSMENT — ANXIETY QUESTIONNAIRES
GAD7 TOTAL SCORE: 9
6. BECOMING EASILY ANNOYED OR IRRITABLE: NOT AT ALL
2. NOT BEING ABLE TO STOP OR CONTROL WORRYING: MORE THAN HALF THE DAYS
7. FEELING AFRAID AS IF SOMETHING AWFUL MIGHT HAPPEN: NEARLY EVERY DAY
4. TROUBLE RELAXING: MORE THAN HALF THE DAYS
1. FEELING NERVOUS, ANXIOUS, OR ON EDGE: NOT AT ALL
3. WORRYING TOO MUCH ABOUT DIFFERENT THINGS: NOT AT ALL
5. BEING SO RESTLESS THAT IT IS HARD TO SIT STILL: MORE THAN HALF THE DAYS

## 2019-04-25 ASSESSMENT — PATIENT HEALTH QUESTIONNAIRE - PHQ9: SUM OF ALL RESPONSES TO PHQ QUESTIONS 1-9: 9

## 2019-04-25 NOTE — PROGRESS NOTES
Progress Note  Disclaimer: This note consists of symbols derived from keyboarding, dictation and/or voice recognition software. As a result, there may be errors in the script that have gone undetected. Please consider this when interpreting information found in this chart.    Client Name: Theresa Bill  Date: 4/22/2019         Service Type: Individual      Session Start Time: 9:30 AM  session End Time: 10:15 AM      Session Length: 45     Session #: 25     Attendees: Client attended alone    Treatment Plan Last Reviewed:  11/7/2018       DATA   Client reports feeling a little more depressed and anxious currently.  Her sister is away visiting relatives and she finds it difficult to be alone.  Sleep is reported as good.  She has surgery coming up that she is a little worried about.  She is still doing crocheting and volunteering but expects that may have to slow down until she recovers from her surgery.   Progress Since Last Session (Related to Symptoms / Goals / Homework):   Symptoms: Stable    Homework: not applicable      Episode of Care Goals: Satisfactory progress - ACTION (Actively working towards change); Intervened by reinforcing change plan / affirming steps taken     Current / Ongoing Stressors and Concerns:   Recent bereavement, prior caregiver stress, son with serious drug addiction.  Multiple medical problems     Treatment Objective(s) Addressed in This Session:   increase understanding of steps in the grief process  Utilize family support, client has order for protection, be sure to communicate with other family members every day     Intervention:   Interpersonal Therapy: facilitating appropriate expressions of emotion and grief.  12 step facilitation for family member.  Processed concerns for upcoming surgery  ASSESSMENT: Current Emotional / Mental Status (status of significant symptoms):   Risk status (Self / Other harm or suicidal ideation)   Client denies  current fears or concerns for personal safety.   Client denies current or recent suicidal ideation or behaviors.   Client denies current or recent homicidal ideation or behaviors.   Client denies current or recent self injurious behavior or ideation.   Client denies other safety concerns.   A safety and risk management plan has not been developed at this time, however client was given the after-hours number / 911 should there be a change in any of these risk factors.     Appearance:   Appropriate    Eye Contact:   Good    Psychomotor Behavior: Normal    Attitude:   Cooperative    Orientation:   All   Speech    Rate / Production: Slow     Volume:  Soft    Mood:    Anxious  Sad    Affect:    Appropriate    Thought Content:  Clear    Thought Form:  Coherent  Logical    Insight:    Fair      Medication Review:   No changes to current psychiatric medication(s)     Medication Compliance:   Yes     Changes in Health Issues:   None reported     Chemical Use Review:   Substance Use: Chemical use reviewed, no active concerns identified      Tobacco Use: No current tobacco use.       Collateral Reports Completed:   Not Applicable    PLAN: (Client Tasks / Therapist Tasks / Other)  Client to continue with self-care, keeping herself safe, and not enabling her son's drug use.      Sam Jaime                                                         ________________________________________________________________________    Treatment Plan    Client's Name: Theresa Bill  YOB: 1945    Date: 4/9/2018    Referral / Collaboration:  Referral to another professional/service is not indicated at this time..    Anticipated number of session or this episode of care: 25       DSM5 Diagnoses: (Sustained by DSM5 Criteria Listed Above)  Diagnoses:            296.21 (F32.0) Major Depressive Disorder, Single Episode, Mild _ grief  Psychosocial & Contextual Factors: Caregiver stress, recent death of her   WHODAS 2.0 (12  item)                          This questionnaire asks about difficulties due to health conditions. Health conditions                   include                        disease or illnesses, other health problems that may be short or long lasting,                    injuries, mental health or emotional problems, and problems with alcohol or drugs.                              Think back over the past 30 days and answer these questions, thinking about how much              difficulty you had doing the following activities. For each question, please [place] only                   one response.      S1 Standing for long periods such as 30 minutes? Moderate =   3   S2 Taking care of household responsibilities? None =         1   S3 Learning a new task, for example, learning how to get to a new place? Mild =           2   S4 How much of a problem do you have joining community activities (for example, festivals, Temple or other activities) in the same way as anyone else can? None =         1   S5 How much have you been emotionally affected by your health problems? None =         1               In the past 30 days, how much difficulty did you have in:   S6 Concentrating on doing something for ten minutes? None =         1   S7 Walking a long distance such as a kilometer (or equivalent)? Moderate =   3   S8 Washing your whole body? None =         1   S9 Getting dressed? None =         1   S10 Dealing with people you do not know? None =         1   S11 Maintaining a friendship? None =         1   S12 Your day to day work? None =         1       H1 Overall, in the past 30 days, how many days were these difficulties present? Record number of days 1    H2 In the past 30 days, for how many days were you totally unable to carry out your usual activities or work because of any health condition? Record number of days  0    H3 In the past 30 days, not counting the days that you were totally unable, for how many days did you cut  back or reduce your usual activities or work because of any health condition? Record number of days 0      Goals  1. Education- the stages of grief  a. Client will be able to describe the stages of grief; denial, bargaining, anger, depression, and acceptance and give examples of how each have felt for her.  2. Behavioral Activation  a. Client will learn to assess their emotional stateon a day to day basis  b. Client will Identify two forms of exercise/activity and engage in them 3 times per week  c. Client will Identify 3 things that make them laugh, and engage in these 5 times per week.  d. Client will Identify 1-2Creative activities or hobbies  and engage in them 2 times per week  e. Client will identify music, movies, books that make them feel good and use them 3-4 times per week  3. Self-care  a. Client will identify 5 things they can do just for themselves  b. Client will take time for quiet, reflection, meditation 5 times per week  c. Client will Learn to set boundaries when appropriate  d. Client will Identify 2 individuals they can call on for support, distraction  4. Assessment of progress  a. Client will engage in assessment of their progress on a regular basis      Client has reviewed and agreed to the above plan.      Sam Jaime  4/9/2018

## 2019-04-26 ASSESSMENT — ANXIETY QUESTIONNAIRES: GAD7 TOTAL SCORE: 9

## 2019-05-08 ENCOUNTER — ALLIED HEALTH/NURSE VISIT (OUTPATIENT)
Dept: EDUCATION SERVICES | Facility: CLINIC | Age: 74
End: 2019-05-08
Payer: MEDICARE

## 2019-05-08 ENCOUNTER — TELEPHONE (OUTPATIENT)
Dept: EDUCATION SERVICES | Facility: CLINIC | Age: 74
End: 2019-05-08

## 2019-05-08 DIAGNOSIS — Z79.4 TYPE 2 DIABETES MELLITUS WITHOUT COMPLICATION, WITH LONG-TERM CURRENT USE OF INSULIN (H): Chronic | ICD-10-CM

## 2019-05-08 DIAGNOSIS — E11.9 TYPE 2 DIABETES MELLITUS WITHOUT COMPLICATION, WITH LONG-TERM CURRENT USE OF INSULIN (H): Chronic | ICD-10-CM

## 2019-05-08 NOTE — PROGRESS NOTES
"Diabetes Self-Management Education & Support    Diabetes Education Self Management & Training    SUBJECTIVE/OBJECTIVE:  Presents for: Individual review  Accompanied by: Sister  Diabetes education in the past 24mo: No  Focus of Visit: Being Active, Healthy Eating, Healthy Coping, Taking Medication  Diabetes type: Type 2  Transportation concerns: No  Other concerns:: None  Cultural Influences/Ethnic Background:  American      Diabetes Symptoms & Complications  Blurred vision: No  Fatigue: Yes  Foot ulcerations: No  Polydipsia: No  Polyphagia: No  Polyuria: Yes  Visual change: No  Weakness: No  Weight loss: No  Slow healing wounds: No  Weight trend: Fluctuating minimally  CVA: No  Heart disease: No  Nephropathy: No  Peripheral neuropathy: No  Peripheral Vascular Disease: No  Retinopathy: No  Sexual dysfunction: No    Patient Problem List and Family Medical History reviewed for relevant medical history, current medical status, and diabetes risk factors.    Vitals:  There were no vitals taken for this visit.  Estimated body mass index is 32.12 kg/m  as calculated from the following:    Height as of 4/15/19: 1.549 m (5' 1\").    Weight as of 4/15/19: 77.1 kg (170 lb).   Last 3 BP:   BP Readings from Last 3 Encounters:   04/15/19 130/84   04/10/19 124/70   03/30/19 118/76       History   Smoking Status     Never Smoker   Smokeless Tobacco     Never Used       Labs:  Lab Results   Component Value Date    A1C 6.7 03/11/2019     Lab Results   Component Value Date     04/10/2019     Lab Results   Component Value Date    LDL 84 03/11/2019     HDL Cholesterol   Date Value Ref Range Status   03/11/2019 47 (L) >49 mg/dL Final   ]  GFR Estimate   Date Value Ref Range Status   04/10/2019 73 >60 mL/min/[1.73_m2] Final     Comment:     Non  GFR Calc  Starting 12/18/2018, serum creatinine based estimated GFR (eGFR) will be   calculated using the Chronic Kidney Disease Epidemiology Collaboration   (CKD-EPI) " equation.       GFR Estimate If Black   Date Value Ref Range Status   04/10/2019 84 >60 mL/min/[1.73_m2] Final     Comment:      GFR Calc  Starting 2018, serum creatinine based estimated GFR (eGFR) will be   calculated using the Chronic Kidney Disease Epidemiology Collaboration   (CKD-EPI) equation.       Lab Results   Component Value Date    CR 0.80 04/10/2019     No results found for: MICROALBUMIN    Healthy Eating  Cultural/Episcopal diet restrictions?: No  Meal planning: Carbohydrate counting, Smaller portions, Plate planning method  Meals include: Breakfast, Lunch, Dinner  Breakfast: banana, bagel with cream cheese or toast with cereal, sausage  Lunch: sandwich or rarely eats lunch  Dinner: chicken wrap salad or vegetables, meat, potato or pasta or hotdish or hamburger or roast  Snacks: yael food cake with vanilla pudding and strawberries  Beverages: Water, Coffee, Soda  Has patient met with a dietitian in the past?: Yes    Being Active  Being Active Assessed Today: No  Exercise:: (doing what she can going up and stairs, walking to the mailbox)  Barrier to exercise: None    Monitoring  Monitoring Assessed Today: Yes  Did patient bring glucose meter to appointment? : Yes  Blood Glucose Meter: One Touch  Home Glucose (Sugar) Monitorin-2 times per day  Blood glucose trend: Increasing steadily  Low Glucose Range (mg/dL): 140-180  High Glucose Range (mg/dL): >200  Overall Range (mg/dL): 140-180                                      Taking Medications  Diabetes Medication(s)     Insulin       insulin detemir (LEVEMIR FLEXPEN/FLEXTOUCH) 100 UNIT/ML pen    Inject 18 Units Subcutaneous daily Increase by 2 units every 3 days if fasting sugars are consistently above 120.  Max 20 units/day.          Current Treatments: Diet, Insulin Injections    Problem Solving  Hypoglycemia Frequency: Rarely  Hypoglycemia Treatment: Juice  Patient carries a carbohydrate source: No  Medical alert: No  Severe  weather/disaster plan for diabetes management?: No  DKA prevention plan?: No    Hypoglycemia symptoms  Confusion: No  Dizziness or Light-Headedness: No  Headaches: Yes  Hunger: No  Mood changes: Yes  Nervousness/Anxiety: Yes  Sleepiness: No  Speech difficulty: No  Sweats: No  Tremors: Yes    Hypoglycemia Complications  Blackouts: No  Hospitalization: No  Nocturnal hypoglycemia: No  Required assistance: No  Required glucagon injection: No  Seizures: No    Reducing Risks  CAD Risks: Family history, Stress  Has dilated eye exam at least once a year?: Yes  Sees dentist every 6 months?: No  Sees podiatrist (foot doctor)?: No    Healthy Coping  Informal Support system:: Family, Friends  Difficulty affording diabetes management supplies?: Yes  Patient Activation Measure Survey Score:  JS Score (Last Two) 4/8/2011   JS Raw Score 49   Activation Score 82.8   JS Level 4       ASSESSMENT:  Numbers are a bit higher since they already dropped the levemir down to 14 units.   Before the surgery they will drop the insulin down to 10 units, after surgery then she will go back to 14 units.  They are all ready with the meal plans for post surgery.  Trying to make better food choices.  Will recheck her in two months.        Patient's most recent   Lab Results   Component Value Date    A1C 6.7 03/11/2019    is meeting goal of <7.0    INTERVENTION:   Diabetes knowledge and skills assessment:     Patient is knowledgeable in diabetes management concepts related to: Healthy Eating, Being Active, Monitoring and Taking Medication    Patient needs further education on the following diabetes management concepts: Healthy Eating, Being Active, Monitoring, Taking Medication, Problem Solving, Reducing Risks and Healthy Coping    Based on learning assessment above, most appropriate setting for further diabetes education would be: Individual setting.    Education provided today on:  AADE Self-Care Behaviors:  Healthy Eating: consistency in amount,  composition, and timing of food intake, portion control and label reading  Being Active: relationship to blood glucose and describe appropriate activity program  Monitoring: frequency of monitoring, in a study on the richard  Taking Medication: when to take medications    Opportunities for ongoing education and support in diabetes-self management were discussed.    Pt verbalized understanding of concepts discussed and recommendations provided today.       Education Materials Provided:  No new materials provided today    PLAN:  See Patient Instructions for co-developed, patient-stated behavior change goals.  AVS printed and provided to patient today. See Follow-Up section for recommended follow-up.      Time Spent: 30 minutes  Encounter Type: Individual    Any diabetes medication dose changes were made via the CDE Protocol and Collaborative Practice Agreement with the patient's primary care provider. A copy of this encounter was shared with the provider.

## 2019-05-08 NOTE — TELEPHONE ENCOUNTER
Shae Pierre PA-C,    Suggest Theresa drop her Levemir to 10 units the night before her surgery. Then increase back up to 14 units after surgery.  I will have her call me one week post surgery to make sure her BG levels look ok.    Are you ok with this plan?    Thanks! Sena Scherer RD, CDE

## 2019-05-08 NOTE — PATIENT INSTRUCTIONS
1.  Drop the insulin to Levemir to 10 units the night before the surgery.  Then go back up to the 14 units.      Call me with your blood sugars one week after surgery.  [phone].

## 2019-05-09 ENCOUNTER — OFFICE VISIT (OUTPATIENT)
Dept: PSYCHOLOGY | Facility: CLINIC | Age: 74
End: 2019-05-09
Payer: MEDICARE

## 2019-05-09 DIAGNOSIS — F32.0 MILD MAJOR DEPRESSION (H): Primary | ICD-10-CM

## 2019-05-09 PROCEDURE — 90834 PSYTX W PT 45 MINUTES: CPT | Performed by: PSYCHOLOGIST

## 2019-05-09 NOTE — Clinical Note
Theresa is reporting she is seeing some shadows moving again.  Not sure if this is related to recent stress or not but thought you should be aware.

## 2019-05-10 ENCOUNTER — OFFICE VISIT (OUTPATIENT)
Dept: FAMILY MEDICINE | Facility: CLINIC | Age: 74
End: 2019-05-10
Payer: MEDICARE

## 2019-05-10 VITALS
WEIGHT: 170 LBS | TEMPERATURE: 97.6 F | HEIGHT: 61 IN | OXYGEN SATURATION: 97 % | SYSTOLIC BLOOD PRESSURE: 112 MMHG | BODY MASS INDEX: 32.1 KG/M2 | HEART RATE: 65 BPM | RESPIRATION RATE: 14 BRPM | DIASTOLIC BLOOD PRESSURE: 70 MMHG

## 2019-05-10 DIAGNOSIS — E11.40 TYPE 2 DIABETES MELLITUS WITH DIABETIC NEUROPATHY, WITH LONG-TERM CURRENT USE OF INSULIN (H): ICD-10-CM

## 2019-05-10 DIAGNOSIS — Z79.4 TYPE 2 DIABETES MELLITUS WITH DIABETIC NEUROPATHY, WITH LONG-TERM CURRENT USE OF INSULIN (H): ICD-10-CM

## 2019-05-10 DIAGNOSIS — R44.1 HALLUCINATIONS, VISUAL: ICD-10-CM

## 2019-05-10 DIAGNOSIS — K21.9 LPRD (LARYNGOPHARYNGEAL REFLUX DISEASE): ICD-10-CM

## 2019-05-10 DIAGNOSIS — R94.4 DECREASED GFR: ICD-10-CM

## 2019-05-10 DIAGNOSIS — Z01.818 PREOP GENERAL PHYSICAL EXAM: Primary | ICD-10-CM

## 2019-05-10 DIAGNOSIS — N39.0 FREQUENT UTI: ICD-10-CM

## 2019-05-10 DIAGNOSIS — I10 ESSENTIAL HYPERTENSION: Chronic | ICD-10-CM

## 2019-05-10 DIAGNOSIS — K44.9 HIATAL HERNIA: ICD-10-CM

## 2019-05-10 DIAGNOSIS — R26.89 POOR BALANCE: ICD-10-CM

## 2019-05-10 LAB
ALBUMIN UR-MCNC: NEGATIVE MG/DL
ANION GAP SERPL CALCULATED.3IONS-SCNC: 3 MMOL/L (ref 3–14)
APPEARANCE UR: ABNORMAL
BACTERIA #/AREA URNS HPF: ABNORMAL /HPF
BILIRUB UR QL STRIP: NEGATIVE
BUN SERPL-MCNC: 15 MG/DL (ref 7–30)
CALCIUM SERPL-MCNC: 9 MG/DL (ref 8.5–10.1)
CHLORIDE SERPL-SCNC: 106 MMOL/L (ref 94–109)
CO2 SERPL-SCNC: 28 MMOL/L (ref 20–32)
COLOR UR AUTO: YELLOW
CREAT SERPL-MCNC: 0.94 MG/DL (ref 0.52–1.04)
ERYTHROCYTE [DISTWIDTH] IN BLOOD BY AUTOMATED COUNT: 13.1 % (ref 10–15)
GFR SERPL CREATININE-BSD FRML MDRD: 60 ML/MIN/{1.73_M2}
GLUCOSE SERPL-MCNC: 96 MG/DL (ref 70–99)
GLUCOSE UR STRIP-MCNC: NEGATIVE MG/DL
HBA1C MFR BLD: 7.3 % (ref 0–5.6)
HCT VFR BLD AUTO: 39.9 % (ref 35–47)
HGB BLD-MCNC: 13.2 G/DL (ref 11.7–15.7)
HGB UR QL STRIP: NEGATIVE
KETONES UR STRIP-MCNC: NEGATIVE MG/DL
LEUKOCYTE ESTERASE UR QL STRIP: ABNORMAL
MCH RBC QN AUTO: 30.7 PG (ref 26.5–33)
MCHC RBC AUTO-ENTMCNC: 33.1 G/DL (ref 31.5–36.5)
MCV RBC AUTO: 93 FL (ref 78–100)
NITRATE UR QL: NEGATIVE
NON-SQ EPI CELLS #/AREA URNS LPF: ABNORMAL /LPF
PH UR STRIP: 7 PH (ref 5–7)
PLATELET # BLD AUTO: 240 10E9/L (ref 150–450)
POTASSIUM SERPL-SCNC: 4 MMOL/L (ref 3.4–5.3)
RBC # BLD AUTO: 4.3 10E12/L (ref 3.8–5.2)
RBC #/AREA URNS AUTO: ABNORMAL /HPF
SODIUM SERPL-SCNC: 137 MMOL/L (ref 133–144)
SOURCE: ABNORMAL
SP GR UR STRIP: 1.01 (ref 1–1.03)
TSH SERPL DL<=0.005 MIU/L-ACNC: 1.65 MU/L (ref 0.4–4)
UROBILINOGEN UR STRIP-ACNC: 0.2 EU/DL (ref 0.2–1)
WBC # BLD AUTO: 6.7 10E9/L (ref 4–11)
WBC #/AREA URNS AUTO: ABNORMAL /HPF

## 2019-05-10 PROCEDURE — 84443 ASSAY THYROID STIM HORMONE: CPT | Performed by: PHYSICIAN ASSISTANT

## 2019-05-10 PROCEDURE — 99215 OFFICE O/P EST HI 40 MIN: CPT | Performed by: PHYSICIAN ASSISTANT

## 2019-05-10 PROCEDURE — 87186 SC STD MICRODIL/AGAR DIL: CPT | Performed by: PHYSICIAN ASSISTANT

## 2019-05-10 PROCEDURE — 85027 COMPLETE CBC AUTOMATED: CPT | Performed by: PHYSICIAN ASSISTANT

## 2019-05-10 PROCEDURE — 81001 URINALYSIS AUTO W/SCOPE: CPT | Performed by: PHYSICIAN ASSISTANT

## 2019-05-10 PROCEDURE — 36415 COLL VENOUS BLD VENIPUNCTURE: CPT | Performed by: PHYSICIAN ASSISTANT

## 2019-05-10 PROCEDURE — 87088 URINE BACTERIA CULTURE: CPT | Performed by: PHYSICIAN ASSISTANT

## 2019-05-10 PROCEDURE — 87086 URINE CULTURE/COLONY COUNT: CPT | Performed by: PHYSICIAN ASSISTANT

## 2019-05-10 PROCEDURE — 83036 HEMOGLOBIN GLYCOSYLATED A1C: CPT | Performed by: PHYSICIAN ASSISTANT

## 2019-05-10 PROCEDURE — 80048 BASIC METABOLIC PNL TOTAL CA: CPT | Performed by: PHYSICIAN ASSISTANT

## 2019-05-10 RX ORDER — LOSARTAN POTASSIUM 25 MG/1
25 TABLET ORAL DAILY
Qty: 90 TABLET | Refills: 3 | Status: SHIPPED | OUTPATIENT
Start: 2019-05-10 | End: 2019-08-26

## 2019-05-10 ASSESSMENT — MIFFLIN-ST. JEOR: SCORE: 1208.49

## 2019-05-10 NOTE — PROGRESS NOTES
[facility]  [address] [place] [id number]  [phone]  Dept: [phone]    PRE-OP EVALUATION:  Today's date: 5/10/2019    Theresa Bill (: 1945) presents for pre-operative evaluation assessment as requested by Dr. Triston Parson.  She requires evaluation and anesthesia risk assessment prior to undergoing surgery/procedure for treatment of Hiatal Hernia .    Proposed Surgery/ Procedure: Hiatal Hernia  Date of Surgery/ Procedure: 2019  Time of Surgery/ Procedure: 10:30 AM  Hospital/Surgical Facility: [facility]  Fax number for surgical facility: [phone]  Primary Physician: Shae Pierre  Type of Anesthesia Anticipated: General    Patient has a Health Care Directive or Living Will:  YES     1. NO - Do you have a history of heart attack, stroke, stent, bypass or surgery on an artery in the head, neck, heart or legs?  2. NO - Do you ever have any pain or discomfort in your chest?  3. NO - Do you have a history of  Heart Failure?  4. NO - Are you troubled by shortness of breath when: walking on the level, up a slight hill or at night?  5. NO - Do you currently have a cold, bronchitis or other respiratory infection?  6. NO - Do you have a cough, shortness of breath or wheezing?  7. YES - DO YOU SOMETIMES GET PAINS IN THE CALVES OF YOUR LEGS WHEN YOU WALK?   8. YES - DO YOU OR ANYONE IN YOUR FAMILY HAVE PREVIOUS HISTORY OF BLOOD CLOTS?   9. NO - Do you or does anyone in your family have a serious bleeding problem such as prolonged bleeding following surgeries or cuts?  10. YES - HAVE YOU EVER HAD PROBLEMS WITH ANEMIA OR BEEN TOLD TO TAKE IRON PILLS?   11. NO - Have you had any abnormal blood loss such as black, tarry or bloody stools, or abnormal vaginal bleeding?  12. YES - HAVE YOU EVER HAD A BLOOD TRANSFUSION?   13. NO - Have you or any of your relatives ever had problems with anesthesia?  14. YES - DO YOU HAVE SLEEP APNEA, EXCESSIVE SNORING OR DAYTIME DROWSINESS?    15. NO - Do you have any prosthetic heart valves?  16. NO - Do you have prosthetic joints?  17. NO - Is there any chance that you may be pregnant?      HPI:     HPI related to upcoming procedure: hiatal hernia with chronic cough, LRPD    See problem list for active medical problems.  Problems all longstanding and stable, except as noted/documented.  See ROS for pertinent symptoms related to these conditions.                                                                                                                                                            DIABETES - Patient has a longstanding history of DiabetesType Type II . Patient is being treated with insulin injections and denies significant side effects. Control has been good. Complicating factors include but are not limited to: hypertension and hyperlipidemia.                                                                                                                                HYPERLIPIDEMIA - Patient has a long history of significant Hyperlipidemia requiring medication for treatment with recent good control. Patient reports no problems or side effects with the medication.                                                                                                                                                         HYPERTENSION - Patient has longstanding history of HTN , currently denies any symptoms referable to elevated blood pressure. Specifically denies chest pain, palpitations, dyspnea, orthopnea, PND or peripheral edema. Blood pressure readings have been in normal range. Current medication regimen is as listed below. Patient denies any side effects of medication.                                                                                                                                                                      HYPOTHYROIDISM - Patient has a longstanding history of chronic Hypothyroidism. Patient has been doing well,  noting no tremor, insomnia, hair loss or changes in skin texture. Continues to take medications as directed, without adverse reactions or side effects.     Known SHANTI.    Seeing shadows that don't exist.  X 1 wk.  Had seen these in the past and had resolved when son went away.  she is now under more stress again and son going to skilled nursing.  Balance worse in past 1 wk, will be standing and suddenly get feeling she is going to fall.  No prescyncopal symptoms.  No HA or other vision changes.   No personality changes.  Recently treated for asymptomatic UTI by urology.                                                                                                                                                 MEDICAL HISTORY:     Patient Active Problem List    Diagnosis Date Noted     Anxiety 09/24/2018     Priority: Medium     Personal history of fall 06/11/2018     Priority: Medium     6/11/18 - Notable history orthostasis with up to 20 point systolic drops.         LPRD (laryngopharyngeal reflux disease) 11/27/2017     Priority: Medium     Other chronic pain 11/10/2015     Priority: Medium     Left sciatic like pain that originates from where bladder stimulator was placed.  Pain unchanged.  Uses tramadol prn - I am ok with refilling #30 per month and seeing her every 6 mo.       SHANTI (obstructive sleep apnea) 06/24/2015     Priority: Medium     Severe SHANTI: Study date 6/11/2015(, , Lowest O2 saturation 87%)       ACP (advance care planning) 06/16/2015     Priority: Medium     Advance Care Planning 6/16/2015: Receipt of ACP document:  Received: invalid HCD document dated 5/6/06.  Document previously scanned on 4/4/12.  Request made to delete invalid medical order document. Notification sent to Scarlett Zuluaga for followup.  Code Status needs to be updated to reflect choices in most recent ACP document.  Confirmed/documented designated decision maker(s).  Added by Kushal cauña  "05/21/2015     Priority: Medium     Spring 2015.  Referred to Physical Therapy.  Sees neurology for RLS.       Iron deficiency anemia 09/20/2013     Priority: Medium     Problem list name updated by automated process. Provider to review       Dyslipidemia 10/31/2010     Priority: Medium     GI bleed 03/17/2010     Priority: Medium     3/17/2010:hospitalized 2/2010 in [place] for GI bleed.  She had a couple irritated spots in stomach antrum and a \"colitis\" picture on colonoscopy.  Unclear exact cause.  She had been on Advil PM daily and ASA 81mg.  She was treated with prednisone and omeprazole.   See clinic notes from today.  Scanned scope reports.        Esophageal reflux 10/20/2006     Priority: Medium     2007 she had a laparoscopic Miguel fundoplication.        Obesity 07/27/2006     Priority: Medium     Microalbuminuria 05/01/2006     Priority: Medium     6/4/2012:Positive MAC 4/2006-once, has been normal since       Restless legs 03/15/2006     Priority: Medium     12/4/14 -- sees Dr Arteaga (neurology) in [place].  Lyrica seems to be helping.         Lumbago 04/21/2005     Priority: Medium     Chronic low back pain  7/22/2010:She had a CT myelogram in April 2010 which showed very mild spinal stenosis at L4-5 and some degenerative disc disease.  There was no foraminal stenosis.        Pain in limb 04/21/2005     Priority: Medium     Chronic (R) foot pain       Mild major depression (H) 04/21/2005     Priority: Medium     Essential hypertension 04/21/2005     Priority: Medium     Type 2 diabetes mellitus with diabetic neuropathy (H) 04/21/2005     Priority: Medium     12/18/17 - neurologist treating for diab neuropathy  Foot exam:4/14/2010, normal   She had cough on lisinopril-see notes 3/7/12.  She also seemed to have cough on losartan-see notes from 5/25/12.  Both meds ended up being stopped.        Osteoarthritis 04/21/2005     Priority: Medium     Problem list name updated by automated process. Provider to " review       Mixed incontinence urge and stress (male)(female) 04/21/2005     Priority: Medium     Interstim placed 2007        Past Medical History:   Diagnosis Date     COPD (chronic obstructive pulmonary disease) (H)     Pt reports chronic bronchitis     Depression      Diabetes mellitus (H)     NIDDM     Dysphagia      GERD (gastroesophageal reflux disease)      Heart disease      Hypertension      Sleep apnea     CPAP     Past Surgical History:   Procedure Laterality Date     BUNIONECTOMY CRISELDA  7/1/2011    Procedure:BUNIONECTOMY CRISELDA; weil osteotomy & Lapidtus Bunionectomy; Surgeon:HYACINTH SANTO; Location:[facility]     BUNIONECTOMY CHEVRON  4/6/2012    Procedure:BUNIONECTOMY CHEVRON; Lapidus bunionectomy, plantar plate repair second and third metatarsophalangeal joints,left foot-popliteal block left lower extremity; Surgeon:HYACINTH SANTO; Location:[facility]     C TOTAL ABDOM HYSTERECTOMY  1992    ovaries removed     COLONOSCOPY  9/11/2012    Procedure: COLONOSCOPY;  Colonoscopy;  Surgeon: Alyssa Foster MD;  Location: [facility]     COLONOSCOPY N/A 8/3/2018    Procedure: COLONOSCOPY;  colonoscopy;  Surgeon: Kelechi Rivera MD;  Location: [facility]     NISSEN FUNDOPLICATION      2003     REPAIR HAMMER TOE  7/1/2011    Procedure:REPAIR HAMMER TOE; hammertoe correction right 2nd digit; Surgeon:HYACINTH SANTO; Location:[facility]     SURGICAL HISTORY OF -   1979    Tubal     SURGICAL HISTORY OF -   1993    Stamey bladder surgery     SURGICAL HISTORY OF -       Lipoma removed f/back     SURGICAL HISTORY OF -   1995    (L) Foot surgery     SURGICAL HISTORY OF -   05/09/2002    Colonoscopy     SURGICAL HISTORY OF -       knee arthroscopy left     SURGICAL HISTORY OF -   2007-two phases    interstim bladder implant     SURGICAL HISTORY OF -   Sept. 13, 2007    laparoscopic Nissen fundoplication     Current Outpatient Medications   Medication Sig Dispense Refill     ACETAMINOPHEN PO         atorvastatin (LIPITOR) 40 MG tablet Take 1 tablet (40 mg) by mouth At Bedtime 90 tablet 3     blood glucose monitoring (NO BRAND SPECIFIED) test strip 1 strip by In Vitro route 3 times daily Has a Freestyle (Patient taking differently: 1 strip by In Vitro route 3 times daily Has a One Touch Ultra) 100 each 12     buPROPion (WELLBUTRIN XL) 300 MG 24 hr tablet Take 1 tablet (300 mg) by mouth every morning 90 tablet 3     clonazePAM (KLONOPIN) 0.5 MG tablet 2 tabs daily  Prescribed by neurology for RLS - previously severe.  0     insulin detemir (LEVEMIR FLEXPEN/FLEXTOUCH) 100 UNIT/ML pen Inject 14 Units Subcutaneous daily Increase by 2 units every 3 days if fasting sugars are consistently above 120.  Max 20 units/day. 15 mL 3     insulin pen needle (BD MARIIA U/F) 32G X 4 MM miscellaneous USE ONE  ONCE DAILY 100 each 3     losartan (COZAAR) 25 MG tablet Take 1 tablet (25 mg) by mouth daily 90 tablet 3     Multiple Vitamin (DAILY MULTIVITAMIN PO) One tablet daily       order for DME Equipment being ordered: CPAP Patient Theresa Bill received a [employer] Airsense 10  Auto. Pressures were set at Auto 10 - 15 cm H2O.       pantoprazole (PROTONIX) 40 MG EC tablet Take 1 tablet (40 mg) by mouth daily 90 tablet 3     traMADol (ULTRAM) 50 MG tablet TAKE 1 TABLET BY MOUTH EVERY 6 HOURS AS NEEDED FOR  MODERATE  PAIN 30 tablet 5     ASPIRIN 81 MG OR TABS ONE DAILY (Patient not taking: No sig reported) 100 3     OTC products: None, except as noted above    Allergies   Allergen Reactions     Lisinopril Cough     Metformin GI Disturbance     reflux sx 50% better off med     Nkda [No Known Drug Allergies]       Latex Allergy: NO    Social History     Tobacco Use     Smoking status: Never Smoker     Smokeless tobacco: Never Used   Substance Use Topics     Alcohol use: No     Alcohol/week: 0.0 oz     History   Drug Use No       REVIEW OF SYSTEMS:   Constitutional, neuro, ENT, endocrine, pulmonary, cardiac, gastrointestinal, genitourinary,  "musculoskeletal, integument and psychiatric systems are negative, except as otherwise noted.    EXAM:   /70 (BP Location: Right arm, Patient Position: Chair, Cuff Size: Adult Large)   Pulse 65   Temp 97.6  F (36.4  C) (Tympanic)   Resp 14   Ht 1.549 m (5' 1\")   Wt 77.1 kg (170 lb)   SpO2 97%   BMI 32.12 kg/m      GENERAL APPEARANCE: healthy, alert and no distress     EYES: EOMI, PERRL     HENT: ear canals and TM's normal and nose and mouth without ulcers or lesions     NECK: no adenopathy, no asymmetry, masses, or scars and thyroid normal to palpation     RESP: lungs clear to auscultation - no rales, rhonchi or wheezes     CV: regular rates and rhythm, normal S1 S2, no S3 or S4 and no murmur, click or rub     ABDOMEN:  soft, nontender, no HSM or masses and bowel sounds normal     MS: extremities normal- no gross deformities noted, no evidence of inflammation in joints, FROM in all extremities.     SKIN: no suspicious lesions or rashes     NEURO: Normal strength and tone, sensory exam grossly normal, mentation intact and speech normal     PSYCH: mentation appears normal. and affect normal/bright    DIAGNOSTICS:   EKG: appears normal, NSR, normal axis, normal intervals, no acute ST/T changes c/w ischemia, no LVH by voltage criteria, unchanged from previous tracings    Recent Labs   Lab Test 04/15/19  1016 04/10/19  0934 03/11/19  1344 09/20/18  0951 09/17/18  0919   HGB 13.7 13.6  --  13.5  --    PLT  --  225  --  220  --    NA  --  141  --  140  --    POTASSIUM  --  4.1  --  4.2  --    CR  --  0.80  --  0.71  --    A1C  --   --  6.7*  --  7.1*     Labs in process  IMPRESSION:   Reason for surgery/procedure: hiatal hernia with chronic cough    The proposed surgical procedure is considered INTERMEDIATE risk.    REVISED CARDIAC RISK INDEX  The patient has the following serious cardiovascular risks for perioperative complications such as (MI, PE, VFib and 3  AV Block):  Diabetes Mellitus (on " Insulin)  INTERPRETATION: 1 risks: Class II (low risk - 0.9% complication rate)    The patient has the following additional risks for perioperative complications:  No identified additional risks  Possible new onset visual hallucinations      ICD-10-CM    1. Preop general physical exam Z01.818 Basic metabolic panel  (Ca, Cl, CO2, Creat, Gluc, K, Na, BUN)     Hemoglobin A1c     Urine Microscopic   2. Hiatal hernia K44.9    3. Type 2 diabetes mellitus with diabetic neuropathy, with long-term current use of insulin (H) E11.40 Hemoglobin A1c    Z79.4 TSH with free T4 reflex   4. LPRD (laryngopharyngeal reflux disease) K21.9    5. Essential hypertension I10 Basic metabolic panel  (Ca, Cl, CO2, Creat, Gluc, K, Na, BUN)     losartan (COZAAR) 25 MG tablet   6. Hallucinations, visual R44.1 CBC with platelets     TSH with free T4 reflex     *UA reflex to Microscopic and Culture ([facility] (except [place] and [place])     Urine Culture Aerobic Bacterial   7. Poor balance R26.89 TSH with free T4 reflex   8. Frequent UTI N39.0 *UA reflex to Microscopic and Culture ([facility] (except [place] and [place])     Urine Culture Aerobic Bacterial       RECOMMENDATIONS:     --Consult hospital rounder / IM to assist post-op medical management    --Patient is to take all scheduled medications on the day of surgery EXCEPT for modifications listed below.    Diabetes Medication Use  -----Take 80% of long acting insulin (e.g. Lantus, NPH) while NPO (fasting)      Anticoagulant or Antiplatelet Medication Use  ASPIRIN: Discontinue ASA 7-10 days prior to procedure to reduce bleeding risk.      ACE Inhibitor or Angiotensin Receptor Blocker (ARB) Use  Ace inhibitor or Angiotensin Receptor Blocker (ARB) and should HOLD this medication for the 24 hours prior to surgery.      APPROVAL GIVEN to proceed with proposed procedure, without further diagnostic evaluation       Signed Electronically by: Shae NATARAJAN  NORBERT Pierre    Copy of this evaluation report is provided to requesting physician.    [facility] Preop Guidelines    Revised Cardiac Risk Index    Patient Instructions   No losartan night before surgery  Day of surgery go down to 10 units insulin  Resume both meds at normal doses day after    Shadows -  Labs today   Start seeing counselor more  See me in a few weeks if shadows have not resolved  Before Your Surgery      Call your surgeon if there is any change in your health. This includes signs of a cold or flu (such as a sore throat, runny nose, cough, rash or fever).    Do not smoke, drink alcohol or take over the counter medicine (unless your surgeon or primary care doctor tells you to) for the 24 hours before and after surgery.    If you take prescribed drugs: Follow your doctor s orders about which medicines to take and which to stop until after surgery.    Eating and drinking prior to surgery: follow the instructions from your surgeon    Take a shower or bath the night before surgery. Use the soap your surgeon gave you to gently clean your skin. If you do not have soap from your surgeon, use your regular soap. Do not shave or scrub the surgery site.  Wear clean pajamas and have clean sheets on your bed.

## 2019-05-10 NOTE — NURSING NOTE
"Chief Complaint   Patient presents with     Pre-Op Exam       Initial /70 (BP Location: Right arm, Patient Position: Chair, Cuff Size: Adult Large)   Pulse 65   Temp 97.6  F (36.4  C) (Tympanic)   Resp 14   Ht 1.549 m (5' 1\")   Wt 77.1 kg (170 lb)   SpO2 97%   BMI 32.12 kg/m   Estimated body mass index is 32.12 kg/m  as calculated from the following:    Height as of this encounter: 1.549 m (5' 1\").    Weight as of this encounter: 77.1 kg (170 lb).    Patient presents to the clinic using No DME    Health Maintenance that is potentially due pending provider review:  NONE        Is there anyone who you would like to be able to receive your results? No  If yes have patient fill out RICH      "

## 2019-05-10 NOTE — PATIENT INSTRUCTIONS
No losartan night before surgery  Day of surgery go down to 10 units insulin  Resume both meds at normal doses day after    Shadows -  Labs today   Start seeing counselor more  See me in a few weeks if shadows have not resolved  Before Your Surgery      Call your surgeon if there is any change in your health. This includes signs of a cold or flu (such as a sore throat, runny nose, cough, rash or fever).    Do not smoke, drink alcohol or take over the counter medicine (unless your surgeon or primary care doctor tells you to) for the 24 hours before and after surgery.    If you take prescribed drugs: Follow your doctor s orders about which medicines to take and which to stop until after surgery.    Eating and drinking prior to surgery: follow the instructions from your surgeon    Take a shower or bath the night before surgery. Use the soap your surgeon gave you to gently clean your skin. If you do not have soap from your surgeon, use your regular soap. Do not shave or scrub the surgery site.  Wear clean pajamas and have clean sheets on your bed.

## 2019-05-11 ASSESSMENT — ASTHMA QUESTIONNAIRES: ACT_TOTALSCORE: 25

## 2019-05-12 LAB
BACTERIA SPEC CULT: ABNORMAL
Lab: ABNORMAL
SPECIMEN SOURCE: ABNORMAL

## 2019-05-13 RX ORDER — CEPHALEXIN 500 MG/1
500 CAPSULE ORAL 2 TIMES DAILY
Qty: 10 CAPSULE | Refills: 0 | Status: SHIPPED | OUTPATIENT
Start: 2019-05-13 | End: 2019-06-20

## 2019-05-13 ASSESSMENT — ANXIETY QUESTIONNAIRES
7. FEELING AFRAID AS IF SOMETHING AWFUL MIGHT HAPPEN: NEARLY EVERY DAY
5. BEING SO RESTLESS THAT IT IS HARD TO SIT STILL: NOT AT ALL
1. FEELING NERVOUS, ANXIOUS, OR ON EDGE: MORE THAN HALF THE DAYS
GAD7 TOTAL SCORE: 10
3. WORRYING TOO MUCH ABOUT DIFFERENT THINGS: NOT AT ALL
4. TROUBLE RELAXING: NEARLY EVERY DAY
6. BECOMING EASILY ANNOYED OR IRRITABLE: NOT AT ALL
2. NOT BEING ABLE TO STOP OR CONTROL WORRYING: MORE THAN HALF THE DAYS

## 2019-05-13 ASSESSMENT — PATIENT HEALTH QUESTIONNAIRE - PHQ9: SUM OF ALL RESPONSES TO PHQ QUESTIONS 1-9: 2

## 2019-05-14 ASSESSMENT — ANXIETY QUESTIONNAIRES: GAD7 TOTAL SCORE: 10

## 2019-06-04 ENCOUNTER — TRANSFERRED RECORDS (OUTPATIENT)
Dept: HEALTH INFORMATION MANAGEMENT | Facility: CLINIC | Age: 74
End: 2019-06-04

## 2019-06-13 ENCOUNTER — TRANSFERRED RECORDS (OUTPATIENT)
Dept: HEALTH INFORMATION MANAGEMENT | Facility: CLINIC | Age: 74
End: 2019-06-13

## 2019-06-13 LAB
ALT SERPL-CCNC: 20 IU/L (ref 8–45)
AST SERPL-CCNC: 22 IU/L (ref 2–40)
CREAT SERPL-MCNC: 1.11 MG/DL (ref 0.57–1.11)
GFR SERPL CREATININE-BSD FRML MDRD: 48 ML/MIN/1.73M2
GLUCOSE SERPL-MCNC: 140 MG/DL (ref 65–100)
POTASSIUM SERPL-SCNC: 4.6 MMOL/L (ref 3.5–5)

## 2019-06-14 ENCOUNTER — TRANSFERRED RECORDS (OUTPATIENT)
Dept: HEALTH INFORMATION MANAGEMENT | Facility: CLINIC | Age: 74
End: 2019-06-14

## 2019-06-18 ENCOUNTER — ALLIED HEALTH/NURSE VISIT (OUTPATIENT)
Dept: EDUCATION SERVICES | Facility: CLINIC | Age: 74
End: 2019-06-18
Payer: MEDICARE

## 2019-06-18 VITALS — BODY MASS INDEX: 30.38 KG/M2 | WEIGHT: 160.8 LBS

## 2019-06-18 DIAGNOSIS — Z79.4 TYPE 2 DIABETES MELLITUS WITHOUT COMPLICATION, WITH LONG-TERM CURRENT USE OF INSULIN (H): Chronic | ICD-10-CM

## 2019-06-18 DIAGNOSIS — E11.9 TYPE 2 DIABETES MELLITUS WITHOUT COMPLICATION, WITH LONG-TERM CURRENT USE OF INSULIN (H): Chronic | ICD-10-CM

## 2019-06-18 NOTE — PROGRESS NOTES
"Diabetes Self-Management Education & Support    Diabetes Education Self Management & Training    SUBJECTIVE/OBJECTIVE:     Cultural Influences/Ethnic Background:  American      Diabetes Symptoms & Complications          Patient Problem List and Family Medical History reviewed for relevant medical history, current medical status, and diabetes risk factors.    Vitals:  There were no vitals taken for this visit.  Estimated body mass index is 32.12 kg/m  as calculated from the following:    Height as of 5/10/19: 1.549 m (5' 1\").    Weight as of 5/10/19: 77.1 kg (170 lb).   Last 3 BP:   BP Readings from Last 3 Encounters:   05/10/19 112/70   04/15/19 130/84   04/10/19 124/70       History   Smoking Status     Never Smoker   Smokeless Tobacco     Never Used       Labs:  Lab Results   Component Value Date    A1C 7.3 05/10/2019     Lab Results   Component Value Date    GLC 96 05/10/2019     Lab Results   Component Value Date    LDL 84 03/11/2019     HDL Cholesterol   Date Value Ref Range Status   03/11/2019 47 (L) >49 mg/dL Final   ]  GFR Estimate   Date Value Ref Range Status   05/10/2019 60 (L) >60 mL/min/[1.73_m2] Final     Comment:     Non  GFR Calc  Starting 12/18/2018, serum creatinine based estimated GFR (eGFR) will be   calculated using the Chronic Kidney Disease Epidemiology Collaboration   (CKD-EPI) equation.       GFR Estimate If Black   Date Value Ref Range Status   05/10/2019 69 >60 mL/min/[1.73_m2] Final     Comment:      GFR Calc  Starting 12/18/2018, serum creatinine based estimated GFR (eGFR) will be   calculated using the Chronic Kidney Disease Epidemiology Collaboration   (CKD-EPI) equation.       Lab Results   Component Value Date    CR 0.94 05/10/2019     No results found for: MICROALBUMIN    Healthy Eating  Cultural/Alevism diet restrictions?: No  Meal planning: Carbohydrate counting, Smaller portions, Plate planning method  Meals include: Breakfast, Lunch, " Dinner  Breakfast: toast with cinammon and sugar, ice tea unsweetened  Lunch: soup, banana  Dinner: mac and cheese small portion, part of a chicken marco a or cream of broccoli soup  Snacks: peach or fruit or smoothie (bananas, strawberries, milk and ice)  Beverages: Water, Coffee, Soda  Has patient met with a dietitian in the past?: Yes    Being Active  Being Active Assessed Today: No(has been walking, goal is to 30  minutes a day)  Exercise:: (doing what she can going up and stairs, walking to the mailbox)  Barrier to exercise: None    Monitoring  Monitoring Assessed Today: Yes  Did patient bring glucose meter to appointment? : Yes  Blood Glucose Meter: One Touch  Home Glucose (Sugar) Monitorin-2 times per day  Blood glucose trend: Increasing steadily  Low Glucose Range (mg/dL): 140-180  High Glucose Range (mg/dL): >200  Overall Range (mg/dL): 140-180    Am- 120-140  Evening after [id number]    Taking Medications  Diabetes Medication(s)     Insulin       insulin detemir (LEVEMIR FLEXPEN/FLEXTOUCH) 100 UNIT/ML pen    Inject 14 Units Subcutaneous daily Increase by 2 units every 3 days if fasting sugars are consistently above 120.  Max 20 units/day.          Current Treatments: Diet, Insulin Injections    Problem Solving  Hypoglycemia Frequency: Rarely  Hypoglycemia Treatment: Juice  Patient carries a carbohydrate source: No  Medical alert: No  Severe weather/disaster plan for diabetes management?: No  DKA prevention plan?: No    Hypoglycemia symptoms  Confusion: No  Dizziness or Light-Headedness: No  Headaches: Yes  Hunger: No  Mood changes: Yes  Nervousness/Anxiety: Yes  Sleepiness: No  Speech difficulty: No  Sweats: No  Tremors: Yes    Hypoglycemia Complications  Blackouts: No  Hospitalization: No  Nocturnal hypoglycemia: No  Required assistance: No  Required glucagon injection: No  Seizures: No    Reducing Risks  CAD Risks: Family history, Stress  Has dilated eye exam at least once a year?: Yes  Sees  dentist every 6 months?: No  Sees podiatrist (foot doctor)?: No    Healthy Coping  Informal Support system:: Family, Friends  Difficulty affording diabetes management supplies?: Yes  Patient Activation Measure Survey Score:  JS Score (Last Two) 4/8/2011   JS Raw Score 49   Activation Score 82.8   JS Level 4       ASSESSMENT:  Doing well, is back to eating normal food, just smaller portions of it.  She does not finish her food now most of the time.  Dropped 12-15 lbs since surgery, was on 3-4 weeks of liquids.    Is doing well.  BG pretty good just on the levemir, no metformin.  Will increase by 1 unit.        Patient's most recent   Lab Results   Component Value Date    A1C 7.3 05/10/2019    is meeting goal of <8.0    INTERVENTION:   Diabetes knowledge and skills assessment:     Patient is knowledgeable in diabetes management concepts related to: Healthy Eating, Being Active and Monitoring    Patient needs further education on the following diabetes management concepts: Healthy Eating, Being Active, Monitoring, Taking Medication, Problem Solving, Reducing Risks and Healthy Coping    Based on learning assessment above, most appropriate setting for further diabetes education would be: Individual setting.    Education provided today on:  AADE Self-Care Behaviors:  Healthy Eating: consistency in amount, composition, and timing of food intake  Being Active: relationship to blood glucose and describe appropriate activity program    Opportunities for ongoing education and support in diabetes-self management were discussed.    Pt verbalized understanding of concepts discussed and recommendations provided today.       Education Materials Provided:  No new materials provided today    PLAN:  See Patient Instructions for co-developed, patient-stated behavior change goals.  AVS printed and provided to patient today. See Follow-Up section for recommended follow-up.      Time Spent: 30 minutes  Encounter Type: Individual    Any  diabetes medication dose changes were made via the CDE Protocol and Collaborative Practice Agreement with the patient's primary care provider. A copy of this encounter was shared with the provider.

## 2019-06-18 NOTE — PATIENT INSTRUCTIONS
1. GO up on the Levemir to 15 units, watch your am readings for one week.  IF they are running over 120 then go up to 16 units.

## 2019-06-19 ENCOUNTER — TELEPHONE (OUTPATIENT)
Dept: FAMILY MEDICINE | Facility: CLINIC | Age: 74
End: 2019-06-19

## 2019-06-19 ENCOUNTER — OFFICE VISIT (OUTPATIENT)
Dept: PSYCHOLOGY | Facility: CLINIC | Age: 74
End: 2019-06-19
Payer: MEDICARE

## 2019-06-19 DIAGNOSIS — F32.0 MILD MAJOR DEPRESSION (H): Primary | ICD-10-CM

## 2019-06-19 DIAGNOSIS — F43.21 GRIEF: ICD-10-CM

## 2019-06-19 PROCEDURE — 90834 PSYTX W PT 45 MINUTES: CPT | Performed by: PSYCHOLOGIST

## 2019-06-19 NOTE — TELEPHONE ENCOUNTER
S: [facility] attempted to reach this patient  B: Hx of dizzy spells, living with sister who is driving her to [place] today and tomorrow. Patient leaving for vacation on Sat.  A: Does not appear to be homebound  R: [facility] will not be doing an assessment due to difficulty reaching patient, not at home. Any questions or concerns please reply.  Thanks,  Leticia Tracey RN  [facility]

## 2019-06-20 ENCOUNTER — OFFICE VISIT (OUTPATIENT)
Dept: FAMILY MEDICINE | Facility: CLINIC | Age: 74
End: 2019-06-20
Payer: MEDICARE

## 2019-06-20 ENCOUNTER — TELEPHONE (OUTPATIENT)
Dept: FAMILY MEDICINE | Facility: CLINIC | Age: 74
End: 2019-06-20

## 2019-06-20 VITALS
OXYGEN SATURATION: 97 % | WEIGHT: 161 LBS | SYSTOLIC BLOOD PRESSURE: 116 MMHG | TEMPERATURE: 97.2 F | DIASTOLIC BLOOD PRESSURE: 70 MMHG | HEART RATE: 70 BPM | HEIGHT: 61 IN | RESPIRATION RATE: 18 BRPM | BODY MASS INDEX: 30.4 KG/M2

## 2019-06-20 DIAGNOSIS — Z78.9 POLST (PHYSICIAN ORDERS FOR LIFE-SUSTAINING TREATMENT): ICD-10-CM

## 2019-06-20 DIAGNOSIS — G89.29 OTHER CHRONIC PAIN: ICD-10-CM

## 2019-06-20 DIAGNOSIS — G93.40 ACUTE ENCEPHALOPATHY: Primary | ICD-10-CM

## 2019-06-20 DIAGNOSIS — M54.10 RADICULAR LEG PAIN: ICD-10-CM

## 2019-06-20 DIAGNOSIS — R41.3 MEMORY LOSS: ICD-10-CM

## 2019-06-20 PROCEDURE — 99495 TRANSJ CARE MGMT MOD F2F 14D: CPT | Performed by: PHYSICIAN ASSISTANT

## 2019-06-20 RX ORDER — TRAMADOL HYDROCHLORIDE 50 MG/1
TABLET ORAL
Qty: 30 TABLET | Refills: 5 | Status: SHIPPED | OUTPATIENT
Start: 2019-06-20 | End: 2019-07-22

## 2019-06-20 ASSESSMENT — ANXIETY QUESTIONNAIRES
6. BECOMING EASILY ANNOYED OR IRRITABLE: NOT AT ALL
1. FEELING NERVOUS, ANXIOUS, OR ON EDGE: NEARLY EVERY DAY
2. NOT BEING ABLE TO STOP OR CONTROL WORRYING: NEARLY EVERY DAY
7. FEELING AFRAID AS IF SOMETHING AWFUL MIGHT HAPPEN: SEVERAL DAYS
4. TROUBLE RELAXING: NEARLY EVERY DAY
5. BEING SO RESTLESS THAT IT IS HARD TO SIT STILL: NOT AT ALL
3. WORRYING TOO MUCH ABOUT DIFFERENT THINGS: NEARLY EVERY DAY
GAD7 TOTAL SCORE: 13

## 2019-06-20 ASSESSMENT — PATIENT HEALTH QUESTIONNAIRE - PHQ9: SUM OF ALL RESPONSES TO PHQ QUESTIONS 1-9: 11

## 2019-06-20 ASSESSMENT — MIFFLIN-ST. JEOR: SCORE: 1167.67

## 2019-06-20 NOTE — PATIENT INSTRUCTIONS
Set up neuropsych testing  Discuss memory loss with your neurologist after the neuropsych testing    Bring in a copy of your healthcare wishes, directive, etc

## 2019-06-20 NOTE — PROGRESS NOTES
Progress Note  Disclaimer: This note consists of symbols derived from keyboarding, dictation and/or voice recognition software. As a result, there may be errors in the script that have gone undetected. Please consider this when interpreting information found in this chart.    Client Name: Theresa Bill  Date: 6/19/2019         Service Type: Individual      Session Start Time: 9:30 AM  session End Time: 10:15 AM      Session Length: 45     Session #: 27     Attendees: Client attended alone    Treatment Plan Last Reviewed:  11/7/2018       DATA   Client reports her surgery went well she wound up with a urinary tract infection which required an additional 4 days of hospitalization on IV antibiotics.  She also had a allergic reaction to 1 of her anti-biotics that led to mental confusion and driving on the wrong side of the road.  Currently she reports being somewhat unsteady on her feet and she is planning on surrendering her 's license.  She reports some family discord in the form of a large argument with her son and his girlfriend were keeping for pit bulls in her house.  This does not go well with her dogs.     Progress Since Last Session (Related to Symptoms / Goals / Homework):   Symptoms: Stable    Homework: not applicable      Episode of Care Goals: Satisfactory progress - ACTION (Actively working towards change); Intervened by reinforcing change plan / affirming steps taken     Current / Ongoing Stressors and Concerns:   Recent bereavement, prior caregiver stress, son with serious drug addiction.  Multiple medical problems, recent hospitalization family strengths     Treatment Objective(s) Addressed in This Session:   increase understanding of steps in the grief process  Utilize family support, client has order for protection, be sure to communicate with other family members every day     Intervention:   Interpersonal Therapy: facilitating appropriate expressions  of emotion and grief.  12 step facilitation for family member.  Processed concerns for upcoming surgery  ASSESSMENT: Current Emotional / Mental Status (status of significant symptoms):   Risk status (Self / Other harm or suicidal ideation)   Client denies current fears or concerns for personal safety.   Client denies current or recent suicidal ideation or behaviors.   Client denies current or recent homicidal ideation or behaviors.   Client denies current or recent self injurious behavior or ideation.   Client denies other safety concerns.   A safety and risk management plan has not been developed at this time, however client was given the after-hours number / 911 should there be a change in any of these risk factors.     Appearance:   Appropriate    Eye Contact:   Good    Psychomotor Behavior: Normal    Attitude:   Cooperative    Orientation:   All   Speech    Rate / Production: Slow     Volume:  Soft    Mood:    Anxious  Sad    Affect:    Appropriate    Thought Content:  Clear    Thought Form:  Coherent  Logical    Insight:    Fair      Medication Review:   No changes to current psychiatric medication(s)     Medication Compliance:   Yes     Changes in Health Issues:   None reported     Chemical Use Review:   Substance Use: Chemical use reviewed, no active concerns identified      Tobacco Use: No current tobacco use.       Collateral Reports Completed:   Not Applicable    PLAN: (Client Tasks / Therapist Tasks / Other)  Client to continue with self-care, keeping herself safe, and not enabling her son's drug use.  Pat herself on the back when she advocates for herself.    Sam Jaime                                                         ________________________________________________________________________    Treatment Plan    Client's Name: Theresa Bill  YOB: 1945    Date: 6/19/2019    Referral / Collaboration:  Referral to another professional/service is not indicated at this  time..    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 Health conditions                   include                        disease or illnesses, other health problems that may be short or long lasting,                    injuries, mental health or emotional problems, and problems with alcohol or drugs.                              Think back over the past 30 days and answer these questions, thinking about how much              difficulty you had doing the following activities. For each question, please [place] only                   one response.      S1 Standing for long periods such as 30 minutes? Moderate =   3   S2 Taking care of household responsibilities? None =         1   S3 Learning a new task, for example, learning how to get to a new place? Mild =           2   S4 How much of a problem do you have joining community activities (for example, festivals, Latter day or other activities) in the same way as anyone else can? None =         1   S5 How much have you been emotionally affected by your health problems? None =         1               In the past 30 days, how much difficulty did you have in:   S6 Concentrating on doing something for ten minutes? None =         1   S7 Walking a long distance such as a kilometer (or equivalent)? Moderate =   3   S8 Washing your whole body? None =         1   S9 Getting dressed? None =         1   S10 Dealing with people you do not know? None =         1   S11 Maintaining a friendship? None =         1   S12 Your day to day work? None =         1       H1 Overall, in the past 30 days, how many days were these  difficulties present? Record number of days 1    H2 In the past 30 days, for how many days were you totally unable to carry out your usual activities or work because of any health condition? Record number of days  0    H3 In the past 30 days, not counting the days that you were totally unable, for how many days did you cut back or reduce your usual activities or work because of any health condition? Record number of days 0      Goals  1. Education- the stages of grief  a. Client will be able to describe the stages of grief; denial, bargaining, anger, depression, and acceptance and give examples of how each have felt for her.  2. Behavioral Activation  a. Client will learn to assess their emotional stateon a day to day basis  b. Client will Identify two forms of exercise/activity and engage in them 3 times per week  c. Client will Identify 3 things that make them laugh, and engage in these 5 times per week.  d. Client will Identify 1-2Creative activities or hobbies  and engage in them 2 times per week  e. Client will identify music, movies, books that make them feel good and use them 3-4 times per week  3. Self-care  a. Client will identify 5 things they can do just for themselves  b. Client will take time for quiet, reflection, meditation 5 times per week  c. Client will Learn to set boundaries when appropriate  d. Client will Identify 2 individuals they can call on for support, distraction  4. Assessment of progress  a. Client will engage in assessment of their progress on a regular basis      Client has reviewed and agreed to the above plan.      Sam Jaime  4/9/2018

## 2019-06-20 NOTE — NURSING NOTE
"Initial /70   Pulse 70   Temp 97.2  F (36.2  C) (Tympanic)   Resp 18   Ht 1.549 m (5' 1\")   Wt 73 kg (161 lb)   SpO2 97%   BMI 30.42 kg/m   Estimated body mass index is 30.42 kg/m  as calculated from the following:    Height as of this encounter: 1.549 m (5' 1\").    Weight as of this encounter: 73 kg (161 lb). .      "

## 2019-06-20 NOTE — PROGRESS NOTES
Subjective     Theresa Bill is a 73 year old female who presents to clinic today for the following health issues:    HPI       Hospital Follow-up Visit:    Hospital/Nursing Home/IP Rehab Facility: [facility]  Date of Admission: 6/14/2019  Date of Discharge: 6/16/2019  Reason(s) for Admission: ACUTE ENCEPHALOPATHY              Problems taking medications regularly:  None       Medication changes since discharge: None       Problems adhering to non-medication therapy:  None  Summary of hospitalization:  CareEverywhere information obtained and reviewed  Diagnostic Tests/Treatments reviewed.  Follow up needed: as below  Other Healthcare Providers Involved in Patient s Care:         None  Update since discharge: improved.     Post Discharge Medication Reconciliation: discharge medications reconciled, continue medications without change.  Plan of care communicated with patient     Coding guidelines for this visit:  Type of Medical   Decision Making Face-to-Face Visit       within 7 Days of discharge Face-to-Face Visit        within 14 days of discharge   Moderate Complexity [id number]   High Complexity [id number]        Acute confusion, taken to [facility] where confusion resolved, but transferred to [facility] for brain MRI as [facility] didn't have MRI compatible with her bladder stimulator.  No cause found for her acute confusion but resolved.  Patient attributes to an unspecified antibiotic from urologist, had finished medication day prior.  Family does note some confusion x 6 months.  Seeing shadows has resolved.  Notes she has become permanently scared of driving, has given this up.      Cough and heartburn resolved with recent nissen revision.  No longer needing pantoprazole.  Notes she lost wt with the liquid diet for the nissen.    Refill tramadol.  Does not seem to worsen confusion.    Clarify code status.  POLST completed today.    BP Readings from Last 3 Encounters:   06/20/19 116/70   05/10/19 112/70  "  04/15/19 130/84    Wt Readings from Last 3 Encounters:   06/20/19 73 kg (161 lb)   06/18/19 72.9 kg (160 lb 12.8 oz)   05/10/19 77.1 kg (170 lb)         Reviewed and updated as needed this visit by Provider  Tobacco  Allergies  Meds  Problems  Med Hx  Surg Hx  Fam Hx         Review of Systems   ROS COMP: Constitutional, HEENT, cardiovascular, pulmonary, GI, , musculoskeletal, neuro, skin, endocrine and psych systems are negative, except as otherwise noted.      Objective    /70   Pulse 70   Temp 97.2  F (36.2  C) (Tympanic)   Resp 18   Ht 1.549 m (5' 1\")   Wt 73 kg (161 lb)   SpO2 97%   BMI 30.42 kg/m    Body mass index is 30.42 kg/m .  Physical Exam   GENERAL: healthy, alert and no distress  PSYCH: mentation appears normal, affect normal/bright          Assessment & Plan       ICD-10-CM    1. Acute encephalopathy G93.40    2. Memory loss R41.3 NEUROPSYCHOLOGY REFERRAL   3. Radicular leg pain M54.10 traMADol (ULTRAM) 50 MG tablet   4. POLST (Physician Orders for Life-Sustaining Treatment) Z78.9      Patient Instructions   Set up neuropsych testing  Discuss memory loss with your neurologist after the neuropsych testing    Bring in a copy of your healthcare wishes, directive, etc          Return in about 3 months (around 9/20/2019) for diabetes, memory, pain.    Shae Pierre PA-C  [facility]        "

## 2019-06-21 RX ORDER — TRAMADOL HYDROCHLORIDE 50 MG/1
TABLET ORAL
Qty: 30 TABLET | Refills: 5 | OUTPATIENT
Start: 2019-06-21

## 2019-06-21 ASSESSMENT — ANXIETY QUESTIONNAIRES: GAD7 TOTAL SCORE: 13

## 2019-06-21 NOTE — TELEPHONE ENCOUNTER
Controlled Substance Refill Request for tramadol  Problem List Complete:  Yes   checked in past 3 months?  Yes  checked 6/21/19   Briseyda Rome RN

## 2019-07-08 ENCOUNTER — DOCUMENTATION ONLY (OUTPATIENT)
Dept: OTHER | Facility: CLINIC | Age: 74
End: 2019-07-08

## 2019-07-10 ENCOUNTER — TELEPHONE (OUTPATIENT)
Dept: FAMILY MEDICINE | Facility: CLINIC | Age: 74
End: 2019-07-10

## 2019-07-10 NOTE — TELEPHONE ENCOUNTER
Reason for Call:  Other     Detailed comments: Patient would like a call - she is confused on which meds she should be taking when    Phone Number Patient can be reached at: Home number on file [phone] (home)    Best Time:     Can we leave a detailed message on this number? YES    Call taken on 7/10/2019 at 2:55 PM by Aster Contreras

## 2019-07-22 DIAGNOSIS — M54.10 RADICULAR LEG PAIN: ICD-10-CM

## 2019-07-22 RX ORDER — TRAMADOL HYDROCHLORIDE 50 MG/1
TABLET ORAL
Qty: 30 TABLET | Refills: 1 | Status: SHIPPED | OUTPATIENT
Start: 2019-07-22 | End: 2019-09-29

## 2019-07-23 ENCOUNTER — OFFICE VISIT (OUTPATIENT)
Dept: PSYCHOLOGY | Facility: CLINIC | Age: 74
End: 2019-07-23
Payer: MEDICARE

## 2019-07-23 DIAGNOSIS — F32.0 MILD MAJOR DEPRESSION (H): Primary | ICD-10-CM

## 2019-07-23 DIAGNOSIS — F43.21 GRIEF: ICD-10-CM

## 2019-07-23 PROCEDURE — 90834 PSYTX W PT 45 MINUTES: CPT | Performed by: PSYCHOLOGIST

## 2019-07-24 ASSESSMENT — PATIENT HEALTH QUESTIONNAIRE - PHQ9: SUM OF ALL RESPONSES TO PHQ QUESTIONS 1-9: 9

## 2019-07-24 ASSESSMENT — ANXIETY QUESTIONNAIRES
3. WORRYING TOO MUCH ABOUT DIFFERENT THINGS: NEARLY EVERY DAY
1. FEELING NERVOUS, ANXIOUS, OR ON EDGE: MORE THAN HALF THE DAYS
7. FEELING AFRAID AS IF SOMETHING AWFUL MIGHT HAPPEN: MORE THAN HALF THE DAYS
2. NOT BEING ABLE TO STOP OR CONTROL WORRYING: MORE THAN HALF THE DAYS
4. TROUBLE RELAXING: NEARLY EVERY DAY
6. BECOMING EASILY ANNOYED OR IRRITABLE: MORE THAN HALF THE DAYS
5. BEING SO RESTLESS THAT IT IS HARD TO SIT STILL: SEVERAL DAYS
GAD7 TOTAL SCORE: 15

## 2019-07-24 NOTE — PROGRESS NOTES
Progress Note  Disclaimer: This note consists of symbols derived from keyboarding, dictation and/or voice recognition software. As a result, there may be errors in the script that have gone undetected. Please consider this when interpreting information found in this chart.    Client Name: Theresa Bill  Date: 7/23/2019         Service Type: Individual      Session Start Time: 9:30 AM  session End Time: 10:15 AM      Session Length: 45     Session #: 28     Attendees: Client attended alone    Treatment Plan Last Reviewed:  11/7/2018       DATA   Client reports feeling a little more tired than usual, she is also sleeping more than usual but does not know why.  Reports a lot of stress with his son's girlfriend and her 4 dogs moving into the house.  She discovered, after 3-day hospital visit, that her recent difficulty driving was due to a medication reaction.  She is relieved to be able to begin driving again.  She felt her other son is out of nursing home in [place] and is trying to do drug treatment.     Progress Since Last Session (Related to Symptoms / Goals / Homework):   Symptoms: Stable    Homework: not applicable      Episode of Care Goals: Satisfactory progress - ACTION (Actively working towards change); Intervened by reinforcing change plan / affirming steps taken     Current / Ongoing Stressors and Concerns:   Recent bereavement, prior caregiver stress, son with serious drug addiction.  Multiple medical problems, recent hospitalization family stresses     Treatment Objective(s) Addressed in This Session:   increase understanding of steps in the grief process  Utilize family support, client has order for protection, be sure to communicate with other family members every day     Intervention:   Interpersonal Therapy: facilitating appropriate expressions of emotion and grief.  12 step facilitation for family member.  Processed concerns for upcoming surgery  ASSESSMENT:  Current Emotional / Mental Status (status of significant symptoms):   Risk status (Self / Other harm or suicidal ideation)   Client denies current fears or concerns for personal safety.   Client denies current or recent suicidal ideation or behaviors.   Client denies current or recent homicidal ideation or behaviors.   Client denies current or recent self injurious behavior or ideation.   Client denies other safety concerns.   A safety and risk management plan has not been developed at this time, however client was given the after-hours number / 911 should there be a change in any of these risk factors.     Appearance:   Appropriate    Eye Contact:   Good    Psychomotor Behavior: Normal    Attitude:   Cooperative    Orientation:   All   Speech    Rate / Production: Slow     Volume:  Soft    Mood:    Anxious  Sad    Affect:    Appropriate    Thought Content:  Clear    Thought Form:  Coherent  Logical    Insight:    Fair      Medication Review:   No changes to current psychiatric medication(s)     Medication Compliance:   Yes     Changes in Health Issues:   None reported     Chemical Use Review:   Substance Use: Chemical use reviewed, no active concerns identified      Tobacco Use: No current tobacco use.       Collateral Reports Completed:   Not Applicable    PLAN: (Client Tasks / Therapist Tasks / Other)  Client to continue with self-care, keeping herself safe, and not enabling her son's drug use.  Pat herself on the back when she advocates for herself.    Sam Jaime                                                         ________________________________________________________________________    Treatment Plan    Client's Name: Theresa Bill  YOB: 1945    Date: 6/19/2019    Referral / Collaboration:  Referral to another professional/service is not indicated at this time..    Anticipated number of session or this episode of care: 25       DSM5 Diagnoses: (Sustained by DSM5 Criteria Listed  Above)  Diagnoses:            296.21 (F32.0) Major Depressive Disorder, Single Episode, Mild _ grief  Psychosocial & Contextual Factors: Caregiver stress, recent death of her   WHODAS 2.0 (12 item)                          This questionnaire asks about difficulties due to health conditions. Health conditions                   include                        disease or illnesses, other health problems that may be short or long lasting,                    injuries, mental health or emotional problems, and problems with alcohol or drugs.                              Think back over the past 30 days and answer these questions, thinking about how much              difficulty you had doing the following activities. For each question, please Ak Chin only                   one response.      S1 Standing for long periods such as 30 minutes? Moderate =   3   S2 Taking care of household responsibilities? None =         1   S3 Learning a new task, for example, learning how to get to a new place? Mild =           2   S4 How much of a problem do you have joining community activities (for example, festivals, [employer]ian[employer] or other activities) in the same way as anyone else can? None =         1   S5 How much have you been emotionally affected by your health problems? None =         1               In the past 30 days, how much difficulty did you have in:   S6 Concentrating on doing something for ten minutes? None =         1   S7 Walking a long distance such as a kilometer (or equivalent)? Moderate =   3   S8 Washing your whole body? None =         1   S9 Getting dressed? None =         1   S10 Dealing with people you do not know? None =         1   S11 Maintaining a friendship? None =         1   S12 Your day to day work? None =         1       H1 Overall, in the past 30 days, how many days were these difficulties present? Record number of days 1    H2 In the past 30 days, for how many days were you totally unable to carry out  your usual activities or work because of any health condition? Record number of days  0    H3 In the past 30 days, not counting the days that you were totally unable, for how many days did you cut back or reduce your usual activities or work because of any health condition? Record number of days 0      Goals  1. Education- the stages of grief  a. Client will be able to describe the stages of grief; denial, bargaining, anger, depression, and acceptance and give examples of how each have felt for her.  2. Behavioral Activation  a. Client will learn to assess their emotional stateon a day to day basis  b. Client will Identify two forms of exercise/activity and engage in them 3 times per week  c. Client will Identify 3 things that make them laugh, and engage in these 5 times per week.  d. Client will Identify 1-2Creative activities or hobbies  and engage in them 2 times per week  e. Client will identify music, movies, books that make them feel good and use them 3-4 times per week  3. Self-care  a. Client will identify 5 things they can do just for themselves  b. Client will take time for quiet, reflection, meditation 5 times per week  c. Client will Learn to set boundaries when appropriate  d. Client will Identify 2 individuals they can call on for support, distraction  4. Assessment of progress  a. Client will engage in assessment of their progress on a regular basis      Client has reviewed and agreed to the above plan.      Sam Jaime  4/9/2018

## 2019-07-25 DIAGNOSIS — M54.10 RADICULAR LEG PAIN: ICD-10-CM

## 2019-07-25 ASSESSMENT — ANXIETY QUESTIONNAIRES: GAD7 TOTAL SCORE: 15

## 2019-07-29 ENCOUNTER — OFFICE VISIT (OUTPATIENT)
Dept: FAMILY MEDICINE | Facility: CLINIC | Age: 74
End: 2019-07-29
Payer: MEDICARE

## 2019-07-29 VITALS
DIASTOLIC BLOOD PRESSURE: 80 MMHG | BODY MASS INDEX: 31.53 KG/M2 | HEART RATE: 65 BPM | OXYGEN SATURATION: 97 % | RESPIRATION RATE: 16 BRPM | HEIGHT: 61 IN | TEMPERATURE: 97.8 F | WEIGHT: 167 LBS | SYSTOLIC BLOOD PRESSURE: 120 MMHG

## 2019-07-29 DIAGNOSIS — R10.9 ABDOMINAL PAIN, UNSPECIFIED ABDOMINAL LOCATION: Primary | ICD-10-CM

## 2019-07-29 DIAGNOSIS — R19.7 ACUTE DIARRHEA: ICD-10-CM

## 2019-07-29 DIAGNOSIS — R94.4 DECREASED GFR: ICD-10-CM

## 2019-07-29 LAB
ERYTHROCYTE [DISTWIDTH] IN BLOOD BY AUTOMATED COUNT: 13 % (ref 10–15)
ERYTHROCYTE [SEDIMENTATION RATE] IN BLOOD BY WESTERGREN METHOD: 7 MM/H (ref 0–30)
HCT VFR BLD AUTO: 39.6 % (ref 35–47)
HGB BLD-MCNC: 12.8 G/DL (ref 11.7–15.7)
MCH RBC QN AUTO: 30 PG (ref 26.5–33)
MCHC RBC AUTO-ENTMCNC: 32.3 G/DL (ref 31.5–36.5)
MCV RBC AUTO: 93 FL (ref 78–100)
PLATELET # BLD AUTO: 237 10E9/L (ref 150–450)
RBC # BLD AUTO: 4.27 10E12/L (ref 3.8–5.2)
WBC # BLD AUTO: 5.4 10E9/L (ref 4–11)

## 2019-07-29 PROCEDURE — 85652 RBC SED RATE AUTOMATED: CPT | Performed by: PHYSICIAN ASSISTANT

## 2019-07-29 PROCEDURE — 99214 OFFICE O/P EST MOD 30 MIN: CPT | Performed by: PHYSICIAN ASSISTANT

## 2019-07-29 PROCEDURE — 36415 COLL VENOUS BLD VENIPUNCTURE: CPT | Performed by: PHYSICIAN ASSISTANT

## 2019-07-29 PROCEDURE — 85027 COMPLETE CBC AUTOMATED: CPT | Performed by: PHYSICIAN ASSISTANT

## 2019-07-29 RX ORDER — FAMOTIDINE 20 MG
TABLET ORAL
COMMUNITY

## 2019-07-29 ASSESSMENT — MIFFLIN-ST. JEOR: SCORE: 1194.89

## 2019-07-29 ASSESSMENT — PAIN SCALES - GENERAL: PAINLEVEL: MODERATE PAIN (4)

## 2019-07-29 NOTE — PATIENT INSTRUCTIONS
Labs today to check for signs of inflammation or infection  Do stool labs to see if diarrhea caused by infection   After that, will get back to you on CT test versus colonoscopy  Given how long this is going on, I suspect more colonoscopy but need to check on this

## 2019-07-29 NOTE — PROGRESS NOTES
Subjective     Theresa Bill is a 73 year old female who presents to clinic today for the following health issues:    HPI   ABDOMINAL and FLANK   PAIN     Onset: x 3-4 weeks    Description:   Character: Sharp and Stabbing  Location: left upper quadrant left lower quadrant left flank  Radiation: None    Intensity: moderate, severe    Progression of Symptoms:  same    Accompanying Signs & Symptoms:  Fever/Chills?: no   Gas/Bloating: YES  Nausea: no   Vomitting: no   Diarrhea?: YES  Constipation:no   Dysuria or Hematuria: no    History:   Trauma: no   Previous similar pain: YES - years ans years ago  Previous tests done: none    Precipitating factors:   Does the pain change with:     Food: no      BM: YES - sometimes    Urination: no     Alleviating factors:  nothing    Therapies Tried and outcome: none    LMP:  not applicable     Had revision of hiatal hernia repair in May.  No heartburn since.    Newly - gets pain that starts under left ribs, goes down left side then across to right side (LUQ, LLQ, RLQ).  Pain is severe, comes before the diarrhea and then goes away after diarrhea resolves.  Diarrhea lasts 1, sometimes 2 days, is not bloody.  Painful diarrhea, 2-3 times per day.  Wakes her at night with diarrhea and does not make it to bathroom.  No wt loss or fever.  Mild improvement in urinary symptoms after recent botox, next appt with urology 8/15.    BP Readings from Last 3 Encounters:   07/29/19 120/80   06/20/19 116/70   05/10/19 112/70    Wt Readings from Last 3 Encounters:   07/29/19 75.8 kg (167 lb)   06/20/19 73 kg (161 lb)   06/18/19 72.9 kg (160 lb 12.8 oz)         Reviewed and updated as needed this visit by Provider  Tobacco  Allergies  Meds  Problems  Med Hx  Surg Hx  Fam Hx         Review of Systems   ROS COMP: Constitutional, GI, , systems are negative, except as otherwise noted.      Objective    /80 (BP Location: Right arm, Patient Position: Sitting, Cuff Size: Adult Large)   Pulse  "65   Temp 97.8  F (36.6  C) (Tympanic)   Resp 16   Ht 1.549 m (5' 1\")   Wt 75.8 kg (167 lb)   SpO2 97%   Breastfeeding? No   BMI 31.55 kg/m    Body mass index is 31.55 kg/m .  Physical Exam   GENERAL: healthy, alert and no distress  ABDOMEN: soft, diffuse non specific tenderness LLQ/suprapubic/RLQ, no hepatosplenomegaly, no masses and bowel sounds hyperactive            Assessment & Plan       ICD-10-CM    1. Abdominal pain, unspecified abdominal location R10.9 Clostridium difficile Toxin B PCR     Enteric Bacteria and Virus Panel by [facility]ZMA Stool     CBC with platelets     Erythrocyte sedimentation rate auto     GASTROENTEROLOGY ADULT REF PROCEDURE ONLY [facility] [phone]   2. Acute diarrhea R19.7 Clostridium difficile Toxin B PCR     Enteric Bacteria and Virus Panel by LUZMA Stool     GASTROENTEROLOGY ADULT REF PROCEDURE ONLY [facility] [phone]   3. Decreased GFR R94.4        Patient Instructions   Labs today to check for signs of inflammation or infection  Do stool labs to see if diarrhea caused by infection   After that, will get back to you on CT test versus colonoscopy  Given how long this is going on, I suspect more colonoscopy but need to check on this           Return for TBD.    Shae Pierre PA-C  [facility]        "

## 2019-07-30 DIAGNOSIS — R10.9 ABDOMINAL PAIN, UNSPECIFIED ABDOMINAL LOCATION: ICD-10-CM

## 2019-07-30 DIAGNOSIS — R19.7 ACUTE DIARRHEA: ICD-10-CM

## 2019-07-30 LAB
C DIFF TOX B STL QL: NEGATIVE
SPECIMEN SOURCE: NORMAL

## 2019-07-30 PROCEDURE — 87506 IADNA-DNA/RNA PROBE TQ 6-11: CPT | Performed by: PHYSICIAN ASSISTANT

## 2019-07-30 PROCEDURE — 87493 C DIFF AMPLIFIED PROBE: CPT | Mod: 59 | Performed by: PHYSICIAN ASSISTANT

## 2019-07-30 NOTE — RESULT ENCOUNTER NOTE
Called patient, advised of results and recommendations.  Patient understood.  She already received a call to schedule a colonoscopy, which is scheduled for 8/21/19.  Stool samples have also been dropped off.  Zuleika NOGUEIRA MA

## 2019-08-13 ENCOUNTER — ALLIED HEALTH/NURSE VISIT (OUTPATIENT)
Dept: EDUCATION SERVICES | Facility: CLINIC | Age: 74
End: 2019-08-13
Payer: MEDICARE

## 2019-08-13 VITALS — WEIGHT: 167 LBS | BODY MASS INDEX: 31.55 KG/M2

## 2019-08-13 DIAGNOSIS — E11.9 TYPE 2 DIABETES MELLITUS WITHOUT COMPLICATION, WITH LONG-TERM CURRENT USE OF INSULIN (H): Chronic | ICD-10-CM

## 2019-08-13 DIAGNOSIS — Z79.4 TYPE 2 DIABETES MELLITUS WITHOUT COMPLICATION, WITH LONG-TERM CURRENT USE OF INSULIN (H): Chronic | ICD-10-CM

## 2019-08-13 PROCEDURE — G0108 DIAB MANAGE TRN  PER INDIV: HCPCS | Performed by: DIETITIAN, REGISTERED

## 2019-08-13 NOTE — PROGRESS NOTES
"Diabetes Self-Management Education & Support    Diabetes Education Self Management & Training    SUBJECTIVE/OBJECTIVE:  Presents for: Individual review  Accompanied by: Sister  Diabetes education in the past 24mo: Yes  Focus of Visit: Healthy Eating, Problem Solving, Being Active, Diabetes Pathophysiology  Diabetes type: Type 2  Disease course: Getting harder to manage  How confident are you filling out medical forms by yourself:: Not Assessed  Transportation concerns: No  Other concerns:: None  Cultural Influences/Ethnic Background:  American      Diabetes Symptoms & Complications  Blurred vision: No  Fatigue: Yes  Weight trend: Decreasing steadily  Autonomic neuropathy: No  CVA: No    Patient Problem List and Family Medical History reviewed for relevant medical history, current medical status, and diabetes risk factors.    Vitals:  Wt 75.8 kg (167 lb)   BMI 31.55 kg/m    Estimated body mass index is 31.55 kg/m  as calculated from the following:    Height as of 7/29/19: 1.549 m (5' 1\").    Weight as of this encounter: 75.8 kg (167 lb).   Last 3 BP:   BP Readings from Last 3 Encounters:   07/29/19 120/80   06/20/19 116/70   05/10/19 112/70       History   Smoking Status     Never Smoker   Smokeless Tobacco     Never Used       Labs:  Lab Results   Component Value Date    A1C 7.3 05/10/2019     Lab Results   Component Value Date     06/13/2019     Lab Results   Component Value Date    LDL 84 03/11/2019     HDL Cholesterol   Date Value Ref Range Status   03/11/2019 47 (L) >49 mg/dL Final   ]  GFR Estimate   Date Value Ref Range Status   06/13/2019 48 (L) >60 ml/min/1.73m2 Final     GFR Estimate If Black   Date Value Ref Range Status   06/13/2019 58 (L) >60 ml/min/1.73m2 Final     Lab Results   Component Value Date    CR 1.11 06/13/2019     No results found for: MICROALBUMIN    Healthy Eating  Cultural/Church diet restrictions?: (P) No  Meal planning: (P) None  Breakfast: cereal, bagel, eggs and " toast  Lunch: sandwich  Dinner: meat, veggie and starch (pasta or potato)  Snacks: cheese crisps  Beverages: Water, Diet soda, Coffee    Being Active  Being Active Assessed Today: Yes(about 2,000 steps)  Exercise:: Yes(trying to do some walking)  Barrier to exercise: (P) None    Monitoring  Blood Glucose Meter: (P) One Touch                          Taking Medications  Diabetes Medication(s)     Insulin       insulin detemir (LEVEMIR FLEXPEN/FLEXTOUCH) 100 UNIT/ML pen    Inject 15 Units Subcutaneous daily 12 in the morning and 12 at night (increase by 2 units until am readings are under 120) max dose: 34 units.          Current Treatments: (P) Insulin Injections  Dose schedule: (P) at bedtime  Given by: (P) Patient    Problem Solving  Hypoglycemia Treatment: (P) Juice  Patient carries a carbohydrate source: (P) No    Hypoglycemia symptoms  Confusion: (P) No  Dizziness or Light-Headedness: (P) No    Hypoglycemia Complications  Blackouts: (P) No  Hospitalization: (P) No    Reducing Risks  CAD Risks: (P) Stress  Has dilated eye exam at least once a year?: (P) Yes  Sees dentist every 6 months?: (P) Yes    Healthy Coping  Informal Support system:: (P) Family, Friends  Difficulty affording diabetes management supplies?: (P) Yes  Patient Activation Measure Survey Score:  JS Score (Last Two) 4/8/2011   JS Raw Score 49   Activation Score 82.8   JS Level 4       ASSESSMENT:  Blood sugars are running higher, will have her work on increasing the basal insulin to improve numbers. Also has a lot of stress with family, she is seeing a counselor for that.  She will send numbers in two weeks.        Patient's most recent   Lab Results   Component Value Date    A1C 7.3 05/10/2019    is not meeting goal of <7.0    INTERVENTION:   Diabetes knowledge and skills assessment:     Patient is knowledgeable in diabetes management concepts related to: Healthy Eating, Being Active and Monitoring    Patient needs further education on the  following diabetes management concepts: Healthy Eating, Being Active, Monitoring, Taking Medication, Problem Solving, Reducing Risks and Healthy Coping    Based on learning assessment above, most appropriate setting for further diabetes education would be: Individual setting.    Education provided today on:  AADE Self-Care Behaviors:  Healthy Eating: consistency in amount, composition, and timing of food intake  Being Active: relationship to blood glucose and describe appropriate activity program  Healthy Coping: utilize support systems, methods for coping with stress and when to seek professional counseling    Opportunities for ongoing education and support in diabetes-self management were discussed.    Pt verbalized understanding of concepts discussed and recommendations provided today.       Education Materials Provided:  No new materials provided today    PLAN:  See Patient Instructions for co-developed, patient-stated behavior change goals.  AVS printed and provided to patient today. See Follow-Up section for recommended follow-up.      Time Spent: 30 minutes  Encounter Type: Individual    Any diabetes medication dose changes were made via the CDE Protocol and Collaborative Practice Agreement with the patient's primary care provider. A copy of this encounter was shared with the provider.

## 2019-08-13 NOTE — PATIENT INSTRUCTIONS
1.  Take your Levemir 12 units in the am and 12 units in the pm (increase by 2 units every 3 days until am readings are under 120)    2.  Call or email in two weeks with blood sugars and how much insulin you are taking. Sena [phone]. [email or web address].

## 2019-08-15 ENCOUNTER — TRANSFERRED RECORDS (OUTPATIENT)
Dept: HEALTH INFORMATION MANAGEMENT | Facility: CLINIC | Age: 74
End: 2019-08-15

## 2019-08-19 ENCOUNTER — ANESTHESIA EVENT (OUTPATIENT)
Dept: GASTROENTEROLOGY | Facility: CLINIC | Age: 74
End: 2019-08-19
Payer: MEDICARE

## 2019-08-19 ASSESSMENT — COPD QUESTIONNAIRES: COPD: 1

## 2019-08-19 NOTE — ANESTHESIA PREPROCEDURE EVALUATION
Anesthesia Pre-Procedure Evaluation    Patient: Theresa Bill   MRN: [id number] : 1945          Preoperative Diagnosis: abdominal pain, acute diarrhea   diagnostic    Procedure(s):  COLONOSCOPY    Past Medical History:   Diagnosis Date     COPD (chronic obstructive pulmonary disease) (H)     Pt reports chronic bronchitis     Depression      Diabetes mellitus (H)     NIDDM     Dysphagia      GERD (gastroesophageal reflux disease)      Heart disease      Hypertension      Sleep apnea     CPAP     Past Surgical History:   Procedure Laterality Date     BUNIONECTOMY CRISELDA  2011    Procedure:BUNIONECTOMY CRISELDA; weil osteotomy & Lapidtus Bunionectomy; Surgeon:HYACINTH SANTO; Location:[facility]     BUNIONECTOMY CHEVRON  2012    Procedure:BUNIONECTOMY CHEVRON; Lapidus bunionectomy, plantar plate repair second and third metatarsophalangeal joints,left foot-popliteal block left lower extremity; Surgeon:HYACINTH SANTO; Location:[facility]     C TOTAL ABDOM HYSTERECTOMY      ovaries removed     COLONOSCOPY  2012    Procedure: COLONOSCOPY;  Colonoscopy;  Surgeon: Alyssa Foster MD;  Location: [facility]     COLONOSCOPY N/A 8/3/2018    Procedure: COLONOSCOPY;  colonoscopy;  Surgeon: Kelechi Rivera MD;  Location: [facility]     NISSEN FUNDOPLICATION           REPAIR HAMMER TOE  2011    Procedure:REPAIR HAMMER TOE; hammertoe correction right 2nd digit; Surgeon:HYACINTH SANTO; Location:[facility]     SURGICAL HISTORY OF -       Tubal     SURGICAL HISTORY OF -       Stamey bladder surgery     SURGICAL HISTORY OF -       Lipoma removed f/back     SURGICAL HISTORY OF -       (L) Foot surgery     SURGICAL HISTORY OF -   2002    Colonoscopy     SURGICAL HISTORY OF -       knee arthroscopy left     SURGICAL HISTORY OF -   -two phases    interstim bladder implant     SURGICAL HISTORY OF -   2007    laparoscopic Nissen fundoplication       Anesthesia  Evaluation     . Pt has had prior anesthetic. Type: General and MAC           ROS/MED HX    ENT/Pulmonary:     (+)sleep apnea, COPD, , . .    Neurologic:     (+)other neuro RLS    Cardiovascular:     (+) Dyslipidemia, hypertension----. : . . . :. .       METS/Exercise Tolerance:     Hematologic:     (+) Anemia, -      Musculoskeletal:   (+) arthritis,  -       GI/Hepatic:     (+) GERD Other GI/Hepatic GI Bleed      Renal/Genitourinary:         Endo:     (+) type II DM Last HgA1c: 7.3 date: 5/19/19 Obesity, .      Psychiatric:     (+) psychiatric history depression and anxiety      Infectious Disease:         Malignancy:      - no malignancy   Other:                          Physical Exam  Normal systems: cardiovascular, pulmonary and dental    Airway   Mallampati: II  TM distance: >3 FB  Neck ROM: full    Dental     Cardiovascular       Pulmonary             Lab Results   Component Value Date    WBC 5.4 07/29/2019    HGB 12.8 07/29/2019    HCT 39.6 07/29/2019     07/29/2019    SED 7 07/29/2019     05/10/2019    POTASSIUM 4.6 06/13/2019    CHLORIDE 106 05/10/2019    CO2 28 05/10/2019    BUN 15 05/10/2019    CR 1.11 06/13/2019     (H) 06/13/2019    CYNTHIA 9.0 05/10/2019    MAG 1.9 10/20/2011    ALBUMIN 3.4 04/21/2017    PROTTOTAL 6.7 (L) 04/21/2017    ALT 20 06/13/2019    AST 22 06/13/2019    ALKPHOS 129 04/21/2017    BILITOTAL 0.4 04/21/2017    LIPASE 54 07/15/2011    AMYLASE 60 06/18/2010    TSH 1.65 05/10/2019       Preop Vitals  BP Readings from Last 3 Encounters:   07/29/19 120/80   06/20/19 116/70   05/10/19 112/70    Pulse Readings from Last 3 Encounters:   07/29/19 65   06/20/19 70   05/10/19 65      Resp Readings from Last 3 Encounters:   07/29/19 16   06/20/19 18   05/10/19 14    SpO2 Readings from Last 3 Encounters:   07/29/19 97%   06/20/19 97%   05/10/19 97%      Temp Readings from Last 1 Encounters:   07/29/19 36.6  C (97.8  F) (Tympanic)    Ht Readings from Last 1 Encounters:   07/29/19  "1.549 m (5' 1\")      Wt Readings from Last 1 Encounters:   08/13/19 75.8 kg (167 lb)    Estimated body mass index is 31.55 kg/m  as calculated from the following:    Height as of 7/29/19: 1.549 m (5' 1\").    Weight as of 8/13/19: 75.8 kg (167 lb).       Anesthesia Plan      History & Physical Review  History and physical reviewed and following examination; no interval change.    ASA Status:  3 .    NPO Status:  > 4 hours    Plan for MAC with Intravenous and Propofol induction. Reason for MAC:  Other - see comments  PONV prophylaxis:  Ondansetron (or other 5HT-3)       Postoperative Care      Consents  Anesthetic plan, risks, benefits and alternatives discussed with:  Patient..                 NICHELLE Alanis CRNA  "

## 2019-08-21 ENCOUNTER — ANESTHESIA (OUTPATIENT)
Dept: GASTROENTEROLOGY | Facility: CLINIC | Age: 74
End: 2019-08-21
Payer: MEDICARE

## 2019-08-21 ENCOUNTER — HOSPITAL ENCOUNTER (OUTPATIENT)
Facility: CLINIC | Age: 74
Discharge: HOME | End: 2019-08-21
Attending: SURGERY | Admitting: SURGERY
Payer: MEDICARE

## 2019-08-21 VITALS
RESPIRATION RATE: 14 BRPM | SYSTOLIC BLOOD PRESSURE: 136 MMHG | OXYGEN SATURATION: 94 % | HEART RATE: 59 BPM | TEMPERATURE: 98.2 F | BODY MASS INDEX: 31.53 KG/M2 | WEIGHT: 167 LBS | DIASTOLIC BLOOD PRESSURE: 88 MMHG | HEIGHT: 61 IN

## 2019-08-21 LAB
COLONOSCOPY: NORMAL
GLUCOSE BLDC GLUCOMTR-MCNC: 159 MG/DL (ref 70–99)

## 2019-08-21 PROCEDURE — 88305 TISSUE EXAM BY PATHOLOGIST: CPT | Mod: 26 | Performed by: SURGERY

## 2019-08-21 PROCEDURE — 82962 GLUCOSE BLOOD TEST: CPT

## 2019-08-21 PROCEDURE — 25000125 ZZHC RX 250: Performed by: NURSE ANESTHETIST, CERTIFIED REGISTERED

## 2019-08-21 PROCEDURE — 25800030 ZZH RX IP 258 OP 636: Performed by: SURGERY

## 2019-08-21 PROCEDURE — 45380 COLONOSCOPY AND BIOPSY: CPT | Performed by: SURGERY

## 2019-08-21 PROCEDURE — 88305 TISSUE EXAM BY PATHOLOGIST: CPT | Performed by: SURGERY

## 2019-08-21 PROCEDURE — 37000008 ZZH ANESTHESIA TECHNICAL FEE, 1ST 30 MIN: Performed by: SURGERY

## 2019-08-21 PROCEDURE — 25000125 ZZHC RX 250: Performed by: SURGERY

## 2019-08-21 RX ORDER — SODIUM CHLORIDE, SODIUM LACTATE, POTASSIUM CHLORIDE, CALCIUM CHLORIDE 600; 310; 30; 20 MG/100ML; MG/100ML; MG/100ML; MG/100ML
INJECTION, SOLUTION INTRAVENOUS CONTINUOUS
Status: DISCONTINUED | OUTPATIENT
Start: 2019-08-21 | End: 2019-08-21 | Stop reason: HOSPADM

## 2019-08-21 RX ORDER — PROPOFOL 10 MG/ML
INJECTION, EMULSION INTRAVENOUS CONTINUOUS PRN
Status: DISCONTINUED | OUTPATIENT
Start: 2019-08-21 | End: 2019-08-21

## 2019-08-21 RX ORDER — LIDOCAINE 40 MG/G
CREAM TOPICAL
Status: DISCONTINUED | OUTPATIENT
Start: 2019-08-21 | End: 2019-08-21 | Stop reason: HOSPADM

## 2019-08-21 RX ORDER — ONDANSETRON 2 MG/ML
4 INJECTION INTRAMUSCULAR; INTRAVENOUS
Status: DISCONTINUED | OUTPATIENT
Start: 2019-08-21 | End: 2019-08-21 | Stop reason: HOSPADM

## 2019-08-21 RX ADMIN — LIDOCAINE HYDROCHLORIDE 0.1 ML: 10 INJECTION, SOLUTION EPIDURAL; INFILTRATION; INTRACAUDAL; PERINEURAL at 07:51

## 2019-08-21 RX ADMIN — PROPOFOL 200 MCG/KG/MIN: 10 INJECTION, EMULSION INTRAVENOUS at 08:18

## 2019-08-21 RX ADMIN — SODIUM CHLORIDE, POTASSIUM CHLORIDE, SODIUM LACTATE AND CALCIUM CHLORIDE: 600; 310; 30; 20 INJECTION, SOLUTION INTRAVENOUS at 07:51

## 2019-08-21 ASSESSMENT — MIFFLIN-ST. JEOR: SCORE: 1194.89

## 2019-08-21 NOTE — H&P
73 year old year old female here for colonoscopy for chronic diarrhea.  This has been ongoing for 1 month.  No antibiotics preceding this.  Last colonoscopy was 2018, negative.    Patient Active Problem List   Diagnosis     Lumbago     Pain in limb     Mild major depression (H)     Essential hypertension     Type 2 diabetes mellitus with diabetic neuropathy (H)     Osteoarthritis     Mixed incontinence urge and stress (male)(female)     Restless legs     Microalbuminuria     Obesity     Esophageal reflux     GI bleed     Dyslipidemia     Iron deficiency anemia     Falls frequently     SHANTI (obstructive sleep apnea)     Other chronic pain     LPRD (laryngopharyngeal reflux disease)     Personal history of fall     Anxiety       Past Medical History:   Diagnosis Date     COPD (chronic obstructive pulmonary disease) (H)     Pt reports chronic bronchitis     Depression      Diabetes mellitus (H)     NIDDM     Dysphagia      GERD (gastroesophageal reflux disease)      Heart disease      Hypertension      Sleep apnea     CPAP       Past Surgical History:   Procedure Laterality Date     BUNIONECTOMY CRISELDA  7/1/2011    Procedure:BUNIONECTOMY CRISELDA; weil osteotomy & Lapidtus Bunionectomy; Surgeon:HYACINTH SANTO; Location:[facility]     BUNIONECTOMY CHEVRON  4/6/2012    Procedure:BUNIONECTOMY CHEVRON; Lapidus bunionectomy, plantar plate repair second and third metatarsophalangeal joints,left foot-popliteal block left lower extremity; Surgeon:HYACINTH SANTO; Location:[facility]      TOTAL ABDOM HYSTERECTOMY  1992    ovaries removed     COLONOSCOPY  9/11/2012    Procedure: COLONOSCOPY;  Colonoscopy;  Surgeon: Alyssa Foster MD;  Location: [facility]     COLONOSCOPY N/A 8/3/2018    Procedure: COLONOSCOPY;  colonoscopy;  Surgeon: Kelechi Rivera MD;  Location: [facility]     NISSEN FUNDOPLICATION      2003     REPAIR HAMMER TOE  7/1/2011    Procedure:REPAIR HAMMER TOE; hammertoe correction right 2nd digit;  "Surgeon:HYACINTH SANTO; Location:[facility]     SURGICAL HISTORY OF -   1979    Tubal     SURGICAL HISTORY OF -   1993    Stamey bladder surgery     SURGICAL HISTORY OF -       Lipoma removed f/back     SURGICAL HISTORY OF -   1995    (L) Foot surgery     SURGICAL HISTORY OF -   05/09/2002    Colonoscopy     SURGICAL HISTORY OF -       knee arthroscopy left     SURGICAL HISTORY OF -   2007-two phases    interstim bladder implant     SURGICAL HISTORY OF -   Sept. 13, 2007    laparoscopic Nissen fundoplication       Family History   Problem Relation Age of Onset     Cancer Mother         brain cancer, ovarian cancer     Cancer Father         lung cancer     Gastrointestinal Disease Father         colitis     Alcohol/Drug Brother      Alcohol/Drug Son      Alcohol/Drug Brother      Gastrointestinal Disease Sister      Diabetes Sister      Cancer Sister         3 sisters .w ovarian cancer and one also with uterine cancer     Gynecology Son         kidney stones     Heart Disease Sister         MI age 64     Gastrointestinal Disease Other         colitis/colitis/colitis/colitis       No current outpatient medications on file.       Allergies   Allergen Reactions     Bactrim [Sulfamethoxazole W/Trimethoprim]      Possible cause of acute encephalopathy summer 2019, was just finishing treatment with bactrim when symptoms occurred.     Lisinopril Cough     Metformin GI Disturbance     reflux sx 50% better off med       Pt reports that she has never smoked. She has never used smokeless tobacco. She reports that she does not drink alcohol or use drugs.    Exam:  /78   Temp 98.2  F (36.8  C)   Resp 18   Ht 1.549 m (5' 1\")   Wt 75.8 kg (167 lb)   SpO2 96%   BMI 31.55 kg/m      Awake, Alert OX3  Lungs - CTA bilaterally  CV - RRR, no murmurs, distal pulses intact  Abd - soft, non-distended, non-tender, +BS  Extr - No cyanosis or edema    A/P 73 year old year old female in need of colonoscopy for chronic diarrhea. " Risks, benefits, alternatives, and complications were discussed including the possibility of perforation, bleeding, missed lesion and the patient agreed to proceed.    Hema Lombardo, DO on 8/21/2019 at 7:47 AM

## 2019-08-21 NOTE — ANESTHESIA CARE TRANSFER NOTE
Patient: Theresa Bill    Procedure(s):  COLONOSCOPY, WITH POLYPECTOMY AND BIOPSY    Diagnosis: abdominal pain, acute diarrhea   diagnostic  Diagnosis Additional Information: No value filed.    Anesthesia Type:   MAC     Note:  Airway :Room Air  Patient transferred to:Phase II  Handoff Report: Identifed the Patient, Identified the Reponsible Provider, Reviewed the pertinent medical history, Discussed the surgical course, Reviewed Intra-OP anesthesia mangement and issues during anesthesia, Set expectations for post-procedure period and Allowed opportunity for questions and acknowledgement of understanding      Vitals: (Last set prior to Anesthesia Care Transfer)    CRNA VITALS  8/21/2019 0804 - 8/21/2019 0835      8/21/2019             Pulse:  78    Ht Rate:  76    SpO2:  97 %                Electronically Signed By: NICHELLE Bermudez CRNA  August 21, 2019  8:35 AM

## 2019-08-21 NOTE — ANESTHESIA POSTPROCEDURE EVALUATION
Patient: Theresa Bill    Procedure(s):  COLONOSCOPY, WITH POLYPECTOMY AND BIOPSY    Diagnosis:abdominal pain, acute diarrhea   diagnostic  Diagnosis Additional Information: No value filed.    Anesthesia Type:  MAC    Note:  Anesthesia Post Evaluation    Patient location during evaluation: Bedside  Patient participation: Able to fully participate in evaluation  Level of consciousness: awake  Pain management: adequate  Airway patency: patent  Cardiovascular status: acceptable  Respiratory status: acceptable  Hydration status: stable  PONV: none     Anesthetic complications: None          Last vitals:  Vitals:    08/21/19 0717   BP: 129/78   Resp: 18   Temp: 36.8  C (98.2  F)   SpO2: 96%         Electronically Signed By: NICHELLE Bermudez CRNA  August 21, 2019  8:35 AM

## 2019-08-23 ENCOUNTER — TELEPHONE (OUTPATIENT)
Dept: FAMILY MEDICINE | Facility: CLINIC | Age: 74
End: 2019-08-23

## 2019-08-23 LAB — COPATH REPORT: NORMAL

## 2019-08-23 NOTE — TELEPHONE ENCOUNTER
Pt called and appointment made for Monday to discuss further plans and work-up. Scarlett Zuluaga RN

## 2019-08-23 NOTE — TELEPHONE ENCOUNTER
Reason for call:  Patient reporting a symptom    Symptom or request: Fatigued, when pt eats something she gets nauseated. Pt had abdominal pain yesterday not today. This started 8/22/19  Pt had Colonoscopy 8/21/19 [facility]. Please Advise if this is normal.    Phone Number patient can be reached at:  Home number on file [phone] (home)    Best Time:  Any Time      Can we leave a detailed message on this number:  YES    Call taken on 8/23/2019 at 11:29 AM by Dianna Suggs

## 2019-08-26 ENCOUNTER — OFFICE VISIT (OUTPATIENT)
Dept: FAMILY MEDICINE | Facility: CLINIC | Age: 74
End: 2019-08-26
Payer: MEDICARE

## 2019-08-26 VITALS
DIASTOLIC BLOOD PRESSURE: 80 MMHG | SYSTOLIC BLOOD PRESSURE: 138 MMHG | RESPIRATION RATE: 18 BRPM | TEMPERATURE: 97.4 F | BODY MASS INDEX: 31.25 KG/M2 | WEIGHT: 165.4 LBS | HEART RATE: 68 BPM

## 2019-08-26 DIAGNOSIS — Z12.31 BREAST CANCER SCREENING BY MAMMOGRAM: ICD-10-CM

## 2019-08-26 DIAGNOSIS — R29.898 LOWER LIMB SYMPTOM: ICD-10-CM

## 2019-08-26 DIAGNOSIS — G25.81 RESTLESS LEGS SYNDROME (RLS): ICD-10-CM

## 2019-08-26 DIAGNOSIS — I10 BENIGN ESSENTIAL HYPERTENSION: ICD-10-CM

## 2019-08-26 DIAGNOSIS — R42 DIZZINESS: Primary | ICD-10-CM

## 2019-08-26 DIAGNOSIS — Z79.4 TYPE 2 DIABETES MELLITUS WITH DIABETIC NEUROPATHY, WITH LONG-TERM CURRENT USE OF INSULIN (H): ICD-10-CM

## 2019-08-26 DIAGNOSIS — E11.40 TYPE 2 DIABETES MELLITUS WITH DIABETIC NEUROPATHY, WITH LONG-TERM CURRENT USE OF INSULIN (H): ICD-10-CM

## 2019-08-26 DIAGNOSIS — R53.83 TIRED: ICD-10-CM

## 2019-08-26 PROCEDURE — 99214 OFFICE O/P EST MOD 30 MIN: CPT | Performed by: PHYSICIAN ASSISTANT

## 2019-08-26 RX ORDER — CLONAZEPAM 0.5 MG/1
TABLET ORAL
Refills: 0
Start: 2019-08-26

## 2019-08-26 RX ORDER — METFORMIN HCL 500 MG
TABLET, EXTENDED RELEASE 24 HR ORAL
Qty: 180 TABLET | Refills: 3 | Status: SHIPPED | OUTPATIENT
Start: 2019-08-26 | End: 2019-09-04

## 2019-08-26 ASSESSMENT — PAIN SCALES - GENERAL: PAINLEVEL: NO PAIN (0)

## 2019-08-26 NOTE — NURSING NOTE
"Chief Complaint   Patient presents with     ER F/U     [facility] 8/23/2019       Initial BP (!) 146/92 (BP Location: Right arm, Patient Position: Chair, Cuff Size: Adult Regular)   Pulse 68   Temp 97.4  F (36.3  C)   Resp 18   Wt 75 kg (165 lb 6.4 oz)   Breastfeeding? No   BMI 31.25 kg/m   Estimated body mass index is 31.25 kg/m  as calculated from the following:    Height as of 8/21/19: 1.549 m (5' 1\").    Weight as of this encounter: 75 kg (165 lb 6.4 oz).    Patient presents to the clinic using No DME    Health Maintenance that is potentially due pending provider review:  Mammogram and labs    Pt will schedule above appt.    Is there anyone who you would like to be able to receive your results? No  If yes have patient fill out RICH    "

## 2019-08-26 NOTE — PATIENT INSTRUCTIONS
Tired legs -  Try stopping atorvastatin to see if helps legs feel less weak    Dizziness -  Since BPs running consistently 125-120/70, let's try stopping BP med   Check your BPs   If doing much better, no further change needed  If not doing much better, try taking just 1 mg clonazepam to see if makes a difference    Sugars -  Try adding metformin 1 tab, if in a few days still seeing high numbers, add the 2nd tab  See how it does for heartburn symptoms     Tired -  See if above changes help    Recheck 1 week      Mammogram eventually      [facility] Mammo Schedule    [facility] ~ [phone]  One Day weekly- Alternating Days    [place] ~ [phone]  Every other Monday or Wednesday   & one Saturday morning a month    [place] ~ [phone]  Every Other Monday Afternoon    ~ [place] ~   Every other Monday Morning    [place] ~ [phone]  Every Monday morning  Every Tuesday afternoon  Wed, Thurs, Friday morning & afternoon  Updated 06/18/18

## 2019-08-26 NOTE — PROGRESS NOTES
"Subjective     Theresa Bill is a 73 year old female who presents to clinic today for the following health issues:    HPI   ED/UC Followup:    Facility:  [facility]  Date of visit: 8/23/2019  Reason for visit: dehydration and weakness. Had colonoscopy at [facility] on 8/21/2019- review results and pathology.  Current Status:  Feeling improved but during the night had an episode of weakness and difficulty getting back to bed after getting up during the night.  Some dizziness with turning in bed.     Noting \"my head feeling dizzy all the time, like its not there\", and tired all the time.  Feels like head going around and around.  If turns over feels like head is in different place.  Sleeping a lot more.  Tiredness has been for 2 months.  Sister here today also notes that she used to be able to walk for half hour while shopping but is now tired, like legs can't go any further.  No new back pain.  No new urinary incont, has recently been having UTI and been ongoing and now doing botox and self cathing tid.      Sugars running higher.  She notes metformin stopped in March when was improving her reflux symptoms x 50%.    BPs - today a bit higher but typically consistent at 115-120/70 at appts.    Reviewed and updated as needed this visit by Provider  Tobacco  Allergies  Meds  Problems  Med Hx  Surg Hx  Fam Hx         Review of Systems   ROS COMP: Constitutional, HEENT, cardiovascular, pulmonary, GI, , musculoskeletal, neuro, endocrine and psych systems are negative, except as otherwise noted.      Objective    /80 (BP Location: Right arm, Patient Position: Sitting, Cuff Size: Adult Regular)   Pulse 68   Temp 97.4  F (36.3  C)   Resp 18   Wt 75 kg (165 lb 6.4 oz)   Breastfeeding? No   BMI 31.25 kg/m    Body mass index is 31.25 kg/m .  Physical Exam   GENERAL: healthy, alert and no distress  HENT: ear canals and TM's normal, Maribell Hallpike normal  NECK: no carotid bruits miguel ángel  RESP: lungs clear to " auscultation - no rales, rhonchi or wheezes  CV: regular rate and rhythm, normal S1 S2, no S3 or S4, no murmur, click or rub  PSYCH: mentation appears normal, affect normal/bright    Diagnostic Test Results:  Labs reviewed in Epic        Assessment & Plan       ICD-10-CM    1. Dizziness R42    2. Benign essential hypertension I10    3. Tired R53.83    4. Lower limb symptom R29.898    5. Type 2 diabetes mellitus with diabetic neuropathy, with long-term current use of insulin (H) E11.40 metFORMIN (GLUCOPHAGE-XR) 500 MG 24 hr tablet    Z79.4    6. Breast cancer screening by mammogram Z12.31 *MA Screening Digital Bilateral   7. Restless legs syndrome (RLS) G25.81 clonazePAM (KLONOPIN) 0.5 MG tablet     Patient Instructions   Tired legs -  Try stopping atorvastatin to see if helps legs feel less weak    Dizziness -  Since BPs running consistently 125-120/70, let's try stopping BP med   Check your BPs   If doing much better, no further change needed  If not doing much better, try taking just 1 mg clonazepam to see if makes a difference    Sugars -  Try adding metformin 1 tab, if in a few days still seeing high numbers, add the 2nd tab  See how it does for heartburn symptoms     Tired -  See if above changes help    Recheck 1 week      Mammogram eventually      [facility] Mammo Schedule    [facility] ~ [phone]  One Day weekly- Alternating Days    [place] ~ [phone]  Every other Monday or Wednesday   & one Saturday morning a month    [place] ~ [phone]  Every Other Monday Afternoon    ~ [place] ~   Every other Monday Morning    [place] ~ [phone]  Every Monday morning  Every Tuesday afternoon  Wed, Thurs, Friday morning & afternoon  Updated 06/18/18          Return in about 1 week (around 9/2/2019) for BP, sugars, dizziness, tiredness.    hSae Pierre PA-C  [facility]

## 2019-08-28 ENCOUNTER — TELEPHONE (OUTPATIENT)
Dept: EDUCATION SERVICES | Facility: CLINIC | Age: 74
End: 2019-08-28

## 2019-08-28 NOTE — TELEPHONE ENCOUNTER
Diabetes education contact:    Currently doing Levemir 15 units twice daily.  Was just restarted on metformin 500 mg twice daily on Monday.    Her BG have increased since she had her colonoscopy    Thursday: 172  Friday: 177, 285  Sat: 249,203, 225  Sun: 144,127,179  Mon: 145,216,209  Tues: 131,279  Wed: 123    She will call me with numbers again in a week.    Sena Scherer RD on 8/28/2019 at 4:53 PM

## 2019-09-03 DIAGNOSIS — K52.9 CHRONIC DIARRHEA: Primary | ICD-10-CM

## 2019-09-04 ENCOUNTER — OFFICE VISIT (OUTPATIENT)
Dept: FAMILY MEDICINE | Facility: CLINIC | Age: 74
End: 2019-09-04
Payer: MEDICARE

## 2019-09-04 VITALS — BODY MASS INDEX: 30.62 KG/M2 | WEIGHT: 162.2 LBS | OXYGEN SATURATION: 96 % | TEMPERATURE: 98.3 F | HEIGHT: 61 IN

## 2019-09-04 DIAGNOSIS — R19.7 DIARRHEA, UNSPECIFIED TYPE: ICD-10-CM

## 2019-09-04 DIAGNOSIS — E11.40 TYPE 2 DIABETES MELLITUS WITH DIABETIC NEUROPATHY, WITH LONG-TERM CURRENT USE OF INSULIN (H): ICD-10-CM

## 2019-09-04 DIAGNOSIS — K52.9 CHRONIC DIARRHEA: ICD-10-CM

## 2019-09-04 DIAGNOSIS — F33.0 MILD RECURRENT MAJOR DEPRESSION (H): ICD-10-CM

## 2019-09-04 DIAGNOSIS — Z79.4 TYPE 2 DIABETES MELLITUS WITH DIABETIC NEUROPATHY, WITH LONG-TERM CURRENT USE OF INSULIN (H): ICD-10-CM

## 2019-09-04 DIAGNOSIS — R42 LIGHTHEADEDNESS: Primary | ICD-10-CM

## 2019-09-04 PROCEDURE — 87206 SMEAR FLUORESCENT/ACID STAI: CPT | Performed by: SURGERY

## 2019-09-04 PROCEDURE — 99214 OFFICE O/P EST MOD 30 MIN: CPT | Performed by: PHYSICIAN ASSISTANT

## 2019-09-04 RX ORDER — BUPROPION HYDROCHLORIDE 150 MG/1
150 TABLET ORAL EVERY MORNING
Qty: 30 TABLET | Refills: 0 | Status: SHIPPED | OUTPATIENT
Start: 2019-09-04 | End: 2019-09-18

## 2019-09-04 RX ORDER — METFORMIN HCL 500 MG
TABLET, EXTENDED RELEASE 24 HR ORAL
Qty: 180 TABLET | Refills: 3 | Status: SHIPPED | OUTPATIENT
Start: 2019-09-04

## 2019-09-04 RX ORDER — NITROFURANTOIN 25; 75 MG/1; MG/1
CAPSULE ORAL
Refills: 0 | COMMUNITY
Start: 2019-08-23 | End: 2019-09-18

## 2019-09-04 ASSESSMENT — ANXIETY QUESTIONNAIRES
6. BECOMING EASILY ANNOYED OR IRRITABLE: NOT AT ALL
1. FEELING NERVOUS, ANXIOUS, OR ON EDGE: NEARLY EVERY DAY
2. NOT BEING ABLE TO STOP OR CONTROL WORRYING: NEARLY EVERY DAY
GAD7 TOTAL SCORE: 10
7. FEELING AFRAID AS IF SOMETHING AWFUL MIGHT HAPPEN: NEARLY EVERY DAY
3. WORRYING TOO MUCH ABOUT DIFFERENT THINGS: SEVERAL DAYS
5. BEING SO RESTLESS THAT IT IS HARD TO SIT STILL: NOT AT ALL

## 2019-09-04 ASSESSMENT — PATIENT HEALTH QUESTIONNAIRE - PHQ9
5. POOR APPETITE OR OVEREATING: NOT AT ALL
SUM OF ALL RESPONSES TO PHQ QUESTIONS 1-9: 6

## 2019-09-04 ASSESSMENT — MIFFLIN-ST. JEOR: SCORE: 1173.11

## 2019-09-04 NOTE — PROGRESS NOTES
"Subjective     Theresa Bill is a 73 year old female who presents to clinic today for the following health issues:    Chief Complaint   Patient presents with     Dizziness     States she is still feeling lightheaded, has a \"buzzing\" noise in her head that does not go away, happens mostly after she eats      Diabetes     Sugars have been high and low, not having any consistency      Recheck Medication     Klonopin, is takig one tablet at night and is doing well with this      Hypertension     Stopped taking her b/p medications, has not been monitoring b/p at home      HPI   Diabetes Follow-up      How often are you checking your blood sugar? Three times daily    What time of day are you checking your blood sugars (select all that apply)?  Before meals    Have you had any blood sugars above 200?  Yes 223 this morning, previous days have been 231, 202, 219, 225, 221, 244     Have you had any blood sugars below 70?  Yes- 66     What symptoms do you notice when your blood sugar is low?  Other: tiredness     What concerns do you have today about your diabetes? Other: sugars running high and low      Do you have any of these symptoms? (Select all that apply)  Has neuropathy in her toes      Have you had a diabetic eye exam in the last 12 months? YES     BP Readings from Last 2 Encounters:   08/26/19 138/80   08/21/19 136/88     Hemoglobin A1C (%)   Date Value   05/10/2019 7.3 (H)   03/11/2019 6.7 (H)     LDL Cholesterol Calculated (mg/dL)   Date Value   03/11/2019 84   03/14/2018 52       Diabetes Management Resources    Here with sister again.    Since last visit -   No improvement in lightheadedness, is all the time.  Onset did not coincide with recent onset of macrobid, and other meds without recent change aside from stopping losartan and decreasing the clonazepam on trial basis for these symptoms.  Her RLS is doing ok with the decrease.  BP is being monitored at home and is fine without med.  Currently some nausea but " "generally not having nausea.  Couple times has had dry heaves.    Still having diarrhea, initially seen 7/29, normal stool tests and colonoscopy.  Has appt with her GI specialist in 9/23 for other - has been able to swallow food well since her nissen revision 5/14 but gets a pain in her stomach with eating, then pain wraps around to her back.       No heartburn despite restarting metformin, 500 mg bid.  Sugars still very variable as above.  200s-230s, sometimes 130s, sometimes 80s, 60s.    She is under a lot of stress with son and his girlfriend, who live in her basement, having major relationship issues.    BP Readings from Last 3 Encounters:   08/26/19 138/80   08/21/19 136/88   07/29/19 120/80    Wt Readings from Last 3 Encounters:   09/04/19 73.6 kg (162 lb 3.2 oz)   08/26/19 75 kg (165 lb 6.4 oz)   08/21/19 75.8 kg (167 lb)        Reviewed and updated as needed this visit by Provider  Tobacco  Allergies  Meds  Problems  Med Hx  Surg Hx  Fam Hx         Review of Systems   ROS COMP: Constitutional, HEENT, cardiovascular, pulmonary, GI, , musculoskeletal, neuro systems are negative, except as otherwise noted.      Objective    Temp 98.3  F (36.8  C) (Tympanic)   Ht 1.549 m (5' 1\")   Wt 73.6 kg (162 lb 3.2 oz)   SpO2 96%   BMI 30.65 kg/m    Body mass index is 30.65 kg/m .  Physical Exam   GENERAL: healthy, alert and no distress          Assessment & Plan       ICD-10-CM    1. Lightheadedness R42 NEUROLOGY ADULT REFERRAL   2. Type 2 diabetes mellitus with diabetic neuropathy, with long-term current use of insulin (H) E11.40 blood glucose (NO BRAND SPECIFIED) test strip    Z79.4 metFORMIN (GLUCOPHAGE-XR) 500 MG 24 hr tablet     insulin detemir (LEVEMIR FLEXPEN/FLEXTOUCH) 100 UNIT/ML pen     DISCONTINUED: insulin detemir (LEVEMIR FLEXPEN/FLEXTOUCH) 100 UNIT/ML pen     DISCONTINUED: insulin detemir (LEVEMIR FLEXPEN/FLEXTOUCH) 100 UNIT/ML pen   3. Mild recurrent major depression (H) F33.0 buPROPion " (WELLBUTRIN XL) 150 MG 24 hr tablet   4. Diarrhea, unspecified type R19.7 Clostridium difficile Toxin B PCR     Patient Instructions   Lightheaded -  Stay off the BP medication (losartan)  Work on getting sugars under control - insulin decreased today from 13 units twice daily to 13 units twice daily, but increasing metformin to 3 tabs per day, and if tolerating, then to 4 tabs per day.    Decrease depression med since it can cause lightheadedness.  Dose changed from 300 to 150 mg.  See me in 2 weeks to see if helped.  If not helping, see [facility].  Discussed I do not think need further heart testing since EKG was normal and lightheaded is all the time.  Discussed possible brain MRI but had this recently (though before the lightheadedness started).    Diarrhea -  cryptosporidium test today  C dif test can be repeated, but needs to be liquid stool.  Keep upcoming appt with GI    Stay off BP med for now.    Recheck appt with me in 2 weeks for mood and lightheadedness and sugars        Return in about 2 weeks (around 9/18/2019) for lightheaded, mood, sugars.    Shae Pierre PA-C  [facility]

## 2019-09-04 NOTE — PATIENT INSTRUCTIONS
Lightheaded -  Stay off the BP medication (losartan)  Work on getting sugars under control - insulin decreased today from 13 units twice daily to 13 units twice daily, but increasing metformin to 3 tabs per day, and if tolerating, then to 4 tabs per day.    Decrease depression med since it can cause lightheadedness.  Dose changed from 300 to 150 mg.  See me in 2 weeks to see if helped.  If not helping, see [facility].  Discussed I do not think need further heart testing since EKG was normal and lightheaded is all the time.  Discussed possible brain MRI but had this recently (though before the lightheadedness started).    Diarrhea -  cryptosporidium test today  C dif test can be repeated, but needs to be liquid stool.  Keep upcoming appt with GI    Stay off BP med for now.    Recheck appt with me in 2 weeks for mood and lightheadedness and sugars

## 2019-09-05 ENCOUNTER — TELEPHONE (OUTPATIENT)
Dept: FAMILY MEDICINE | Facility: CLINIC | Age: 74
End: 2019-09-05

## 2019-09-05 LAB
O+P STL CONC: NORMAL
SPECIMEN SOURCE: NORMAL

## 2019-09-05 ASSESSMENT — ANXIETY QUESTIONNAIRES: GAD7 TOTAL SCORE: 10

## 2019-09-05 NOTE — TELEPHONE ENCOUNTER
Per discussion with Shae, she would like to make sure Theresa knows that she needs to increase fluid intake.  She would like to know if Theresa is drinking enough during the day.  Urine should be clear and not much color to it.  Called Theresa, no answer. Unable to leave message due to no answering machine or voicemail.    Zuleika NOGUEIRA MA

## 2019-09-06 PROCEDURE — 87493 C DIFF AMPLIFIED PROBE: CPT | Performed by: PHYSICIAN ASSISTANT

## 2019-09-07 DIAGNOSIS — R19.7 DIARRHEA, UNSPECIFIED TYPE: ICD-10-CM

## 2019-09-07 LAB
C DIFF TOX B STL QL: NEGATIVE
SPECIMEN SOURCE: NORMAL

## 2019-09-11 ENCOUNTER — TRANSFERRED RECORDS (OUTPATIENT)
Dept: HEALTH INFORMATION MANAGEMENT | Facility: CLINIC | Age: 74
End: 2019-09-11

## 2019-09-18 ENCOUNTER — OFFICE VISIT (OUTPATIENT)
Dept: FAMILY MEDICINE | Facility: CLINIC | Age: 74
End: 2019-09-18
Payer: MEDICARE

## 2019-09-18 VITALS
DIASTOLIC BLOOD PRESSURE: 76 MMHG | OXYGEN SATURATION: 96 % | SYSTOLIC BLOOD PRESSURE: 138 MMHG | RESPIRATION RATE: 24 BRPM | TEMPERATURE: 98.2 F | WEIGHT: 163.6 LBS | BODY MASS INDEX: 30.91 KG/M2 | HEART RATE: 77 BPM

## 2019-09-18 DIAGNOSIS — Z12.31 ENCOUNTER FOR SCREENING MAMMOGRAM FOR BREAST CANCER: ICD-10-CM

## 2019-09-18 DIAGNOSIS — F32.0 MILD MAJOR DEPRESSION (H): Chronic | ICD-10-CM

## 2019-09-18 DIAGNOSIS — E78.5 DYSLIPIDEMIA: ICD-10-CM

## 2019-09-18 DIAGNOSIS — I10 ESSENTIAL HYPERTENSION: Chronic | ICD-10-CM

## 2019-09-18 DIAGNOSIS — R42 LIGHTHEADEDNESS: ICD-10-CM

## 2019-09-18 DIAGNOSIS — E11.40 TYPE 2 DIABETES MELLITUS WITH DIABETIC NEUROPATHY, WITHOUT LONG-TERM CURRENT USE OF INSULIN (H): Primary | ICD-10-CM

## 2019-09-18 LAB — HBA1C MFR BLD: 6.7 % (ref 0–5.6)

## 2019-09-18 PROCEDURE — 99214 OFFICE O/P EST MOD 30 MIN: CPT | Mod: 25 | Performed by: PHYSICIAN ASSISTANT

## 2019-09-18 PROCEDURE — 90662 IIV NO PRSV INCREASED AG IM: CPT | Performed by: PHYSICIAN ASSISTANT

## 2019-09-18 PROCEDURE — 83036 HEMOGLOBIN GLYCOSYLATED A1C: CPT | Performed by: PHYSICIAN ASSISTANT

## 2019-09-18 PROCEDURE — G0008 ADMIN INFLUENZA VIRUS VAC: HCPCS | Performed by: PHYSICIAN ASSISTANT

## 2019-09-18 PROCEDURE — 36415 COLL VENOUS BLD VENIPUNCTURE: CPT | Performed by: PHYSICIAN ASSISTANT

## 2019-09-18 RX ORDER — ATORVASTATIN CALCIUM 40 MG/1
40 TABLET, FILM COATED ORAL AT BEDTIME
Qty: 90 TABLET | Refills: 1 | Status: SHIPPED | OUTPATIENT
Start: 2019-09-18 | End: 2020-06-16

## 2019-09-18 NOTE — NURSING NOTE
"Chief Complaint   Patient presents with     ER F/U     [facility] 8/23/2019       Initial /80 (BP Location: Right arm, Patient Position: Sitting, Cuff Size: Adult Regular)   Pulse 68   Temp 97.4  F (36.3  C)   Resp 18   Wt 75 kg (165 lb 6.4 oz)   Breastfeeding? No   BMI 31.25 kg/m   Estimated body mass index is 31.25 kg/m  as calculated from the following:    Height as of 8/21/19: 1.549 m (5' 1\").    Weight as of this encounter: 75 kg (165 lb 6.4 oz).        "

## 2019-09-18 NOTE — NURSING NOTE
"Chief Complaint   Patient presents with     Diabetes     Dizziness     Diarrhea       Initial /76   Pulse 77   Temp 98.2  F (36.8  C)   Resp 24   Wt 74.2 kg (163 lb 9.6 oz)   SpO2 96%   Breastfeeding? No   BMI 30.91 kg/m   Estimated body mass index is 30.91 kg/m  as calculated from the following:    Height as of 9/4/19: 1.549 m (5' 1\").    Weight as of this encounter: 74.2 kg (163 lb 9.6 oz).    Patient presents to the clinic using No DME    Health Maintenance that is potentially due pending provider review:  NONE    n/a    Is there anyone who you would like to be able to receive your results? not asked  If yes have patient fill out RICH    "

## 2019-09-18 NOTE — Clinical Note
Call patient, let her know -1) I sent message to Dr Jaime.  Can always see me sooner than 2-3 weeks if things worsen.  OR - if she tries off clonazepam for a night and lightheadedness is not at all an issue, she doesn't have to try off wellbutrin.2) I sent prescription for atorvastatin as I noticed is no longer on med list.  I don't recall her having any symptoms/side effects, but confirm this with her.

## 2019-09-18 NOTE — PATIENT INSTRUCTIONS
Diarrhea -  Upcoming GI appt     Diabetes -  Sugars at times high, never low but sometimes 98 so we want to go slow  Increase to 14 for one of for your insulin doses, keep other at 13  Endocrinology referral    Lightheadedness -  Working on sugars  Still minimizing tramadol  Trial off wellbutrin - may worsen mood but we need to see if is cause of lightheadedness  Also suggest trying to skip clonazepam entirely one night to see if is cause of lightheadedness    Mood -  Keep seeing Sam  Be sure to have the meeting with family to address stressors  I suspect mood will worsen off wellbutrin but we need to run experiment.  May need to try other depression meds, but one thing at a time.    Recheck 2-3 weeks    Please schedule your mammogram  [facility] Mammo Schedule  [facility] ~ [phone]  One Day Weekly- Alternating Days    [place] ~ [phone]  Every other Monday or Wednesday   & one Saturday morning a month    [place] ~ [phone]  Every Other Monday Afternoon    [place] ~ [phone]  Every Other Monday Morning    [place] ~ [phone]  Every Monday morning  Every Tuesday afternoon  Wed, Thurs, Friday morning & afternoon

## 2019-09-18 NOTE — PROGRESS NOTES
Subjective     Theresa Bill is a 73 year old female who presents to clinic today for the following health issues:    HPI   Diabetes Follow-up      How often are you checking your blood sugar? Three times daily    What time of day are you checking your blood sugars (select all that apply)?  Before and after meals    Have you had any blood sugars above 200?  Yes one at 219 on 9/10    Have you had any blood sugars below 70?  No    What symptoms do you notice when your blood sugar is low?  Other: sleepy    What concerns do you have today about your diabetes? Other: would like to see endocrinologist     Do you have any of these symptoms? (Select all that apply)  Numbness in feet right -fingers numb too     Have you had a diabetic eye exam in the last 12 months? Yes- Date of last eye exam: 11/2018    BP Readings from Last 2 Encounters:   09/18/19 138/76   08/26/19 138/80     Hemoglobin A1C (%)   Date Value   09/18/2019 6.7 (H)   05/10/2019 7.3 (H)     LDL Cholesterol Calculated (mg/dL)   Date Value   03/11/2019 84   03/14/2018 52       Diabetes Management Resources      How many servings of fruits and vegetables do you eat daily?  2-3    On average, how many sweetened beverages do you drink each day (soda, juice, sweet tea, etc)?   0    How many days per week do you miss taking your medication? 0    Dizziness      Duration: 1 month but is getting better    Description   Feeling faint:  YES  Feeling like the surroundings are moving: no   Loss of consciousness or falls: no     Intensity:  moderate    Accompanying signs and symptoms:   Nausea/vomitting: no   Palpitations: no   Weakness in arms or legs: no   Vision or speech changes: no   Ringing in ears (Tinnitus): no   Hearing loss related to dizziness: no BUT has buzzing in head - unchanged, comes and goes for years.  MRI/MRA this summer normal.  Other (fevers/chills/sweating/dyspnea): YES- gets hot and sweats and feels faint at times    History (similar episodes/head  "trauma/previous evaluation/recent bleeding): 9/4/2019    Precipitating or alleviating factors (new meds/chemicals): None  Worse with activity/head movement: YES    Therapies tried and outcome: sits down and relaxes is helpful    Has not made an appointment to the dizziness clinic    Diarrhea      Duration: 1.5 months    Description:       Consistency of stool: runny, foaming and green       Blood in stool: no        Number of loose stools past 24 hours: 5 loose stools and 1 solid BM    Intensity:  mild    Accompanying signs and symptoms:       Fever: no        Nausea/vomitting: no        Abdominal pain: YES- cramping when has to have BM       Weight loss: YES-   Wt Readings from Last 4 Encounters:   09/18/19 74.2 kg (163 lb 9.6 oz)   09/04/19 73.6 kg (162 lb 3.2 oz)   08/26/19 75 kg (165 lb 6.4 oz)   08/21/19 75.8 kg (167 lb)       History (recent antibiotics or travel/ill contacts/med changes/testing done): ? If from medications    Precipitating or alleviating factors: None    Therapies tried and outcome: Imodium AD and Pepto Bismol but have not helped    GI appt 9/23  Metformin was restarted 8/26, no worsening with restart  Diarrhea no better with decrease of wellbutrin.    Mood is \"not bad\" despite wellbutrin decrease.  Still a lot of stress at home with son and his family not getting a lot.  She's working on getting them to move out.  Anniversary of 's death is upcoming, 9/28 - she has some plans for this.    Lightheaded - mostly morning, later in evening when really tired, and if up in night.    Urine still dark.  Drinking > 64 oz per day.    BP Readings from Last 3 Encounters:   09/18/19 138/76   08/26/19 138/80   08/21/19 136/88    Wt Readings from Last 3 Encounters:   09/18/19 74.2 kg (163 lb 9.6 oz)   09/04/19 73.6 kg (162 lb 3.2 oz)   08/26/19 75 kg (165 lb 6.4 oz)           Reviewed and updated as needed this visit by Provider  Tobacco  Allergies  Meds  Problems  Med Hx  Surg Hx  Fam Hx     "     Review of Systems   ROS COMP: Constitutional, HEENT, cardiovascular, pulmonary, GI, , musculoskeletal, neuro, endocrine and psych systems are negative, except as otherwise noted.      Objective    /76   Pulse 77   Temp 98.2  F (36.8  C)   Resp 24   Wt 74.2 kg (163 lb 9.6 oz)   SpO2 96%   Breastfeeding? No   BMI 30.91 kg/m    Body mass index is 30.91 kg/m .  Physical Exam   GENERAL: healthy, alert and no distress  CV: regular rate and rhythm, normal S1 S2, no S3 or S4, no murmur, click or rub  PSYCH: mentation appears normal, affect normal/bright          Assessment & Plan       ICD-10-CM    1. Type 2 diabetes mellitus with diabetic neuropathy, without long-term current use of insulin (H) E11.40 HEMOGLOBIN A1C     ENDOCRINOLOGY ADULT REFERRAL   2. Lightheadedness R42    3. Mild major depression (H) F32.0    4. Dyslipidemia E78.5 atorvastatin (LIPITOR) 40 MG tablet   5. Essential hypertension I10    6. Encounter for screening mammogram for breast cancer Z12.31 MA SCREENING DIGITAL BILAT - Future  (s+30)   She was also going to try drinking 2-3 cups more water per day to see if helps lightheadedness.    Patient Instructions   Diarrhea -  Upcoming GI appt     Diabetes -  Sugars at times high, never low but sometimes 98 so we want to go slow  Increase to 14 for one of for your insulin doses, keep other at 13  Endocrinology referral    Lightheadedness -  Working on sugars  Still minimizing tramadol  Trial off wellbutrin - may worsen mood but we need to see if is cause of lightheadedness  Also suggest trying to skip clonazepam entirely one night to see if is cause of lightheadedness    Mood -  Keep seeing Sam  Be sure to have the meeting with family to address stressors  I suspect mood will worsen off wellbutrin but we need to run experiment.  May need to try other depression meds, but one thing at a time.    Recheck 2-3 weeks    Please schedule your mammogram  [facility] Mammo Schedule  [facility] ~  [phone]  One Day Weekly- Alternating Days    [place] ~ [phone]  Every other Monday or Wednesday   & one Saturday morning a month    [place] ~ [phone]  Every Other Monday Afternoon    [place] ~ [phone]  Every Other Monday Morning    [place] ~ [phone]  Every Monday morning  Every Tuesday afternoon  Wed, Thurs, Friday morning & afternoon        Return in about 3 weeks (around 10/9/2019).    Shae Pierre PA-C  [facility]

## 2019-09-19 ENCOUNTER — TELEPHONE (OUTPATIENT)
Dept: FAMILY MEDICINE | Facility: CLINIC | Age: 74
End: 2019-09-19

## 2019-09-19 NOTE — TELEPHONE ENCOUNTER
She wanted you to know that when she checked her blood sugar this morning it was 78 fasting.  She ate a banana and will check it again.      Also, she wants her pancreas checked next time she sees you

## 2019-09-25 ENCOUNTER — TELEPHONE (OUTPATIENT)
Dept: FAMILY MEDICINE | Facility: CLINIC | Age: 74
End: 2019-09-25

## 2019-09-25 NOTE — TELEPHONE ENCOUNTER
Theresa Bill is a 73 year old female  who calls with abdominal pain.    NURSING ASSESSMENT:  The pain began 1 day ago.    Pain scale (0-10): 10/10    The pain is described as sharp and is located LLQ, which is without radiation.  Symptom associated with the abdominal pain: nausea and diarrhea.  Patient has had previous abdominal surgery, including none.  Pain is aggravated by movement and urination, and relieved by lying down.  Allergies:   Allergies   Allergen Reactions     Bactrim [Sulfamethoxazole W/Trimethoprim]      Possible cause of acute encephalopathy summer 2019, was just finishing treatment with bactrim when symptoms occurred.     Lisinopril Cough     Metformin GI Disturbance     reflux sx 50% better off med       MEDICATIONS:   Taking medication(s) as prescribed? Yes  Taking over the counter medication(s)? No  Any medication side effects? Not Applicable    Any barriers to taking medication(s) as prescribed?  No  Medication(s) improving/managing symptoms?  N/A  Medication reconciliation completed: No    NURSING PLAN: Nursing advice to patient go to the ED    RECOMMENDED DISPOSITION:  To ED, another person to drive  Will comply with recommendation: Yes  If further questions/concerns or if symptoms do not improve, worsen or new symptoms develop, call your PCP or [facility] as soon as possible.        Briseyda Rome, RN, RN

## 2019-09-25 NOTE — TELEPHONE ENCOUNTER
Reason for call:  Patient reporting a symptom    Symptom or request: Pt says she is having a lot of pain in the lower left side. She says she is having trouble siting or walking. After she urinates it hurts just as bad.     Duration (how long have symptoms been present): Started last night    Have you been treated for this before? No    Additional comments:      Phone Number patient can be reached at:  [phone]  Best Time:  anytime    Can we leave a detailed message on this number:  YES    Call taken on 9/25/2019 at 12:50 PM by Aliza Jolley

## 2019-09-30 ENCOUNTER — OFFICE VISIT (OUTPATIENT)
Dept: ENDOCRINOLOGY | Facility: CLINIC | Age: 74
End: 2019-09-30
Payer: MEDICARE

## 2019-09-30 VITALS
BODY MASS INDEX: 30.16 KG/M2 | SYSTOLIC BLOOD PRESSURE: 133 MMHG | WEIGHT: 159.6 LBS | DIASTOLIC BLOOD PRESSURE: 78 MMHG | OXYGEN SATURATION: 97 % | HEART RATE: 80 BPM

## 2019-09-30 DIAGNOSIS — E11.40 TYPE 2 DIABETES MELLITUS WITH DIABETIC NEUROPATHY, WITH LONG-TERM CURRENT USE OF INSULIN (H): Primary | ICD-10-CM

## 2019-09-30 DIAGNOSIS — Z79.4 TYPE 2 DIABETES MELLITUS WITH DIABETIC NEUROPATHY, WITH LONG-TERM CURRENT USE OF INSULIN (H): Primary | ICD-10-CM

## 2019-09-30 DIAGNOSIS — I10 ESSENTIAL HYPERTENSION: Chronic | ICD-10-CM

## 2019-09-30 DIAGNOSIS — G62.89 OTHER POLYNEUROPATHY: ICD-10-CM

## 2019-09-30 DIAGNOSIS — E78.5 DYSLIPIDEMIA: ICD-10-CM

## 2019-09-30 PROCEDURE — 99204 OFFICE O/P NEW MOD 45 MIN: CPT | Performed by: INTERNAL MEDICINE

## 2019-09-30 RX ORDER — NITROGLYCERIN 0.4 MG/1
0.4 TABLET SUBLINGUAL
COMMUNITY
Start: 2019-09-28

## 2019-09-30 RX ORDER — BUPROPION HYDROCHLORIDE 150 MG/1
150 TABLET ORAL
COMMUNITY

## 2019-09-30 NOTE — PATIENT INSTRUCTIONS
-I will ask Dr Razo if ok to resume aspirin.   -Try the Shannon you mentioned. If this is not helpful, we can have you meet with a dietician.   -Tonight levemir tonight as you would. Tomorrow, stop the metformin and take levemir 15 Units at night.   -Labs in 1 month.

## 2019-09-30 NOTE — LETTER
9/30/2019         RE: Theresa Bill  [address] [place] [address]        Dear Colleague,    Thank you for referring your patient, Theresa Bill, to the [facility]. Please see a copy of my visit note below.    CC: DM.     HPI: Patient here for management of DM.   Diagnosed ~15 years ago.   Originally on metformin.   Then added glipizide.   Eventually stopped glipizide and started insulin.     Metformin 1000 mg AM and 500 mg HS    She was admitted to the hospital last week for diverticulitis.   Levemir dose down from 13 U AM and 14 HS to 6U AM and 7U HS.     Meter:  Avg 141, SD 38  High to 212 post meal.     Family concerned that when she starting doing split dose levemir  They are also wondering about a sliding scale.     Notes hard to stick to diet given her diverticulitis.     She has pins and needles in feet and legs but does not effect her activity.     ROS: 10 point ROS neg other than the symptoms noted above in the HPI.    PMH:   Patient Active Problem List   Diagnosis     Lumbago     Pain in limb     Mild major depression (H)     Essential hypertension     Type 2 diabetes mellitus with diabetic neuropathy (H)     Osteoarthritis     Mixed incontinence urge and stress (male)(female)     Restless legs     Microalbuminuria     Obesity     Esophageal reflux     GI bleed     Dyslipidemia     Iron deficiency anemia     Falls frequently     SHANTI (obstructive sleep apnea)     Other chronic pain     LPRD (laryngopharyngeal reflux disease)     Personal history of fall     Anxiety     Meds:  Current Outpatient Medications   Medication     ACETAMINOPHEN PO     amoxicillin-clavulanate (AUGMENTIN) 875-125 MG tablet     atorvastatin (LIPITOR) 40 MG tablet     blood glucose (NO BRAND SPECIFIED) test strip     buPROPion (WELLBUTRIN XL) 150 MG 24 hr tablet     clonazePAM (KLONOPIN) 0.5 MG tablet     Cyanocobalamin 500 MCG TBDP     insulin detemir (LEVEMIR FLEXPEN/FLEXTOUCH) 100 UNIT/ML pen     insulin pen needle  "(BD MARIIA U/F) 32G X 4 MM miscellaneous     metFORMIN (GLUCOPHAGE-XR) 500 MG 24 hr tablet     Multiple Vitamin (DAILY MULTIVITAMIN PO)     Vitamin D, Cholecalciferol, 1000 units CAPS     nitroGLYcerin (NITROSTAT) 0.4 MG sublingual tablet     order for DME     traMADol (ULTRAM) 50 MG tablet     No current facility-administered medications for this visit.    FHX:   Sister with DM.     SHX:  Non-smoker.     Exam:   Vital signs:      BP: 133/78 Pulse: 80     SpO2: 97 %       Weight: 72.4 kg (159 lb 9.6 oz)  Estimated body mass index is 30.16 kg/m  as calculated from the following:    Height as of 9/4/19: 1.549 m (5' 1\").    Weight as of this encounter: 72.4 kg (159 lb 9.6 oz).  Gen: In NAD.   HEENT: no proptosis or lid lag, EOMI, no palpable thyroid tissue.  Card: S1 S2 RRR no m/r/g. no LE edema.   Pulm: CTA b/l.   GI: NT ND +BS.   MSK: right 3rd toe with hammertoe deformity.   Derm: no rashes or lesions.   Neuro: no tremor, +2 DTR's. Normal monofilament.     A/P:   Type 2 DM - Outside records reviewed. They are concerned about her continued use of metformin. No GLP-1 agonist 2/2 GI concerns.  -Try the Shannon you mentioned. If this is not helpful, we can have you meet with a dietician.   -Tonight levemir tonight as you would. Tomorrow, stop the metformin and take levemir 15 Units at night.   -ASA reports this was stopped prior to surgery in 5/2019 and not resumed. I will ask Dr Razo if ok to resume aspirin.   -BP: controlled.   -Microalbumin normal 4/2019. ACEi not indicated.   -Eyes: due for exam. Denies being told she had DR.   -Smoking: none.     Dyslipidemia - high triglycerides and low HDL in 3/2019. She is on atorvastatin. Discussed adding vascepa.   -As she just started atorvastatin, will wait before adding vascepa.     Neuropathy - She is taking B12 and we will stop the metformin. Normal monofilament exam. Single hammertoe, right #3. Patient declines additional trx at this time.   -Follow clinically.     Evens" SIOBHAN Pearson MD on 9/30/2019 at 4:01 PM      Again, thank you for allowing me to participate in the care of your patient.        Sincerely,        Evens Pearson MD

## 2019-09-30 NOTE — PROGRESS NOTES
CC: DM.     HPI: Patient here for management of DM.   Diagnosed ~15 years ago.   Originally on metformin.   Then added glipizide.   Eventually stopped glipizide and started insulin.     Metformin 1000 mg AM and 500 mg HS    She was admitted to the hospital last week for diverticulitis.   Levemir dose down from 13 U AM and 14 HS to 6U AM and 7U HS.     Meter:  Avg 141, SD 38  High to 212 post meal.     Family concerned that when she starting doing split dose levemir  They are also wondering about a sliding scale.     Notes hard to stick to diet given her diverticulitis.     She has pins and needles in feet and legs but does not effect her activity.     ROS: 10 point ROS neg other than the symptoms noted above in the HPI.    PMH:   Patient Active Problem List   Diagnosis     Lumbago     Pain in limb     Mild major depression (H)     Essential hypertension     Type 2 diabetes mellitus with diabetic neuropathy (H)     Osteoarthritis     Mixed incontinence urge and stress (male)(female)     Restless legs     Microalbuminuria     Obesity     Esophageal reflux     GI bleed     Dyslipidemia     Iron deficiency anemia     Falls frequently     SHANTI (obstructive sleep apnea)     Other chronic pain     LPRD (laryngopharyngeal reflux disease)     Personal history of fall     Anxiety     Meds:  Current Outpatient Medications   Medication     ACETAMINOPHEN PO     amoxicillin-clavulanate (AUGMENTIN) 875-125 MG tablet     atorvastatin (LIPITOR) 40 MG tablet     blood glucose (NO BRAND SPECIFIED) test strip     buPROPion (WELLBUTRIN XL) 150 MG 24 hr tablet     clonazePAM (KLONOPIN) 0.5 MG tablet     Cyanocobalamin 500 MCG TBDP     insulin detemir (LEVEMIR FLEXPEN/FLEXTOUCH) 100 UNIT/ML pen     insulin pen needle (BD MARIIA U/F) 32G X 4 MM miscellaneous     metFORMIN (GLUCOPHAGE-XR) 500 MG 24 hr tablet     Multiple Vitamin (DAILY MULTIVITAMIN PO)     Vitamin D, Cholecalciferol, 1000 units CAPS     nitroGLYcerin (NITROSTAT) 0.4 MG  "sublingual tablet     order for DME     traMADol (ULTRAM) 50 MG tablet     No current facility-administered medications for this visit.    FHX:   Sister with DM.     SHX:  Non-smoker.     Exam:   Vital signs:      BP: 133/78 Pulse: 80     SpO2: 97 %       Weight: 72.4 kg (159 lb 9.6 oz)  Estimated body mass index is 30.16 kg/m  as calculated from the following:    Height as of 9/4/19: 1.549 m (5' 1\").    Weight as of this encounter: 72.4 kg (159 lb 9.6 oz).  Gen: In NAD.   HEENT: no proptosis or lid lag, EOMI, no palpable thyroid tissue.  Card: S1 S2 RRR no m/r/g. no LE edema.   Pulm: CTA b/l.   GI: NT ND +BS.   MSK: right 3rd toe with hammertoe deformity.   Derm: no rashes or lesions.   Neuro: no tremor, +2 DTR's. Normal monofilament.     A/P:   Type 2 DM - Outside records reviewed. They are concerned about her continued use of metformin. No GLP-1 agonist 2/2 GI concerns.  -Try the Shannon you mentioned. If this is not helpful, we can have you meet with a dietician.   -Tonight levemir tonight as you would. Tomorrow, stop the metformin and take levemir 15 Units at night.   -ASA reports this was stopped prior to surgery in 5/2019 and not resumed. I will ask Dr Razo if ok to resume aspirin.   -BP: controlled.   -Microalbumin normal 4/2019. ACEi not indicated.   -Eyes: due for exam. Denies being told she had DR.   -Smoking: none.     Dyslipidemia - high triglycerides and low HDL in 3/2019. She is on atorvastatin. Discussed adding vascepa.   -As she just started atorvastatin, will wait before adding vascepa.     Neuropathy - She is taking B12 and we will stop the metformin. Normal monofilament exam. Single hammertoe, right #3. Patient declines additional trx at this time.   -Follow clinically.     Evens Pearson MD on 9/30/2019 at 4:01 PM    "

## 2019-10-02 ENCOUNTER — OFFICE VISIT (OUTPATIENT)
Dept: PSYCHOLOGY | Facility: CLINIC | Age: 74
End: 2019-10-02
Payer: MEDICARE

## 2019-10-02 DIAGNOSIS — F32.0 MILD MAJOR DEPRESSION (H): Primary | ICD-10-CM

## 2019-10-02 DIAGNOSIS — F43.21 GRIEF: ICD-10-CM

## 2019-10-02 PROCEDURE — 90834 PSYTX W PT 45 MINUTES: CPT | Performed by: PSYCHOLOGIST

## 2019-10-08 ASSESSMENT — ANXIETY QUESTIONNAIRES
GAD7 TOTAL SCORE: 4
6. BECOMING EASILY ANNOYED OR IRRITABLE: NOT AT ALL
7. FEELING AFRAID AS IF SOMETHING AWFUL MIGHT HAPPEN: NEARLY EVERY DAY
4. TROUBLE RELAXING: NOT AT ALL
2. NOT BEING ABLE TO STOP OR CONTROL WORRYING: SEVERAL DAYS
3. WORRYING TOO MUCH ABOUT DIFFERENT THINGS: NOT AT ALL
1. FEELING NERVOUS, ANXIOUS, OR ON EDGE: NOT AT ALL
5. BEING SO RESTLESS THAT IT IS HARD TO SIT STILL: NOT AT ALL

## 2019-10-08 ASSESSMENT — PATIENT HEALTH QUESTIONNAIRE - PHQ9: SUM OF ALL RESPONSES TO PHQ QUESTIONS 1-9: 2

## 2019-10-08 NOTE — PROGRESS NOTES
Progress Note  Disclaimer: This note consists of symbols derived from keyboarding, dictation and/or voice recognition software. As a result, there may be errors in the script that have gone undetected. Please consider this when interpreting information found in this chart.    Client Name: Theresa Bill  Date: 10/2/2019         Service Type: Individual      Session Start Time: 6:00 PM  session End Time: 6:45 PM      Session Length: 45     Session #: 29     Attendees: Client attended alone    Treatment Plan Last Reviewed:  11/7/2018       DATA   Client reports she has been sick for the last couple months and in and out of the hospital a number of times.  She was in the hospital last week for dehydration, diverticulitis and a bladder infection.  Some stress at home due to son's girlfriend having moved some of her family in along with him.  Client reports she is getting ready to set some firm boundaries with who can live with her and who cannot.  Also notes that her diabetes is not well controlled and will be seeing endocrinology.     Progress Since Last Session (Related to Symptoms / Goals / Homework):   Symptoms: Stable    Homework: not applicable      Episode of Care Goals: Satisfactory progress - ACTION (Actively working towards change); Intervened by reinforcing change plan / affirming steps taken     Current / Ongoing Stressors and Concerns:   Recent bereavement, prior caregiver stress, son with serious drug addiction.  Multiple medical problems, recent hospitalization, family stresses     Treatment Objective(s) Addressed in This Session:   increase understanding of steps in the grief process  Utilize family support, client has order for protection, be sure to communicate with other family members every day     Intervention:   Interpersonal Therapy: facilitating appropriate expressions of emotion and grief.  12 step facilitation for family member.  Processed concerns for  upcoming surgery  ASSESSMENT: Current Emotional / Mental Status (status of significant symptoms):   Risk status (Self / Other harm or suicidal ideation)   Client denies current fears or concerns for personal safety.   Client denies current or recent suicidal ideation or behaviors.   Client denies current or recent homicidal ideation or behaviors.   Client denies current or recent self injurious behavior or ideation.   Client denies other safety concerns.   A safety and risk management plan has not been developed at this time, however client was given the after-hours number / 911 should there be a change in any of these risk factors.     Appearance:   Appropriate    Eye Contact:   Good    Psychomotor Behavior: Normal    Attitude:   Cooperative    Orientation:   All   Speech    Rate / Production: Slow     Volume:  Soft    Mood:    Anxious  Sad    Affect:    Appropriate    Thought Content:  Clear    Thought Form:  Coherent  Logical    Insight:    Fair      Medication Review:   No changes to current psychiatric medication(s)     Medication Compliance:   Yes     Changes in Health Issues:   None reported     Chemical Use Review:   Substance Use: Chemical use reviewed, no active concerns identified      Tobacco Use: No current tobacco use.       Collateral Reports Completed:   Not Applicable    PLAN: (Client Tasks / Therapist Tasks / Other)  Client to continue with self-care, keeping herself safe, and not enabling her son's drug use.  Pat herself on the back when she advocates for herself.    Sam Jaime                                                         ________________________________________________________________________    Treatment Plan    Client's Name: Theresa Bill  YOB: 1945    Date: 6/19/2019    Referral / Collaboration:  Referral to another professional/service is not indicated at this time..    Anticipated number of session or this episode of care: 25       DSM5 Diagnoses: (Sustained by  DSM5 Criteria Listed Above)  Diagnoses:            296.21 (F32.0) Major Depressive Disorder, Single Episode, Mild _ grief  Psychosocial & Contextual Factors: Caregiver stress, recent death of her   WHODAS 2.0 (12 item)                          This questionnaire asks about difficulties due to health conditions. Health conditions                   include                        disease or illnesses, other health problems that may be short or long lasting,                    injuries, mental health or emotional problems, and problems with alcohol or drugs.                              Think back over the past 30 days and answer these questions, thinking about how much              difficulty you had doing the following activities. For each question, please [place] only                   one response.      S1 Standing for long periods such as 30 minutes? Moderate =   3   S2 Taking care of household responsibilities? None =         1   S3 Learning a new task, for example, learning how to get to a new place? Mild =           2   S4 How much of a problem do you have joining community activities (for example, festivals, [employer] or other activities) in the same way as anyone else can? None =         1   S5 How much have you been emotionally affected by your health problems? None =         1               In the past 30 days, how much difficulty did you have in:   S6 Concentrating on doing something for ten minutes? None =         1   S7 Walking a long distance such as a kilometer (or equivalent)? Moderate =   3   S8 Washing your whole body? None =         1   S9 Getting dressed? None =         1   S10 Dealing with people you do not know? None =         1   S11 Maintaining a friendship? None =         1   S12 Your day to day work? None =         1       H1 Overall, in the past 30 days, how many days were these difficulties present? Record number of days 1    H2 In the past 30 days, for how many days were you totally  unable to carry out your usual activities or work because of any health condition? Record number of days  0    H3 In the past 30 days, not counting the days that you were totally unable, for how many days did you cut back or reduce your usual activities or work because of any health condition? Record number of days 0      Goals  1. Education- the stages of grief  a. Client will be able to describe the stages of grief; denial, bargaining, anger, depression, and acceptance and give examples of how each have felt for her.  2. Behavioral Activation  a. Client will learn to assess their emotional stateon a day to day basis  b. Client will Identify two forms of exercise/activity and engage in them 3 times per week  c. Client will Identify 3 things that make them laugh, and engage in these 5 times per week.  d. Client will Identify 1-2Creative activities or hobbies  and engage in them 2 times per week  e. Client will identify music, movies, books that make them feel good and use them 3-4 times per week  3. Self-care  a. Client will identify 5 things they can do just for themselves  b. Client will take time for quiet, reflection, meditation 5 times per week  c. Client will Learn to set boundaries when appropriate  d. Client will Identify 2 individuals they can call on for support, distraction  4. Assessment of progress  a. Client will engage in assessment of their progress on a regular basis      Client has reviewed and agreed to the above plan.      Sam Jaime  4/9/2018

## 2019-10-09 ENCOUNTER — TRANSFERRED RECORDS (OUTPATIENT)
Dept: HEALTH INFORMATION MANAGEMENT | Facility: CLINIC | Age: 74
End: 2019-10-09

## 2019-10-09 ASSESSMENT — ANXIETY QUESTIONNAIRES: GAD7 TOTAL SCORE: 4

## 2019-10-25 DIAGNOSIS — F33.0 MILD RECURRENT MAJOR DEPRESSION (H): ICD-10-CM

## 2019-10-25 NOTE — TELEPHONE ENCOUNTER
"Requested Prescriptions   Pending Prescriptions Disp Refills     buPROPion (WELLBUTRIN XL) 150 MG 24 hr tablet [Pharmacy Med Name: BUPROPION XL 150MG TAB] 30 tablet 0     Sig: TAKE 1 TABLET BY MOUTH IN THE MORNING       SSRIs Protocol Passed - 10/25/2019  3:49 PM        Passed - PHQ-9 score less than 5 in past 6 months     Please review last PHQ-9 score.           Passed - Medication is Bupropion     If the medication is Bupropion (Wellbutrin), and the patient is taking for smoking cessation; OK to refill.          Passed - Medication is active on med list        Passed - Patient is age 18 or older        Passed - No active pregnancy on record        Passed - No positive pregnancy test in last 12 months        Passed - Recent (6 mo) or future (30 days) visit within the authorizing provider's specialty     Patient had office visit in the last 6 months or has a visit in the next 30 days with authorizing provider or within the authorizing provider's specialty.  See \"Patient Info\" tab in inbasket, or \"Choose Columns\" in Meds & Orders section of the refill encounter.              Last Written Prescription Date:  unk  Last Fill Quantity: 30,  # refills: 0   Last office visit: 9/18/2019 with prescribing provider:  Shae Lawler #[id number]   Future Office Visit:   Next 5 appointments (look out 90 days)    Nov 04, 2019 10:30 AM CST  Return Visit with Evens Pearson MD  [facility] ([facility]) [address] [facility] [id number] [phone]         "

## 2019-10-28 RX ORDER — BUPROPION HYDROCHLORIDE 150 MG/1
TABLET ORAL
Qty: 90 TABLET | Refills: 1 | Status: SHIPPED | OUTPATIENT
Start: 2019-10-28

## 2019-11-04 ENCOUNTER — OFFICE VISIT (OUTPATIENT)
Dept: ENDOCRINOLOGY | Facility: CLINIC | Age: 74
End: 2019-11-04
Payer: MEDICARE

## 2019-11-04 VITALS
WEIGHT: 158.8 LBS | OXYGEN SATURATION: 96 % | BODY MASS INDEX: 30 KG/M2 | HEART RATE: 74 BPM | DIASTOLIC BLOOD PRESSURE: 75 MMHG | SYSTOLIC BLOOD PRESSURE: 137 MMHG

## 2019-11-04 DIAGNOSIS — I10 ESSENTIAL HYPERTENSION: ICD-10-CM

## 2019-11-04 DIAGNOSIS — E78.5 DYSLIPIDEMIA: ICD-10-CM

## 2019-11-04 DIAGNOSIS — Z79.4 TYPE 2 DIABETES MELLITUS WITH DIABETIC NEUROPATHY, WITH LONG-TERM CURRENT USE OF INSULIN (H): Primary | ICD-10-CM

## 2019-11-04 DIAGNOSIS — G62.89 OTHER POLYNEUROPATHY: ICD-10-CM

## 2019-11-04 DIAGNOSIS — E11.40 TYPE 2 DIABETES MELLITUS WITH DIABETIC NEUROPATHY, WITH LONG-TERM CURRENT USE OF INSULIN (H): Primary | ICD-10-CM

## 2019-11-04 LAB
CHOLEST SERPL-MCNC: 169 MG/DL
HBA1C MFR BLD: 6.6 % (ref 0–5.6)
HDLC SERPL-MCNC: 61 MG/DL
LDLC SERPL CALC-MCNC: 79 MG/DL
NONHDLC SERPL-MCNC: 108 MG/DL
TRIGL SERPL-MCNC: 146 MG/DL

## 2019-11-04 PROCEDURE — 80061 LIPID PANEL: CPT | Performed by: INTERNAL MEDICINE

## 2019-11-04 PROCEDURE — 83036 HEMOGLOBIN GLYCOSYLATED A1C: CPT | Performed by: INTERNAL MEDICINE

## 2019-11-04 PROCEDURE — 36415 COLL VENOUS BLD VENIPUNCTURE: CPT | Performed by: INTERNAL MEDICINE

## 2019-11-04 PROCEDURE — 99214 OFFICE O/P EST MOD 30 MIN: CPT | Performed by: INTERNAL MEDICINE

## 2019-11-04 RX ORDER — NITROFURANTOIN 25; 75 MG/1; MG/1
CAPSULE ORAL
Refills: 0 | COMMUNITY
Start: 2019-10-14

## 2019-11-04 RX ORDER — PRAMIPEXOLE DIHYDROCHLORIDE 0.12 MG/1
TABLET ORAL
COMMUNITY
Start: 2019-10-30

## 2019-11-04 RX ORDER — REPAGLINIDE 0.5 MG/1
0.5 TABLET ORAL
Qty: 30 TABLET | Refills: 11 | Status: SHIPPED | OUTPATIENT
Start: 2019-11-04

## 2019-11-04 RX ORDER — PNV NO.95/FERROUS FUM/FOLIC AC 28MG-0.8MG
1 TABLET ORAL DAILY
COMMUNITY

## 2019-11-04 NOTE — LETTER
"    11/4/2019         RE: Theresa Bill  [address] [place] [address]        Dear Colleague,    Thank you for referring your patient, Theresa Bill, to the [facility]. Please see a copy of my visit note below.    S: Pt being seen in f/u for DM.  No longer on metformin.   She is taking 18 Units of levemir at night.     Meter:  Avg 169, SD 35  Most of her highs are from 9541-6270.   198 after pizza.   Low to 92 at 1300.   -188.     Toast and eggs for breakfast.   Lunch is a sandwich.   Dinner is meat, stemmed veggies, rice or potato.   Snacks: fruit, cheese, nuts.      ROS: 10 point ROS neg other than the symptoms noted above in the HPI.    O:  Vital signs:      BP: 137/75 Pulse: 74     SpO2: 96 %       Weight: 72 kg (158 lb 12.8 oz)  Estimated body mass index is 30 kg/m  as calculated from the following:    Height as of 9/4/19: 1.549 m (5' 1\").    Weight as of this encounter: 72 kg (158 lb 12.8 oz).  Gen: In NAD.   HEENT: no proptosis or lid lag, EOMI, MMM.     A/P:   Type 2 DM - Outside records reviewed. They are concerned about her continued use of metformin. No GLP-1 agonist 2/2 GI concerns.  In 11/2019, on levemir alone. PP highs. Discussed meal time insulin, glipizide, prandin.   -See diabetes education later this week as planned.   -No change to levemir dose.   -Start prandin 0.5 mg with dinner.   -ASA  will discuss with Dr Crain.   -BP: controlled.   -Microalbumin normal 4/2019. ACEi not indicated.   -Eyes: due for exam. Denies being told she had DR.   -Smoking: none.      Dyslipidemia - high triglycerides and low HDL in 3/2019. She is on atorvastatin. Discussed adding vascepa.   -As she just started atorvastatin, will wait before adding vascepa.   -Labs today.      Neuropathy - She is taking B12 and we will stop the metformin. Normal monofilament exam. Single hammertoe, right #3. Patient declines additional trx at this time.   Started on iron and mirapex for RLS in 10/2019.   -Follow " clinically.     I spent 25 minutes with the patient. Greater than 50% of the time spent in . Risks/benefits/alternatives reviewed.     Evens Pearson MD on 11/4/2019 at 11:30 AM          Again, thank you for allowing me to participate in the care of your patient.        Sincerely,        Evens Pearson MD

## 2019-11-04 NOTE — PATIENT INSTRUCTIONS
-Schedule an eye exam.     -Labs today.     -Please ask your new primary care care if they have any objection to your taking 81 mg aspirin a day.     -See diabetes education later this week as planned.   -No change to levemir dose.   -Start prandin 0.5 mg with dinner.

## 2019-11-04 NOTE — PROGRESS NOTES
"S: Pt being seen in f/u for DM.  No longer on metformin.   She is taking 18 Units of levemir at night.     Meter:  Avg 169, SD 35  Most of her highs are from 9484-0360.   198 after pizza.   Low to 92 at 1300.   -188.     Toast and eggs for breakfast.   Lunch is a sandwich.   Dinner is meat, stemmed veggies, rice or potato.   Snacks: fruit, cheese, nuts.      ROS: 10 point ROS neg other than the symptoms noted above in the HPI.    O:  Vital signs:      BP: 137/75 Pulse: 74     SpO2: 96 %       Weight: 72 kg (158 lb 12.8 oz)  Estimated body mass index is 30 kg/m  as calculated from the following:    Height as of 9/4/19: 1.549 m (5' 1\").    Weight as of this encounter: 72 kg (158 lb 12.8 oz).  Gen: In NAD.   HEENT: no proptosis or lid lag, EOMI, MMM.     A/P:   Type 2 DM - Outside records reviewed. They are concerned about her continued use of metformin. No GLP-1 agonist 2/2 GI concerns.  In 11/2019, on levemir alone. PP highs. Discussed meal time insulin, glipizide, prandin.   -See diabetes education later this week as planned.   -No change to levemir dose.   -Start prandin 0.5 mg with dinner.   -ASA will discuss with Dr Crain.   -BP: controlled.   -Microalbumin normal 4/2019. ACEi not indicated.   -Eyes: due for exam. Denies being told she had DR.   -Smoking: none.      Dyslipidemia - high triglycerides and low HDL in 3/2019. She is on atorvastatin. Discussed adding vascepa.   -As she just started atorvastatin, will wait before adding vascepa.   -Labs today.      Neuropathy - She is taking B12 and we will stop the metformin. Normal monofilament exam. Single tirso, right #3. Patient declines additional trx at this time.   Started on iron and mirapex for RLS in 10/2019.   -Follow clinically.     I spent 25 minutes with the patient. Greater than 50% of the time spent in . Risks/benefits/alternatives reviewed.     Evens Pearson MD on 11/4/2019 at 11:30 AM        "

## 2019-11-07 ENCOUNTER — TRANSFERRED RECORDS (OUTPATIENT)
Dept: HEALTH INFORMATION MANAGEMENT | Facility: CLINIC | Age: 74
End: 2019-11-07

## 2019-11-09 ENCOUNTER — TRANSFERRED RECORDS (OUTPATIENT)
Dept: HEALTH INFORMATION MANAGEMENT | Facility: CLINIC | Age: 74
End: 2019-11-09

## 2019-11-09 LAB — RETINOPATHY: NORMAL

## 2019-11-27 DIAGNOSIS — M54.10 RADICULAR LEG PAIN: ICD-10-CM

## 2019-11-29 NOTE — TELEPHONE ENCOUNTER
Requested Prescriptions   Pending Prescriptions Disp Refills     traMADol (ULTRAM) 50 MG tablet [Pharmacy Med Name: TRAMADOL 50MG TAB] 30 tablet 0     Sig: TAKE 1 TABLET BY MOUTH EVERY 6 HOURS AS NEEDED FOR MODERATE PAIN       There is no refill protocol information for this order        Last Written Prescription Date:  9/30/19  Last Fill Quantity: 30,  # refills: 0   Last office visit: 9/18/2019 with prescribing provider:     Future Office Visit:   Next 5 appointments (look out 90 days)    Dec 02, 2019 10:00 AM CST  Return Visit with Evens Pearson MD  [facility] ([facility]) [address] [place] [id number]  [phone]         Routing refill request to provider for review/approval because:  Drug not on the [facility], [facility] or [facility] refill protocol or controlled substance

## 2019-11-29 NOTE — TELEPHONE ENCOUNTER
[place] reviewed:  reviewed- no concerns    [place]  [email or web address]

## 2019-12-04 RX ORDER — TRAMADOL HYDROCHLORIDE 50 MG/1
TABLET ORAL
Qty: 30 TABLET | Refills: 0 | Status: SHIPPED | OUTPATIENT
Start: 2019-12-04

## 2019-12-05 ENCOUNTER — TELEPHONE (OUTPATIENT)
Dept: FAMILY MEDICINE | Facility: CLINIC | Age: 74
End: 2019-12-05

## 2019-12-05 NOTE — TELEPHONE ENCOUNTER
Please clarify medication concern.  Form on Scarlett's desk.    Hilary [facility] [place]

## 2020-01-16 NOTE — MR AVS SNAPSHOT
MRN:[id number]                      After Visit Summary   3/26/2018    Theresa Bill    MRN: [id number]           Visit Information        Provider Department      3/26/2018 9:30 AM Sam Jaime [facility] Generic      Your next 10 appointments already scheduled     Mar 30, 2018 12:30 PM CDT   Ortho Treatment with Consuelo Diazw, PT   [facility] ([facility])    [address] [place] [address]   [phone]            Apr 06, 2018 10:30 AM CDT   Ortho Treatment with Consuelo Diazw, PT   [facility] ([facility])    [address] [place] [address]   [phone]            Apr 09, 2018  9:30 AM CDT   Return Visit with Sam Jaime   [facility] ([facility])    [address] [facility] [address] [phone]              MyChart Information     [facility] gives you secure access to your electronic health record. If you see a primary care provider, you can also send messages to your care team and make appointments. If you have questions, please call your primary care clinic.  If you do not have a primary care provider, please call [phone] and they will assist you.        Care EveryWhere ID     This is your Care EveryWhere ID. This could be used by other organizations to access your [place] medical records  [id number]        Equal Access to Services     SABAS VUONG : Hadii lamonte shankaro Sodainelle, waaxda luqadaha, qaybta kaalmada adecyrusyada, ángel jauregui. So [facility] [phone].    ATENCIÓN: Si habla español, tiene a urban disposición servicios gratuitos de asistencia lingüística. Llame al [phone].    We comply with applicable federal civil rights laws and [place] laws. We do not discriminate on the basis of race, color, national origin, age, disability, sex, sexual orientation, or  gender identity.             2wks

## 2020-01-28 ENCOUNTER — TRANSFERRED RECORDS (OUTPATIENT)
Dept: HEALTH INFORMATION MANAGEMENT | Facility: CLINIC | Age: 75
End: 2020-01-28

## 2020-02-20 ENCOUNTER — TRANSFERRED RECORDS (OUTPATIENT)
Dept: HEALTH INFORMATION MANAGEMENT | Facility: CLINIC | Age: 75
End: 2020-02-20

## 2020-03-06 ENCOUNTER — TRANSFERRED RECORDS (OUTPATIENT)
Dept: HEALTH INFORMATION MANAGEMENT | Facility: CLINIC | Age: 75
End: 2020-03-06

## 2020-04-03 ENCOUNTER — TRANSFERRED RECORDS (OUTPATIENT)
Dept: HEALTH INFORMATION MANAGEMENT | Facility: CLINIC | Age: 75
End: 2020-04-03

## 2020-04-28 DIAGNOSIS — Z79.4 TYPE 2 DIABETES MELLITUS WITHOUT COMPLICATION, WITH LONG-TERM CURRENT USE OF INSULIN (H): Chronic | ICD-10-CM

## 2020-04-28 DIAGNOSIS — E11.9 TYPE 2 DIABETES MELLITUS WITHOUT COMPLICATION, WITH LONG-TERM CURRENT USE OF INSULIN (H): Chronic | ICD-10-CM

## 2020-04-29 RX ORDER — PEN NEEDLE, DIABETIC 32GX 5/32"
NEEDLE, DISPOSABLE MISCELLANEOUS
Qty: 100 EACH | Refills: 0 | Status: SHIPPED | OUTPATIENT
Start: 2020-04-29 | End: 2020-08-17

## 2020-06-16 DIAGNOSIS — E78.5 DYSLIPIDEMIA: ICD-10-CM

## 2020-06-16 RX ORDER — ATORVASTATIN CALCIUM 40 MG/1
TABLET, FILM COATED ORAL
Qty: 90 TABLET | Refills: 1 | Status: SHIPPED | OUTPATIENT
Start: 2020-06-16 | End: 2021-01-06

## 2020-08-13 DIAGNOSIS — Z79.4 TYPE 2 DIABETES MELLITUS WITHOUT COMPLICATION, WITH LONG-TERM CURRENT USE OF INSULIN (H): Chronic | ICD-10-CM

## 2020-08-13 DIAGNOSIS — E11.9 TYPE 2 DIABETES MELLITUS WITHOUT COMPLICATION, WITH LONG-TERM CURRENT USE OF INSULIN (H): Chronic | ICD-10-CM

## 2020-08-17 RX ORDER — PEN NEEDLE, DIABETIC 32GX 5/32"
NEEDLE, DISPOSABLE MISCELLANEOUS
Qty: 60 EACH | Refills: 0 | Status: SHIPPED | OUTPATIENT
Start: 2020-08-17

## 2020-10-09 NOTE — MR AVS SNAPSHOT
MRN:[id number]                      After Visit Summary   8/16/2018    Theresa Bill    MRN: [id number]           Visit Information        Provider Department      8/16/2018 8:30 AM Sam Jaime [facility] Generic      Your next 10 appointments already scheduled     Sep 10, 2018  2:00 PM CDT   Return Visit with Sam Jaime   [facility] ([facility])    [address] [facility] [address]   [phone]            Sep 24, 2018  9:30 AM CDT   Return Visit with Sam Jaime   [facility] ([facility])    [address] [facility] [address]   [phone]              Care EveryWhere ID     This is your Care EveryWhere ID. This could be used by other organizations to access your [place] medical records  [id number]        Equal Access to Services     SABAS VUONG : Hadii lamonte hall Sodanielle, waaxda luqadaha, qaybta kaalmada adecyrusyaestrellita, ángel oconnor . So [facility] [phone].    ATENCIÓN: Si habla español, tiene a urban disposición servicios gratuitos de asistencia lingüística. Llame al [phone].    We comply with applicable federal civil rights laws and [place] laws. We do not discriminate on the basis of race, color, national origin, age, disability, sex, sexual orientation, or gender identity.             cane/dentures

## 2020-11-28 NOTE — PROGRESS NOTES
SUBJECTIVE:   Theresa Bill is a 72 year old female who presents to clinic today for the following health issues:      2 Falls      Duration: Feb 24 and 25th    Description (location/character/radiation): Rt rib cage is sore    Intensity:  moderate    Accompanying signs and symptoms: none    History (similar episodes/previous evaluation): None    Precipitating or alleviating factors: None    Therapies tried and outcome: None   Missed a step twice while on vacation  Ribs getting slightly better.  Was 9-10/10, now 5/10.  Not SOB  Unsure if bruised.  CXR 3/9 without fx.  Cough improving.  No falls or dizziness.  Is still to start Physical Therapy.    Mood - long trip to family did help.    Son also getting out of prison in 2 mo  Planning [place] trip with sister  Also will take 's ashes to scatter with son    DM2 - 120s-158.    Problem list and histories reviewed & adjusted, as indicated.  Additional history: as documented    BP Readings from Last 3 Encounters:   03/14/18 118/80   03/09/18 117/74   02/15/18 128/74    Wt Readings from Last 3 Encounters:   03/14/18 169 lb (76.7 kg)   03/09/18 170 lb (77.1 kg)   02/15/18 170 lb (77.1 kg)         Labs reviewed in EPIC    Reviewed and updated as needed this visit by clinical staff  Tobacco  Allergies  Meds  Problems  Med Hx  Surg Hx  Fam Hx  Soc Hx        Reviewed and updated as needed this visit by Provider  Tobacco  Allergies  Meds  Problems  Med Hx  Surg Hx  Fam Hx  Soc Hx          ROS:  Constitutional, HEENT, cardiovascular, pulmonary, GI, , musculoskeletal, neuro, skin, endocrine and psych systems are negative, except as otherwise noted.    OBJECTIVE:     /80 (BP Location: Right arm)  Pulse 60  Temp 97.9  F (36.6  C) (Tympanic)  Resp 16  Wt 169 lb (76.7 kg)  BMI 31.93 kg/m2  Body mass index is 31.93 kg/(m^2).  GENERAL: healthy, alert and no distress  RESP: lungs clear to auscultation - no rales, rhonchi or wheezes  CV: regular rate and  rhythm, normal S1 S2, no S3 or S4, no murmur, click or rub, chest wall tender over multiple ribs and non focal over each rib  PSYCH: mentation appears normal, affect normal/bright    ASSESSMENT/PLAN:       ICD-10-CM    1. Rib pain R07.81    2. Fall, initial encounter W19.XXXA    3. At risk for falling Z91.81 PHYSICAL THERAPY REFERRAL   4. Mild major depression (H) F32.0    5. Type 2 diabetes mellitus with diabetic neuropathy, with long-term current use of insulin (H) E11.40 Hemoglobin A1c    Z79.4 PHYSICAL THERAPY REFERRAL   6. Benign essential hypertension I10 Basic metabolic panel  (Ca, Cl, CO2, Creat, Gluc, K, Na, BUN)   7. Major depressive disorder, recurrent episode, mild (H) F33.0 DULoxetine (CYMBALTA) 60 MG EC capsule   8. Dyslipidemia E78.5 atorvastatin (LIPITOR) 40 MG tablet     Lipid panel reflex to direct LDL Non-fasting   9. Gastroesophageal reflux disease, esophagitis presence not specified K21.9 pantoprazole (PROTONIX) 40 MG EC tablet     Patient Instructions   Labs today     Start Physical Therapy for fall risk     Doubt broken rib - keep letting it heal    Call if questions    See me in 6 mo, or in July  Perhaps see Sara Ellis when I'm on maternity leave      Shae Pierre PA-C  [facility]   syncope

## 2020-12-15 ENCOUNTER — TRANSFERRED RECORDS (OUTPATIENT)
Dept: HEALTH INFORMATION MANAGEMENT | Facility: CLINIC | Age: 75
End: 2020-12-15

## 2021-01-05 DIAGNOSIS — E78.5 DYSLIPIDEMIA: ICD-10-CM

## 2021-01-05 RX ORDER — ATORVASTATIN CALCIUM 40 MG/1
TABLET, FILM COATED ORAL
Qty: 90 TABLET | Refills: 0 | OUTPATIENT
Start: 2021-01-05

## 2021-01-06 RX ORDER — ATORVASTATIN CALCIUM 40 MG/1
TABLET, FILM COATED ORAL
Qty: 30 TABLET | Refills: 0 | Status: SHIPPED | OUTPATIENT
Start: 2021-01-06

## 2021-02-04 ENCOUNTER — TRANSFERRED RECORDS (OUTPATIENT)
Dept: HEALTH INFORMATION MANAGEMENT | Facility: CLINIC | Age: 76
End: 2021-02-04

## 2022-04-05 ENCOUNTER — TRANSFERRED RECORDS (OUTPATIENT)
Dept: HEALTH INFORMATION MANAGEMENT | Facility: CLINIC | Age: 77
End: 2022-04-05
Payer: MEDICARE

## 2022-06-13 ENCOUNTER — TELEPHONE (OUTPATIENT)
Dept: FAMILY MEDICINE | Facility: CLINIC | Age: 77
End: 2022-06-13
Payer: MEDICARE

## 2022-06-13 NOTE — TELEPHONE ENCOUNTER
[facility] - diabetic test Strips  Per pt does not want her supplies to go to this site. She will get them at [facility]

## 2023-03-31 NOTE — TELEPHONE ENCOUNTER
"Called and discussed fluid intake. \"I drink two big glasses that are about 20 ounces a day\" (of water or juice). So a total of 40oz per day. Does not drink coffee or any other beverages.  She says her urine color: \"It's dark.\"  Discussed urine needs to be clear to straw colored.  Recommended increasing another 20 ounces per day, as the recommended is 64 oz per day. She is in agreement with plan.     Pt also inquired about stool retesting. Her stools have been semi solid but not pure liquid. Discussed if this occurs retesting for C-Diff can be done per PCP 9/4/19 note.    Ryann IRENE RN      " Not Applicable

## 2025-01-24 NOTE — PROGRESS NOTES
Clinic Care Coordination Contact  [facility]/Voicemail       Clinical Data: Care Coordinator Outreach  Outreach attempted x 2.  Left message on voicemail with call back information and requested return call.  Plan: Care Coordinator will send unable to contact letter. Care Coordinator will try to reach patient again in 10-15 business days.  LEYDA Mojica, Clinic Care Coordinator 10/5/2018   9:41 AM  [phone]       Danielle

## 2025-08-26 ENCOUNTER — APPOINTMENT (OUTPATIENT)
Dept: CT IMAGING | Facility: CLINIC | Age: 80
End: 2025-08-26
Attending: EMERGENCY MEDICINE
Payer: COMMERCIAL

## 2025-08-26 ENCOUNTER — HOSPITAL ENCOUNTER (INPATIENT)
Facility: CLINIC | Age: 80
End: 2025-08-26
Attending: EMERGENCY MEDICINE | Admitting: INTERNAL MEDICINE
Payer: COMMERCIAL

## 2025-08-26 DIAGNOSIS — N30.01 ACUTE CYSTITIS WITH HEMATURIA: Primary | ICD-10-CM

## 2025-08-26 DIAGNOSIS — R10.30 LOWER ABDOMINAL PAIN: ICD-10-CM

## 2025-08-26 DIAGNOSIS — N30.80 EMPHYSEMATOUS CYSTITIS: ICD-10-CM

## 2025-08-26 LAB
ALBUMIN SERPL BCG-MCNC: 3.4 G/DL (ref 3.5–5.2)
ALBUMIN UR-MCNC: 300 MG/DL
ALP SERPL-CCNC: 113 U/L (ref 40–150)
ALT SERPL W P-5'-P-CCNC: 10 U/L (ref 0–50)
ANION GAP SERPL CALCULATED.3IONS-SCNC: 11 MMOL/L (ref 7–15)
APPEARANCE UR: ABNORMAL
AST SERPL W P-5'-P-CCNC: 15 U/L (ref 0–45)
BACTERIA #/AREA URNS HPF: ABNORMAL /HPF
BASOPHILS # BLD AUTO: 0.07 10E3/UL (ref 0–0.2)
BASOPHILS NFR BLD AUTO: 1.3 %
BILIRUB SERPL-MCNC: 0.2 MG/DL
BILIRUB UR QL STRIP: NEGATIVE
BUN SERPL-MCNC: 16.1 MG/DL (ref 8–23)
CALCIUM SERPL-MCNC: 9.1 MG/DL (ref 8.8–10.4)
CHLORIDE SERPL-SCNC: 107 MMOL/L (ref 98–107)
COLOR UR AUTO: ABNORMAL
CREAT SERPL-MCNC: 0.94 MG/DL (ref 0.51–0.95)
EGFRCR SERPLBLD CKD-EPI 2021: 61 ML/MIN/1.73M2
EOSINOPHIL # BLD AUTO: 0.23 10E3/UL (ref 0–0.7)
EOSINOPHIL NFR BLD AUTO: 4.1 %
ERYTHROCYTE [DISTWIDTH] IN BLOOD BY AUTOMATED COUNT: 13 % (ref 10–15)
GLUCOSE SERPL-MCNC: 140 MG/DL (ref 70–99)
GLUCOSE UR STRIP-MCNC: NEGATIVE MG/DL
HCO3 SERPL-SCNC: 23 MMOL/L (ref 22–29)
HCT VFR BLD AUTO: 40.6 % (ref 35–47)
HGB BLD-MCNC: 13.7 G/DL (ref 11.7–15.7)
HGB UR QL STRIP: ABNORMAL
IMM GRANULOCYTES # BLD: 0.04 10E3/UL
IMM GRANULOCYTES NFR BLD: 0.7 %
KETONES UR STRIP-MCNC: NEGATIVE MG/DL
LEUKOCYTE ESTERASE UR QL STRIP: ABNORMAL
LIPASE SERPL-CCNC: 26 U/L (ref 13–60)
LYMPHOCYTES # BLD AUTO: 1.49 10E3/UL (ref 0.8–5.3)
LYMPHOCYTES NFR BLD AUTO: 26.8 %
MAGNESIUM SERPL-MCNC: 2 MG/DL (ref 1.7–2.3)
MCH RBC QN AUTO: 31.4 PG (ref 26.5–33)
MCHC RBC AUTO-ENTMCNC: 33.7 G/DL (ref 31.5–36.5)
MCV RBC AUTO: 92.9 FL (ref 78–100)
MONOCYTES # BLD AUTO: 0.45 10E3/UL (ref 0–1.3)
MONOCYTES NFR BLD AUTO: 8.1 %
MUCOUS THREADS #/AREA URNS LPF: PRESENT /LPF
NEUTROPHILS # BLD AUTO: 3.28 10E3/UL (ref 1.6–8.3)
NEUTROPHILS NFR BLD AUTO: 59 %
NITRATE UR QL: POSITIVE
NRBC # BLD AUTO: <0.03 10E3/UL
NRBC BLD AUTO-RTO: 0 /100
PH UR STRIP: 6.5 [PH] (ref 5–7)
PLATELET # BLD AUTO: 244 10E3/UL (ref 150–450)
POTASSIUM SERPL-SCNC: 3.9 MMOL/L (ref 3.4–5.3)
PROT SERPL-MCNC: 6.1 G/DL (ref 6.4–8.3)
RBC # BLD AUTO: 4.37 10E6/UL (ref 3.8–5.2)
RBC URINE: >182 /HPF
SODIUM SERPL-SCNC: 141 MMOL/L (ref 135–145)
SP GR UR STRIP: 1.03 (ref 1–1.03)
SQUAMOUS EPITHELIAL: 7 /HPF
T4 FREE SERPL-MCNC: 0.79 NG/DL (ref 0.9–1.7)
TSH SERPL DL<=0.005 MIU/L-ACNC: 5.1 UIU/ML (ref 0.3–4.2)
UROBILINOGEN UR STRIP-MCNC: NORMAL MG/DL
WBC # BLD AUTO: 5.56 10E3/UL (ref 4–11)
WBC CLUMPS #/AREA URNS HPF: PRESENT /HPF
WBC URINE: >182 /HPF

## 2025-08-26 PROCEDURE — 84443 ASSAY THYROID STIM HORMONE: CPT | Performed by: EMERGENCY MEDICINE

## 2025-08-26 PROCEDURE — 255N000002 HC RX 255 OP 636: Performed by: EMERGENCY MEDICINE

## 2025-08-26 PROCEDURE — 81001 URINALYSIS AUTO W/SCOPE: CPT | Performed by: EMERGENCY MEDICINE

## 2025-08-26 PROCEDURE — 80053 COMPREHEN METABOLIC PANEL: CPT | Performed by: EMERGENCY MEDICINE

## 2025-08-26 PROCEDURE — 96365 THER/PROPH/DIAG IV INF INIT: CPT | Mod: 59

## 2025-08-26 PROCEDURE — 250N000011 HC RX IP 250 OP 636: Performed by: EMERGENCY MEDICINE

## 2025-08-26 PROCEDURE — 74177 CT ABD & PELVIS W/CONTRAST: CPT

## 2025-08-26 PROCEDURE — 83735 ASSAY OF MAGNESIUM: CPT | Performed by: EMERGENCY MEDICINE

## 2025-08-26 PROCEDURE — 250N000009 HC RX 250: Performed by: EMERGENCY MEDICINE

## 2025-08-26 PROCEDURE — 83690 ASSAY OF LIPASE: CPT | Performed by: EMERGENCY MEDICINE

## 2025-08-26 PROCEDURE — 36415 COLL VENOUS BLD VENIPUNCTURE: CPT | Performed by: EMERGENCY MEDICINE

## 2025-08-26 PROCEDURE — 85004 AUTOMATED DIFF WBC COUNT: CPT | Performed by: EMERGENCY MEDICINE

## 2025-08-26 PROCEDURE — 84439 ASSAY OF FREE THYROXINE: CPT | Performed by: EMERGENCY MEDICINE

## 2025-08-26 PROCEDURE — 99285 EMERGENCY DEPT VISIT HI MDM: CPT | Mod: 25

## 2025-08-26 RX ORDER — CEFTRIAXONE 1 G/1
1 INJECTION, POWDER, FOR SOLUTION INTRAMUSCULAR; INTRAVENOUS ONCE
Status: COMPLETED | OUTPATIENT
Start: 2025-08-26 | End: 2025-08-27

## 2025-08-26 RX ADMIN — IOHEXOL 100 ML: 350 INJECTION, SOLUTION INTRAVENOUS at 23:02

## 2025-08-26 RX ADMIN — SODIUM CHLORIDE 60 ML: 9 INJECTION, SOLUTION INTRAVENOUS at 23:01

## 2025-08-26 RX ADMIN — CEFTRIAXONE SODIUM 1 G: 1 INJECTION, POWDER, FOR SOLUTION INTRAMUSCULAR; INTRAVENOUS at 23:44

## 2025-08-26 ASSESSMENT — ACTIVITIES OF DAILY LIVING (ADL)
ADLS_ACUITY_SCORE: 41
ADLS_ACUITY_SCORE: 41

## 2025-08-26 ASSESSMENT — COLUMBIA-SUICIDE SEVERITY RATING SCALE - C-SSRS
2. HAVE YOU ACTUALLY HAD ANY THOUGHTS OF KILLING YOURSELF IN THE PAST MONTH?: NO
1. IN THE PAST MONTH, HAVE YOU WISHED YOU WERE DEAD OR WISHED YOU COULD GO TO SLEEP AND NOT WAKE UP?: NO
6. HAVE YOU EVER DONE ANYTHING, STARTED TO DO ANYTHING, OR PREPARED TO DO ANYTHING TO END YOUR LIFE?: NO

## 2025-08-27 PROBLEM — N30.01 ACUTE CYSTITIS WITH HEMATURIA: Status: ACTIVE | Noted: 2025-08-27

## 2025-08-27 LAB
ANION GAP SERPL CALCULATED.3IONS-SCNC: 13 MMOL/L (ref 7–15)
BUN SERPL-MCNC: 14.1 MG/DL (ref 8–23)
CALCIUM SERPL-MCNC: 8.9 MG/DL (ref 8.8–10.4)
CHLORIDE SERPL-SCNC: 108 MMOL/L (ref 98–107)
CREAT SERPL-MCNC: 0.81 MG/DL (ref 0.51–0.95)
EGFRCR SERPLBLD CKD-EPI 2021: 73 ML/MIN/1.73M2
ERYTHROCYTE [DISTWIDTH] IN BLOOD BY AUTOMATED COUNT: 13.1 % (ref 10–15)
GLUCOSE BLDC GLUCOMTR-MCNC: 113 MG/DL (ref 70–99)
GLUCOSE BLDC GLUCOMTR-MCNC: 116 MG/DL (ref 70–99)
GLUCOSE BLDC GLUCOMTR-MCNC: 134 MG/DL (ref 70–99)
GLUCOSE BLDC GLUCOMTR-MCNC: 138 MG/DL (ref 70–99)
GLUCOSE BLDC GLUCOMTR-MCNC: 148 MG/DL (ref 70–99)
GLUCOSE SERPL-MCNC: 132 MG/DL (ref 70–99)
HCO3 SERPL-SCNC: 22 MMOL/L (ref 22–29)
HCT VFR BLD AUTO: 38.9 % (ref 35–47)
HGB BLD-MCNC: 13.1 G/DL (ref 11.7–15.7)
MAGNESIUM SERPL-MCNC: 1.9 MG/DL (ref 1.7–2.3)
MCH RBC QN AUTO: 31 PG (ref 26.5–33)
MCHC RBC AUTO-ENTMCNC: 33.7 G/DL (ref 31.5–36.5)
MCV RBC AUTO: 92 FL (ref 78–100)
PLATELET # BLD AUTO: 233 10E3/UL (ref 150–450)
POTASSIUM SERPL-SCNC: 3.8 MMOL/L (ref 3.4–5.3)
RBC # BLD AUTO: 4.23 10E6/UL (ref 3.8–5.2)
SODIUM SERPL-SCNC: 143 MMOL/L (ref 135–145)
WBC # BLD AUTO: 7.76 10E3/UL (ref 4–11)

## 2025-08-27 PROCEDURE — 258N000003 HC RX IP 258 OP 636: Performed by: INTERNAL MEDICINE

## 2025-08-27 PROCEDURE — 250N000011 HC RX IP 250 OP 636: Performed by: EMERGENCY MEDICINE

## 2025-08-27 PROCEDURE — 80048 BASIC METABOLIC PNL TOTAL CA: CPT | Performed by: INTERNAL MEDICINE

## 2025-08-27 PROCEDURE — 99222 1ST HOSP IP/OBS MODERATE 55: CPT | Mod: 95 | Performed by: INTERNAL MEDICINE

## 2025-08-27 PROCEDURE — 99207 PR NO BILLABLE SERVICE THIS VISIT: CPT | Performed by: NURSE PRACTITIONER

## 2025-08-27 PROCEDURE — 250N000012 HC RX MED GY IP 250 OP 636 PS 637: Performed by: NURSE PRACTITIONER

## 2025-08-27 PROCEDURE — 36415 COLL VENOUS BLD VENIPUNCTURE: CPT | Performed by: INTERNAL MEDICINE

## 2025-08-27 PROCEDURE — 250N000013 HC RX MED GY IP 250 OP 250 PS 637: Performed by: NURSE PRACTITIONER

## 2025-08-27 PROCEDURE — 99207 PR NOT IN PERSON INPATIENT CONSULT STATISTICAL MARKER: CPT | Performed by: INTERNAL MEDICINE

## 2025-08-27 PROCEDURE — 258N000003 HC RX IP 258 OP 636: Performed by: NURSE PRACTITIONER

## 2025-08-27 PROCEDURE — 250N000013 HC RX MED GY IP 250 OP 250 PS 637: Performed by: INTERNAL MEDICINE

## 2025-08-27 PROCEDURE — 250N000011 HC RX IP 250 OP 636: Performed by: INTERNAL MEDICINE

## 2025-08-27 PROCEDURE — 85027 COMPLETE CBC AUTOMATED: CPT | Performed by: INTERNAL MEDICINE

## 2025-08-27 PROCEDURE — 83735 ASSAY OF MAGNESIUM: CPT | Performed by: INTERNAL MEDICINE

## 2025-08-27 PROCEDURE — 120N000001 HC R&B MED SURG/OB

## 2025-08-27 RX ORDER — PRAMIPEXOLE DIHYDROCHLORIDE 0.12 MG/1
0.25 TABLET ORAL 3 TIMES DAILY
Status: DISCONTINUED | OUTPATIENT
Start: 2025-08-27 | End: 2025-08-27

## 2025-08-27 RX ORDER — INSULIN GLARGINE AND LIXISENATIDE 100; 33 U/ML; UG/ML
25 INJECTION, SOLUTION SUBCUTANEOUS
Status: ON HOLD | COMMUNITY
Start: 2024-03-20

## 2025-08-27 RX ORDER — ROPINIROLE 0.5 MG/1
0.5 TABLET, FILM COATED ORAL 2 TIMES DAILY
Status: ON HOLD | COMMUNITY

## 2025-08-27 RX ORDER — ENOXAPARIN SODIUM 100 MG/ML
40 INJECTION SUBCUTANEOUS EVERY 24 HOURS
Status: DISPENSED | OUTPATIENT
Start: 2025-08-28

## 2025-08-27 RX ORDER — REPAGLINIDE 0.5 MG/1
0.5 TABLET ORAL
Status: DISCONTINUED | OUTPATIENT
Start: 2025-08-27 | End: 2025-08-27

## 2025-08-27 RX ORDER — ONDANSETRON 4 MG/1
4 TABLET, ORALLY DISINTEGRATING ORAL EVERY 6 HOURS PRN
Status: ACTIVE | OUTPATIENT
Start: 2025-08-27

## 2025-08-27 RX ORDER — BUPROPION HYDROCHLORIDE 150 MG/1
150 TABLET ORAL DAILY
Status: DISPENSED | OUTPATIENT
Start: 2025-08-27

## 2025-08-27 RX ORDER — CLONAZEPAM 0.5 MG/1
0.5 TABLET ORAL DAILY
Status: DISCONTINUED | OUTPATIENT
Start: 2025-08-27 | End: 2025-08-27

## 2025-08-27 RX ORDER — OMEPRAZOLE 20 MG/1
20 CAPSULE, DELAYED RELEASE ORAL DAILY
Status: ON HOLD | COMMUNITY
Start: 2025-05-16

## 2025-08-27 RX ORDER — ROPINIROLE 0.25 MG/1
0.5 TABLET, FILM COATED ORAL 3 TIMES DAILY
Status: DISCONTINUED | OUTPATIENT
Start: 2025-08-27 | End: 2025-08-27

## 2025-08-27 RX ORDER — ROPINIROLE 0.5 MG/1
1 TABLET, FILM COATED ORAL AT BEDTIME
Status: ON HOLD | COMMUNITY
Start: 2024-05-09

## 2025-08-27 RX ORDER — ROPINIROLE 1 MG/1
1 TABLET, FILM COATED ORAL AT BEDTIME
Status: DISCONTINUED | OUTPATIENT
Start: 2025-08-27 | End: 2025-08-27

## 2025-08-27 RX ORDER — LEVOTHYROXINE SODIUM 25 UG/1
25 TABLET ORAL
Status: ON HOLD | COMMUNITY
Start: 2024-05-23

## 2025-08-27 RX ORDER — PANTOPRAZOLE SODIUM 40 MG/1
40 TABLET, DELAYED RELEASE ORAL DAILY
Status: DISPENSED | OUTPATIENT
Start: 2025-08-27

## 2025-08-27 RX ORDER — DULOXETIN HYDROCHLORIDE 60 MG/1
60 CAPSULE, DELAYED RELEASE ORAL DAILY
Status: ON HOLD | COMMUNITY

## 2025-08-27 RX ORDER — LEVOTHYROXINE SODIUM 25 UG/1
25 TABLET ORAL
Status: DISPENSED | OUTPATIENT
Start: 2025-08-28

## 2025-08-27 RX ORDER — CEFTRIAXONE 2 G/1
2 INJECTION, POWDER, FOR SOLUTION INTRAMUSCULAR; INTRAVENOUS EVERY 24 HOURS
Status: DISPENSED | OUTPATIENT
Start: 2025-08-28

## 2025-08-27 RX ORDER — AMOXICILLIN 250 MG
2 CAPSULE ORAL 2 TIMES DAILY PRN
Status: ACTIVE | OUTPATIENT
Start: 2025-08-27

## 2025-08-27 RX ORDER — MEROPENEM 1 G/1
1 INJECTION, POWDER, FOR SOLUTION INTRAVENOUS ONCE
Status: COMPLETED | OUTPATIENT
Start: 2025-08-27 | End: 2025-08-27

## 2025-08-27 RX ORDER — MEROPENEM 1 G/1
1 INJECTION, POWDER, FOR SOLUTION INTRAVENOUS EVERY 8 HOURS
Status: DISCONTINUED | OUTPATIENT
Start: 2025-08-27 | End: 2025-08-27

## 2025-08-27 RX ORDER — ONDANSETRON 2 MG/ML
4 INJECTION INTRAMUSCULAR; INTRAVENOUS EVERY 6 HOURS PRN
Status: DISPENSED | OUTPATIENT
Start: 2025-08-27

## 2025-08-27 RX ORDER — ERTAPENEM 1 G/1
1 INJECTION, POWDER, LYOPHILIZED, FOR SOLUTION INTRAMUSCULAR; INTRAVENOUS EVERY 24 HOURS
Status: DISCONTINUED | OUTPATIENT
Start: 2025-08-27 | End: 2025-08-27

## 2025-08-27 RX ORDER — ACETAMINOPHEN 500 MG
1000 TABLET ORAL 3 TIMES DAILY
Status: ON HOLD | COMMUNITY

## 2025-08-27 RX ORDER — HYDRALAZINE HYDROCHLORIDE 20 MG/ML
10 INJECTION INTRAMUSCULAR; INTRAVENOUS EVERY 4 HOURS PRN
Status: DISPENSED | OUTPATIENT
Start: 2025-08-27

## 2025-08-27 RX ORDER — ENOXAPARIN SODIUM 100 MG/ML
40 INJECTION SUBCUTANEOUS EVERY 24 HOURS
Status: DISCONTINUED | OUTPATIENT
Start: 2025-08-27 | End: 2025-08-27

## 2025-08-27 RX ORDER — TRAMADOL HYDROCHLORIDE 50 MG/1
50 TABLET ORAL EVERY 6 HOURS PRN
Status: DISCONTINUED | OUTPATIENT
Start: 2025-08-27 | End: 2025-08-27

## 2025-08-27 RX ORDER — LATANOPROST 50 UG/ML
1 SOLUTION/ DROPS OPHTHALMIC AT BEDTIME
Status: ON HOLD | COMMUNITY
Start: 2024-05-02

## 2025-08-27 RX ORDER — ACETAMINOPHEN 650 MG/1
650 SUPPOSITORY RECTAL EVERY 4 HOURS PRN
Status: ACTIVE | OUTPATIENT
Start: 2025-08-27

## 2025-08-27 RX ORDER — FAMOTIDINE 20 MG/1
20 TABLET, FILM COATED ORAL 2 TIMES DAILY
Status: ON HOLD | COMMUNITY

## 2025-08-27 RX ORDER — DEXTROSE MONOHYDRATE 25 G/50ML
25-50 INJECTION, SOLUTION INTRAVENOUS
Status: ACTIVE | OUTPATIENT
Start: 2025-08-27

## 2025-08-27 RX ORDER — LIDOCAINE 40 MG/G
CREAM TOPICAL
Status: ACTIVE | OUTPATIENT
Start: 2025-08-27

## 2025-08-27 RX ORDER — HYDRALAZINE HYDROCHLORIDE 10 MG/1
10 TABLET, FILM COATED ORAL EVERY 4 HOURS PRN
Status: ACTIVE | OUTPATIENT
Start: 2025-08-27

## 2025-08-27 RX ORDER — PNV NO.95/FERROUS FUM/FOLIC AC 28MG-0.8MG
1 TABLET ORAL DAILY
Status: DISCONTINUED | OUTPATIENT
Start: 2025-08-27 | End: 2025-08-27

## 2025-08-27 RX ORDER — ROPINIROLE 1 MG/1
1 TABLET, FILM COATED ORAL AT BEDTIME
Status: DISPENSED | OUTPATIENT
Start: 2025-08-28

## 2025-08-27 RX ORDER — SODIUM CHLORIDE 9 MG/ML
INJECTION, SOLUTION INTRAVENOUS CONTINUOUS
Status: DISCONTINUED | OUTPATIENT
Start: 2025-08-27 | End: 2025-08-28

## 2025-08-27 RX ORDER — ATORVASTATIN CALCIUM 40 MG/1
40 TABLET, FILM COATED ORAL AT BEDTIME
Status: DISPENSED | OUTPATIENT
Start: 2025-08-27

## 2025-08-27 RX ORDER — AMOXICILLIN 250 MG
1 CAPSULE ORAL 2 TIMES DAILY PRN
Status: ACTIVE | OUTPATIENT
Start: 2025-08-27

## 2025-08-27 RX ORDER — NICOTINE POLACRILEX 4 MG
15-30 LOZENGE BUCCAL
Status: ACTIVE | OUTPATIENT
Start: 2025-08-27

## 2025-08-27 RX ORDER — ROPINIROLE 0.25 MG/1
0.5 TABLET, FILM COATED ORAL
Status: DISCONTINUED | OUTPATIENT
Start: 2025-08-27 | End: 2025-08-27

## 2025-08-27 RX ORDER — DULOXETIN HYDROCHLORIDE 30 MG/1
60 CAPSULE, DELAYED RELEASE ORAL DAILY
Status: DISPENSED | OUTPATIENT
Start: 2025-08-28

## 2025-08-27 RX ORDER — CALCIUM CARBONATE 500 MG/1
1000 TABLET, CHEWABLE ORAL 4 TIMES DAILY PRN
Status: ACTIVE | OUTPATIENT
Start: 2025-08-27

## 2025-08-27 RX ORDER — FAMOTIDINE 20 MG/1
20 TABLET, FILM COATED ORAL 2 TIMES DAILY
Status: DISPENSED | OUTPATIENT
Start: 2025-08-27

## 2025-08-27 RX ORDER — ACETAMINOPHEN 325 MG/1
650 TABLET ORAL EVERY 4 HOURS PRN
Status: DISPENSED | OUTPATIENT
Start: 2025-08-27

## 2025-08-27 RX ADMIN — SODIUM CHLORIDE: 0.9 INJECTION, SOLUTION INTRAVENOUS at 01:57

## 2025-08-27 RX ADMIN — MEROPENEM 1 G: 1 INJECTION, POWDER, FOR SOLUTION INTRAVENOUS at 00:37

## 2025-08-27 RX ADMIN — MEROPENEM 1 G: 1 INJECTION, POWDER, FOR SOLUTION INTRAVENOUS at 08:17

## 2025-08-27 RX ADMIN — ROPINIROLE HYDROCHLORIDE 0.5 MG: 0.25 TABLET, FILM COATED ORAL at 08:20

## 2025-08-27 RX ADMIN — ACETAMINOPHEN 650 MG: 325 TABLET ORAL at 23:58

## 2025-08-27 RX ADMIN — FAMOTIDINE 20 MG: 20 TABLET, FILM COATED ORAL at 20:08

## 2025-08-27 RX ADMIN — INSULIN GLARGINE 14 UNITS: 100 INJECTION, SOLUTION SUBCUTANEOUS at 14:05

## 2025-08-27 RX ADMIN — ATORVASTATIN CALCIUM 40 MG: 40 TABLET, FILM COATED ORAL at 20:08

## 2025-08-27 RX ADMIN — ROPINIROLE HYDROCHLORIDE 0.5 MG: 0.25 TABLET, FILM COATED ORAL at 13:00

## 2025-08-27 RX ADMIN — ACETAMINOPHEN 650 MG: 325 TABLET ORAL at 08:25

## 2025-08-27 RX ADMIN — BUPROPION HYDROCHLORIDE 150 MG: 150 TABLET, FILM COATED, EXTENDED RELEASE ORAL at 08:20

## 2025-08-27 RX ADMIN — ACETAMINOPHEN 650 MG: 325 TABLET ORAL at 02:21

## 2025-08-27 RX ADMIN — ONDANSETRON 4 MG: 2 INJECTION, SOLUTION INTRAMUSCULAR; INTRAVENOUS at 12:22

## 2025-08-27 RX ADMIN — PANTOPRAZOLE SODIUM 40 MG: 40 TABLET, DELAYED RELEASE ORAL at 20:08

## 2025-08-27 RX ADMIN — SODIUM CHLORIDE: 0.9 INJECTION, SOLUTION INTRAVENOUS at 12:22

## 2025-08-27 RX ADMIN — SODIUM CHLORIDE: 0.9 INJECTION, SOLUTION INTRAVENOUS at 20:13

## 2025-08-27 RX ADMIN — HYDRALAZINE HYDROCHLORIDE 10 MG: 20 INJECTION INTRAMUSCULAR; INTRAVENOUS at 02:25

## 2025-08-27 RX ADMIN — ACETAMINOPHEN 650 MG: 325 TABLET ORAL at 12:59

## 2025-08-27 RX ADMIN — ROPINIROLE HYDROCHLORIDE 1 MG: 1 TABLET, FILM COATED ORAL at 04:04

## 2025-08-27 ASSESSMENT — ACTIVITIES OF DAILY LIVING (ADL)
DIFFICULTY_COMMUNICATING: NO
ADLS_ACUITY_SCORE: 24
ADLS_ACUITY_SCORE: 38
WEAR_GLASSES_OR_BLIND: YES
USE_OF_HEARING_ASSISTIVE_DEVICES: BILATERAL HEARING AIDS
ADLS_ACUITY_SCORE: 41
EQUIPMENT_CURRENTLY_USED_AT_HOME: CANE, STRAIGHT
DRESSING/BATHING_DIFFICULTY: NO
ADLS_ACUITY_SCORE: 38
ADLS_ACUITY_SCORE: 27
ADLS_ACUITY_SCORE: 41
ADLS_ACUITY_SCORE: 38
ADLS_ACUITY_SCORE: 38
CHANGE_IN_FUNCTIONAL_STATUS_SINCE_ONSET_OF_CURRENT_ILLNESS/INJURY: NO
ADLS_ACUITY_SCORE: 24
ADLS_ACUITY_SCORE: 41
DOING_ERRANDS_INDEPENDENTLY_DIFFICULTY: NO
HEARING_DIFFICULTY_OR_DEAF: YES
ADLS_ACUITY_SCORE: 38
FALL_HISTORY_WITHIN_LAST_SIX_MONTHS: NO
PATIENT'S_PREFERRED_MEANS_OF_COMMUNICATION: ENGLISH SPEAKER WITH HEARING LOSS, NO SPEECH PROBLEMS.
ADLS_ACUITY_SCORE: 38
ADLS_ACUITY_SCORE: 38
ADLS_ACUITY_SCORE: 41
CONCENTRATING,_REMEMBERING_OR_MAKING_DECISIONS_DIFFICULTY: NO
ADLS_ACUITY_SCORE: 27
ADLS_ACUITY_SCORE: 38
WERE_AUXILIARY_AIDS_OFFERED?: YES
ADLS_ACUITY_SCORE: 38
DIFFICULTY_EATING/SWALLOWING: NO
ADLS_ACUITY_SCORE: 41
ADLS_ACUITY_SCORE: 27
ADLS_ACUITY_SCORE: 38
ADLS_ACUITY_SCORE: 26
DESCRIBE_HEARING_LOSS: BILATERAL HEARING LOSS
WALKING_OR_CLIMBING_STAIRS_DIFFICULTY: YES
WALKING_OR_CLIMBING_STAIRS: AMBULATION DIFFICULTY, REQUIRES EQUIPMENT
TOILETING_ISSUES: NO
ADLS_ACUITY_SCORE: 38
THE_FOLLOWING_AIDS_WERE_PROVIDED;: PATIENT DECLINED OFFER OF AUXILIARY AIDS
ADLS_ACUITY_SCORE: 27

## 2025-08-28 VITALS
HEIGHT: 61 IN | DIASTOLIC BLOOD PRESSURE: 86 MMHG | WEIGHT: 190 LBS | OXYGEN SATURATION: 94 % | TEMPERATURE: 98.3 F | BODY MASS INDEX: 35.87 KG/M2 | HEART RATE: 69 BPM | RESPIRATION RATE: 16 BRPM | SYSTOLIC BLOOD PRESSURE: 147 MMHG

## 2025-08-28 LAB
ALBUMIN SERPL BCG-MCNC: 2.9 G/DL (ref 3.5–5.2)
ALP SERPL-CCNC: 88 U/L (ref 40–150)
ALT SERPL W P-5'-P-CCNC: 9 U/L (ref 0–50)
ANION GAP SERPL CALCULATED.3IONS-SCNC: 10 MMOL/L (ref 7–15)
AST SERPL W P-5'-P-CCNC: 17 U/L (ref 0–45)
BACTERIA UR CULT: ABNORMAL
BACTERIA UR CULT: ABNORMAL
BILIRUB SERPL-MCNC: 0.3 MG/DL
BUN SERPL-MCNC: 9.1 MG/DL (ref 8–23)
CALCIUM SERPL-MCNC: 8.9 MG/DL (ref 8.8–10.4)
CHLORIDE SERPL-SCNC: 109 MMOL/L (ref 98–107)
CREAT SERPL-MCNC: 0.81 MG/DL (ref 0.51–0.95)
EGFRCR SERPLBLD CKD-EPI 2021: 73 ML/MIN/1.73M2
ERYTHROCYTE [DISTWIDTH] IN BLOOD BY AUTOMATED COUNT: 13 % (ref 10–15)
GLUCOSE BLDC GLUCOMTR-MCNC: 140 MG/DL (ref 70–99)
GLUCOSE BLDC GLUCOMTR-MCNC: 152 MG/DL (ref 70–99)
GLUCOSE BLDC GLUCOMTR-MCNC: 171 MG/DL (ref 70–99)
GLUCOSE BLDC GLUCOMTR-MCNC: 187 MG/DL (ref 70–99)
GLUCOSE BLDC GLUCOMTR-MCNC: 81 MG/DL (ref 70–99)
GLUCOSE BLDC GLUCOMTR-MCNC: 86 MG/DL (ref 70–99)
GLUCOSE BLDC GLUCOMTR-MCNC: 95 MG/DL (ref 70–99)
GLUCOSE SERPL-MCNC: 77 MG/DL (ref 70–99)
HCO3 SERPL-SCNC: 22 MMOL/L (ref 22–29)
HCT VFR BLD AUTO: 37.3 % (ref 35–47)
HGB BLD-MCNC: 12.4 G/DL (ref 11.7–15.7)
MAGNESIUM SERPL-MCNC: 2.1 MG/DL (ref 1.7–2.3)
MCH RBC QN AUTO: 30.8 PG (ref 26.5–33)
MCHC RBC AUTO-ENTMCNC: 33.2 G/DL (ref 31.5–36.5)
MCV RBC AUTO: 92.6 FL (ref 78–100)
PLATELET # BLD AUTO: 219 10E3/UL (ref 150–450)
POTASSIUM SERPL-SCNC: 3.8 MMOL/L (ref 3.4–5.3)
PROT SERPL-MCNC: 5.3 G/DL (ref 6.4–8.3)
RBC # BLD AUTO: 4.03 10E6/UL (ref 3.8–5.2)
SODIUM SERPL-SCNC: 141 MMOL/L (ref 135–145)
WBC # BLD AUTO: 5.66 10E3/UL (ref 4–11)

## 2025-08-28 PROCEDURE — 250N000013 HC RX MED GY IP 250 OP 250 PS 637: Performed by: INTERNAL MEDICINE

## 2025-08-28 PROCEDURE — 85014 HEMATOCRIT: CPT | Performed by: NURSE PRACTITIONER

## 2025-08-28 PROCEDURE — 120N000001 HC R&B MED SURG/OB

## 2025-08-28 PROCEDURE — 36415 COLL VENOUS BLD VENIPUNCTURE: CPT | Performed by: NURSE PRACTITIONER

## 2025-08-28 PROCEDURE — 83735 ASSAY OF MAGNESIUM: CPT | Performed by: NURSE PRACTITIONER

## 2025-08-28 PROCEDURE — 82310 ASSAY OF CALCIUM: CPT | Performed by: NURSE PRACTITIONER

## 2025-08-28 PROCEDURE — 250N000013 HC RX MED GY IP 250 OP 250 PS 637: Performed by: NURSE PRACTITIONER

## 2025-08-28 PROCEDURE — 250N000011 HC RX IP 250 OP 636: Performed by: INTERNAL MEDICINE

## 2025-08-28 PROCEDURE — 250N000011 HC RX IP 250 OP 636: Performed by: NURSE PRACTITIONER

## 2025-08-28 RX ADMIN — CEFTRIAXONE SODIUM 2 G: 2 INJECTION, POWDER, FOR SOLUTION INTRAMUSCULAR; INTRAVENOUS at 12:40

## 2025-08-28 RX ADMIN — BUPROPION HYDROCHLORIDE 150 MG: 150 TABLET, FILM COATED, EXTENDED RELEASE ORAL at 08:37

## 2025-08-28 RX ADMIN — ROPINIROLE HYDROCHLORIDE 1 MG: 1 TABLET, FILM COATED ORAL at 20:13

## 2025-08-28 RX ADMIN — ENOXAPARIN SODIUM 40 MG: 40 INJECTION SUBCUTANEOUS at 08:39

## 2025-08-28 RX ADMIN — PANTOPRAZOLE SODIUM 40 MG: 40 TABLET, DELAYED RELEASE ORAL at 08:37

## 2025-08-28 RX ADMIN — ATORVASTATIN CALCIUM 40 MG: 40 TABLET, FILM COATED ORAL at 20:13

## 2025-08-28 RX ADMIN — DULOXETINE 60 MG: 30 CAPSULE, DELAYED RELEASE ORAL at 08:37

## 2025-08-28 RX ADMIN — FAMOTIDINE 20 MG: 20 TABLET, FILM COATED ORAL at 08:37

## 2025-08-28 RX ADMIN — LEVOTHYROXINE SODIUM 25 MCG: 0.03 TABLET ORAL at 06:04

## 2025-08-28 RX ADMIN — FAMOTIDINE 20 MG: 20 TABLET, FILM COATED ORAL at 20:13

## 2025-08-28 RX ADMIN — ACETAMINOPHEN 650 MG: 325 TABLET ORAL at 23:51

## 2025-08-28 ASSESSMENT — ACTIVITIES OF DAILY LIVING (ADL)
ADLS_ACUITY_SCORE: 38

## 2025-08-30 ENCOUNTER — PATIENT OUTREACH (OUTPATIENT)
Dept: CARE COORDINATION | Facility: CLINIC | Age: 80
End: 2025-08-30
Payer: COMMERCIAL

## (undated) RX ORDER — METOPROLOL TARTRATE 1 MG/ML
INJECTION, SOLUTION INTRAVENOUS
Status: DISPENSED
Start: 2017-10-24

## (undated) RX ORDER — NITROGLYCERIN 0.4 MG/1
TABLET SUBLINGUAL
Status: DISPENSED
Start: 2017-10-24